# Patient Record
Sex: FEMALE | Race: BLACK OR AFRICAN AMERICAN | NOT HISPANIC OR LATINO | Employment: UNEMPLOYED | URBAN - METROPOLITAN AREA
[De-identification: names, ages, dates, MRNs, and addresses within clinical notes are randomized per-mention and may not be internally consistent; named-entity substitution may affect disease eponyms.]

---

## 2017-01-20 ENCOUNTER — RECORDS - HEALTHEAST (OUTPATIENT)
Dept: LAB | Facility: CLINIC | Age: 52
End: 2017-01-20

## 2017-01-20 LAB
CHOLEST SERPL-MCNC: 193 MG/DL
FASTING STATUS PATIENT QL REPORTED: ABNORMAL
HDLC SERPL-MCNC: 40 MG/DL
LDLC SERPL CALC-MCNC: 134 MG/DL
TRIGL SERPL-MCNC: 94 MG/DL

## 2017-01-25 ENCOUNTER — HOSPITAL ENCOUNTER (OUTPATIENT)
Dept: RADIOLOGY | Facility: HOSPITAL | Age: 52
Discharge: HOME | End: 2017-01-25

## 2017-01-25 ENCOUNTER — RECORDS - HEALTHEAST (OUTPATIENT)
Dept: ADMINISTRATIVE | Facility: OTHER | Age: 52
End: 2017-01-25

## 2017-01-25 DIAGNOSIS — R52 PAIN: ICD-10-CM

## 2017-05-08 ENCOUNTER — COMMUNICATION - HEALTHEAST (OUTPATIENT)
Dept: ENDOCRINOLOGY | Facility: CLINIC | Age: 52
End: 2017-05-08

## 2017-05-08 DIAGNOSIS — E04.2 NONTOXIC MULTINODULAR GOITER: ICD-10-CM

## 2017-05-11 ENCOUNTER — AMBULATORY - HEALTHEAST (OUTPATIENT)
Dept: LAB | Facility: CLINIC | Age: 52
End: 2017-05-11

## 2017-05-11 DIAGNOSIS — E04.2 NONTOXIC MULTINODULAR GOITER: ICD-10-CM

## 2017-05-16 ENCOUNTER — OFFICE VISIT - HEALTHEAST (OUTPATIENT)
Dept: ENDOCRINOLOGY | Facility: CLINIC | Age: 52
End: 2017-05-16

## 2017-05-16 DIAGNOSIS — E04.9 GOITER: ICD-10-CM

## 2017-05-24 ENCOUNTER — HOSPITAL ENCOUNTER (OUTPATIENT)
Dept: ULTRASOUND IMAGING | Facility: HOSPITAL | Age: 52
Discharge: HOME | End: 2017-05-24
Attending: INTERNAL MEDICINE

## 2017-05-24 DIAGNOSIS — E04.9 GOITER: ICD-10-CM

## 2017-07-19 ENCOUNTER — RECORDS - HEALTHEAST (OUTPATIENT)
Dept: ADMINISTRATIVE | Facility: OTHER | Age: 52
End: 2017-07-19

## 2017-09-20 ENCOUNTER — HOSPITAL ENCOUNTER (OUTPATIENT)
Dept: MAMMOGRAPHY | Facility: HOSPITAL | Age: 52
Discharge: HOME | End: 2017-09-20

## 2017-09-20 DIAGNOSIS — Z12.31 VISIT FOR SCREENING MAMMOGRAM: ICD-10-CM

## 2018-02-16 ENCOUNTER — RECORDS - HEALTHEAST (OUTPATIENT)
Dept: LAB | Facility: CLINIC | Age: 53
End: 2018-02-16

## 2018-02-16 LAB
ALBUMIN SERPL-MCNC: 3.5 G/DL (ref 3.5–5)
ALP SERPL-CCNC: 88 U/L (ref 45–120)
ALT SERPL W P-5'-P-CCNC: 21 U/L (ref 0–45)
ANION GAP SERPL CALCULATED.3IONS-SCNC: 9 MMOL/L (ref 5–18)
AST SERPL W P-5'-P-CCNC: 12 U/L (ref 0–40)
BILIRUB SERPL-MCNC: 0.2 MG/DL (ref 0–1)
BUN SERPL-MCNC: 9 MG/DL (ref 8–22)
CALCIUM SERPL-MCNC: 9.2 MG/DL (ref 8.5–10.5)
CHLORIDE BLD-SCNC: 110 MMOL/L (ref 98–107)
CO2 SERPL-SCNC: 21 MMOL/L (ref 22–31)
CREAT SERPL-MCNC: 0.83 MG/DL (ref 0.6–1.1)
GFR SERPL CREATININE-BSD FRML MDRD: >60 ML/MIN/1.73M2
GLUCOSE BLD-MCNC: 91 MG/DL (ref 70–125)
POTASSIUM BLD-SCNC: 3.9 MMOL/L (ref 3.5–5)
PROT SERPL-MCNC: 6.9 G/DL (ref 6–8)
SODIUM SERPL-SCNC: 140 MMOL/L (ref 136–145)

## 2018-04-20 ENCOUNTER — RECORDS - HEALTHEAST (OUTPATIENT)
Dept: LAB | Facility: CLINIC | Age: 53
End: 2018-04-20

## 2018-04-20 LAB
ANION GAP SERPL CALCULATED.3IONS-SCNC: 8 MMOL/L (ref 5–18)
BUN SERPL-MCNC: 4 MG/DL (ref 8–22)
CALCIUM SERPL-MCNC: 9 MG/DL (ref 8.5–10.5)
CHLORIDE BLD-SCNC: 109 MMOL/L (ref 98–107)
CO2 SERPL-SCNC: 23 MMOL/L (ref 22–31)
CREAT SERPL-MCNC: 0.73 MG/DL (ref 0.6–1.1)
GFR SERPL CREATININE-BSD FRML MDRD: >60 ML/MIN/1.73M2
GLUCOSE BLD-MCNC: 89 MG/DL (ref 70–125)
POTASSIUM BLD-SCNC: 3.6 MMOL/L (ref 3.5–5)
SODIUM SERPL-SCNC: 140 MMOL/L (ref 136–145)

## 2018-04-22 LAB — TOPIRAMATE SERPL-MCNC: 6.4 UG/ML (ref 5–20)

## 2018-09-07 ENCOUNTER — RECORDS - HEALTHEAST (OUTPATIENT)
Dept: LAB | Facility: CLINIC | Age: 53
End: 2018-09-07

## 2018-09-07 LAB
ALBUMIN SERPL-MCNC: 3.5 G/DL (ref 3.5–5)
ALP SERPL-CCNC: 69 U/L (ref 45–120)
ALT SERPL W P-5'-P-CCNC: 17 U/L (ref 0–45)
ANION GAP SERPL CALCULATED.3IONS-SCNC: 8 MMOL/L (ref 5–18)
AST SERPL W P-5'-P-CCNC: 15 U/L (ref 0–40)
BILIRUB SERPL-MCNC: 0.2 MG/DL (ref 0–1)
BUN SERPL-MCNC: 16 MG/DL (ref 8–22)
CALCIUM SERPL-MCNC: 9.4 MG/DL (ref 8.5–10.5)
CHLORIDE BLD-SCNC: 106 MMOL/L (ref 98–107)
CHOLEST SERPL-MCNC: 196 MG/DL
CO2 SERPL-SCNC: 24 MMOL/L (ref 22–31)
CREAT SERPL-MCNC: 0.76 MG/DL (ref 0.6–1.1)
FASTING STATUS PATIENT QL REPORTED: ABNORMAL
GFR SERPL CREATININE-BSD FRML MDRD: >60 ML/MIN/1.73M2
GLUCOSE BLD-MCNC: 84 MG/DL (ref 70–125)
HDLC SERPL-MCNC: 44 MG/DL
LDLC SERPL CALC-MCNC: 112 MG/DL
POTASSIUM BLD-SCNC: 4 MMOL/L (ref 3.5–5)
PROT SERPL-MCNC: 6.4 G/DL (ref 6–8)
SODIUM SERPL-SCNC: 138 MMOL/L (ref 136–145)
TRIGL SERPL-MCNC: 200 MG/DL

## 2018-11-07 ENCOUNTER — HOSPITAL ENCOUNTER (OUTPATIENT)
Dept: MAMMOGRAPHY | Facility: CLINIC | Age: 53
Discharge: HOME | End: 2018-11-07

## 2018-11-07 DIAGNOSIS — Z12.31 VISIT FOR SCREENING MAMMOGRAM: ICD-10-CM

## 2019-02-20 ENCOUNTER — RECORDS - HEALTHEAST (OUTPATIENT)
Dept: LAB | Facility: CLINIC | Age: 54
End: 2019-02-20

## 2019-02-20 LAB
ALBUMIN SERPL-MCNC: 3.5 G/DL (ref 3.5–5)
ALP SERPL-CCNC: 74 U/L (ref 45–120)
ALT SERPL W P-5'-P-CCNC: 14 U/L (ref 0–45)
ANION GAP SERPL CALCULATED.3IONS-SCNC: 7 MMOL/L (ref 5–18)
AST SERPL W P-5'-P-CCNC: 14 U/L (ref 0–40)
BASOPHILS # BLD AUTO: 0 THOU/UL (ref 0–0.2)
BASOPHILS NFR BLD AUTO: 1 % (ref 0–2)
BILIRUB SERPL-MCNC: 0.2 MG/DL (ref 0–1)
BUN SERPL-MCNC: 6 MG/DL (ref 8–22)
CALCIUM SERPL-MCNC: 9 MG/DL (ref 8.5–10.5)
CHLORIDE BLD-SCNC: 111 MMOL/L (ref 98–107)
CO2 SERPL-SCNC: 22 MMOL/L (ref 22–31)
CREAT SERPL-MCNC: 0.78 MG/DL (ref 0.6–1.1)
EOSINOPHIL # BLD AUTO: 0.2 THOU/UL (ref 0–0.4)
EOSINOPHIL NFR BLD AUTO: 3 % (ref 0–6)
ERYTHROCYTE [DISTWIDTH] IN BLOOD BY AUTOMATED COUNT: 12.9 % (ref 11–14.5)
GFR SERPL CREATININE-BSD FRML MDRD: >60 ML/MIN/1.73M2
GLUCOSE BLD-MCNC: 104 MG/DL (ref 70–125)
HCT VFR BLD AUTO: 36.8 % (ref 35–47)
HGB BLD-MCNC: 11.7 G/DL (ref 12–16)
LYMPHOCYTES # BLD AUTO: 2.1 THOU/UL (ref 0.8–4.4)
LYMPHOCYTES NFR BLD AUTO: 32 % (ref 20–40)
MCH RBC QN AUTO: 30.9 PG (ref 27–34)
MCHC RBC AUTO-ENTMCNC: 31.8 G/DL (ref 32–36)
MCV RBC AUTO: 97 FL (ref 80–100)
MONOCYTES # BLD AUTO: 0.6 THOU/UL (ref 0–0.9)
MONOCYTES NFR BLD AUTO: 8 % (ref 2–10)
NEUTROPHILS # BLD AUTO: 3.8 THOU/UL (ref 2–7.7)
NEUTROPHILS NFR BLD AUTO: 57 % (ref 50–70)
PLATELET # BLD AUTO: 367 THOU/UL (ref 140–440)
PMV BLD AUTO: 8.6 FL (ref 8.5–12.5)
POTASSIUM BLD-SCNC: 3.9 MMOL/L (ref 3.5–5)
PROT SERPL-MCNC: 6.5 G/DL (ref 6–8)
RBC # BLD AUTO: 3.79 MILL/UL (ref 3.8–5.4)
SODIUM SERPL-SCNC: 140 MMOL/L (ref 136–145)
WBC: 6.7 THOU/UL (ref 4–11)

## 2019-02-22 LAB — TOPIRAMATE SERPL-MCNC: 6.1 UG/ML (ref 5–20)

## 2019-05-20 ENCOUNTER — RECORDS - HEALTHEAST (OUTPATIENT)
Dept: ADMINISTRATIVE | Facility: OTHER | Age: 54
End: 2019-05-20

## 2019-05-22 ENCOUNTER — COMMUNICATION - HEALTHEAST (OUTPATIENT)
Dept: SURGERY | Facility: CLINIC | Age: 54
End: 2019-05-22

## 2019-05-31 ENCOUNTER — AMBULATORY - HEALTHEAST (OUTPATIENT)
Dept: ADMINISTRATIVE | Facility: CLINIC | Age: 54
End: 2019-05-31

## 2019-05-31 DIAGNOSIS — Z80.0 FAMILY HISTORY OF COLON CANCER: ICD-10-CM

## 2019-06-24 ENCOUNTER — RECORDS - HEALTHEAST (OUTPATIENT)
Dept: ADMINISTRATIVE | Facility: OTHER | Age: 54
End: 2019-06-24

## 2019-06-24 ENCOUNTER — ANESTHESIA - HEALTHEAST (OUTPATIENT)
Dept: SURGERY | Facility: AMBULATORY SURGERY CENTER | Age: 54
End: 2019-06-24

## 2019-07-12 ENCOUNTER — RECORDS - HEALTHEAST (OUTPATIENT)
Dept: LAB | Facility: CLINIC | Age: 54
End: 2019-07-12

## 2019-07-12 LAB
ALBUMIN SERPL-MCNC: 3.5 G/DL (ref 3.5–5)
ALP SERPL-CCNC: 72 U/L (ref 45–120)
ALT SERPL W P-5'-P-CCNC: 16 U/L (ref 0–45)
ANION GAP SERPL CALCULATED.3IONS-SCNC: 14 MMOL/L (ref 5–18)
AST SERPL W P-5'-P-CCNC: ABNORMAL U/L (ref 0–40)
BILIRUB SERPL-MCNC: 0.2 MG/DL (ref 0–1)
BUN SERPL-MCNC: 6 MG/DL (ref 8–22)
CALCIUM SERPL-MCNC: 9.3 MG/DL (ref 8.5–10.5)
CHLORIDE BLD-SCNC: 111 MMOL/L (ref 98–107)
CO2 SERPL-SCNC: 15 MMOL/L (ref 22–31)
CREAT SERPL-MCNC: 0.79 MG/DL (ref 0.6–1.1)
GFR SERPL CREATININE-BSD FRML MDRD: >60 ML/MIN/1.73M2
GLUCOSE BLD-MCNC: 92 MG/DL (ref 70–125)
HGB BLD-MCNC: 12.3 G/DL (ref 12–16)
POTASSIUM BLD-SCNC: ABNORMAL MMOL/L (ref 3.5–5)
PROT SERPL-MCNC: 7.2 G/DL (ref 6–8)
SODIUM SERPL-SCNC: 140 MMOL/L (ref 136–145)

## 2019-07-17 ENCOUNTER — RECORDS - HEALTHEAST (OUTPATIENT)
Dept: LAB | Facility: CLINIC | Age: 54
End: 2019-07-17

## 2019-07-17 LAB
ALBUMIN SERPL-MCNC: 3.9 G/DL (ref 3.5–5)
ALP SERPL-CCNC: 81 U/L (ref 45–120)
ALT SERPL W P-5'-P-CCNC: 14 U/L (ref 0–45)
ANION GAP SERPL CALCULATED.3IONS-SCNC: 9 MMOL/L (ref 5–18)
AST SERPL W P-5'-P-CCNC: 12 U/L (ref 0–40)
BILIRUB SERPL-MCNC: 0.2 MG/DL (ref 0–1)
BUN SERPL-MCNC: 5 MG/DL (ref 8–22)
CALCIUM SERPL-MCNC: 9.6 MG/DL (ref 8.5–10.5)
CHLORIDE BLD-SCNC: 106 MMOL/L (ref 98–107)
CO2 SERPL-SCNC: 25 MMOL/L (ref 22–31)
CREAT SERPL-MCNC: 0.95 MG/DL (ref 0.6–1.1)
GFR SERPL CREATININE-BSD FRML MDRD: >60 ML/MIN/1.73M2
GLUCOSE BLD-MCNC: 88 MG/DL (ref 70–125)
POTASSIUM BLD-SCNC: 3.8 MMOL/L (ref 3.5–5)
PROT SERPL-MCNC: 7.2 G/DL (ref 6–8)
SODIUM SERPL-SCNC: 140 MMOL/L (ref 136–145)

## 2019-07-26 ASSESSMENT — MIFFLIN-ST. JEOR: SCORE: 1461.79

## 2019-07-29 ENCOUNTER — ANESTHESIA - HEALTHEAST (OUTPATIENT)
Dept: SURGERY | Facility: AMBULATORY SURGERY CENTER | Age: 54
End: 2019-07-29

## 2019-07-31 ENCOUNTER — SURGERY - HEALTHEAST (OUTPATIENT)
Dept: SURGERY | Facility: AMBULATORY SURGERY CENTER | Age: 54
End: 2019-07-31

## 2019-07-31 ASSESSMENT — MIFFLIN-ST. JEOR: SCORE: 1461.79

## 2019-08-02 ENCOUNTER — COMMUNICATION - HEALTHEAST (OUTPATIENT)
Dept: SURGERY | Facility: CLINIC | Age: 54
End: 2019-08-02

## 2019-09-17 ENCOUNTER — RECORDS - HEALTHEAST (OUTPATIENT)
Dept: ADMINISTRATIVE | Facility: OTHER | Age: 54
End: 2019-09-17

## 2019-10-22 ENCOUNTER — RECORDS - HEALTHEAST (OUTPATIENT)
Dept: LAB | Facility: CLINIC | Age: 54
End: 2019-10-22

## 2019-10-22 LAB
CHOLEST SERPL-MCNC: 230 MG/DL
FASTING STATUS PATIENT QL REPORTED: ABNORMAL
HDLC SERPL-MCNC: 44 MG/DL
LDLC SERPL CALC-MCNC: 156 MG/DL
TRIGL SERPL-MCNC: 148 MG/DL
TSH SERPL DL<=0.005 MIU/L-ACNC: 1.34 UIU/ML (ref 0.3–5)

## 2019-11-11 ENCOUNTER — HOSPITAL ENCOUNTER (OUTPATIENT)
Dept: MAMMOGRAPHY | Facility: CLINIC | Age: 54
Discharge: HOME | End: 2019-11-11

## 2019-11-11 DIAGNOSIS — Z12.31 VISIT FOR SCREENING MAMMOGRAM: ICD-10-CM

## 2020-01-17 ENCOUNTER — RECORDS - HEALTHEAST (OUTPATIENT)
Dept: LAB | Facility: CLINIC | Age: 55
End: 2020-01-17

## 2020-01-17 LAB
ALBUMIN SERPL-MCNC: 3.5 G/DL (ref 3.5–5)
ALP SERPL-CCNC: 74 U/L (ref 45–120)
ALT SERPL W P-5'-P-CCNC: <9 U/L (ref 0–45)
ANION GAP SERPL CALCULATED.3IONS-SCNC: 9 MMOL/L (ref 5–18)
AST SERPL W P-5'-P-CCNC: 10 U/L (ref 0–40)
BILIRUB SERPL-MCNC: 0.2 MG/DL (ref 0–1)
BUN SERPL-MCNC: 7 MG/DL (ref 8–22)
CALCIUM SERPL-MCNC: 9.1 MG/DL (ref 8.5–10.5)
CHLORIDE BLD-SCNC: 112 MMOL/L (ref 98–107)
CO2 SERPL-SCNC: 22 MMOL/L (ref 22–31)
CREAT SERPL-MCNC: 0.81 MG/DL (ref 0.6–1.1)
GFR SERPL CREATININE-BSD FRML MDRD: >60 ML/MIN/1.73M2
GLUCOSE BLD-MCNC: 94 MG/DL (ref 70–125)
POTASSIUM BLD-SCNC: 4.1 MMOL/L (ref 3.5–5)
PROT SERPL-MCNC: 6.4 G/DL (ref 6–8)
SODIUM SERPL-SCNC: 143 MMOL/L (ref 136–145)

## 2020-01-19 LAB — TOPIRAMATE SERPL-MCNC: 6.9 UG/ML (ref 5–20)

## 2020-07-07 PROBLEM — F34.1 DYSTHYMIA: Status: ACTIVE | Noted: 2020-07-07

## 2020-07-07 PROBLEM — R27.9 LACK OF COORDINATION: Status: ACTIVE | Noted: 2020-07-07

## 2020-07-07 PROBLEM — E87.6 DRUG-INDUCED HYPOKALEMIA: Status: ACTIVE | Noted: 2020-07-07

## 2020-07-07 PROBLEM — R51.9 HEADACHE DISORDER: Status: ACTIVE | Noted: 2020-07-07

## 2020-07-07 PROBLEM — G43.109 MIGRAINE WITH AURA: Status: ACTIVE | Noted: 2020-07-07

## 2020-07-07 PROBLEM — T50.905A DRUG-INDUCED HYPOKALEMIA: Status: ACTIVE | Noted: 2020-07-07

## 2020-07-07 PROBLEM — J45.909 ASTHMA: Status: ACTIVE | Noted: 2020-07-07

## 2020-07-07 PROBLEM — G47.33 OBSTRUCTIVE SLEEP APNEA SYNDROME: Status: ACTIVE | Noted: 2020-07-07

## 2020-07-07 PROBLEM — M19.90 ARTHRITIS: Status: ACTIVE | Noted: 2020-07-07

## 2020-07-07 PROBLEM — C18.9 MALIGNANT NEOPLASM OF COLON (H): Status: ACTIVE | Noted: 2020-07-07

## 2020-07-07 PROBLEM — C50.919 MALIGNANT NEOPLASM OF BREAST (H): Status: ACTIVE | Noted: 2020-07-07

## 2020-07-07 PROBLEM — R29.6 FALLING: Status: ACTIVE | Noted: 2020-07-07

## 2020-07-07 PROBLEM — E66.9 OBESITY: Status: ACTIVE | Noted: 2020-07-07

## 2020-07-07 PROBLEM — G43.019 COMMON MIGRAINE WITH INTRACTABLE MIGRAINE: Status: ACTIVE | Noted: 2020-07-07

## 2020-07-10 ENCOUNTER — OFFICE VISIT (OUTPATIENT)
Dept: NEUROLOGY | Facility: CLINIC | Age: 55
End: 2020-07-10
Payer: COMMERCIAL

## 2020-07-10 VITALS — HEIGHT: 59 IN | WEIGHT: 196 LBS | BODY MASS INDEX: 39.51 KG/M2

## 2020-07-10 DIAGNOSIS — G43.109 MIGRAINE WITH AURA AND WITHOUT STATUS MIGRAINOSUS, NOT INTRACTABLE: Primary | ICD-10-CM

## 2020-07-10 PROCEDURE — 99214 OFFICE O/P EST MOD 30 MIN: CPT | Mod: GT | Performed by: PSYCHIATRY & NEUROLOGY

## 2020-07-10 RX ORDER — TOPIRAMATE 50 MG/1
1 TABLET, FILM COATED ORAL SEE ADMIN INSTRUCTIONS
COMMUNITY
Start: 2020-01-21 | End: 2020-07-10

## 2020-07-10 RX ORDER — BUSPIRONE HYDROCHLORIDE 5 MG/1
5 TABLET ORAL 3 TIMES DAILY
Status: ON HOLD | COMMUNITY
Start: 2019-07-03 | End: 2023-01-01

## 2020-07-10 RX ORDER — SUMATRIPTAN 50 MG/1
50 TABLET, FILM COATED ORAL PRN
Qty: 9 TABLET | Refills: 11 | Status: SHIPPED | OUTPATIENT
Start: 2020-07-10 | End: 2021-01-11

## 2020-07-10 RX ORDER — TOPIRAMATE 50 MG/1
50 TABLET, FILM COATED ORAL SEE ADMIN INSTRUCTIONS
Qty: 90 TABLET | Refills: 11 | Status: SHIPPED | OUTPATIENT
Start: 2020-07-10 | End: 2021-01-11

## 2020-07-10 RX ORDER — SUMATRIPTAN 50 MG/1
50 TABLET, FILM COATED ORAL PRN
COMMUNITY
Start: 2020-01-21 | End: 2020-07-10

## 2020-07-10 RX ORDER — HYDROXYZINE PAMOATE 25 MG/1
25 CAPSULE ORAL 3 TIMES DAILY PRN
COMMUNITY
Start: 2019-02-11

## 2020-07-10 ASSESSMENT — MIFFLIN-ST. JEOR: SCORE: 1384.68

## 2020-07-10 NOTE — PATIENT INSTRUCTIONS
Patient Education   About Migraine Headaches  What is a migraine headache?  A migraine is a very painful type of headache. It may last a few hours or days. During a migraine, you may have vision problems and feel sick to your stomach.  Migraines are three times more common in women than in men. Once they start, you may get them for the rest of your life. They may occur less often as you age.  What causes it?  We don't know the exact cause, but many things can trigger a migraine. These include:    Stress and anxiety    Lack of food or sleep    Foods and drinks that contain tyramine, such as:  ? Red víctor and some beers  ? Aged cheeses (like cheddar or blue cheese)  ? Yeast  ? Aged, dried or cured meats  ? Fermented foods like sauerkraut, soy sauce, miso and jose chi    Too much light    Chemicals (gasoline, cleaning products, perfume, glue, etc.)    Weather changes    Certain medicines    Hormone changes (in women).  What are symptoms?  Some people can tell when they're about to have a migraine. They may see flashing lights or zigzag lines in front of their eyes. Or they may lose their vision for a short time.  With a migraine, you may:    Feel pain or pulsing on one side of the head. For some people, the entire head hurts.    Be very sensitive to light and sound.    Feel nauseated (sick to your stomach) and vomit (throw up).  How is it treated?  Your care team may suggest medicine to prevent or relieve your symptoms. Once you start having migraines, you may also need medicine to keep them from getting worse.   Take your medicine at the first sign of a migraine. It may take several tries to find a medicine that works for you.   When a migraine comes:    Lie down in a quiet, dark room. Try not to bend over, as this may cause more pain.    Put a cold pack on your head. Try a bag of frozen vegetables, wrapped with a thin cloth.    Drink extra fluids. If you can't drink, suck on ice chips.  How can I prevent  migraines?  It will help to keep a migraine diary. By keeping a diary, you may find the cause of your headaches. Once you know the cause, you can take steps to prevent migraines in the future.  It also helps to live a healthy lifestyle. This means:    Get regular exercise. (If exercise triggers your headaches, tell your doctor.)    Drink plenty of water.    Eat healthy meals at regular times.    Try to go to bed and get up and regular times.    Don't smoke. Avoid second-hand smoke.    Avoid caffeine. Coffee, tea and soda may help a migraine. But if you drink them too often, they can cause migraines.    Find ways to relax, have fun and reduce stress in your life.    Try complementary therapies (yoga, acupuncture, massage, biofeedback, etc.).  When should I call my clinic?  Call your clinic at once if you have new or unusual symptoms, such as:     Pain that gets worse or lasts more than 24 hours.    Slurred speech or problems talking.    A weak arm or leg that you can't move normally.    A fever over 100 F (37.8 C), taken under the tongue.    Stiff neck.    Vomiting (throwing up) for several hours.  For more information about migraines  Contact the [employer] (ACHE) at 1-742.320.6133 or www.headaches.org.  Local providers of complementary therapies  These services may not be covered by insurance. Check your insurance plan.  [facility]  [phone]   Includes pain education, behavioral therapy,   trigger point injections and more.  [facility]   [phone]   Includes acupuncture, massage, myofascial release.  [facility]  [phone]  [email or web address]  Includes mindfulness, meditation, yoga.  Community Education     [place]: armida.mpls.k12.mn.    [place]: commedprograms.spps.org  Look for programs on yoga, mindfulness, etc.  [facility]    [phone]  www.pathways[place].org  Includes mindfulness, yoga, body-mind skills.  For informational purposes only. Not to replace the advice of your health care provider.   Copyright   2011 [employer]. All rights reserved. [employer] [id number] - 11/15.

## 2020-07-10 NOTE — NURSING NOTE
Chief Complaint   Patient presents with     Follow Up     Migraines-States she is doing ok and no new concerns      Video Visit [phone] smart phone  Edgardo Childs on 7/10/2020 at 12:39 PM

## 2020-07-10 NOTE — PROGRESS NOTES
"Video visit requested by patient  Video visit due to the COVID-19 pandemic  Text number [phone]  Patient identified    .Patient is being evaluated via a billable video visit. The patient has been notified of following:     \"This video visit will be conducted via a call between you and your physician/provider. We have found that certain health care needs can be provided without the need for an in-person physical exam. This service lets us provide the care you need with a video conversation. If a prescription is necessary we can send it directly to your pharmacy. If lab work is needed we can place an order for that and you can then stop by our lab to have the test done at a later time.    If during the course of the call the physician/provider feels a video visit is not appropriate, you will not be charged for this service.    Physician has received verbal consent for a Video Visit from the patient? YES    Patient would like the video invitation sent by: Text [phone]      Video Visit Details    Type of service: Video Visit    Video Start Time: 12:48 PM    Video End Time (time video stopped): 1:18 PM    Originating Location (pt. Location): Patient's Home    Distant Location (provider location): [facility]     Mode of Communication: Video Conference via [employer] / [employer]          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 extensive work-up as far back as 2006    Headache frequency is the same  No new symptoms or signs with her headaches  Is staying " "well-hydrated getting enough food and  Does have a PCA and her son helps her out a lot      Patient has migraine headaches  Frequency is about 9/month  Uses Imitrex 50 mill grams once per day for abortive therapy tolerates well without side effects  Uses Topamax 50 mg tablet 1 in the morning 2 at night with a level of 6.1 February 2019  Chloride of 111 CO2 of 22 knows the side effects of Topamax we discussed today at length        Patient is pretty much scooter bound when she is out and about  Uses a walker inside her apartment  Has a shower chair  Lives on the 10th floor but at one level has an elevator  Sons do the grocery shopping for her  Has a PCA who does cleaning and cooking 7 days/week 5 hours/day            Patient s current difficulties include:    1. Difficulty with gait which is chronic in nature, uses a motorized scooter wheelchair, has a four-wheeled walker also. Gait difficulties have been going on as far back as 2006, multifactorial in nature.  2. Has a past history of bowel and bladder dysfunction, mild incontinence, not progressive. No structural lesion had been found in the past in her spine during previous workups.  3. History of breast cancer, currently quiet for the last few years, is \"cancer free.\"  4. History of colon cancer with colonoscopy followed by oncologist, and tracked by Dr. Chua who removed a polyp in the past.  5. Breast cancer was originally diagnosed in 2010.  6. Has a history of hysterectomy, oophorectomy, and fibroids removed. No cancer when those were removed in 2013 workup.    Patient has a history of chronic daily headaches:    a. Does not want to use Botox for chronic headaches.  b. Has been on Topamax as a preventative with the last level of 6.4 in April 2018.  c. Does use Imitrex as an abortive therapy.  d. Have talked to her about medication overuse headaches and proper headache hygiene.    Complicating factors include:    1. Complex history of ataxia went back at least " to 2006 previous workup for this is outlined in the 2010 notes and 2013 notes for reference.  2. History of obstructive sleep apnea could not tolerate CPAP, was switched more to nasal pillows, but this can exacerbate her headache control also.    Summary of medical issues:    1. Cancer, stromal sarcoma of the breast, and malignant colonic polyp.  2. Possible CVA in 2010.  3. Asthma and COPD followed by pulmonary.  4. Obstructive sleep apnea uses nasal pillows or prongs, cannot do CPAP mask.  5. Hypertension and hypothyroidism.  6. Chronic back pain.  7. Depression.  8. Chronic headache disorder, migraines on Topamax, which helps make them better.  9. Patient is disabled from competitive gainful employment due to multiple medical issues as above confirmed on October 19, 2012 note for details.              Review of Systems   Headaches as above reasonably controlled with medications both abortive and preventative as above    No diplopia dysarthria dysphasia  No numbness  No new weakness of the arms  Chronic ataxia no change  Uses the motorized scooter    No chest pain today no shortness of breath  No nausea vomiting or diarrhea  Denies any significant constipation      HEENT is missing some teeth speech is just a little bit dysarthric that is her baseline  Moving air well  No significant edema in the feet  Follows closely with her oncologist for her breast cancer and follows up with primary for colon cancer      Neurologic exam  Alert oriented x3  Normal process speech  Normal naming  Normal comprehension  Normal repetition  Memory recall adequate  No neglect    Cranial nerves II through XII normal    No ophthalmoplegia  No nystagmus  Tongue twisters are good face is symmetrical (always has a little bit of thickness to her speech which is her baseline)    Upper extremities no drift  No tremor  Normal finger-nose    Lower extremities  Seems to move her legs relatively well while seated  Has a little bit of weakness in  the iliopsoas maybe 4+ in the past when seen face-to-face  Anterior tibial strength is just a little bit down bilaterally in the past when seen face-to-face    Seems to have more of a postural problem he is to hang onto things when she transfers uses the motorized scooter for longer distances    Assessment/Plan     Ataxia (R27.8)   Motorized scooter for longer distances  Does have a walker for inside the home and transferring  Does have a shower chair  Has a PCA 7 days/week 5 hours/day for cooking and cleaning         Migraine   Topamax 50 mg tablet 1 in the morning 2 at night  Imitrex 50 mg tablet 1 tab as needed severe headache number 9/month try not overuse  We discussed good headache hygiene with proper sleep pattern hydration nutrition and avoiding triggers    Patient had a recent follow-up sleep study to readjust her CPAP mask which might help we will see Dr. Galindo the sleep specialist for that      We reviewed the above    25 minutes total care time today greater than for percent face-to-face discussion about the above issues.

## 2020-07-10 NOTE — LETTER
"    7/10/2020         RE: Peri Irizarry  [address]  [place] [id number]        Dear Colleague,    Thank you for referring your patient, Peri Irizarry, to the [facility]. Please see a copy of my visit note below.    Video visit requested by patient  Video visit due to the COVID-19 pandemic  Text number [phone]  Patient identified    .Patient is being evaluated via a billable video visit. The patient has been notified of following:     \"This video visit will be conducted via a call between you and your physician/provider. We have found that certain health care needs can be provided without the need for an in-person physical exam. This service lets us provide the care you need with a video conversation. If a prescription is necessary we can send it directly to your pharmacy. If lab work is needed we can place an order for that and you can then stop by our lab to have the test done at a later time.    If during the course of the call the physician/provider feels a video visit is not appropriate, you will not be charged for this service.    Physician has received verbal consent for a Video Visit from the patient? YES    Patient would like the video invitation sent by: Text [phone]      Video Visit Details    Type of service: Video Visit    Video Start Time: 12:48 PM    Video End Time (time video stopped): 1:18 PM    Originating Location (pt. Location): Patient's Home    Distant Location (provider location): 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 extensive work-up as far back as 2006    Headache frequency is the same  No new symptoms or signs with her headaches  Is staying well-hydrated getting enough food and  Does have a PCA and her son helps her out a lot      Patient has migraine headaches  Frequency is about 9/month  Uses Imitrex 50 mill grams once per day for abortive therapy tolerates well without side effects  Uses Topamax 50 mg tablet 1 in the morning 2 at night with a level of 6.1 February 2019  Chloride of 111 CO2 of 22 knows the side effects of Topamax we discussed today at length        Patient is pretty much scooter bound when she is out and about  Uses a walker inside her apartment  Has a shower chair  Lives on the 10th floor but at one level has an elevator  Sons do the grocery shopping for her  Has a PCA who does cleaning and cooking 7 days/week 5 hours/day            Patient s current difficulties include:    1. Difficulty with gait which is chronic in nature, uses a motorized scooter wheelchair, has a four-wheeled walker also. Gait difficulties have been going on as far back as 2006, multifactorial in nature.  2. Has a past history of bowel and bladder dysfunction, mild incontinence, not progressive. No structural lesion had been found in the past in her spine during previous workups.  3. History of breast cancer, currently quiet for the last few years, is \"cancer free.\"  4. History of colon cancer with colonoscopy followed by oncologist, and tracked by Dr. Chua who removed a polyp in the past.  5. Breast cancer was originally diagnosed in 2010.  6. Has a history of hysterectomy, oophorectomy, and fibroids removed. No cancer when those were removed in 2013 workup.    Patient has a history of chronic daily headaches:    a. Does not want to use Botox for chronic headaches.  b. Has been on Topamax as a " preventative with the last level of 6.4 in April 2018.  c. Does use Imitrex as an abortive therapy.  d. Have talked to her about medication overuse headaches and proper headache hygiene.    Complicating factors include:    1. Complex history of ataxia went back at least to 2006 previous workup for this is outlined in the 2010 notes and 2013 notes for reference.  2. History of obstructive sleep apnea could not tolerate CPAP, was switched more to nasal pillows, but this can exacerbate her headache control also.    Summary of medical issues:    1. Cancer, stromal sarcoma of the breast, and malignant colonic polyp.  2. Possible CVA in 2010.  3. Asthma and COPD followed by pulmonary.  4. Obstructive sleep apnea uses nasal pillows or prongs, cannot do CPAP mask.  5. Hypertension and hypothyroidism.  6. Chronic back pain.  7. Depression.  8. Chronic headache disorder, migraines on Topamax, which helps make them better.  9. Patient is disabled from competitive gainful employment due to multiple medical issues as above confirmed on October 19, 2012 note for details.              Review of Systems   Headaches as above reasonably controlled with medications both abortive and preventative as above    No diplopia dysarthria dysphasia  No numbness  No new weakness of the arms  Chronic ataxia no change  Uses the motorized scooter    No chest pain today no shortness of breath  No nausea vomiting or diarrhea  Denies any significant constipation      HEENT is missing some teeth speech is just a little bit dysarthric that is her baseline  Moving air well  No significant edema in the feet  Follows closely with her oncologist for her breast cancer and follows up with primary for colon cancer      Neurologic exam  Alert oriented x3  Normal process speech  Normal naming  Normal comprehension  Normal repetition  Memory recall adequate  No neglect    Cranial nerves II through XII normal    No ophthalmoplegia  No nystagmus  Tongue twisters  are good face is symmetrical (always has a little bit of thickness to her speech which is her baseline)    Upper extremities no drift  No tremor  Normal finger-nose    Lower extremities  Seems to move her legs relatively well while seated  Has a little bit of weakness in the iliopsoas maybe 4+ in the past when seen face-to-face  Anterior tibial strength is just a little bit down bilaterally in the past when seen face-to-face    Seems to have more of a postural problem he is to hang onto things when she transfers uses the motorized scooter for longer distances    Assessment/Plan     Ataxia (R27.8)   Motorized scooter for longer distances  Does have a walker for inside the home and transferring  Does have a shower chair  Has a PCA 7 days/week 5 hours/day for cooking and cleaning         Migraine   Topamax 50 mg tablet 1 in the morning 2 at night  Imitrex 50 mg tablet 1 tab as needed severe headache number 9/month try not overuse  We discussed good headache hygiene with proper sleep pattern hydration nutrition and avoiding triggers    Patient had a recent follow-up sleep study to readjust her CPAP mask which might help we will see Dr. Galindo the sleep specialist for that      We reviewed the above    25 minutes total care time today greater than for percent face-to-face discussion about the above issues.    Again, thank you for allowing me to participate in the care of your patient.        Sincerely,        Bigg Yoon MD

## 2020-12-03 ENCOUNTER — HOSPITAL ENCOUNTER (OUTPATIENT)
Dept: MAMMOGRAPHY | Facility: CLINIC | Age: 55
Discharge: HOME | End: 2020-12-03

## 2020-12-03 DIAGNOSIS — Z12.31 VISIT FOR SCREENING MAMMOGRAM: ICD-10-CM

## 2021-01-11 ENCOUNTER — VIRTUAL VISIT (OUTPATIENT)
Dept: NEUROLOGY | Facility: CLINIC | Age: 56
End: 2021-01-11
Payer: COMMERCIAL

## 2021-01-11 VITALS — HEIGHT: 59 IN | WEIGHT: 189 LBS | BODY MASS INDEX: 38.1 KG/M2

## 2021-01-11 DIAGNOSIS — R27.9 LACK OF COORDINATION: ICD-10-CM

## 2021-01-11 DIAGNOSIS — G43.109 MIGRAINE WITH AURA AND WITHOUT STATUS MIGRAINOSUS, NOT INTRACTABLE: Primary | ICD-10-CM

## 2021-01-11 PROCEDURE — 99214 OFFICE O/P EST MOD 30 MIN: CPT | Mod: TEL | Performed by: PSYCHIATRY & NEUROLOGY

## 2021-01-11 RX ORDER — DULOXETIN HYDROCHLORIDE 30 MG/1
30 CAPSULE, DELAYED RELEASE ORAL 2 TIMES DAILY
COMMUNITY

## 2021-01-11 RX ORDER — SUMATRIPTAN 50 MG/1
50 TABLET, FILM COATED ORAL PRN
Qty: 9 TABLET | Refills: 11 | Status: SHIPPED | OUTPATIENT
Start: 2021-01-11 | End: 2022-01-03

## 2021-01-11 RX ORDER — VENLAFAXINE 37.5 MG/1
37.5 TABLET ORAL
COMMUNITY
End: 2022-12-02

## 2021-01-11 RX ORDER — AMLODIPINE BESYLATE 5 MG/1
5 TABLET ORAL
COMMUNITY
End: 2023-01-01

## 2021-01-11 RX ORDER — FERROUS SULFATE 325(65) MG
325 TABLET ORAL
COMMUNITY

## 2021-01-11 RX ORDER — ALENDRONATE SODIUM 35 MG/1
35 TABLET ORAL
COMMUNITY
Start: 2020-12-21

## 2021-01-11 RX ORDER — POLYETHYLENE GLYCOL 3350 17 G/17G
17 POWDER, FOR SOLUTION ORAL DAILY PRN
COMMUNITY
End: 2023-01-01

## 2021-01-11 RX ORDER — CHOLECALCIFEROL (VITAMIN D3) 50 MCG
50 TABLET ORAL DAILY
COMMUNITY
Start: 2020-12-28

## 2021-01-11 RX ORDER — NAPROXEN 500 MG/1
500 TABLET ORAL 2 TIMES DAILY PRN
COMMUNITY
End: 2023-01-01

## 2021-01-11 RX ORDER — CYCLOBENZAPRINE HCL 5 MG
5 TABLET ORAL 3 TIMES DAILY PRN
COMMUNITY

## 2021-01-11 RX ORDER — POTASSIUM CHLORIDE 750 MG/1
20 TABLET, EXTENDED RELEASE ORAL DAILY
Status: ON HOLD | COMMUNITY
Start: 2020-10-25 | End: 2023-01-01

## 2021-01-11 RX ORDER — MONTELUKAST SODIUM 10 MG/1
10 TABLET ORAL AT BEDTIME
COMMUNITY

## 2021-01-11 RX ORDER — TIOTROPIUM BROMIDE INHALATION SPRAY 1.56 UG/1
SPRAY, METERED RESPIRATORY (INHALATION)
COMMUNITY
Start: 2020-12-15 | End: 2023-01-01

## 2021-01-11 RX ORDER — AMOXICILLIN 250 MG
2 CAPSULE ORAL DAILY PRN
COMMUNITY

## 2021-01-11 RX ORDER — PHENOL 1.4 %
600 AEROSOL, SPRAY (ML) MUCOUS MEMBRANE 2 TIMES DAILY WITH MEALS
COMMUNITY
Start: 2020-12-15

## 2021-01-11 RX ORDER — GABAPENTIN 300 MG/1
CAPSULE ORAL
COMMUNITY
End: 2023-01-01

## 2021-01-11 RX ORDER — ALBUTEROL SULFATE 0.63 MG/3ML
1 SOLUTION RESPIRATORY (INHALATION)
COMMUNITY
End: 2022-12-02

## 2021-01-11 RX ORDER — TOPIRAMATE 50 MG/1
50 TABLET, FILM COATED ORAL SEE ADMIN INSTRUCTIONS
Qty: 90 TABLET | Refills: 11 | Status: SHIPPED | OUTPATIENT
Start: 2021-01-11 | End: 2021-12-17

## 2021-01-11 RX ORDER — FUROSEMIDE 40 MG
40 TABLET ORAL DAILY
Status: ON HOLD | COMMUNITY
End: 2023-01-01

## 2021-01-11 RX ORDER — HYDROCODONE BITARTRATE AND ACETAMINOPHEN 5; 325 MG/1; MG/1
TABLET ORAL
COMMUNITY
Start: 2020-12-02 | End: 2023-01-01

## 2021-01-11 RX ORDER — TRAZODONE HYDROCHLORIDE 50 MG/1
100 TABLET, FILM COATED ORAL AT BEDTIME
COMMUNITY

## 2021-01-11 RX ORDER — HYDROCHLOROTHIAZIDE 25 MG/1
25 TABLET ORAL DAILY
COMMUNITY

## 2021-01-11 RX ORDER — NYSTATIN AND TRIAMCINOLONE ACETONIDE 100000; 1 [USP'U]/G; MG/G
1 CREAM TOPICAL
COMMUNITY
End: 2022-01-03

## 2021-01-11 RX ORDER — ALBUTEROL SULFATE 90 UG/1
1-2 AEROSOL, METERED RESPIRATORY (INHALATION) EVERY 4 HOURS PRN
COMMUNITY
Start: 2019-11-04

## 2021-01-11 RX ORDER — CHOLECALCIFEROL (VITAMIN D3) 125 MCG
CAPSULE ORAL
COMMUNITY
Start: 2020-09-14 | End: 2022-12-02

## 2021-01-11 ASSESSMENT — MIFFLIN-ST. JEOR: SCORE: 1352.93

## 2021-01-11 NOTE — NURSING NOTE
Chief Complaint   Patient presents with     Follow Up     Migraines. Pt states she has been doing well.      Video Visit     [employer] g[facility]riele, send text link to [phone]     Tanvi Becker LPN on 1/11/2021 at 11:07 AM

## 2021-01-11 NOTE — PROGRESS NOTES
"Video visit requested by patient  Video visit due to the COVID-19 pandemic  Text number [phone]  Patient identified    .Patient is being evaluated via a billable video visit. The patient has been notified of following:     \"This video visit will be conducted via a call between you and your physician/provider. We have found that certain health care needs can be provided without the need for an in-person physical exam. This service lets us provide the care you need with a video conversation. If a prescription is necessary we can send it directly to your pharmacy. If lab work is needed we can place an order for that and you can then stop by our lab to have the test done at a later time.    If during the course of the call the physician/provider feels a video visit is not appropriate, you will not be charged for this service.    Physician has received verbal consent for a Video Visit from the patient? YES    Patient would like the video invitation sent by: Text [phone]      Video Visit Details    Type of service: Video Visit    Video Start Time: 11:08 AM    Video End Time (time video stopped):     Originating Location (pt. Location): Patient's Home    Distant Location (provider location): [facility] [place]     Mode of Communication: Video Conference via [employer]    [facility] to connect switch to a telephone visit  .  The patient has been notified of following:     \"This telephone visit will be conducted via a call between you and your physician/provider. We have found that certain health care needs can be provided without the need for a physical exam. This service lets us provide the care you need with a short phone conversation. If a prescription is necessary we can send it directly to your pharmacy. If lab work is needed we can place an order for that and you can then stop by our lab to have the test done at a later time.    If during the course of the call the physician/provider feels a " "telephone visit is not appropriate, you will not be charged for this service.\"     Patient has given verbal consent for Telephone visit? Yes  Consent has been obtained for this service by 1 care team member: yes.              History of Present Illness   55-year-old here for neurologic follow-up  Last seen video follow-up visit 7/10/2020  Today's visit January 11, 2021, switch to telephone visit due to trouble with connection with [employer]    Neurologic difficulties  Chronic ataxia uses motorized wheelchair  Migraine headaches  Breast cancer followed by oncologist  Colon cancer followed by GI/rectal surgery    Has difficulty with migraine headaches  Has difficulty with ataxia     Says that her breast cancer is stable primary is following with that along with her oncologist    Headache are controlled by the Imitrex we do not want her to overuse this    Tolerating the Topamax does stay well-hydrated do not want to push this further at this time    Patient has chronic gait ataxia multifactorial in nature extensive work-up as far back as 2006    Headache frequency is the same, about 2/month, which uses too much Imitrex actually get out of hand to become more frequent  No new symptoms or signs with her headaches  Is staying well-hydrated getting enough food and  Does have a PCA and her son helps her out a lot      Fell in the bathroom sometime around the holidays but strained to go to the bathroom getting off the toilet back on her scooter fell bumped her head son helped her back to bed  She tells me that she does not have a lifeline anymore she is getting have her PCA look back into that        Patient has migraine headaches  Frequency is about 9/month  Uses Imitrex 50 mill grams once per day for abortive therapy tolerates well without side effects  Uses Topamax 50 mg tablet 1 in the morning 2 at night with a level of 6.1 February 2019  Chloride of 111 CO2 of 22 knows the side effects of Topamax we discussed today at " "length        Patient is pretty much scooter bound when she is out and about  Uses a walker inside her apartment  Has a shower chair  Lives on the 10th floor but at one level has an elevator  Sons do the grocery shopping for her  Has a PCA who does cleaning and cooking 7 days/week 5 hours/day            Patient s current difficulties include:    1. Difficulty with gait which is chronic in nature, uses a motorized scooter wheelchair, has a four-wheeled walker also. Gait difficulties have been going on as far back as 2006, multifactorial in nature.  2. Has a past history of bowel and bladder dysfunction, mild incontinence, not progressive. No structural lesion had been found in the past in her spine during previous workups.  3. History of breast cancer, currently quiet for the last few years, is \"cancer free.\"  4. History of colon cancer with colonoscopy followed by oncologist, and tracked by Dr. Chua who removed a polyp in the past.  5. Breast cancer was originally diagnosed in 2010.  6. Has a history of hysterectomy, oophorectomy, and fibroids removed. No cancer when those were removed in 2013 workup.    Patient has a history of chronic daily headaches:    a. Does not want to use Botox for chronic headaches.  b. Has been on Topamax as a preventative with the last level of 6.4 in April 2018.  c. Does use Imitrex as an abortive therapy.  d. Have talked to her about medication overuse headaches and proper headache hygiene.    Complicating factors include:    1. Complex history of ataxia went back at least to 2006 previous workup for this is outlined in the 2010 notes and 2013 notes for reference.  2. History of obstructive sleep apnea could not tolerate CPAP, was switched more to nasal pillows, but this can exacerbate her headache control also.    Summary of medical issues:    1. Cancer, stromal sarcoma of the breast, and malignant colonic polyp.  2. Possible CVA in 2010.  3. Asthma and COPD followed by pulmonary.  4. " Obstructive sleep apnea uses nasal pillows or prongs, cannot do CPAP mask.  5. Hypertension and hypothyroidism.  6. Chronic back pain.  7. Depression.  8. Chronic headache disorder, migraines on Topamax, which helps make them better.  9. Patient is disabled from competitive gainful employment due to multiple medical issues as above confirmed on October 19, 2012 note for details.              Review of Systems  Headaches are about 1 to 2/month  When she takes the Imitrex too often that he come every day  We discussed medication overuse headaches      No diplopia dysarthria dysphasia  No numbness  No new weakness of the arms  Chronic ataxia no change  Uses the motorized scooter    No chest pain today no shortness of breath  No nausea vomiting or diarrhea  Denies any significant constipation      Past exam for comparison in the future  HEENT is missing some teeth speech is just a little bit dysarthric that is her baseline  Moving air well  No significant edema in the feet  Follows closely with her oncologist for her breast cancer and follows up with primary for colon cancer      Neurologic exam  Alert oriented x3  Normal process speech  Normal naming  Normal comprehension  Normal repetition  Memory recall adequate  No neglect    Cranial nerves II through XII normal    No ophthalmoplegia  No nystagmus  Tongue twisters are good face is symmetrical (always has a little bit of thickness to her speech which is her baseline)    Upper extremities no drift  No tremor  Normal finger-nose    Lower extremities  Seems to move her legs relatively well while seated  Has a little bit of weakness in the iliopsoas maybe 4+ in the past when seen face-to-face  Anterior tibial strength is just a little bit down bilaterally in the past when seen face-to-face    Seems to have more of a postural problem he is to hang onto things when she transfers uses the motorized scooter for longer distances    Assessment/Plan     Ataxia (R27.8)    Motorized scooter for longer distances  Does have a walker for inside the home and transferring  Does have a shower chair  Has a PCA 7 days/week 5 hours/day for cooking and cleaning         Migraine   Topamax 50 mg tablet 1 in the morning 2 at night  Imitrex 50 mg tablet 1 tab as needed severe headache number 9/month try not overuse  We discussed good headache hygiene with proper sleep pattern hydration nutrition and avoiding triggers    Patient had a recent follow-up sleep study to readjust her CPAP mask which might help we will see Dr. Galindo the sleep specialist for that    I reviewed the labs from January 2020 a year ago  Topamax 6.9  Sodium 143 potassium 4.1 BUN 7 creatinine 0.81  Chloride 112  CO2 22  AST 10    We reviewed the above    Patient will follow up with a face-to-face visit in July 2021      As part of this visit today I reviewed her past records  I reviewed her past labs  I reviewed chart as above  Meds refilled    21 minutes of discussion time today about her ataxia and migraine headaches safety issues care needs.

## 2021-01-11 NOTE — LETTER
"    1/11/2021         RE: Peri Irizarry  [address]  [place] [address]        Dear Colleague,    Thank you for referring your patient, Peri Irizarry, to the [facility] [place]. Please see a copy of my visit note below.    Video visit requested by patient  Video visit due to the COVID-19 pandemic  Text number [phone]  Patient identified    .Patient is being evaluated via a billable video visit. The patient has been notified of following:     \"This video visit will be conducted via a call between you and your physician/provider. We have found that certain health care needs can be provided without the need for an in-person physical exam. This service lets us provide the care you need with a video conversation. If a prescription is necessary we can send it directly to your pharmacy. If lab work is needed we can place an order for that and you can then stop by our lab to have the test done at a later time.    If during the course of the call the physician/provider feels a video visit is not appropriate, you will not be charged for this service.    Physician has received verbal consent for a Video Visit from the patient? YES    Patient would like the video invitation sent by: Text [phone]      Video Visit Details    Type of service: Video Visit    Video Start Time: 11:08 AM    Video End Time (time video stopped):     Originating Location (pt. Location): Patient's Home    Distant Location (provider location): [facility]     Mode of Communication: Video Conference via [employer] to connect switch to a telephone visit  .  The patient has been notified of following:     \"This telephone visit will be conducted via a call between you and your physician/provider. We have found that certain health care needs can be provided without the need for a physical exam. This service lets us provide the care you need with a short phone " "conversation. If a prescription is necessary we can send it directly to your pharmacy. If lab work is needed we can place an order for that and you can then stop by our lab to have the test done at a later time.    If during the course of the call the physician/provider feels a telephone visit is not appropriate, you will not be charged for this service.\"     Patient has given verbal consent for Telephone visit? Yes  Consent has been obtained for this service by 1 care team member: yes.              History of Present Illness   55-year-old here for neurologic follow-up  Last seen video follow-up visit 7/10/2020  Today's visit January 11, 2021, switch to telephone visit due to trouble with connection with [employer]    Neurologic difficulties  Chronic ataxia uses motorized wheelchair  Migraine headaches  Breast cancer followed by oncologist  Colon cancer followed by GI/rectal surgery    Has difficulty with migraine headaches  Has difficulty with ataxia     Says that her breast cancer is stable primary is following with that along with her oncologist    Headache are controlled by the Imitrex we do not want her to overuse this    Tolerating the Topamax does stay well-hydrated do not want to push this further at this time    Patient has chronic gait ataxia multifactorial in nature extensive work-up as far back as 2006    Headache frequency is the same, about 2/month, which uses too much Imitrex actually get out of hand to become more frequent  No new symptoms or signs with her headaches  Is staying well-hydrated getting enough food and  Does have a PCA and her son helps her out a lot      Fell in the bathroom sometime around the holidays but strained to go to the bathroom getting off the toilet back on her scooter fell bumped her head son helped her back to bed  She tells me that she does not have a lifeline anymore she is getting have her PCA look back into that        Patient has migraine headaches  Frequency is about " "9/month  Uses Imitrex 50 mill grams once per day for abortive therapy tolerates well without side effects  Uses Topamax 50 mg tablet 1 in the morning 2 at night with a level of 6.1 February 2019  Chloride of 111 CO2 of 22 knows the side effects of Topamax we discussed today at length        Patient is pretty much scooter bound when she is out and about  Uses a walker inside her apartment  Has a shower chair  Lives on the 10th floor but at one level has an elevator  Sons do the grocery shopping for her  Has a PCA who does cleaning and cooking 7 days/week 5 hours/day            Patient s current difficulties include:    1. Difficulty with gait which is chronic in nature, uses a motorized scooter wheelchair, has a four-wheeled walker also. Gait difficulties have been going on as far back as 2006, multifactorial in nature.  2. Has a past history of bowel and bladder dysfunction, mild incontinence, not progressive. No structural lesion had been found in the past in her spine during previous workups.  3. History of breast cancer, currently quiet for the last few years, is \"cancer free.\"  4. History of colon cancer with colonoscopy followed by oncologist, and tracked by Dr. Chua who removed a polyp in the past.  5. Breast cancer was originally diagnosed in 2010.  6. Has a history of hysterectomy, oophorectomy, and fibroids removed. No cancer when those were removed in 2013 workup.    Patient has a history of chronic daily headaches:    a. Does not want to use Botox for chronic headaches.  b. Has been on Topamax as a preventative with the last level of 6.4 in April 2018.  c. Does use Imitrex as an abortive therapy.  d. Have talked to her about medication overuse headaches and proper headache hygiene.    Complicating factors include:    1. Complex history of ataxia went back at least to 2006 previous workup for this is outlined in the 2010 notes and 2013 notes for reference.  2. History of obstructive sleep apnea could not " tolerate CPAP, was switched more to nasal pillows, but this can exacerbate her headache control also.    Summary of medical issues:    1. Cancer, stromal sarcoma of the breast, and malignant colonic polyp.  2. Possible CVA in 2010.  3. Asthma and COPD followed by pulmonary.  4. Obstructive sleep apnea uses nasal pillows or prongs, cannot do CPAP mask.  5. Hypertension and hypothyroidism.  6. Chronic back pain.  7. Depression.  8. Chronic headache disorder, migraines on Topamax, which helps make them better.  9. Patient is disabled from competitive gainful employment due to multiple medical issues as above confirmed on October 19, 2012 note for details.              Review of Systems  Headaches are about 1 to 2/month  When she takes the Imitrex too often that he come every day  We discussed medication overuse headaches      No diplopia dysarthria dysphasia  No numbness  No new weakness of the arms  Chronic ataxia no change  Uses the motorized scooter    No chest pain today no shortness of breath  No nausea vomiting or diarrhea  Denies any significant constipation      Past exam for comparison in the future  HEENT is missing some teeth speech is just a little bit dysarthric that is her baseline  Moving air well  No significant edema in the feet  Follows closely with her oncologist for her breast cancer and follows up with primary for colon cancer      Neurologic exam  Alert oriented x3  Normal process speech  Normal naming  Normal comprehension  Normal repetition  Memory recall adequate  No neglect    Cranial nerves II through XII normal    No ophthalmoplegia  No nystagmus  Tongue twisters are good face is symmetrical (always has a little bit of thickness to her speech which is her baseline)    Upper extremities no drift  No tremor  Normal finger-nose    Lower extremities  Seems to move her legs relatively well while seated  Has a little bit of weakness in the iliopsoas maybe 4+ in the past when seen  face-to-face  Anterior tibial strength is just a little bit down bilaterally in the past when seen face-to-face    Seems to have more of a postural problem he is to hang onto things when she transfers uses the motorized scooter for longer distances    Assessment/Plan     Ataxia (R27.8)   Motorized scooter for longer distances  Does have a walker for inside the home and transferring  Does have a shower chair  Has a PCA 7 days/week 5 hours/day for cooking and cleaning         Migraine   Topamax 50 mg tablet 1 in the morning 2 at night  Imitrex 50 mg tablet 1 tab as needed severe headache number 9/month try not overuse  We discussed good headache hygiene with proper sleep pattern hydration nutrition and avoiding triggers    Patient had a recent follow-up sleep study to readjust her CPAP mask which might help we will see Dr. Galindo the sleep specialist for that    I reviewed the labs from January 2020 a year ago  Topamax 6.9  Sodium 143 potassium 4.1 BUN 7 creatinine 0.81  Chloride 112  CO2 22  AST 10    We reviewed the above    Patient will follow up with a face-to-face visit in July 2021      As part of this visit today I reviewed her past records  I reviewed her past labs  I reviewed chart as above  Meds refilled    21 minutes of discussion time today about her ataxia and migraine headaches safety issues care needs.      Again, thank you for allowing me to participate in the care of your patient.        Sincerely,        Bigg Yoon MD

## 2021-02-19 ENCOUNTER — RECORDS - HEALTHEAST (OUTPATIENT)
Dept: LAB | Facility: CLINIC | Age: 56
End: 2021-02-19

## 2021-02-19 LAB
ALBUMIN SERPL-MCNC: 3.5 G/DL (ref 3.5–5)
ALP SERPL-CCNC: 87 U/L (ref 45–120)
ALT SERPL W P-5'-P-CCNC: 14 U/L (ref 0–45)
ANION GAP SERPL CALCULATED.3IONS-SCNC: 6 MMOL/L (ref 5–18)
AST SERPL W P-5'-P-CCNC: 14 U/L (ref 0–40)
BASOPHILS # BLD AUTO: 0 THOU/UL (ref 0–0.2)
BASOPHILS NFR BLD AUTO: 1 % (ref 0–2)
BILIRUB SERPL-MCNC: 0.2 MG/DL (ref 0–1)
BUN SERPL-MCNC: 7 MG/DL (ref 8–22)
CALCIUM SERPL-MCNC: 8.6 MG/DL (ref 8.5–10.5)
CHLORIDE BLD-SCNC: 111 MMOL/L (ref 98–107)
CHOLEST SERPL-MCNC: 217 MG/DL
CO2 SERPL-SCNC: 24 MMOL/L (ref 22–31)
CREAT SERPL-MCNC: 0.8 MG/DL (ref 0.6–1.1)
EOSINOPHIL # BLD AUTO: 0.2 THOU/UL (ref 0–0.4)
EOSINOPHIL NFR BLD AUTO: 4 % (ref 0–6)
ERYTHROCYTE [DISTWIDTH] IN BLOOD BY AUTOMATED COUNT: 14.3 % (ref 11–14.5)
FASTING STATUS PATIENT QL REPORTED: ABNORMAL
GFR SERPL CREATININE-BSD FRML MDRD: >60 ML/MIN/1.73M2
GLUCOSE BLD-MCNC: 93 MG/DL (ref 70–125)
HCT VFR BLD AUTO: 36.1 % (ref 35–47)
HDLC SERPL-MCNC: 51 MG/DL
HGB BLD-MCNC: 11.5 G/DL (ref 12–16)
IMM GRANULOCYTES # BLD: 0 THOU/UL
IMM GRANULOCYTES NFR BLD: 0 %
LDLC SERPL CALC-MCNC: 150 MG/DL
LYMPHOCYTES # BLD AUTO: 1.6 THOU/UL (ref 0.8–4.4)
LYMPHOCYTES NFR BLD AUTO: 28 % (ref 20–40)
MCH RBC QN AUTO: 30.3 PG (ref 27–34)
MCHC RBC AUTO-ENTMCNC: 31.9 G/DL (ref 32–36)
MCV RBC AUTO: 95 FL (ref 80–100)
MONOCYTES # BLD AUTO: 0.4 THOU/UL (ref 0–0.9)
MONOCYTES NFR BLD AUTO: 7 % (ref 2–10)
NEUTROPHILS # BLD AUTO: 3.4 THOU/UL (ref 2–7.7)
NEUTROPHILS NFR BLD AUTO: 60 % (ref 50–70)
PLATELET # BLD AUTO: 454 THOU/UL (ref 140–440)
PMV BLD AUTO: 8.4 FL (ref 8.5–12.5)
POTASSIUM BLD-SCNC: 4.2 MMOL/L (ref 3.5–5)
PROT SERPL-MCNC: 6.7 G/DL (ref 6–8)
RBC # BLD AUTO: 3.79 MILL/UL (ref 3.8–5.4)
SODIUM SERPL-SCNC: 141 MMOL/L (ref 136–145)
TRIGL SERPL-MCNC: 80 MG/DL
TSH SERPL DL<=0.005 MIU/L-ACNC: 0.99 UIU/ML (ref 0.3–5)
WBC: 5.7 THOU/UL (ref 4–11)

## 2021-02-21 LAB — TOPIRAMATE SERPL-MCNC: 6.6 UG/ML (ref 5–20)

## 2021-05-31 NOTE — ANESTHESIA PREPROCEDURE EVALUATION
Anesthesia Evaluation      Patient summary reviewed   No history of anesthetic complications     Airway   Mallampati: II  Neck ROM: full   Pulmonary - normal exam   (+) COPD (on inhalers, no O2 therapy) moderate, asthma  shortness of breath (at baseline), sleep apnea on CPAP, ,                          Cardiovascular - normal exam  (+) hypertension, ,     ECG reviewed        Neuro/Psych    (+) CVA (no residual syptoms) , depression,     Comments: BLE weakness, uses wheelchair to get around    Endo/Other    (+) arthritis, obesity (BMI 43),      GI/Hepatic/Renal - negative ROS           Dental    (+) edentulous                       Anesthesia Plan  Planned anesthetic: MAC    ASA 3   Induction: intravenous   Anesthetic plan and risks discussed with: patient    Post-op plan: routine recovery

## 2021-05-31 NOTE — ANESTHESIA POSTPROCEDURE EVALUATION
Patient: Peri Irizarry  COLONOSCOPY  Anesthesia type: MAC    Patient location: Phase II Recovery  Last vitals:   Vitals Value Taken Time   /60 7/31/2019  4:00 PM   Temp 36.7  C (98  F) 7/31/2019  3:50 PM   Pulse 64 7/31/2019  4:00 PM   Resp 16 7/31/2019  3:50 PM   SpO2 100 % 7/31/2019  4:00 PM   Vitals shown include unvalidated device data.  Post vital signs: stable  Level of consciousness: awake and responds to simple questions  Post-anesthesia pain: pain controlled  Post-anesthesia nausea and vomiting: no  Pulmonary: unassisted  Cardiovascular: stable  Hydration: adequate  Anesthetic events: no    QCDR Measures:  ASA# 11 - Alondra-op Cardiac Arrest: ASA11B - Patient did NOT experience unanticipated cardiac arrest  ASA# 12 - Alondra-op Mortality Rate: ASA12B - Patient did NOT die  ASA# 13 - PACU Re-Intubation Rate: NA - No ETT / LMA used for case  ASA# 10 - Composite Anes Safety: ASA10A - No serious adverse event    Additional Notes:

## 2021-05-31 NOTE — ANESTHESIA CARE TRANSFER NOTE
Last vitals:   Vitals:    07/31/19 1550   BP: 107/58   Pulse: 80   Resp: 16   Temp: 36.7  C (98  F)   SpO2: 100%     Patient's level of consciousness is drowsy  Spontaneous respirations: yes  Maintains airway independently: yes  Dentition unchanged: yes  Oropharynx: oropharynx clear of all foreign objects    QCDR Measures:  ASA# 20 - Surgical Safety Checklist: WHO surgical safety checklist completed prior to induction    PQRS# 430 - Adult PONV Prevention: 4558F-8P - Pt did NOT receive => 2 anti-emetic agents  ASA# 8 - Peds PONV Prevention: NA - Not pediatric patient, not GA or 2 or more risk factors NOT present  PQRS# 424 - Alondra-op Temp Management: 4559F - At least one body temp DOCUMENTED => 35.5C or 95.9F within required timeframe  PQRS# 426 - PACU Transfer Protocol: - Transfer of care checklist used  ASA# 14 - Acute Post-op Pain: ASA14B - Patient did NOT experience pain >= 7 out of 10

## 2021-06-02 ENCOUNTER — RECORDS - HEALTHEAST (OUTPATIENT)
Dept: ADMINISTRATIVE | Facility: CLINIC | Age: 56
End: 2021-06-02

## 2021-06-03 ENCOUNTER — RECORDS - HEALTHEAST (OUTPATIENT)
Dept: ADMINISTRATIVE | Facility: CLINIC | Age: 56
End: 2021-06-03

## 2021-06-03 VITALS — WEIGHT: 213 LBS | HEIGHT: 59 IN | BODY MASS INDEX: 42.94 KG/M2

## 2021-06-03 VITALS — HEIGHT: 59 IN | WEIGHT: 220 LBS | BODY MASS INDEX: 44.43 KG/M2 | BODY MASS INDEX: 44.53 KG/M2

## 2021-06-10 NOTE — PROGRESS NOTES
Progress Note    Reason for Visit:  Chief Complaint     Thyroid Problem          Progress Note:    HPI: This pleasant 51-year-old female patient is a new patient to me.  She is known to have thyroid nodules that has been biopsied before and it was benign 1 biopsy was nondiagnostic.    The patient had repeat thyroid ultrasound.      Component      Latest Ref Rng & Units 3/14/2017 5/11/2017   Creatinine      0.60 - 1.10 mg/dL 0.75    GFR MDRD Af Amer      >60 mL/min/1.73m2 >60    GFR MDRD Non Af Amer      >60 mL/min/1.73m2 >60    Bilirubin, Total      0.0 - 1.0 mg/dL 0.3    Calcium      8.5 - 10.5 mg/dL 9.4 9.5   Protein, Total      6.0 - 8.0 g/dL 7.6    ALBUMIN      3.5 - 5.0 g/dL 4.0    Alkaline Phosphatase      45 - 120 U/L 95    AST      0 - 40 U/L 13    ALT      0 - 45 U/L 12    Cholesterol      <=199 mg/dL     Triglycerides      <=149 mg/dL     HDL Cholesterol      >=50 mg/dL     LDL Calculated      <=129 mg/dL     Patient Fasting > 8hrs?           TSH      0.30 - 5.00 uIU/mL  1.93   Free T4      0.7 - 1.8 ng/dL  0.9   T3, Total      45 - 175 ng/dL  109   Vitamin D, Total (25-Hydroxy)      30.0 - 80.0 ng/mL  50.2     [facility]  US THYROID  8/20/2015 1:12 PM     INDICATION: Nontoxic multinodular goiter. Prior thyroid biopsies.  TECHNIQUE: Routine.  COMPARISON: 05/20/2014.      FINDINGS: The right lobe measures 5.8 x 2.4 x 2.3 cm and the left 5.6 x 2 x 2 cm. Both lobes are quite heterogeneous. Previously biopsied colloid appearing nodule is not readily identified today inferiorly in the right lobe. There is a tiny 3 mm cystic   nodule in the right lobe. There has been no change in the 1 cm hypoechoic nodule inferiorly in the left lobe. Isthmus is again thickened measuring 7 mm.     IMPRESSION:   IMPRESSION:  1. Multinodular goiter without any suspicious lesions.         HPI:  Ms. Irizarry is a 50-year-old woman who returns for followup evaluation of her   nontoxic multinodular goiter. The patient was  originally seen by me in   12/2012, at which time her history was obtained. She was noted to have a   thyroid ultrasound in 2012, which was compared to 2010. This showed a normal   sized thyroid; however, there was subcentimeter nodule on the right lobe and   a 1.1 cm nodule on the left lobe. The nodule was about 1 cm in 2010. She   never had fine-needle aspiration and therefore underwent fine-needle   aspiration of the dominant nodule in 12/2012 the result of which showed a   benign lesion. Repeat ultrasound in May 2014 showed a new right-sided nodule measuring about 1.5 cm. This was subjected to fine-needle aspiration results of which are as follows:     Medical Cytology Case: [id number]   --------------------------------------------------------------------------------------------------   Authorizing Provider: Bigg Carmona MD Ordering Provider: Bigg Carmona MD   Ordering Location: [facility] Collected: 6/4/2014 1250   Ultrasound   Pathologist: Juan Montgomery MD PHD Received: 6/4/2014 1336   Signed Out: 6/5/2014 1615 (Final)     Specimen: Thyroid, Right     [facility] General Path Interpretation Negative for malignant cells, Non-Diagnostic   Negative for malignant cells     [facility] Micro/Diagnosis   RIGHT THYROID, NODULE, FINE NEEDLE ASPIRATION:   1) SCANT CELLULAR SPECIMEN WITH COLLOID MATERIAL, RARE BENIGN THYROID EPITHELIUM, AND FOCAL INCREASED LYMPHOID CELLS POPULATION, SUGGESTIVE OF FOCAL THYROIDITIS (SEE COMMENT)   2) NO OBVIOUS NUCLEAR OR CYTOLOGIC FEATURES OF PAPILLARY CARCINOMA OR OTHER HIGH GRADE MALIGNANCY   [facility] Comment   Focal presence of lymphocytes, suggestive of focal thyroiditis is noted. Overall, the thyroid epithelial cells in the aspirate cytology materials are scarce; the findings may not be representative. Appropriate clinical and radiologic correlation and follow-up is recommended.   [facility] Clinical Information   Right thyroid nodule             Review of  Systems:    Nervous System: No headache, dizziness, fainting or memory loss. No tingling sensation of hand or feet.  Ears: No hearing loss or ringing in the ears  Eyes: No blurring of vision, redness, itching or dryness.  Nose: No nosebleed or loss of smell  Mouth: No mouth sores or loss of taste  Throat: No hoarseness or difficulty swallowing  Neck: No enlarged thyroid or lymph nodes.  Heart: No chest pain, palpitation or irregular heartbeat. No swelling of hands or feet  Lungs: No shortness of breath, cough, night sweats, wheezing or hemoptysis.  Gastrointestinal: No nausea or vomiting, constipation or diarrhea.  No acid reflux, abdominal pain or blood in stools.  Kidney/Bladdr: No polyuria, polydipsia, nocturia or hematuria.  Genital/Sexual: No loss of libido  Skin: No rash, hair loss or hirsutism.  No abnormal striae  Muscles/Joints/Bones: No morning stiffness, muscle aches and pain or loss of height.    Current Medications:  Current Outpatient Prescriptions   Medication Sig     albuterol (ACCUNEB) 0.63 mg/3 mL nebulizer solution Take 1 ampule by nebulization every 6 (six) hours as needed for wheezing.     albuterol (PROVENTIL HFA;VENTOLIN HFA) 90 mcg/actuation inhaler Inhale 2 puffs every 6 (six) hours as needed for wheezing.     alendronate (FOSAMAX) 35 MG tablet      amLODIPine (NORVASC) 5 MG tablet Take 5 mg by mouth daily.     conjugated estrogens (PREMARIN) vaginal cream Insert 1 g into the vagina 2 (two) times a week.     cyclobenzaprine (FLEXERIL) 5 MG tablet Take 5 mg by mouth 3 (three) times a day as needed for muscle spasms.     diclofenac sodium (VOLTAREN) 1 % Gel Apply 2 g topically 4 (four) times a day.     DULoxetine (CYMBALTA) 30 MG capsule Take 30 mg by mouth 2 (two) times a day.      ferrous sulfate 325 (65 FE) MG tablet Take 325 mg by mouth every other day.      fluticasone-salmeterol (ADVAIR) 500-50 mcg/dose DISKUS Inhale 1 puff 2 (two) times a day. Information from [facility]      gabapentin (NEURONTIN) 300 MG capsule Take 300 mg by mouth 2 (two) times a day. In addition to 600 mg QHS     gabapentin (NEURONTIN) 600 MG tablet Take 600 mg by mouth bedtime. In addition to 300 mg BID     hydrochlorothiazide (HYDRODIURIL) 25 MG tablet Take 25 mg by mouth daily.     ibuprofen (ADVIL,MOTRIN) 800 MG tablet Take 800 mg by mouth every 6 (six) hours as needed for pain.     naproxen (NAPROSYN) 500 MG tablet Take 500 mg by mouth 2 (two) times a day with meals. Takes naproxen regularly for arthritis, takes ibuprofen for occasional pain     nystatin-triamcinolone (MYCOLOG II) cream Apply 1 application topically 4 (four) times a day.      ondansetron (ZOFRAN) 8 MG tablet Take 1 tablet (8 mg total) by mouth every 8 (eight) hours as needed for nausea.     polyethylene glycol (MIRALAX) 17 gram packet Take 17 g by mouth daily.     potassium chloride SA (K-DUR,KLOR-CON) 10 MEQ tablet Take 10 mEq by mouth daily.     psyllium (FIBER) powder Take 2 g by mouth daily.      senna-docusate (SENNOSIDES-DOCUSATE SODIUM) 8.6-50 mg tablet Take 2 tablets by mouth daily.     SUMAtriptan (IMITREX) 25 MG tablet Take 25 mg by mouth every 2 (two) hours as needed for migraine (max of 200mg\24 hours).      tiotropium (SPIRIVA) 18 mcg inhalation capsule Place 18 mcg into inhaler and inhale daily.     topiramate (TOPAMAX) 100 MG tablet Take 100 mg by mouth every evening. In addition to 50 mg QAM     topiramate (TOPAMAX) 50 MG tablet Take 50 mg by mouth every morning. For headaches - in addition to 100 mg QHS     traZODone (DESYREL) 50 MG tablet Take 200 mg by mouth bedtime.      venlafaxine (EFFEXOR) 37.5 MG tablet Take 37.5 mg by mouth 2 (two) times a day.       Patients Active Problems:  Patient Active Problem List   Diagnosis     Nontoxic multinodular goiter     History of colon polyps     Hypokalemia     Gastroenteritis     Dehydration     Shoulder pain, acute, left       History:   reports that she quit smoking about 7 years  "ago. She has a 72.00 pack-year smoking history. She has never used smokeless tobacco. She reports that she does not drink alcohol or use illicit drugs.   reports that she quit smoking about 7 years ago. She has a 72.00 pack-year smoking history. She has never used smokeless tobacco. She reports that she does not drink alcohol or use illicit drugs.  History   Smoking Status     Former Smoker     Packs/day: 3.00     Years: 24.00     Quit date: 1/1/2010   Smokeless Tobacco     Never Used     Comment: cigarette every once in a while      reports that she quit smoking about 7 years ago. She has a 72.00 pack-year smoking history. She has never used smokeless tobacco. She reports that she does not drink alcohol or use illicit drugs.  History   Sexual Activity     Sexual activity: Not on file     Past Medical History:   Diagnosis Date     Arthritis      Asthma      Breast cancer 2010     Colon cancer 2011     COPD (chronic obstructive pulmonary disease)      History of migraine headaches      Hx of radiation therapy 2010     Hypertension      Sleep apnea     CPAP     Stroke 2010    \"I HAD A SLIGHT STROKE BACK IN 2010\"     Family History   Problem Relation Age of Onset     Breast cancer Sister 53     Breast cancer Maternal Aunt 45     Colon cancer Paternal Aunt 45     Past Medical History:   Diagnosis Date     Arthritis      Asthma      Breast cancer 2010     Colon cancer 2011     COPD (chronic obstructive pulmonary disease)      History of migraine headaches      Hx of radiation therapy 2010     Hypertension      Sleep apnea     CPAP     Stroke 2010    \"I HAD A SLIGHT STROKE BACK IN 2010\"     Past Surgical History:   Procedure Laterality Date     BREAST BIOPSY Left 2010     BREAST LUMPECTOMY Left 2010     COLON SURGERY  2011    20% OF COLON REMOVED-COLON CANCER     COLONOSCOPY N/A 6/24/2016    Procedure: COLONOSCOPY WITH MAC SEDATION;  Surgeon: Kumar Horowitz MD;  Location: [facility];  Service:      " HYSTERECTOMY  2013     OOPHORECTOMY Bilateral 2013       Vitals   blood pressure is 116/74 and her pulse is 84.         Exam  General appearance: The patient looked well, not in acute distress.  Eyes: no evidence of thyroid eye disease.   Retinal exam: No evidence of diabetic retinopathy.  Mouth and Throat: Normal  Neck: No evidence of thyromegaly, enlarged lymph node or tenderness  Chest: Trachea is central. Chest is clear to auscultation and percussion. Breat sounds are normal.  Cardiovascular exam: JVP is not raised. Heart sounds are normal, no murmurs or rub  Peripheral pulses are palpable.   Abdomen: No masses or tenderness.    Back: No vertebral tenderness or kyphosis.  Extremities: No evidence of leg edema.   Skin: Normal to touch.  No abnormal striae  Neurologic exam:  Visual fields are intact by confrontation, grossly intact. No evidence of peripheral neuropathy.  Detailed foot exam normal.        Diagnosis:  No diagnosis found.    Orders:   No orders of the defined types were placed in this encounter.        Assessment and Plan:  Thyroid nodules biopsied twice one the last biopsy was nondiagnostic repeat ultrasound showed that the nodules has become smaller indicating the benign nature.    Thyroid exam could not feel any enlargement.    The patient has previous history of breast cancer diagnosed with surgery and radiation.    She also has diagnosis of colon cancer that was over 10 years ago.    She is taking Norvasc 5 mg for hypertension blood pressures were controlled 116/74 pulse is 84.    The patient is on a wheelchair because of arthritis of her ankles.    She is taking Topamax 150 mg daily for headache Fosamax 35 mg once a week has been on it for 6 months I did advise her if she had any acid reflux or heartburn stopped that she is taking potassium supplements Neurontin 600 mg 3 times a day she had a hysterectomy.    She takes hydrochlorothiazide 25 mg plus KCl 10 mg.    Her TSH is normal at 1.93 free  T4 0.9 vitamin D is 50.2.    The patient restarted smoking again and I counseled her regarding that.    We will do thyroid ultrasound to make sure that there is no thyroid nodules or new nodules.    She said that her sister had thyroid checked surgery but she is not sure for what reason.    Patient return to clinic in 1 year I did spend 40 minutes with the patient more than 50% was spent on counseling and managing her care.

## 2021-06-19 NOTE — LETTER
Letter by Peggy Cao RN at      Author: Peggy Cao RN Service: -- Author Type: --    Filed:  Encounter Date: 8/2/2019 Status: (Other)       8/2/2019    Peri Irizarry  [address]  [place] [address]    Re: Recent Colonoscopy    Dear Peri Irizarry.    We are writing with results from your recent colonoscopy which showed:     A polyp identified as an adenoma with high-grade dysplasia. No cancer was seen in this lesion but because there is an elevated risk of developing colon cancer, we recommend your next colonoscopy to be in 3 years.    Polyps are growths from the lining of the colon. Some polyps have potential to progress on to become colon cancers. When polyps are identified during a colonoscopy we do our best to remove the entire polyp and send it to pathology for an evaluation under the microscope. The risk for developing colon cancer from a polyp is drastically reduced when that polyp is removed.       If any new concerns arise in the meantime, please contact your primary care provider for evaluation so your provider can help you decide if you need a colonscopy sooner. Some things to watch for include blood in your stool, stomach pain or cramping that does not resolve, or unexplained weight loss.    We sincerely appreciate the opportunity to provide your care. If you have any questions please feel free to contact us at [phone].    Sincerely,     Chintan Peter MD

## 2021-06-19 NOTE — LETTER
Letter by Cindy Gregg CMA at      Author: Cindy Gregg CMA Service: -- Author Type: --    Filed:  Encounter Date: 5/22/2019 Status: (Other)          Peri Irizarry   [address] [facility] [address]   [place] [address]

## 2021-07-13 ENCOUNTER — RECORDS - HEALTHEAST (OUTPATIENT)
Dept: ADMINISTRATIVE | Facility: CLINIC | Age: 56
End: 2021-07-13

## 2021-07-22 ENCOUNTER — RECORDS - HEALTHEAST (OUTPATIENT)
Dept: SCHEDULING | Facility: CLINIC | Age: 56
End: 2021-07-22

## 2021-07-22 DIAGNOSIS — Z12.31 OTHER SCREENING MAMMOGRAM: ICD-10-CM

## 2021-12-17 DIAGNOSIS — G43.109 MIGRAINE WITH AURA AND WITHOUT STATUS MIGRAINOSUS, NOT INTRACTABLE: ICD-10-CM

## 2021-12-17 RX ORDER — TOPIRAMATE 50 MG/1
TABLET, FILM COATED ORAL
Qty: 90 TABLET | Refills: 0 | Status: SHIPPED | OUTPATIENT
Start: 2021-12-17 | End: 2022-01-03

## 2021-12-17 NOTE — TELEPHONE ENCOUNTER
Pt requesting Topamax refill. Pt has an appt on 1/3/22. Will send in enough refills to get through until her next appointment.     Abena Gordon RN on 12/17/2021 at 1:51 PM

## 2021-12-23 ENCOUNTER — APPOINTMENT (OUTPATIENT)
Dept: CT IMAGING | Facility: HOSPITAL | Age: 56
End: 2021-12-23
Attending: EMERGENCY MEDICINE
Payer: COMMERCIAL

## 2021-12-23 ENCOUNTER — HOSPITAL ENCOUNTER (EMERGENCY)
Facility: HOSPITAL | Age: 56
Discharge: HOME | End: 2021-12-23
Attending: EMERGENCY MEDICINE | Admitting: EMERGENCY MEDICINE
Payer: COMMERCIAL

## 2021-12-23 VITALS
TEMPERATURE: 99.3 F | SYSTOLIC BLOOD PRESSURE: 136 MMHG | BODY MASS INDEX: 40.12 KG/M2 | RESPIRATION RATE: 24 BRPM | HEIGHT: 59 IN | OXYGEN SATURATION: 97 % | DIASTOLIC BLOOD PRESSURE: 70 MMHG | WEIGHT: 199 LBS | HEART RATE: 78 BPM

## 2021-12-23 DIAGNOSIS — R42 VERTIGO: ICD-10-CM

## 2021-12-23 LAB
ALBUMIN SERPL-MCNC: 3.9 G/DL (ref 3.5–5)
ALP SERPL-CCNC: 68 U/L (ref 45–120)
ALT SERPL W P-5'-P-CCNC: 13 U/L (ref 0–45)
ANION GAP SERPL CALCULATED.3IONS-SCNC: 12 MMOL/L (ref 5–18)
AST SERPL W P-5'-P-CCNC: 19 U/L (ref 0–40)
BASOPHILS # BLD AUTO: 0 10E3/UL (ref 0–0.2)
BASOPHILS NFR BLD AUTO: 0 %
BILIRUB SERPL-MCNC: 0.3 MG/DL (ref 0–1)
BUN SERPL-MCNC: 4 MG/DL (ref 8–22)
CALCIUM SERPL-MCNC: 9.5 MG/DL (ref 8.5–10.5)
CHLORIDE BLD-SCNC: 109 MMOL/L (ref 98–107)
CO2 SERPL-SCNC: 20 MMOL/L (ref 22–31)
CREAT SERPL-MCNC: 0.8 MG/DL (ref 0.6–1.1)
EOSINOPHIL # BLD AUTO: 0.1 10E3/UL (ref 0–0.7)
EOSINOPHIL NFR BLD AUTO: 1 %
ERYTHROCYTE [DISTWIDTH] IN BLOOD BY AUTOMATED COUNT: 14.6 % (ref 10–15)
GFR SERPL CREATININE-BSD FRML MDRD: 85 ML/MIN/1.73M2
GLUCOSE BLD-MCNC: 89 MG/DL (ref 70–125)
HCT VFR BLD AUTO: 37.3 % (ref 35–47)
HGB BLD-MCNC: 12.2 G/DL (ref 11.7–15.7)
IMM GRANULOCYTES # BLD: 0 10E3/UL
IMM GRANULOCYTES NFR BLD: 0 %
INR PPP: 0.98 (ref 0.9–1.15)
LYMPHOCYTES # BLD AUTO: 2.9 10E3/UL (ref 0.8–5.3)
LYMPHOCYTES NFR BLD AUTO: 28 %
MCH RBC QN AUTO: 29.8 PG (ref 26.5–33)
MCHC RBC AUTO-ENTMCNC: 32.7 G/DL (ref 31.5–36.5)
MCV RBC AUTO: 91 FL (ref 78–100)
MONOCYTES # BLD AUTO: 0.6 10E3/UL (ref 0–1.3)
MONOCYTES NFR BLD AUTO: 6 %
NEUTROPHILS # BLD AUTO: 6.6 10E3/UL (ref 1.6–8.3)
NEUTROPHILS NFR BLD AUTO: 65 %
NRBC # BLD AUTO: 0 10E3/UL
NRBC BLD AUTO-RTO: 0 /100
PLATELET # BLD AUTO: 496 10E3/UL (ref 150–450)
POTASSIUM BLD-SCNC: 4.3 MMOL/L (ref 3.5–5)
PROT SERPL-MCNC: 8.1 G/DL (ref 6–8)
RBC # BLD AUTO: 4.09 10E6/UL (ref 3.8–5.2)
SODIUM SERPL-SCNC: 141 MMOL/L (ref 136–145)
WBC # BLD AUTO: 10.2 10E3/UL (ref 4–11)

## 2021-12-23 PROCEDURE — 250N000011 HC RX IP 250 OP 636: Performed by: EMERGENCY MEDICINE

## 2021-12-23 PROCEDURE — 258N000003 HC RX IP 258 OP 636: Performed by: EMERGENCY MEDICINE

## 2021-12-23 PROCEDURE — 96361 HYDRATE IV INFUSION ADD-ON: CPT

## 2021-12-23 PROCEDURE — 70498 CT ANGIOGRAPHY NECK: CPT

## 2021-12-23 PROCEDURE — 93005 ELECTROCARDIOGRAM TRACING: CPT | Performed by: EMERGENCY MEDICINE

## 2021-12-23 PROCEDURE — 250N000013 HC RX MED GY IP 250 OP 250 PS 637: Performed by: EMERGENCY MEDICINE

## 2021-12-23 PROCEDURE — 96374 THER/PROPH/DIAG INJ IV PUSH: CPT | Mod: 59

## 2021-12-23 PROCEDURE — 82040 ASSAY OF SERUM ALBUMIN: CPT | Performed by: EMERGENCY MEDICINE

## 2021-12-23 PROCEDURE — 99285 EMERGENCY DEPT VISIT HI MDM: CPT | Mod: 25

## 2021-12-23 PROCEDURE — 85025 COMPLETE CBC W/AUTO DIFF WBC: CPT | Performed by: EMERGENCY MEDICINE

## 2021-12-23 PROCEDURE — 36415 COLL VENOUS BLD VENIPUNCTURE: CPT | Performed by: EMERGENCY MEDICINE

## 2021-12-23 PROCEDURE — 85610 PROTHROMBIN TIME: CPT | Performed by: EMERGENCY MEDICINE

## 2021-12-23 RX ORDER — MECLIZINE HCL 12.5 MG 12.5 MG/1
25 TABLET ORAL ONCE
Status: COMPLETED | OUTPATIENT
Start: 2021-12-23 | End: 2021-12-23

## 2021-12-23 RX ORDER — SODIUM CHLORIDE 9 MG/ML
INJECTION, SOLUTION INTRAVENOUS CONTINUOUS
Status: DISCONTINUED | OUTPATIENT
Start: 2021-12-23 | End: 2021-12-24 | Stop reason: HOSPADM

## 2021-12-23 RX ORDER — ONDANSETRON 2 MG/ML
4 INJECTION INTRAMUSCULAR; INTRAVENOUS EVERY 30 MIN PRN
Status: DISCONTINUED | OUTPATIENT
Start: 2021-12-23 | End: 2021-12-24 | Stop reason: HOSPADM

## 2021-12-23 RX ORDER — IOPAMIDOL 755 MG/ML
75 INJECTION, SOLUTION INTRAVASCULAR ONCE
Status: COMPLETED | OUTPATIENT
Start: 2021-12-23 | End: 2021-12-23

## 2021-12-23 RX ORDER — LORAZEPAM 2 MG/ML
0.5 INJECTION INTRAMUSCULAR ONCE
Status: COMPLETED | OUTPATIENT
Start: 2021-12-23 | End: 2021-12-23

## 2021-12-23 RX ORDER — MECLIZINE HYDROCHLORIDE 25 MG/1
25 TABLET ORAL 3 TIMES DAILY PRN
Qty: 20 TABLET | Refills: 0 | Status: SHIPPED | OUTPATIENT
Start: 2021-12-23

## 2021-12-23 RX ADMIN — MECLIZINE HCL 12.5 MG 25 MG: 12.5 TABLET ORAL at 22:58

## 2021-12-23 RX ADMIN — SODIUM CHLORIDE 1000 ML: 9 INJECTION, SOLUTION INTRAVENOUS at 19:49

## 2021-12-23 RX ADMIN — IOPAMIDOL 75 ML: 755 INJECTION, SOLUTION INTRAVENOUS at 20:36

## 2021-12-23 RX ADMIN — LORAZEPAM 0.5 MG: 2 INJECTION INTRAMUSCULAR; INTRAVENOUS at 22:57

## 2021-12-23 RX ADMIN — ONDANSETRON 4 MG: 2 INJECTION INTRAMUSCULAR; INTRAVENOUS at 19:49

## 2021-12-23 ASSESSMENT — ENCOUNTER SYMPTOMS
DIZZINESS: 1
NAUSEA: 0
NECK PAIN: 1
VOMITING: 0
APPETITE CHANGE: 0

## 2021-12-23 ASSESSMENT — MIFFLIN-ST. JEOR: SCORE: 1393.29

## 2021-12-24 LAB
ATRIAL RATE - MUSE: 92 BPM
DIASTOLIC BLOOD PRESSURE - MUSE: NORMAL MMHG
INTERPRETATION ECG - MUSE: NORMAL
P AXIS - MUSE: 51 DEGREES
PR INTERVAL - MUSE: 168 MS
QRS DURATION - MUSE: 78 MS
QT - MUSE: 390 MS
QTC - MUSE: 482 MS
R AXIS - MUSE: 12 DEGREES
SYSTOLIC BLOOD PRESSURE - MUSE: NORMAL MMHG
T AXIS - MUSE: 8 DEGREES
VENTRICULAR RATE- MUSE: 92 BPM

## 2021-12-24 NOTE — ED PROVIDER NOTES
Emergency Department Encounter      NAME: Peri Irizarry  AGE: 56 year old female  YOB: 1965  MRN: [id number]  EVALUATION DATE & TIME: No admission date for patient encounter.    PCP: Dinora Whitfield    ED PROVIDER: Heri Kaufman M.D.      Chief Complaint   Patient presents with     Dizziness     Fall         FINAL IMPRESSION:  1. Vertigo        MEDICAL DECISION MAKIN:39 PM I met with the patient in triage, obtained history, performed an initial exam, and discussed options and plan for diagnostics and treatment here in the ED. PPE: Provider wore surgical mask.   8:11 PM I rechecked the patient.   10:45 PM I rechecked and updated the patient.     Patient is a 56-year-old female with a history of hypertension who fell last Friday and hit her forehead on the ground causing a brief loss of consciousness.  She thinks that her legs gave out on her at that time which apparently happens periodically.  She was fine for the weekend but then 3 days ago on Monday developed some vertigo and unsteadiness.  She says that she feels she is leaning to the right.  She says the vertigo is worsened by moving her head or hold it in different positions.  She did have some mild tenderness along the right side of her neck.  She complained of tinnitus but no hearing loss.  The patient was initiated as a stroke code in triage and a head and neck CT angiogram was ordered.  This did not show any sign of acute stroke but there was a partially empty sella on the head CT, small eccentric soft tissue filling defects along the posterior walls of both internal carotid arteries which are nonspecific but could be concerning for bilateral carotid webs or noncalcified atherosclerosis.  The patient was feeling better and will be discharged home with some meclizine to use for her dizziness.  She is going to follow-up with her doctor to discuss the findings on the imaging and labs.  She was told not to drive until she is  completely better.    Pertinent Labs & Imaging studies reviewed. (See chart for details)           The importance of close follow up was discussed. We reviewed warning signs and symptoms, and I instructed Ms. Irizarry to return to the emergency department immediately if she develops any new or worsening symptoms. I provided additional verbal discharge instructions. Ms. Irizarry expressed understanding and agreement with this plan of care, her questions were answered, and she was discharged in stable condition.       MEDICATIONS GIVEN IN THE EMERGENCY:  Medications   0.9% sodium chloride BOLUS (0 mLs Intravenous Stopped 12/23/21 2140)     Followed by   sodium chloride 0.9% infusion (0 mLs Intravenous Hold 12/23/21 2141)   ondansetron (ZOFRAN) injection 4 mg (4 mg Intravenous Given 12/23/21 1949)   iopamidol (ISOVUE-370) solution 75 mL (75 mLs Intravenous Given 12/23/21 2036)   meclizine (ANTIVERT) tablet 25 mg (25 mg Oral Given 12/23/21 2258)   LORazepam (ATIVAN) injection 0.5 mg (0.5 mg Intravenous Given 12/23/21 2257)       NEW PRESCRIPTIONS STARTED AT TODAY'S ER VISIT:  New Prescriptions    MECLIZINE (ANTIVERT) 25 MG TABLET    Take 1 tablet (25 mg) by mouth 3 times daily as needed for dizziness          =================================================================    HPI    Patient information was obtained from: Patient     Use of : N/A       Peri PELLETIER Juan C is a 56 year old female with a past medical history of hypertension, anemia, migraines, breast cancer, and colon cancer who presents for evaluation of dizziness and unsteadiness.     Patient reports that last Friday (6 days ago) she fell, hit her forehead, and briefly lost consciousness; she states that she thinks her legs gave out at the time of the fall. Denies blood thinner use. She felt fine over the weekend. On Monday (3 days ago) the patient then developed spinning dizziness and unsteadiness on her feet. She states that she has been  "tripping over her own feet and leaning to the right side. Her dizziness is worsened when she is standing upright. Patient also endorses some mild soreness localized to the right side of her neck as well as ringing in her right ear. She adds that she feels dehydrated. Patient states that she had been eating and drinking as usual at home. She denies nausea, vomiting, or additional symptoms or complaints at this time.     Social history: Former smoker      REVIEW OF SYSTEMS   Review of Systems   Constitutional: Negative for appetite change.        Positive for feeling dehydrated   HENT: Positive for tinnitus (right ear).    Gastrointestinal: Negative for nausea and vomiting.   Musculoskeletal: Positive for gait problem (unsteadiness on feet) and neck pain.   Neurological: Positive for dizziness.        Positive for loss of consciousness   All other systems reviewed and are negative.       PAST MEDICAL HISTORY:  Past Medical History:   Diagnosis Date     Anemia      Anxiety      Arthritis      Asthma      Asthma      Breast cancer (H) 2010    left      Cancer (H)      Colon cancer (H) 2011     COPD (chronic obstructive pulmonary disease) (H)      Depression      History of migraine headaches      Hx of radiation therapy 2010     Hypertension      Migraines      Shortness of breath      Sleep apnea     CPAP     Stroke (H) 2010    \"I HAD A SLIGHT STROKE BACK IN 2010\"       PAST SURGICAL HISTORY:  Past Surgical History:   Procedure Laterality Date     BIOPSY BREAST Left 2010     BREAST SURGERY       COLON SURGERY  2011    20% OF COLON REMOVED-COLON CANCER     COLONOSCOPY N/A 6/24/2016    Procedure: COLONOSCOPY WITH MAC SEDATION;  Surgeon: Kumar Horowitz MD;  Location: [facility] [place];  Service:      COLONOSCOPY N/A 7/31/2019    Procedure: COLONOSCOPY;  Surgeon: Chintan Peter MD;  Location: [facility] [place];  Service: General     HYSTERECTOMY  2013     LUMPECTOMY BREAST Left 2010     OOPHORECTOMY Bilateral 2013 "       CURRENT MEDICATIONS:      Current Facility-Administered Medications:      ondansetron (ZOFRAN) injection 4 mg, 4 mg, Intravenous, Q30 Min PRN, Heri Kaufman MD, 4 mg at 12/23/21 1949     [COMPLETED] 0.9% sodium chloride BOLUS, 1,000 mL, Intravenous, Once, Stopped at 12/23/21 2140 **FOLLOWED BY** sodium chloride 0.9% infusion, , Intravenous, Continuous, Heri Kaufman MD, Held at 12/23/21 2141    Current Outpatient Medications:      meclizine (ANTIVERT) 25 MG tablet, Take 1 tablet (25 mg) by mouth 3 times daily as needed for dizziness, Disp: 20 tablet, Rfl: 0     ADVAIR DISKUS 500-50 MCG/DOSE inhaler, , Disp: , Rfl:      albuterol (ACCUNEB) 0.63 MG/3ML neb solution, Inhale 1 ampule into the lungs, Disp: , Rfl:      albuterol (PROAIR HFA/PROVENTIL HFA/VENTOLIN HFA) 108 (90 Base) MCG/ACT inhaler, Inhale 1-2 puffs into the lungs, Disp: , Rfl:      alendronate (FOSAMAX) 35 MG tablet, , Disp: , Rfl:      amLODIPine (NORVASC) 5 MG tablet, Take 5 mg by mouth, Disp: , Rfl:      busPIRone (BUSPAR) 5 MG tablet, Take 5 mg by mouth 2 times daily, Disp: , Rfl:      calcium carbonate (OS-CHERY) 600 MG tablet, , Disp: , Rfl:      conjugated estrogens (PREMARIN) 0.625 MG/GM vaginal cream, Place 1 g vaginally, Disp: , Rfl:      cyclobenzaprine (FLEXERIL) 5 MG tablet, Take 5 mg by mouth, Disp: , Rfl:      diclofenac (VOLTAREN) 1 % topical gel, Apply 2 g topically, Disp: , Rfl:      DULoxetine (CYMBALTA) 30 MG capsule, Take 30 mg by mouth, Disp: , Rfl:      ferrous sulfate (FEROSUL) 325 (65 Fe) MG tablet, Take 325 mg by mouth every 48 hours, Disp: , Rfl:      FIBER THERAPY 43 % POWD, , Disp: , Rfl:      furosemide (LASIX) 40 MG tablet, Take 40 mg by mouth, Disp: , Rfl:      gabapentin (NEURONTIN) 300 MG capsule, , Disp: , Rfl:      hydrochlorothiazide (HYDRODIURIL) 25 MG tablet, Take 25 mg by mouth, Disp: , Rfl:      HYDROcodone-acetaminophen (NORCO) 5-325 MG tablet, , Disp: , Rfl:      hydrOXYzine (VISTARIL) 25 MG  capsule, Take 25 mg by mouth 3 times daily as needed, Disp: , Rfl:      montelukast (SINGULAIR) 10 MG tablet, Take 10 mg by mouth, Disp: , Rfl:      naproxen (NAPROSYN) 500 MG tablet, Take 500 mg by mouth, Disp: , Rfl:      nystatin-triamcinolone (MYCOLOG II) 910032-4.1 UNIT/GM-% external cream, Apply 1 Application topically, Disp: , Rfl:      polyethylene glycol (MIRALAX) 17 GM/Dose powder, Take 17 g by mouth, Disp: , Rfl:      potassium chloride ER (KLOR-CON M) 10 MEQ CR tablet, , Disp: , Rfl:      senna-docusate (SENOKOT-S/PERICOLACE) 8.6-50 MG tablet, Take 2 tablets by mouth, Disp: , Rfl:      SPIRIVA RESPIMAT 1.25 MCG/ACT inhaler, , Disp: , Rfl:      SUMAtriptan (IMITREX) 50 MG tablet, Take 1 tablet (50 mg) by mouth as needed for migraine, Disp: 9 tablet, Rfl: 11     topiramate (TOPAMAX) 50 MG tablet, SEE ADMIN INSTRUCTIONS. TAKE 1 TABLET BY MOUTH IN THE MORNING AND 2 BY MOUTH IN THE EVENING- *NDC [address]-15** ONLY, Disp: 90 tablet, Rfl: 0     traZODone (DESYREL) 50 MG tablet, Take 200 mg by mouth, Disp: , Rfl:      venlafaxine (EFFEXOR) 37.5 MG tablet, Take 37.5 mg by mouth, Disp: , Rfl:      vitamin D3 (CHOLECALCIFEROL) 50 mcg (2000 units) tablet, , Disp: , Rfl:     ALLERGIES:  Allergies   Allergen Reactions     Hydrocodone-Acetaminophen Unknown     Lisinopril      Penicillin G        FAMILY HISTORY:  Family History   Problem Relation Age of Onset     Heart Disease Mother      No Known Problems Father      Breast Cancer Sister 53.00     Breast Cancer Maternal Aunt 45.00     Colon Cancer Paternal Aunt 45.00       SOCIAL HISTORY:   Social History     Socioeconomic History     Marital status: Single     Spouse name: Not on file     Number of children: Not on file     Years of education: Not on file     Highest education level: Not on file   Occupational History     Not on file   Tobacco Use     Smoking status: Former Smoker     Packs/day: 2.00     Years: 20.00     Pack years: 40.00     Types: Cigarettes      "Smokeless tobacco: Never Used     Tobacco comment: quit in 2010   Substance and Sexual Activity     Alcohol use: Not Currently     Drug use: Never     Sexual activity: Yes   Other Topics Concern     Parent/sibling w/ CABG, MI or angioplasty before 65F 55M? No   Social History Narrative     Not on file     Social Determinants of Health     Financial Resource Strain: Not on file   Food Insecurity: Not on file   Transportation Needs: Not on file   Physical Activity: Not on file   Stress: Not on file   Social Connections: Not on file   Intimate Partner Violence: Not on file   Housing Stability: Not on file       PHYSICAL EXAM:    Vitals: BP (!) 140/61   Pulse 66   Temp 99.3  F (37.4  C) (Oral)   Resp 21   Ht 1.499 m (4' 11\")   Wt 90.3 kg (199 lb)   LMP  (LMP Unknown)   SpO2 98%   BMI 40.19 kg/m     Constitutional: Well developed, well nourished. Comfortable appearing.  HENT: Normocephalic, atraumatic, mucous membranes moist, nose normal.   Neck- Supple, gross ROM intact.   Eyes: Pupils mid-range, sclera white, no discharge  Respiratory: Clear to auscultation bilaterally, no respiratory distress, no wheezing, speaks full sentences easily.  Cardiovascular: Normal heart rate, regular rhythm, no murmurs. No lower extremity edema, 2+ DP pulses.   GI: Soft, no tenderness to deep palpation in all quadrants, no masses.  Musculoskeletal: Moving all 4 extremities intentionally and without pain. No obvious deformity.  Skin: Warm, dry, no rash.  Neurologic: Alert & oriented x 3, speech clear, moving all extremities spontaneously   Psychiatric: Affect normal, cooperative.     LAB:  All pertinent labs reviewed and interpreted.  Labs Ordered and Resulted from Time of ED Arrival to Time of ED Departure   COMPREHENSIVE METABOLIC PANEL - Abnormal       Result Value    Sodium 141      Potassium 4.3      Chloride 109 (*)     Carbon Dioxide (CO2) 20 (*)     Anion Gap 12      Urea Nitrogen 4 (*)     Creatinine 0.80      Calcium 9.5   "    Glucose 89      Alkaline Phosphatase 68      AST 19      ALT 13      Protein Total 8.1 (*)     Albumin 3.9      Bilirubin Total 0.3      GFR Estimate 85     CBC WITH PLATELETS AND DIFFERENTIAL - Abnormal    WBC Count 10.2      RBC Count 4.09      Hemoglobin 12.2      Hematocrit 37.3      MCV 91      MCH 29.8      MCHC 32.7      RDW 14.6      Platelet Count 496 (*)     % Neutrophils 65      % Lymphocytes 28      % Monocytes 6      % Eosinophils 1      % Basophils 0      % Immature Granulocytes 0      NRBCs per 100 WBC 0      Absolute Neutrophils 6.6      Absolute Lymphocytes 2.9      Absolute Monocytes 0.6      Absolute Eosinophils 0.1      Absolute Basophils 0.0      Absolute Immature Granulocytes 0.0      Absolute NRBCs 0.0     INR - Normal    INR 0.98         RADIOLOGY:  CTA Head Neck with Contrast   Final Result   IMPRESSION:    HEAD CT:   1.  No CT findings of acute intracranial process.   2.  Mild generalized brain parenchymal volume loss.   3.  Partially empty sella. This is a nonspecific finding and can be incidental, but may also be seen in some patients with pituitary endocrine abnormalities/dysfunction or idiopathic intracranial hypertension in the appropriate clinical context.      HEAD CTA:    1.  No significant stenosis, aneurysm, or high flow vascular malformation identified.   2.  Variant [place] of [place] anatomy as above.      NECK CTA:   1.  Small eccentric soft tissue filling defects along the posterior walls of the bilateral internal carotid arteries, right greater than left. These are nonspecific, but are concerning for bilateral carotid webs versus less likely noncalcified    atherosclerosis. No flow-limiting stenosis of the cervical carotid arteries.   2.  No significant stenosis of the vertebral arteries.   3.  Aberrant right subclavian artery.          EKG:   December 23, 2021 at 7:13 PM.  Normal sinus rhythm, 92 bpm.  There is a nonspecific T wave abnormality.  There is a prolonged QT  with a QTC duration 482 ms.  QRS duration 78 ms.  No previous EKG available for comparison.  I have independently reviewed and interpreted the EKG(s) documented above.     PROCEDURES:   Procedures       I, Caren Brothers, am serving as a scribe to document services personally performed by Dr. Heri Kaufman based on my observation and the provider's statements to me. I, Heri Kaufman M.D. attest that Caren Brothers is acting in a scribe capacity, has observed my performance of the services and has documented them in accordance with my direction.      Heri Kaufman M.D.  Emergency Medicine  [facility] EMERGENCY DEPARTMENT  [facility] [facility] [id number] [phone]  Dept: [phone]     Hrei Kaufman MD  12/28/21 0711

## 2021-12-24 NOTE — ED TRIAGE NOTES
Pt fell last Friday and hit her head. Monday, pt started feeling dizzy. Pt has been tripping over her feet as well. Pt feels weak on right side of body. Pt is alert and oriented. Pt denies blood thinners. Neuro assessment called.

## 2022-01-03 ENCOUNTER — OFFICE VISIT (OUTPATIENT)
Dept: NEUROLOGY | Facility: CLINIC | Age: 57
End: 2022-01-03
Payer: COMMERCIAL

## 2022-01-03 VITALS
HEIGHT: 59 IN | HEART RATE: 95 BPM | WEIGHT: 199 LBS | DIASTOLIC BLOOD PRESSURE: 80 MMHG | BODY MASS INDEX: 40.12 KG/M2 | SYSTOLIC BLOOD PRESSURE: 125 MMHG

## 2022-01-03 DIAGNOSIS — E66.01 MORBID OBESITY (H): Primary | ICD-10-CM

## 2022-01-03 DIAGNOSIS — G43.109 MIGRAINE WITH AURA AND WITHOUT STATUS MIGRAINOSUS, NOT INTRACTABLE: ICD-10-CM

## 2022-01-03 DIAGNOSIS — R27.9 LACK OF COORDINATION: ICD-10-CM

## 2022-01-03 PROCEDURE — 99215 OFFICE O/P EST HI 40 MIN: CPT | Performed by: PSYCHIATRY & NEUROLOGY

## 2022-01-03 RX ORDER — SUMATRIPTAN 50 MG/1
50 TABLET, FILM COATED ORAL
Qty: 9 TABLET | Refills: 11 | Status: SHIPPED | OUTPATIENT
Start: 2022-01-03 | End: 2023-01-05

## 2022-01-03 RX ORDER — TOPIRAMATE 50 MG/1
TABLET, FILM COATED ORAL
Qty: 90 TABLET | Refills: 11 | Status: SHIPPED | OUTPATIENT
Start: 2022-01-03 | End: 2023-02-21

## 2022-01-03 ASSESSMENT — MIFFLIN-ST. JEOR: SCORE: 1393.29

## 2022-01-03 NOTE — PROGRESS NOTES
In person evaluation        History of Present Illness :  1/21/2020, in person visit  7/10/2020, video visit  1/11/2021, telephone visit  7/13/2021 no-show  11/23/2021, no-show canceled day visit  1/3/2022, in person visit      56-year-old seen for  Neurologic difficulties  Chronic ataxia uses motorized wheelchair  Migraine headaches  Breast cancer followed by oncologist  Colon cancer followed by GI/rectal surgery      A.  Migraine headaches       Frequency between 4 and 9/month       Duration is about 2 hours or less if she takes the Imitrex       May go through 9 Imitrex in a month with vague that         Currently       No vomiting need a little bit of nausea       Some photophobia       No visual changes                Imitrex 50 mg tablet 1 p.o. daily as needed severe headache #9 with 11 refills           Topamax 50 mg tablet 1 in the morning 2 at night as a preventative    Stay well-hydrated to prevent kidney stones  Knows risk factors of kidney stones glaucoma paresthesias cognitive difficulties    B.   Chronic ataxia multifactorial         uses motorized scooter         Patient has chronic gait ataxia multifactorial in nature extensive work-up as far back as 2006           Fell possibly hit her head 12/1/2021        This was at nighttime coming back from the bathroom          Went to the ER on 12/23/2021 reviewed MRI scan personally with her             No intracranial changes         We discussed gait safety    C.   History of cancer x2         Breast cancer followed by primary oncologist         Colon cancer followed by her physicians        D.   Lives on her own         Has a PCA, 5hours/day         2 sons that help her out         Gets around with a motorized scooter when she is outside the home does have a walker and a cane to use inside the home    E.  History of sleep apnea          Patient has migraine headaches  Frequency is about 9/month  Uses Imitrex 50 mill grams once per day for abortive therapy  "tolerates well without side effects  Uses Topamax 50 mg tablet 1 in the morning 2 at night with a level of 6.1 February 2019  Chloride of 111 CO2 of 22 knows the side effects of Topamax we discussed today at length        Patient is pretty much scooter bound when she is out and about  Uses a walker inside her apartment  Has a shower chair  Lives on the 10th floor but at one level has an elevator  Sons do the grocery shopping for her  Has a PCA who does cleaning and cooking 7 days/week 5 hours/day            Patient s current difficulties include:    1. Difficulty with gait which is chronic in nature, uses a motorized scooter wheelchair, has a four-wheeled walker also. Gait difficulties have been going on as far back as 2006, multifactorial in nature.  2. Has a past history of bowel and bladder dysfunction, mild incontinence, not progressive. No structural lesion had been found in the past in her spine during previous workups.  3. History of breast cancer, currently quiet for the last few years, is \"cancer free.\"  4. History of colon cancer with colonoscopy followed by oncologist, and tracked by Dr. Chua who removed a polyp in the past.  5. Breast cancer was originally diagnosed in 2010.  6. Has a history of hysterectomy, oophorectomy, and fibroids removed. No cancer when those were removed in 2013 workup.    Patient has a history of chronic daily headaches:    a. Does not want to use Botox for chronic headaches.  b. Has been on Topamax as a preventative with the last level of 6.4 in April 2018.  c. Does use Imitrex as an abortive therapy.  d. Have talked to her about medication overuse headaches and proper headache hygiene.    Complicating factors include:    1. Complex history of ataxia went back at least to 2006 previous workup for this is outlined in the 2010 notes and 2013 notes for reference.  2. History of obstructive sleep apnea could not tolerate CPAP, was switched more to nasal pillows, but this can " exacerbate her headache control also.    Summary of medical issues:    1. Cancer, stromal sarcoma of the breast, and malignant colonic polyp.  2. Possible CVA in 2010.  3. Asthma and COPD followed by pulmonary.  4. Obstructive sleep apnea uses nasal pillows or prongs, cannot do CPAP mask.  5. Hypertension and hypothyroidism.  6. Chronic back pain.  7. Depression.  8. Chronic headache disorder, migraines on Topamax, which helps make them better.  9. Patient is disabled from competitive gainful employment due to multiple medical issues as above confirmed on October 19, 2012 note for details.    Work-up review  HDL 51/ (February 2021)  TSH 0.99 (February 21)  CT Head 12 /23 /2021  1.  No CT findings of acute intracranial process.  2.  Mild generalized brain parenchymal volume loss.  3.  Partially empty sella. This is a nonspecific finding and can be incidental, but may also be seen in some patients with pituitary endocrine abnormalities/dysfunction or idiopathic intracranial hypertension in the appropriate clinical context.  HEAD CTA: 12/23/2021  1.  No significant stenosis, aneurysm, or high flow vascular malformation identified.  2.  Variant [place] of [place] anatomy as above.  NECK CTA: 12/23/2021  1.  Small eccentric soft tissue filling defects along the posterior walls of the bilateral internal carotid arteries, right greater than left. These are nonspecific, but are concerning for bilateral carotid webs versus less likely noncalcified   atherosclerosis. No flow-limiting stenosis of the cervical carotid arteries.  2.  No significant stenosis of the vertebral arteries.  3.  Aberrant right subclavian artery.    Laboratory review                                      1/2020 2/19/2021 12/23/2021  Topamax                       6.9                6.6    NA/K                             143/4.1         141/4.2             141/4.3    Cl/CO2                         112/24          111/24                109/20    BUN/Cr                         7/0.81          7/0.8                   4/0.8    AST                              10                14                         19    GLU                                                                              89    WBC/Hgb                                     5.7/11.5              10.2/12.2    PLTs                                            454,000               496,000          Review of Systems  Headaches about 9/month although vague about its significance and severity    Episode of falling reviewed ER notes 12/23/2021    When she takes the Imitrex too often that he come every day  We discussed medication overuse headaches      No diplopia dysarthria dysphasia  No numbness  No new weakness of the arms  Chronic ataxia no change  Uses the motorized scooter    No chest pain today no shortness of breath  No nausea vomiting or diarrhea  Denies any significant constipation      General exam  Blood pressure 125/80, pulse 95, temperature nine 9.3  HEENT is missing some teeth speech is just a little bit dysarthric that is her baseline  Lungs clear  Heart rate regular  Abdomen soft positive bowel sounds  No edema the feet    Follows closely with her oncologist for her breast cancer and follows up with primary for colon cancer      Neurologic exam  Alert oriented x3  Normal process speech  Normal naming  Normal comprehension  Normal repetition  Memory recall adequate  No neglect    Cranial nerves II through XII normal  No ophthalmoplegia  No nystagmus  Tongue twisters are good face is symmetrical (always has a little bit of thickness to her speech which is her baseline)   no visual field cut    Upper extremities no drift  No tremor  Normal finger-nose    Lower extremities   can move her legs in the scooter  With some help was able to turn stand up march in place with a little bit help for balance  Has some problems with ataxia better with assistive device or scooter    Seems to  have more of a postural problem he is to hang onto things when she transfers uses the motorized scooter for longer distances    Assessment/Plan    1.  Ataxia (R27.8)   Motorized scooter for longer distances  Does have a walker for inside the home and transferring  Does have a shower chair  Has a PCA 7 days/week 5 hours/day for cooking and cleaning      2.  Migraine headaches       Topamax 50 mg tablet 1 in the morning 2 at night       Imitrex 5000, 1 tab p.o. daily as needed severe headache #9 try not to overuse      As part of the visit today  Reviewed CT scan head December 2021  Reviewed recent labs December 2021  Reviewed ER notes 12/23/2021  Fill medication    Total care time today 42 minutes discussing and evaluating the above  Follow-up in 6 months

## 2022-01-03 NOTE — LETTER
1/3/2022         RE: Peri Irizarry  [address]  [place] [address]        Dear Colleague,    Thank you for referring your patient, Peri Irizarry, to the [facility] [place]. Please see a copy of my visit note below.    In person evaluation        History of Present Illness :  1/21/2020, in person visit  7/10/2020, video visit  1/11/2021, telephone visit  7/13/2021 no-show  11/23/2021, no-show canceled day visit  1/3/2022, in person visit      56-year-old seen for  Neurologic difficulties  Chronic ataxia uses motorized wheelchair  Migraine headaches  Breast cancer followed by oncologist  Colon cancer followed by GI/rectal surgery      A.  Migraine headaches       Frequency between 4 and 9/month       Duration is about 2 hours or less if she takes the Imitrex       May go through 9 Imitrex in a month with vague that         Currently       No vomiting need a little bit of nausea       Some photophobia       No visual changes                Imitrex 50 mg tablet 1 p.o. daily as needed severe headache #9 with 11 refills           Topamax 50 mg tablet 1 in the morning 2 at night as a preventative    Stay well-hydrated to prevent kidney stones  Knows risk factors of kidney stones glaucoma paresthesias cognitive difficulties    B.   Chronic ataxia multifactorial         uses motorized scooter         Patient has chronic gait ataxia multifactorial in nature extensive work-up as far back as 2006           Fell possibly hit her head 12/1/2021        This was at nighttime coming back from the bathroom          Went to the ER on 12/23/2021 reviewed MRI scan personally with her             No intracranial changes         We discussed gait safety    C.   History of cancer x2         Breast cancer followed by primary oncologist         Colon cancer followed by her physicians        D.   Lives on her own         Has a PCA, 5hours/day         2 sons that help her out         Gets around  "with a motorized scooter when she is outside the home does have a walker and a cane to use inside the home    E.  History of sleep apnea          Patient has migraine headaches  Frequency is about 9/month  Uses Imitrex 50 mill grams once per day for abortive therapy tolerates well without side effects  Uses Topamax 50 mg tablet 1 in the morning 2 at night with a level of 6.1 February 2019  Chloride of 111 CO2 of 22 knows the side effects of Topamax we discussed today at length        Patient is pretty much scooter bound when she is out and about  Uses a walker inside her apartment  Has a shower chair  Lives on the 10th floor but at one level has an elevator  Sons do the grocery shopping for her  Has a PCA who does cleaning and cooking 7 days/week 5 hours/day            Patient s current difficulties include:    1. Difficulty with gait which is chronic in nature, uses a motorized scooter wheelchair, has a four-wheeled walker also. Gait difficulties have been going on as far back as 2006, multifactorial in nature.  2. Has a past history of bowel and bladder dysfunction, mild incontinence, not progressive. No structural lesion had been found in the past in her spine during previous workups.  3. History of breast cancer, currently quiet for the last few years, is \"cancer free.\"  4. History of colon cancer with colonoscopy followed by oncologist, and tracked by Dr. Chua who removed a polyp in the past.  5. Breast cancer was originally diagnosed in 2010.  6. Has a history of hysterectomy, oophorectomy, and fibroids removed. No cancer when those were removed in 2013 workup.    Patient has a history of chronic daily headaches:    a. Does not want to use Botox for chronic headaches.  b. Has been on Topamax as a preventative with the last level of 6.4 in April 2018.  c. Does use Imitrex as an abortive therapy.  d. Have talked to her about medication overuse headaches and proper headache hygiene.    Complicating factors " include:    1. Complex history of ataxia went back at least to 2006 previous workup for this is outlined in the 2010 notes and 2013 notes for reference.  2. History of obstructive sleep apnea could not tolerate CPAP, was switched more to nasal pillows, but this can exacerbate her headache control also.    Summary of medical issues:    1. Cancer, stromal sarcoma of the breast, and malignant colonic polyp.  2. Possible CVA in 2010.  3. Asthma and COPD followed by pulmonary.  4. Obstructive sleep apnea uses nasal pillows or prongs, cannot do CPAP mask.  5. Hypertension and hypothyroidism.  6. Chronic back pain.  7. Depression.  8. Chronic headache disorder, migraines on Topamax, which helps make them better.  9. Patient is disabled from competitive gainful employment due to multiple medical issues as above confirmed on October 19, 2012 note for details.    Work-up review  HDL 51/ (February 2021)  TSH 0.99 (February 21)  CT Head 12 /23 /2021  1.  No CT findings of acute intracranial process.  2.  Mild generalized brain parenchymal volume loss.  3.  Partially empty sella. This is a nonspecific finding and can be incidental, but may also be seen in some patients with pituitary endocrine abnormalities/dysfunction or idiopathic intracranial hypertension in the appropriate clinical context.  HEAD CTA: 12/23/2021  1.  No significant stenosis, aneurysm, or high flow vascular malformation identified.  2.  Variant [place] of [place] anatomy as above.  NECK CTA: 12/23/2021  1.  Small eccentric soft tissue filling defects along the posterior walls of the bilateral internal carotid arteries, right greater than left. These are nonspecific, but are concerning for bilateral carotid webs versus less likely noncalcified   atherosclerosis. No flow-limiting stenosis of the cervical carotid arteries.  2.  No significant stenosis of the vertebral arteries.  3.  Aberrant right subclavian artery.    Laboratory review                                       1/2020 2/19/2021 12/23/2021  Topamax                       6.9                6.6    NA/K                             143/4.1         141/4.2             141/4.3    Cl/CO2                         112/24          111/24               109/20    BUN/Cr                         7/0.81          7/0.8                   4/0.8    AST                              10                14                         19    GLU                                                                              89    WBC/Hgb                                     5.7/11.5              10.2/12.2    PLTs                                            454,000               496,000          Review of Systems  Headaches about 9/month although vague about its significance and severity    Episode of falling reviewed ER notes 12/23/2021    When she takes the Imitrex too often that he come every day  We discussed medication overuse headaches      No diplopia dysarthria dysphasia  No numbness  No new weakness of the arms  Chronic ataxia no change  Uses the motorized scooter    No chest pain today no shortness of breath  No nausea vomiting or diarrhea  Denies any significant constipation      General exam  Blood pressure 125/80, pulse 95, temperature nine 9.3  HEENT is missing some teeth speech is just a little bit dysarthric that is her baseline  Lungs clear  Heart rate regular  Abdomen soft positive bowel sounds  No edema the feet    Follows closely with her oncologist for her breast cancer and follows up with primary for colon cancer      Neurologic exam  Alert oriented x3  Normal process speech  Normal naming  Normal comprehension  Normal repetition  Memory recall adequate  No neglect    Cranial nerves II through XII normal  No ophthalmoplegia  No nystagmus  Tongue twisters are good face is symmetrical (always has a little bit of thickness to her speech which is her baseline)   no visual field cut    Upper extremities no  drift  No tremor  Normal finger-nose    Lower extremities   can move her legs in the scooter  With some help was able to turn stand up march in place with a little bit help for balance  Has some problems with ataxia better with assistive device or scooter    Seems to have more of a postural problem he is to hang onto things when she transfers uses the motorized scooter for longer distances    Assessment/Plan    1.  Ataxia (R27.8)   Motorized scooter for longer distances  Does have a walker for inside the home and transferring  Does have a shower chair  Has a PCA 7 days/week 5 hours/day for cooking and cleaning      2.  Migraine headaches       Topamax 50 mg tablet 1 in the morning 2 at night       Imitrex 5000, 1 tab p.o. daily as needed severe headache #9 try not to overuse      As part of the visit today  Reviewed CT scan head December 2021  Reviewed recent labs December 2021  Reviewed ER notes 12/23/2021  Fill medication    Total care time today 42 minutes discussing and evaluating the above  Follow-up in 6 months      Again, thank you for allowing me to participate in the care of your patient.        Sincerely,        Bigg Yoon MD

## 2022-01-03 NOTE — NURSING NOTE
Chief Complaint   Patient presents with     Migraine     Annual follow up for migraine. pt states she had a fall about 2 weeks ago, starting feeling dizzy the next day. Was seen in ED on 12/23/21 for this.     Tanvi Becker LPN on 1/3/2022 at 2:02 PM.

## 2022-01-06 ENCOUNTER — ANCILLARY PROCEDURE (OUTPATIENT)
Dept: MAMMOGRAPHY | Facility: CLINIC | Age: 57
End: 2022-01-06
Attending: FAMILY MEDICINE
Payer: COMMERCIAL

## 2022-01-06 DIAGNOSIS — Z12.31 VISIT FOR SCREENING MAMMOGRAM: ICD-10-CM

## 2022-01-06 PROCEDURE — 77067 SCR MAMMO BI INCL CAD: CPT

## 2022-04-13 ENCOUNTER — LAB REQUISITION (OUTPATIENT)
Dept: LAB | Facility: CLINIC | Age: 57
End: 2022-04-13
Payer: COMMERCIAL

## 2022-04-13 DIAGNOSIS — Z51.81 ENCOUNTER FOR THERAPEUTIC DRUG LEVEL MONITORING: ICD-10-CM

## 2022-04-13 DIAGNOSIS — E78.2 MIXED HYPERLIPIDEMIA: ICD-10-CM

## 2022-04-13 DIAGNOSIS — I10 ESSENTIAL (PRIMARY) HYPERTENSION: ICD-10-CM

## 2022-04-13 DIAGNOSIS — E04.2 NONTOXIC MULTINODULAR GOITER: ICD-10-CM

## 2022-04-13 DIAGNOSIS — E23.6 OTHER DISORDERS OF PITUITARY GLAND (H): ICD-10-CM

## 2022-04-13 LAB
ALBUMIN SERPL-MCNC: 3.9 G/DL (ref 3.5–5)
ALP SERPL-CCNC: 83 U/L (ref 45–120)
ALT SERPL W P-5'-P-CCNC: 13 U/L (ref 0–45)
ANION GAP SERPL CALCULATED.3IONS-SCNC: 12 MMOL/L (ref 5–18)
AST SERPL W P-5'-P-CCNC: 12 U/L (ref 0–40)
BASOPHILS # BLD AUTO: 0 10E3/UL (ref 0–0.2)
BASOPHILS NFR BLD AUTO: 0 %
BILIRUB SERPL-MCNC: 0.3 MG/DL (ref 0–1)
BUN SERPL-MCNC: 10 MG/DL (ref 8–22)
CALCIUM SERPL-MCNC: 9.6 MG/DL (ref 8.5–10.5)
CHLORIDE BLD-SCNC: 101 MMOL/L (ref 98–107)
CHOLEST SERPL-MCNC: 230 MG/DL
CO2 SERPL-SCNC: 26 MMOL/L (ref 22–31)
CORTIS SERPL-MCNC: 4.2 UG/DL
CREAT SERPL-MCNC: 0.95 MG/DL (ref 0.6–1.1)
EOSINOPHIL # BLD AUTO: 0.2 10E3/UL (ref 0–0.7)
EOSINOPHIL NFR BLD AUTO: 1 %
ERYTHROCYTE [DISTWIDTH] IN BLOOD BY AUTOMATED COUNT: 16.5 % (ref 10–15)
FASTING STATUS PATIENT QL REPORTED: ABNORMAL
GFR SERPL CREATININE-BSD FRML MDRD: 70 ML/MIN/1.73M2
GLUCOSE BLD-MCNC: 90 MG/DL (ref 70–125)
HCT VFR BLD AUTO: 35.6 % (ref 35–47)
HDLC SERPL-MCNC: 58 MG/DL
HGB BLD-MCNC: 11.5 G/DL (ref 11.7–15.7)
IMM GRANULOCYTES # BLD: 0.1 10E3/UL
IMM GRANULOCYTES NFR BLD: 1 %
LDLC SERPL CALC-MCNC: 155 MG/DL
LYMPHOCYTES # BLD AUTO: 2.7 10E3/UL (ref 0.8–5.3)
LYMPHOCYTES NFR BLD AUTO: 25 %
MCH RBC QN AUTO: 28.2 PG (ref 26.5–33)
MCHC RBC AUTO-ENTMCNC: 32.3 G/DL (ref 31.5–36.5)
MCV RBC AUTO: 87 FL (ref 78–100)
MONOCYTES # BLD AUTO: 0.5 10E3/UL (ref 0–1.3)
MONOCYTES NFR BLD AUTO: 5 %
NEUTROPHILS # BLD AUTO: 7.2 10E3/UL (ref 1.6–8.3)
NEUTROPHILS NFR BLD AUTO: 68 %
NRBC # BLD AUTO: 0 10E3/UL
NRBC BLD AUTO-RTO: 0 /100
PLATELET # BLD AUTO: 554 10E3/UL (ref 150–450)
POTASSIUM BLD-SCNC: 3.5 MMOL/L (ref 3.5–5)
PROLACTIN SERPL-MCNC: 6 NG/ML (ref 0–20)
PROT SERPL-MCNC: 7.4 G/DL (ref 6–8)
RBC # BLD AUTO: 4.08 10E6/UL (ref 3.8–5.2)
SODIUM SERPL-SCNC: 139 MMOL/L (ref 136–145)
TRIGL SERPL-MCNC: 84 MG/DL
TSH SERPL DL<=0.005 MIU/L-ACNC: 1.5 UIU/ML (ref 0.3–5)
WBC # BLD AUTO: 10.7 10E3/UL (ref 4–11)

## 2022-04-13 PROCEDURE — 85025 COMPLETE CBC W/AUTO DIFF WBC: CPT | Mod: ORL | Performed by: FAMILY MEDICINE

## 2022-04-13 PROCEDURE — 82533 TOTAL CORTISOL: CPT | Mod: ORL | Performed by: FAMILY MEDICINE

## 2022-04-13 PROCEDURE — 84443 ASSAY THYROID STIM HORMONE: CPT | Mod: ORL | Performed by: FAMILY MEDICINE

## 2022-04-13 PROCEDURE — 80201 ASSAY OF TOPIRAMATE: CPT | Mod: ORL | Performed by: FAMILY MEDICINE

## 2022-04-13 PROCEDURE — 84146 ASSAY OF PROLACTIN: CPT | Mod: ORL | Performed by: FAMILY MEDICINE

## 2022-04-13 PROCEDURE — 80053 COMPREHEN METABOLIC PANEL: CPT | Mod: ORL | Performed by: FAMILY MEDICINE

## 2022-04-13 PROCEDURE — 80061 LIPID PANEL: CPT | Mod: ORL | Performed by: FAMILY MEDICINE

## 2022-04-15 LAB — TOPIRAMATE SERPL-MCNC: 5.6 UG/ML

## 2022-06-21 ENCOUNTER — TRANSFERRED RECORDS (OUTPATIENT)
Dept: NEUROLOGY | Facility: CLINIC | Age: 57
End: 2022-06-21

## 2022-09-16 ENCOUNTER — MEDICAL CORRESPONDENCE (OUTPATIENT)
Dept: HEALTH INFORMATION MANAGEMENT | Facility: CLINIC | Age: 57
End: 2022-09-16

## 2022-09-19 ENCOUNTER — TELEPHONE (OUTPATIENT)
Dept: SURGERY | Facility: CLINIC | Age: 57
End: 2022-09-19

## 2022-09-19 ENCOUNTER — PREP FOR PROCEDURE (OUTPATIENT)
Dept: SURGERY | Facility: CLINIC | Age: 57
End: 2022-09-19

## 2022-09-19 ENCOUNTER — TRANSCRIBE ORDERS (OUTPATIENT)
Dept: OTHER | Age: 57
End: 2022-09-19

## 2022-09-19 DIAGNOSIS — Z12.11 ENCOUNTER FOR SCREENING COLONOSCOPY: Primary | ICD-10-CM

## 2022-09-19 DIAGNOSIS — Z86.0100 HISTORY OF COLONIC POLYPS: Primary | ICD-10-CM

## 2022-09-19 DIAGNOSIS — K63.5 POLYP OF COLON: Primary | ICD-10-CM

## 2022-09-19 RX ORDER — POLYETHYLENE GLYCOL 3350 17 G/17G
POWDER, FOR SOLUTION ORAL
Qty: 527 G | Refills: 0 | Status: SHIPPED | OUTPATIENT
Start: 2022-09-19 | End: 2023-01-01

## 2022-09-19 RX ORDER — BISACODYL 5 MG/1
TABLET, DELAYED RELEASE ORAL
Qty: 2 TABLET | Refills: 0 | Status: SHIPPED | OUTPATIENT
Start: 2022-09-19 | End: 2023-01-01

## 2022-09-19 NOTE — LETTER
COLONOSCOPY INSTRUCTIONS:        Before your colonoscopy, you will need to prep your colon.  It is important you follow the instructions below carefully.   the following over-the-counter items at your local pharmacy.     2 Bisacodyl 5 mg tablets (Dulcolax laxative, NOT stool softener)     Miralax powder, 8.3 ounce bottle     Gatorade or PowerAde 64 ounce bottle (NOT red and NOT powered)          14 Days Prior to Colonoscopy   Stop taking any fiber supplements and any medications, including vitamins, which contain iron.     Stop taking any Vitamin E (other than that included in a multivitamin), fish oil, diet and herbal supplements.          7 Days Prior to Colonoscopy   Stop taking any blood thinners, including aspirin and ibuprofen. Contact your prescribing physician for instructions before discontinuing anticoagulants such as Coumadin, Ticlid and Plavix.          3 Days Prior to Colonoscopy   Start a low-residue diet. Avoid high-fiber foods.          2 Days Prior to Colonoscopy   Continue with low-residue diet. Drink at least 8 glasses of water     May chill the Gatorade/PowerAde if desired. No solid food after 11:00pm          1 Day Prior to Colonoscopy   Start a clear liquid diet. Drink at least 8 glasses of water     At 12:00pm, take the 2 Bisacodyl tablets     At 4:00pm mix the entire bottle of Miralax with the 64 ounces of Gatorade     Drink one 8 ounce glass of the solution every 15 minutes until the solution is gone.      This will give you diarrhea; stay near a toilet. You will be uncomfortable and may vomit. If this happens, rinse your mouth with water and take a 15-minute break. It is important to drink ALL of the solution. When finished, continue with clear liquids for remainder of the day.          Day of Colonoscopy Procedure   CLEAR LIQUIDS ONLY!   Take your morning medication(s) as usual.  If you are a diabetic, ask your physician if you should modify your medication or dose of medication.      2 hours prior: nothing at all by mouth. Including no water or gum.           Bring up to date insurance information, including your insurance card, and photo ID.  Wear comfortable, loose fitting clothing.  Do not wear jewelry and do not bring any valuables.          *If you have any questions regarding your colonoscopy or the prep, or if you need to reschedule your procedure, please call our office at [phone].     **You must arrange for a responsible adult to drive you and stay with you for 24hours after the procedure or your procedure will be cancelled**          You will receive a phone call from the facility approximately 1-3 days before the procedure for exact arrival time.     The following foods are allowed in a clear liquid diet:     Water (plain, carbonated or flavored)   Fruit juices without pulp, such as apple or white grape   Fruit-flavored beverages, such as fruit punch or lemonade   Carbonated drinks, including dark sodas (cola and root beer)   Gelatin   Tea or coffee without milk or cream   Strained tomato or vegetable juice   Sports drinks   Clear, fat-free broth (bouillon or consomme)   Honey or sugar   Hard candy, such as lemon drops or peppermint rounds   Ice pops without milk, bits of fruit, seeds or nuts          Foods that are generally allowed on a low-fiber diet include:     White bread without nuts and seeds   White rice, plain white pasta, and crackers   Refined hot cereals, such as Cream of Wheat, or cold cereals with less than 1 gram of fiber per serving   Pancakes or waffles made from white refined flour   Most canned or well-cooked vegetables and fruits without skins or seeds   Fruit and vegetable juice with little or no pulp, fruit-flavored drinks, and flavored mauro   Tender meat, poultry, fish, eggs and tofu   Milk and foods made from milk -- such as yogurt, pudding, ice cream, cheeses and sour cream -- if tolerated   Butter, margarine, oils and salad dressings without seeds

## 2022-09-19 NOTE — TELEPHONE ENCOUNTER
Spoke with patient today regarding surgery scheduling     Went over details/instructions.    Surgery Letter sent via mail

## 2022-09-19 NOTE — TELEPHONE ENCOUNTER
I sent a Rx for Miralax and Dulcolax for her upcoming colonoscopy. I did try to send a Rx for Magnesium Citrate but this was unable to send though electronically de to recall.

## 2022-09-19 NOTE — LETTER
We've received instruction to get you scheduled for a colonoscopy with Dr Wright. We have that arranged as follows:     Pre-op Physical:  Dr. Wright will do your pre op physical the day of surgery    Surgery Date: 12/7/2022     [place]: [place] [address]    Approximate Arrival Time: 6:00 am  (Unless instructed differently by the pre-op call nurse)     Prep Tasks and Info:     1. Review your medications with your primary care or prescribing physician; they will advise you which meds to stop and when, and when you can resume taking.  Certain medications like blood thinners, need to be stopped in advance of surgery to proceed safely.    2. You must get tested for COVID-19, even if you are vaccinated.    Outpatient Surgery:  If you are going home the same day of surgery, a home rapid antigen Covid-19 test is required 1-2 days before surgery- regardless of your vaccination status.  Take a photo of the negative result and show to the nurse on the day of surgery. If you test positive, contact our office right away to reschedule surgery. You can buy a home Covid-19 Rapid Antigen test at many local pharmacies, or you can order for free at covid.gov/tests.      3. Please shower the evening before and morning of surgery with Hibiclens or Exidine soap.  This can be found at your local pharmacy.    4. Fasting instructions will be provided by the pre-op nurse who will call you 1-3 days before surgery.  Typically we advise normal food up to 8 hours before surgery then clear liquids only up to 2 hours before surgery then nothing at all by mouth for 2 hours including no gum or candy.  The nurse will review your specific instructions with you at the call.      5. Smoking impacts your body's ability to heal properly.  If you are a smoker, we strongly urge you to stop smoking 4-6 weeks before surgery.    6. You will need an adult to drive you home and stay with you 24 hours  after surgery. Public transportation or Medical Van Services are not permitted.    7. You may have one family member wait in the lobby at the surgery center during your surgery. Visitor restrictions are subject to change, please verify with the pre-op nurse when they call.    8. If the community sees a new COVID19 surge, your procedure may need to be postponed. We will contact you if this happens. You will be screened for high-risk exposure to Covid-19 during the pre-op call.  We encourage you to quarantine yourself away from any Covid-19 people for 10 days before surgery to avoid possible last minute cancellations.   When you arrive to the surgery center, you will again be screened for COVID19 symptoms. If you screen positive, your surgery will need to be postponed.    9. We always encourage you to notify your insurance any time you have medical tests or procedures scheduled including surgery. The number is usually right on the back of your insurance card. Please call [facility] Cost of Care at [phone] if you'd like a surgery quote.       Call our office if you have any questions! Thank you!     Keren Steele  Surgery Coordinator of Surgical Specialties   [phone]

## 2022-09-21 PROBLEM — Z86.0100 HISTORY OF COLONIC POLYPS: Status: ACTIVE | Noted: 2022-09-21

## 2022-12-02 RX ORDER — ALBUTEROL SULFATE 0.83 MG/ML
SOLUTION RESPIRATORY (INHALATION)
COMMUNITY
Start: 2022-09-13

## 2022-12-02 RX ORDER — VENLAFAXINE HYDROCHLORIDE 37.5 MG/1
37.5 CAPSULE, EXTENDED RELEASE ORAL 2 TIMES DAILY
Status: ON HOLD | COMMUNITY
Start: 2022-09-02 | End: 2023-01-01

## 2022-12-02 RX ORDER — APIXABAN 2.5 MG/1
TABLET, FILM COATED ORAL
COMMUNITY
Start: 2022-07-25 | End: 2023-02-21

## 2022-12-02 RX ORDER — ASPIRIN 325 MG
TABLET ORAL
COMMUNITY
Start: 2022-08-20 | End: 2022-12-07

## 2022-12-06 ENCOUNTER — ANESTHESIA EVENT (OUTPATIENT)
Dept: SURGERY | Facility: AMBULATORY SURGERY CENTER | Age: 57
End: 2022-12-06
Payer: COMMERCIAL

## 2022-12-07 ENCOUNTER — ANESTHESIA (OUTPATIENT)
Dept: SURGERY | Facility: AMBULATORY SURGERY CENTER | Age: 57
End: 2022-12-07
Payer: COMMERCIAL

## 2022-12-07 ENCOUNTER — SURGERY (OUTPATIENT)
Age: 57
End: 2022-12-07
Payer: COMMERCIAL

## 2022-12-07 ENCOUNTER — HOSPITAL ENCOUNTER (OUTPATIENT)
Facility: AMBULATORY SURGERY CENTER | Age: 57
Discharge: HOME | End: 2022-12-07
Attending: SURGERY
Payer: COMMERCIAL

## 2022-12-07 VITALS
BODY MASS INDEX: 34.27 KG/M2 | DIASTOLIC BLOOD PRESSURE: 86 MMHG | HEART RATE: 98 BPM | RESPIRATION RATE: 20 BRPM | SYSTOLIC BLOOD PRESSURE: 142 MMHG | HEIGHT: 59 IN | WEIGHT: 170 LBS | OXYGEN SATURATION: 99 % | TEMPERATURE: 97.4 F

## 2022-12-07 DIAGNOSIS — Z86.0100 HISTORY OF COLONIC POLYPS: ICD-10-CM

## 2022-12-07 LAB — COLONOSCOPY: NORMAL

## 2022-12-07 PROCEDURE — 45380 COLONOSCOPY AND BIOPSY: CPT | Mod: PT | Performed by: SURGERY

## 2022-12-07 RX ORDER — PROPOFOL 10 MG/ML
INJECTION, EMULSION INTRAVENOUS CONTINUOUS PRN
Status: DISCONTINUED | OUTPATIENT
Start: 2022-12-07 | End: 2022-12-07

## 2022-12-07 RX ORDER — HYDROMORPHONE HCL IN WATER/PF 6 MG/30 ML
0.4 PATIENT CONTROLLED ANALGESIA SYRINGE INTRAVENOUS EVERY 5 MIN PRN
Status: DISCONTINUED | OUTPATIENT
Start: 2022-12-07 | End: 2022-12-09 | Stop reason: HOSPADM

## 2022-12-07 RX ORDER — OXYCODONE HYDROCHLORIDE 10 MG/1
10 TABLET ORAL EVERY 4 HOURS PRN
Status: DISCONTINUED | OUTPATIENT
Start: 2022-12-07 | End: 2022-12-09 | Stop reason: HOSPADM

## 2022-12-07 RX ORDER — ONDANSETRON 2 MG/ML
4 INJECTION INTRAMUSCULAR; INTRAVENOUS EVERY 30 MIN PRN
Status: DISCONTINUED | OUTPATIENT
Start: 2022-12-07 | End: 2022-12-09 | Stop reason: HOSPADM

## 2022-12-07 RX ORDER — LIDOCAINE 40 MG/G
CREAM TOPICAL
Status: DISCONTINUED | OUTPATIENT
Start: 2022-12-07 | End: 2022-12-09 | Stop reason: HOSPADM

## 2022-12-07 RX ORDER — SODIUM CHLORIDE, SODIUM LACTATE, POTASSIUM CHLORIDE, CALCIUM CHLORIDE 600; 310; 30; 20 MG/100ML; MG/100ML; MG/100ML; MG/100ML
INJECTION, SOLUTION INTRAVENOUS CONTINUOUS
Status: DISCONTINUED | OUTPATIENT
Start: 2022-12-07 | End: 2022-12-09 | Stop reason: HOSPADM

## 2022-12-07 RX ORDER — FENTANYL CITRATE 0.05 MG/ML
50 INJECTION, SOLUTION INTRAMUSCULAR; INTRAVENOUS EVERY 5 MIN PRN
Status: DISCONTINUED | OUTPATIENT
Start: 2022-12-07 | End: 2022-12-09 | Stop reason: HOSPADM

## 2022-12-07 RX ORDER — FENTANYL CITRATE 0.05 MG/ML
25 INJECTION, SOLUTION INTRAMUSCULAR; INTRAVENOUS
Status: DISCONTINUED | OUTPATIENT
Start: 2022-12-07 | End: 2022-12-09 | Stop reason: HOSPADM

## 2022-12-07 RX ORDER — LIDOCAINE HYDROCHLORIDE 10 MG/ML
INJECTION, SOLUTION INFILTRATION; PERINEURAL PRN
Status: DISCONTINUED | OUTPATIENT
Start: 2022-12-07 | End: 2022-12-07

## 2022-12-07 RX ORDER — FENTANYL CITRATE 0.05 MG/ML
25 INJECTION, SOLUTION INTRAMUSCULAR; INTRAVENOUS EVERY 5 MIN PRN
Status: DISCONTINUED | OUTPATIENT
Start: 2022-12-07 | End: 2022-12-09 | Stop reason: HOSPADM

## 2022-12-07 RX ORDER — MEPERIDINE HYDROCHLORIDE 25 MG/ML
12.5 INJECTION INTRAMUSCULAR; INTRAVENOUS; SUBCUTANEOUS
Status: DISCONTINUED | OUTPATIENT
Start: 2022-12-07 | End: 2022-12-09 | Stop reason: HOSPADM

## 2022-12-07 RX ORDER — PROPOFOL 10 MG/ML
INJECTION, EMULSION INTRAVENOUS PRN
Status: DISCONTINUED | OUTPATIENT
Start: 2022-12-07 | End: 2022-12-07

## 2022-12-07 RX ORDER — ONDANSETRON 4 MG/1
4 TABLET, ORALLY DISINTEGRATING ORAL EVERY 30 MIN PRN
Status: DISCONTINUED | OUTPATIENT
Start: 2022-12-07 | End: 2022-12-09 | Stop reason: HOSPADM

## 2022-12-07 RX ORDER — HYDROMORPHONE HCL IN WATER/PF 6 MG/30 ML
0.2 PATIENT CONTROLLED ANALGESIA SYRINGE INTRAVENOUS EVERY 5 MIN PRN
Status: DISCONTINUED | OUTPATIENT
Start: 2022-12-07 | End: 2022-12-09 | Stop reason: HOSPADM

## 2022-12-07 RX ORDER — ACETAMINOPHEN 325 MG/1
975 TABLET ORAL ONCE
Status: DISCONTINUED | OUTPATIENT
Start: 2022-12-07 | End: 2022-12-09 | Stop reason: HOSPADM

## 2022-12-07 RX ORDER — GLYCOPYRROLATE 0.2 MG/ML
INJECTION, SOLUTION INTRAMUSCULAR; INTRAVENOUS PRN
Status: DISCONTINUED | OUTPATIENT
Start: 2022-12-07 | End: 2022-12-07

## 2022-12-07 RX ORDER — OXYCODONE HYDROCHLORIDE 5 MG/1
5 TABLET ORAL EVERY 4 HOURS PRN
Status: DISCONTINUED | OUTPATIENT
Start: 2022-12-07 | End: 2022-12-09 | Stop reason: HOSPADM

## 2022-12-07 RX ORDER — ONDANSETRON 2 MG/ML
INJECTION INTRAMUSCULAR; INTRAVENOUS PRN
Status: DISCONTINUED | OUTPATIENT
Start: 2022-12-07 | End: 2022-12-07

## 2022-12-07 RX ADMIN — ONDANSETRON 4 MG: 2 INJECTION INTRAMUSCULAR; INTRAVENOUS at 07:06

## 2022-12-07 RX ADMIN — LIDOCAINE HYDROCHLORIDE 3 ML: 10 INJECTION, SOLUTION INFILTRATION; PERINEURAL at 07:16

## 2022-12-07 RX ADMIN — SODIUM CHLORIDE, SODIUM LACTATE, POTASSIUM CHLORIDE, CALCIUM CHLORIDE: 600; 310; 30; 20 INJECTION, SOLUTION INTRAVENOUS at 06:55

## 2022-12-07 RX ADMIN — PROPOFOL 160 MCG/KG/MIN: 10 INJECTION, EMULSION INTRAVENOUS at 07:05

## 2022-12-07 RX ADMIN — PROPOFOL 30 MG: 10 INJECTION, EMULSION INTRAVENOUS at 07:08

## 2022-12-07 RX ADMIN — PROPOFOL 40 MG: 10 INJECTION, EMULSION INTRAVENOUS at 07:05

## 2022-12-07 RX ADMIN — GLYCOPYRROLATE 0.2 MG: 0.2 INJECTION, SOLUTION INTRAMUSCULAR; INTRAVENOUS at 07:02

## 2022-12-07 RX ADMIN — LIDOCAINE HYDROCHLORIDE 3 ML: 10 INJECTION, SOLUTION INFILTRATION; PERINEURAL at 07:04

## 2022-12-07 ASSESSMENT — LIFESTYLE VARIABLES: TOBACCO_USE: 1

## 2022-12-07 ASSESSMENT — COPD QUESTIONNAIRES: COPD: 1

## 2022-12-07 NOTE — ANESTHESIA POSTPROCEDURE EVALUATION
Patient: Peri Irizarry    Procedure: Procedure(s):  COLONOSCOPY WITH POLYPECTOMY       Anesthesia Type:  MAC    Note:  Disposition: Outpatient   Postop Pain Control: Uneventful            Sign Out: Well controlled pain   PONV: No   Neuro/Psych: Uneventful            Sign Out: Acceptable/Baseline neuro status   Airway/Respiratory: Uneventful            Sign Out: Acceptable/Baseline resp. status   CV/Hemodynamics: Uneventful            Sign Out: Acceptable CV status; No obvious hypovolemia; No obvious fluid overload   Other NRE: NONE   DID A NON-ROUTINE EVENT OCCUR? No           Last vitals:  Vitals Value Taken Time   /71 12/07/22 0807   Temp 97.4  F (36.3  C) 12/07/22 0753   Pulse 93 12/07/22 0816   Resp 20 12/07/22 0753   SpO2 97 % 12/07/22 0816   Vitals shown include unvalidated device data.    Electronically Signed By: Paulo Pollack MD  December 7, 2022  8:44 AM

## 2022-12-07 NOTE — DISCHARGE INSTRUCTIONS
Colonoscopy  Colonoscopy is a test to view the inside of your lower digestive tract (colon and rectum). Sometimes it can show the last part of the small intestine (ileum). During the test, small pieces of tissue may be removed for testing. This is called a biopsy. Small growths, such as polyps, may also be removed.       A camera attached to a flexible tube with a viewing lens is used to take video pictures.   Why is colonoscopy done?  The test is done to help look for colon cancer. And it can help find the source of abdominal pain, bleeding, and changes in bowel habits. It may be needed once a year to every 10 years, depending on factors such as your:   Age  Health history  Family health history  Symptoms  Results from any prior colonoscopy  Risks and possible complications  These include:  Bleeding               A puncture or tear in the colon   Risks of anesthesia  A cancer lesion not being seen or fully removed  Getting ready   To prepare for the test:  Talk with your healthcare provider about the risks of the test (see below). Also ask your healthcare provider about alternatives to the test.  Tell your healthcare provider about any medicines and supplements you take. Also tell him or her about any health conditions you may have.  Make sure your rectum and colon are empty for the test. Follow the diet and bowel prep instructions exactly. If you don t, the test may need to be rescheduled.  Plan for a friend or family member to drive you home after the test.    You may discuss the results with your doctor right away or at a future visit.   During the test   The test is usually done in the hospital on an outpatient basis or at an outpatient clinic. This means you go home the same day. The procedure takes about 30 minutes. During that time:   You are given relaxing (sedating) medicine through an IV line. You may be drowsy, or fully asleep.  The healthcare provider will first give you a physical exam to check for  anal and rectal problems.  Then the anus is lubricated and the scope inserted.  If you are awake, you may have a feeling similar to needing to have a bowel movement. You may also feel pressure as air is pumped into the colon. It s OK to pass gas during the procedure.  Biopsy, polyp removal, or other treatments may be done during the test.     Colonoscopy provides an inside view of the entire colon.   After the test   You may have gas right after the test. It can help to try to pass it to help prevent later bloating. Your healthcare provider may discuss the results with you right away. Or you may need to schedule a follow-up visit to talk about the results. After the test, you can go back to your normal eating and other activities. You may be tired from the sedation and need to rest for a few hours. Discuss your medicines with your provider to understand if they can be restarted right away.   When to call your healthcare provider  Call your healthcare provider if you have any of the following after the procedure:   Pain in your belly  Fever of 100.4 F (38 C) or higher, or as directed by your provider  Rectal bleeding  Nausea or vomiting  [employer] last reviewed this educational content on 6/1/2019 2000-2021 [employer]. All rights reserved. This information is not intended as a substitute for professional medical care. Always follow your healthcare professional's instructions.      If you have any questions or concerns regarding your procedure, please contact Dr. Wright, his office number is [phone].     Discharge Instructions: After Your Surgery    You ve just had surgery. During surgery, you were given medicine called anesthesia to keep you relaxed and free of pain. After surgery, you may have some pain or nausea. This is common. Here are some tips for feeling better and getting well after surgery.    Going home    Your healthcare provider will show you how to take care of yourself when you go  home. He or she will also answer your questions. Have an adult family member or friend drive you home. For the first 24 hours after your surgery:  Don't drive or use heavy equipment.  Don't make important decisions or sign legal papers.  Don't drink alcohol.  Have an adult stay with you for 24 hours. He or she can watch for problems and help keep you safe.  Be sure to go to all follow-up visits with your healthcare provider. And rest after your surgery for as long as your healthcare provider tells you to.    Coping with pain    If you have pain after surgery, pain medicine will help you feel better. Take it as told, before pain becomes severe. Also, ask your healthcare provider or pharmacist about other ways to control pain. This might be with heat, ice, or relaxation. And follow any other instructions your surgeon or nurse gives you.    Tips for taking pain medicine    To get the best relief possible, remember these points:  Pain medicines can upset your stomach. Taking them with a little food may help.  Most pain relievers taken by mouth need at least 20 to 30 minutes to start to work.  Don't wait till your pain becomes severe before you take your medicine. Try to time your medicine so that you can take it before starting an activity. This might be before you get dressed, go for a walk, or sit down for dinner.  Constipation is a common side effect of pain medicines. It's ok to take medicines such as laxatives or stool softeners to help ease constipation. Also ask if you should skip any foods. Drinking lots of fluids and eating foods such as fruits and vegetables that are high in fiber can also help.  Drinking alcohol and taking pain medicine can cause dizziness and slow your breathing. It can even be deadly. Don't drink alcohol while taking pain medicine.  Pain medicine can make you react more slowly to things. Don't drive or run machinery while taking pain medicine.  Your healthcare provider may tell you to take  acetaminophen to help ease your pain. Ask him or her how much you are supposed to take each day. Acetaminophen or other pain relievers may interact with your prescription medicines or other over-the-counter (OTC) medicines. Some prescription medicines have acetaminophen and other ingredients. Using both prescription and OTC acetaminophen for pain can cause you to overdose. Read the labels on your OTC medicines with care. This will help you to clearly know the list of ingredients, how much to take, and any warnings. It may also help you not take too much acetaminophen. If you have questions or don't understand the information, ask your pharmacist or healthcare provider to explain it to you before you take the OTC medicine.    Managing nausea    Some people have an upset stomach after surgery. This is often because of anesthesia, pain, or pain medicine, or the stress of surgery. These tips will help you handle nausea and eat healthy foods as you get better. If you were on a special food plan before surgery, ask your healthcare provider if you should follow it while you get better. These tips may help:    Don't push yourself to eat. Your body will tell you when to eat and how much.  Start off with clear liquids and soup. They are easier to digest.  Next try semi-solid foods, such as mashed potatoes, applesauce, and gelatin, as you feel ready.  Slowly move to solid foods. Don t eat fatty, rich, or spicy foods at first.  Don't force yourself to have 3 large meals a day. Instead eat smaller amounts more often.  Take pain medicines with a small amount of solid food, such as crackers or toast, to prevent nausea.    When to call your healthcare provider    Call your healthcare provider if:  You still have intolerable pain an hour after taking medicine. The medicine may not be strong enough.  You feel too sleepy, dizzy, or groggy. The medicine may be too strong.  You have side effects such as nausea or vomiting, or skin  changes such as rash, itching, or hives. Your healthcare provider may suggest other medicines to control side effects.  Rash, itching, or hives may mean you have an allergic reaction. Report this right away. If you have trouble breathing or facial swelling, call 911 right away.    If you have obstructive sleep apnea    You were given anesthesia medicine during surgery to keep you comfortable and free of pain. After surgery, you may have more apnea spells because of this medicine and other medicines you were given. The spells may last longer than usual.   At home:  Keep using the continuous positive airway pressure (CPAP) device when you sleep. Unless your healthcare provider tells you not to, use it when you sleep, day or night. CPAP is a common device used to treat obstructive sleep apnea.  Talk with your provider before taking any pain medicine, muscle relaxants, or sedatives. Your provider will tell you about the possible dangers of taking these medicines.

## 2022-12-07 NOTE — ANESTHESIA CARE TRANSFER NOTE
Patient: Peri Irizarry    Procedure: Procedure(s):  COLONOSCOPY WITH POLYPECTOMY       Diagnosis: History of colonic polyps [Z86.010]  Diagnosis Additional Information: No value filed.    Anesthesia Type:   MAC     Note:    Oropharynx: oropharynx clear of all foreign objects and spontaneously breathing  Level of Consciousness: drowsy  Oxygen Supplementation: face mask  Level of Supplemental Oxygen (L/min / FiO2): 6  Independent Airway: airway patency not satisfactory and stable  Dentition: dentition unchanged  Vital Signs Stable: post-procedure vital signs reviewed and stable  Report to RN Given: handoff report given  Patient transferred to: Phase II    Handoff Report: Identifed the Patient, Identified the Reponsible Provider, Reviewed the pertinent medical history, Discussed the surgical course, Reviewed Intra-OP anesthesia mangement and issues during anesthesia, Set expectations for post-procedure period and Allowed opportunity for questions and acknowledgement of understanding      Vitals:  Vitals Value Taken Time   /88 12/07/22 0754   Temp 97.4    Pulse 83 12/07/22 0754   Resp 20    SpO2 100 % 12/07/22 0754   Vitals shown include unvalidated device data.    Electronically Signed By: EUFEMIA Mckeon CRNA  December 7, 2022  7:57 AM

## 2022-12-07 NOTE — ANESTHESIA PREPROCEDURE EVALUATION
"Anesthesia Pre-Procedure Evaluation    Patient: Peri Irizarry   MRN: [id number] : 1965        Procedure : Procedure(s):  COLONOSCOPY          Past Medical History:   Diagnosis Date     Anemia      Anxiety      Arthritis      Asthma      Asthma      Breast cancer (H) 2010    left      Cancer (H)      Colon cancer (H) 2011     COPD (chronic obstructive pulmonary disease) (H)      Depression      Difficulty walking      Dyspnea on exertion      History of migraine headaches      Hx of radiation therapy 2010     Hypertension      Migraines      Motion sickness      Orthopnea      Other chronic pain      Shortness of breath      Sleep apnea     CPAP     Stroke (H) 2010    \"I HAD A SLIGHT STROKE BACK IN \"     Walking troubles       Past Surgical History:   Procedure Laterality Date     BIOPSY BREAST Left 2010     BREAST SURGERY       COLON SURGERY      20% OF COLON REMOVED-COLON CANCER     COLONOSCOPY N/A 2016    Procedure: COLONOSCOPY WITH MAC SEDATION;  Surgeon: Kumar Horowitz MD;  Location: [facility];  Service:      COLONOSCOPY N/A 2019    Procedure: COLONOSCOPY;  Surgeon: Chintan Peter MD;  Location: [facility];  Service: General     HYSTERECTOMY  2013     LUMPECTOMY BREAST Left 2010     OOPHORECTOMY Bilateral 2013      Allergies   Allergen Reactions     Hydrocodone-Acetaminophen Unknown     Pt takes this med 3 times daily, Brand Name Hydrocodone without the sparkles pt can use     Lisinopril      Penicillin G       Social History     Tobacco Use     Smoking status: Former     Packs/day: 2.00     Years: 20.00     Pack years: 40.00     Types: Cigarettes     Smokeless tobacco: Never     Tobacco comments:     quit in    Substance Use Topics     Alcohol use: Not Currently      Wt Readings from Last 1 Encounters:   22 77.1 kg (170 lb)        Anesthesia Evaluation   Pt has had prior anesthetic.     No history of anesthetic complications "       ROS/MED HX  ENT/Pulmonary:     (+) sleep apnea, uses CPAP, tobacco use, Current use, asthma Treatment: Inhaler prn and Inhaler daily,  COPD,     Neurologic:     (+) migraines, CVA, date: 2010, with deficits,     Cardiovascular:     (+) Dyslipidemia hypertension----- (-) wheezes   METS/Exercise Tolerance:     Hematologic: Comments: Was on eliquis after ankle fx, now off for over 1 month    (+) anemia,     Musculoskeletal:   (+) arthritis,     GI/Hepatic:       Renal/Genitourinary:       Endo:     (+) Obesity (bmi 34),     Psychiatric/Substance Use:     (+) depression and anxiety     Infectious Disease:       Malignancy:   (+) Malignancy, History of Breast.Breast CA status post Radiation.        Other:      (+) , H/O Chronic Pain,        Physical Exam    Airway        Mallampati: II   TM distance: > 3 FB   Neck ROM: full   Mouth opening: > 3 cm    Respiratory Devices and Support         Dental       (+) upper dentures and lower dentures      Cardiovascular          Rhythm and rate: regular and normal     Pulmonary           breath sounds clear to auscultation   (-) no rhonchi and no wheezes        OUTSIDE LABS:  CBC:   Lab Results   Component Value Date    WBC 10.7 04/13/2022    WBC 10.2 12/23/2021    HGB 11.5 (L) 04/13/2022    HGB 12.2 12/23/2021    HCT 35.6 04/13/2022    HCT 37.3 12/23/2021     (H) 04/13/2022     (H) 12/23/2021     BMP:   Lab Results   Component Value Date     04/13/2022     12/23/2021    POTASSIUM 3.5 04/13/2022    POTASSIUM 4.3 12/23/2021    CHLORIDE 101 04/13/2022    CHLORIDE 109 (H) 12/23/2021    CO2 26 04/13/2022    CO2 20 (L) 12/23/2021    BUN 10 04/13/2022    BUN 4 (L) 12/23/2021    CR 0.95 04/13/2022    CR 0.80 12/23/2021    GLC 90 04/13/2022    GLC 89 12/23/2021     COAGS:   Lab Results   Component Value Date    INR 0.98 12/23/2021     POC: No results found for: BGM, HCG, HCGS  HEPATIC:   Lab Results   Component Value Date    ALBUMIN 3.9 04/13/2022     PROTTOTAL 7.4 04/13/2022    ALT 13 04/13/2022    AST 12 04/13/2022    ALKPHOS 83 04/13/2022    BILITOTAL 0.3 04/13/2022     OTHER:   Lab Results   Component Value Date    CHERY 9.6 04/13/2022    TSH 1.50 04/13/2022       Anesthesia Plan    ASA Status:  3   NPO Status:  NPO Appropriate    Anesthesia Type: MAC.     - Reason for MAC: immobility needed, straight local not clinically adequate   Induction: Propofol.   Maintenance: TIVA.        Consents    Anesthesia Plan(s) and associated risks, benefits, and realistic alternatives discussed. Questions answered and patient/representative(s) expressed understanding.    - Discussed:     - Discussed with:  Patient         Postoperative Care    Pain management: Multi-modal analgesia.   PONV prophylaxis: Ondansetron (or other 5HT-3), Dexamethasone or Solumedrol     Comments:    Other Comments: Propofol infusion    Reviewed anesthetic options and risks. Patient agrees to proceed.             Paulo Pollack MD

## 2022-12-07 NOTE — BRIEF OP NOTE
[facility]    Brief Operative Note    Pre-operative diagnosis: History of colonic polyps [Z86.010]  Post-operative diagnosis Same as pre-operative diagnosis    Procedure: Procedure(s):  COLONOSCOPY WITH POLYPECTOMY  Surgeon: Surgeon(s) and Role:     * Evin Wright DO - Primary  Anesthesia: MAC   Estimated Blood Loss: 2 mL from 12/7/2022  7:02 AM to 12/7/2022  7:53 AM      Drains: None  Specimens:   ID Type Source Tests Collected by Time Destination   1 :  Polyp Large Intestine, Colon, Descending SURGICAL PATHOLOGY EXAM Evin Wright DO 12/7/2022  7:36 AM    2 :  Polyp Rectum SURGICAL PATHOLOGY EXAM Evin Wright DO 12/7/2022  7:39 AM      Please see provation apex for op note.

## 2022-12-07 NOTE — H&P
"General Surgery H&P  Peri Irizarry MRN# [id number]   Age/Sex: 57 year old female YOB: 1965     Reason for visit: Colonoscopy       Referring physician: Brain Camacho MD                   Assessment and Plan:   Assessment:  1. Colon cancer -partial colectomy done in 2010 by colorectal surgery.      Plan:  -To the OR for colonoscopy  -Risk and benefits of procedure explained detail to the patient.  Risks include infection, bleeding, damage to the surrounding structures and possible perforation of the hollow organs.  Patient verbalized understanding provided consent to undergo the procedure above.          Chief Complaint:   Presenting for colonoscopy     History is obtained from the patient    HPI:   Peri Irizarry is a 57 year old female who presents for colonoscopy.  Patient tolerated the prep well.  The patient's last colonoscopy was 2019.  During that colonoscopy, the patient was found to have a cecal lipoma as well as polyps.  No new complaints.           Past Medical History:     Past Medical History:   Diagnosis Date     Anemia      Anxiety      Arthritis      Asthma      Asthma      Breast cancer (H) 01/01/2010    left      Cancer (H)      Colon cancer (H) 01/01/2011     COPD (chronic obstructive pulmonary disease) (H)      Depression      Difficulty walking      Dyspnea on exertion      History of migraine headaches      Hx of radiation therapy 01/01/2010     Hypertension      Migraines      Motion sickness      Orthopnea      Other chronic pain      Shortness of breath      Sleep apnea     CPAP     Stroke (H) 01/01/2010    \"I HAD A SLIGHT STROKE BACK IN 2010\"     Walking troubles               Past Surgical History:     Past Surgical History:   Procedure Laterality Date     BIOPSY BREAST Left 2010     BREAST SURGERY       COLON SURGERY  2011    20% OF COLON REMOVED-COLON CANCER     COLONOSCOPY N/A 6/24/2016    Procedure: COLONOSCOPY WITH MAC SEDATION;  Surgeon: Kumar Horowitz MD;  " Location: [facility] OR;  Service:      COLONOSCOPY N/A 7/31/2019    Procedure: COLONOSCOPY;  Surgeon: Chintan Peter MD;  Location: [facility] OR;  Service: General     HYSTERECTOMY  2013     LUMPECTOMY BREAST Left 2010     OOPHORECTOMY Bilateral 2013             Social History:    reports that she has been smoking cigarettes. She has a 40.00 pack-year smoking history. She has never used smokeless tobacco. She reports that she does not currently use alcohol. She reports that she does not use drugs.           Family History:     Family History   Problem Relation Age of Onset     Heart Disease Mother      No Known Problems Father      Breast Cancer Sister 53.00     Breast Cancer Maternal Aunt 45.00     Colon Cancer Paternal Aunt 45.00              Allergies:     Allergies   Allergen Reactions     Hydrocodone-Acetaminophen Unknown     Pt takes this med 3 times daily, Brand Name Hydrocodone without the sparkles pt can use     Lisinopril      Penicillin G               Medications:     Prior to Admission medications    Medication Sig Start Date End Date Taking? Authorizing Provider   ADVAIR DISKUS 500-50 MCG/DOSE inhaler  12/21/20  Yes Reported, Patient   albuterol (PROAIR HFA/PROVENTIL HFA/VENTOLIN HFA) 108 (90 Base) MCG/ACT inhaler Inhale 1-2 puffs into the lungs 11/4/19  Yes Reported, Patient   albuterol (PROVENTIL) (2.5 MG/3ML) 0.083% neb solution NEBULIZE 1 VIAL AS DIRECTED EVERY 4-6 HOURS AS NEEDED VIA NEBULIZER 9/13/22  Yes Reported, Patient   alendronate (FOSAMAX) 35 MG tablet  12/21/20  Yes Reported, Patient   amLODIPine (NORVASC) 5 MG tablet Take 5 mg by mouth   Yes Reported, Patient   bisacodyl (DULCOLAX) 5 MG EC tablet Use as instructed for colonoscopy prep 9/19/22  Yes Evin Wright,    busPIRone (BUSPAR) 5 MG tablet Take 5 mg by mouth 2 times daily 7/3/19  Yes Reported, Patient   calcium carbonate (OS-CHERY) 600 MG tablet  12/15/20  Yes Reported, Patient   conjugated estrogens (PREMARIN) 0.625  MG/GM vaginal cream Place 1 g vaginally   Yes Reported, Patient   cyclobenzaprine (FLEXERIL) 5 MG tablet Take 5 mg by mouth prn   Yes Reported, Patient   DULoxetine (CYMBALTA) 30 MG capsule Take 30 mg by mouth   Yes Reported, Patient   ELIQUIS ANTICOAGULANT 2.5 MG tablet TAKE 1 TABLET BY MOUTH TWO TIMES A DAY FOR 14 DAYS 7/25/22  Yes Reported, Patient   ferrous sulfate (FEROSUL) 325 (65 Fe) MG tablet Take 325 mg by mouth every 48 hours   Yes Reported, Patient   furosemide (LASIX) 40 MG tablet Take 40 mg by mouth   Yes Reported, Patient   gabapentin (NEURONTIN) 300 MG capsule One in the morning, one in the evening and 2 at bedtime   Yes Reported, Patient   GNP NATURAL FIBER 48.57 % POWD TAKE 2G ONCE DAILY AS DIRECTED **184 DAYS SUPPLY** 30 6/15/22  Yes Reported, Patient   hydrochlorothiazide (HYDRODIURIL) 25 MG tablet Take 25 mg by mouth   Yes Reported, Patient   HYDROcodone-acetaminophen (NORCO) 5-325 MG tablet  12/2/20  Yes Reported, Patient   hydrOXYzine (VISTARIL) 25 MG capsule Take 25 mg by mouth 3 times daily as needed 2/11/19  Yes Reported, Patient   meclizine (ANTIVERT) 25 MG tablet Take 1 tablet (25 mg) by mouth 3 times daily as needed for dizziness 12/23/21  Yes Heri Kaufman MD   montelukast (SINGULAIR) 10 MG tablet Take 10 mg by mouth   Yes Reported, Patient   naproxen (NAPROSYN) 500 MG tablet Take 500 mg by mouth   Yes Reported, Patient   polyethylene glycol (MIRALAX) 17 GM/Dose powder Use as instructed for colonoscopy prep. 9/19/22  Yes Evin Wright,    polyethylene glycol (MIRALAX) 17 GM/Dose powder Take 17 g by mouth   Yes Reported, Patient   potassium chloride ER (KLOR-CON M) 10 MEQ CR tablet  10/25/20  Yes Reported, Patient   senna-docusate (SENOKOT-S/PERICOLACE) 8.6-50 MG tablet Take 2 tablets by mouth   Yes Reported, Patient   SPIRIVA RESPIMAT 1.25 MCG/ACT inhaler  12/15/20  Yes Reported, Patient   SUMAtriptan (IMITREX) 50 MG tablet Take 1 tablet (50 mg) by mouth at onset of headache for  "migraine 1/3/22  Yes Bigg Yoon MD   topiramate (TOPAMAX) 50 MG tablet One tab po qam and two tabs po qhs 1/3/22  Yes Bigg Yoon MD   traZODone (DESYREL) 50 MG tablet Take 200 mg by mouth   Yes Reported, Patient   venlafaxine (EFFEXOR XR) 37.5 MG 24 hr capsule TAKE TWO CAPSULES BY MOUTH ONCE DAILY FOR HOT FLASHES 9/2/22  Yes Reported, Patient   vitamin D3 (CHOLECALCIFEROL) 50 mcg (2000 units) tablet  12/28/20  Yes Reported, Patient              Review of Systems:   A 12 point Review of Systems is negative other than noted in the HPI            Physical Exam:     Patient Vitals for the past 24 hrs:   BP Temp Temp src Pulse Resp SpO2 Height Weight   12/07/22 0626 (!) 149/84 98  F (36.7  C) Temporal 93 18 98 % 1.499 m (4' 11\") 77.1 kg (170 lb)        No intake or output data in the 24 hours ending 12/07/22 0657   Constitutional:   awake, alert, cooperative, no apparent distress, and appears stated age       Eyes:   PERRL, conjunctiva/corneas clear, EOM's intact; no scleral edema or icterus noted        ENT:   Normocephalic, without obvious abnormality, atraumatic, Lips, mucosa, and tongue normal        Hematologic / Lymphatic:   No lymphadenopathy       Lungs:   Normal respiratory effort, no accessory muscle use, breath sounds bilaterally on auscultation       Cardiovascular:   Regular rate and rhythm       Abdomen:   Soft, nondistended, nontender to palpation       Musculoskeletal:   No obvious swelling, bruising or deformity       Skin:   Skin color and texture normal for patient, no rashes or lesions              Data:          DO Evin Mazariegos DO  General Surgeon  [facility]  Surgery [address]  [address] [place] [address]?  Office: [phone]  Employed by - [facility]  Pager: [phone]         "

## 2022-12-09 NOTE — RESULT ENCOUNTER NOTE
I called patient with results.  The patient had findings of hyperplastic polyps.  Given the patient's history of colon cancer and with the findings of multiple polyps, my recommendation would be for the patient to repeat colonoscopy in 3 years.  All of patient's question concerns answered.     Evin Wright DO  General Surgeon  [facility]  Surgery [address] [address]?  Office: [phone]  Employed by - [facility] [employer]  Pager: [phone]

## 2023-01-01 ENCOUNTER — HOSPITAL ENCOUNTER (INPATIENT)
Facility: HOSPITAL | Age: 58
LOS: 15 days | Discharge: HOME | DRG: 326 | End: 2023-10-24
Attending: EMERGENCY MEDICINE | Admitting: HOSPITALIST
Payer: COMMERCIAL

## 2023-01-01 ENCOUNTER — ANESTHESIA (OUTPATIENT)
Dept: SURGERY | Facility: HOSPITAL | Age: 58
DRG: 326 | End: 2023-01-01
Payer: COMMERCIAL

## 2023-01-01 ENCOUNTER — APPOINTMENT (OUTPATIENT)
Dept: CT IMAGING | Facility: HOSPITAL | Age: 58
DRG: 326 | End: 2023-01-01
Attending: EMERGENCY MEDICINE
Payer: COMMERCIAL

## 2023-01-01 ENCOUNTER — APPOINTMENT (OUTPATIENT)
Dept: RADIOLOGY | Facility: HOSPITAL | Age: 58
DRG: 326 | End: 2023-01-01
Attending: INTERNAL MEDICINE
Payer: COMMERCIAL

## 2023-01-01 ENCOUNTER — LAB REQUISITION (OUTPATIENT)
Dept: LAB | Facility: CLINIC | Age: 58
End: 2023-01-01
Payer: COMMERCIAL

## 2023-01-01 ENCOUNTER — TELEPHONE (OUTPATIENT)
Dept: NEUROLOGY | Facility: CLINIC | Age: 58
End: 2023-01-01
Payer: COMMERCIAL

## 2023-01-01 ENCOUNTER — APPOINTMENT (OUTPATIENT)
Dept: CT IMAGING | Facility: HOSPITAL | Age: 58
DRG: 326 | End: 2023-01-01
Attending: STUDENT IN AN ORGANIZED HEALTH CARE EDUCATION/TRAINING PROGRAM
Payer: COMMERCIAL

## 2023-01-01 ENCOUNTER — TELEPHONE (OUTPATIENT)
Dept: INTERVENTIONAL RADIOLOGY/VASCULAR | Facility: CLINIC | Age: 58
End: 2023-01-01

## 2023-01-01 ENCOUNTER — APPOINTMENT (OUTPATIENT)
Dept: OCCUPATIONAL THERAPY | Facility: HOSPITAL | Age: 58
DRG: 326 | End: 2023-01-01
Attending: INTERNAL MEDICINE
Payer: COMMERCIAL

## 2023-01-01 ENCOUNTER — MEDICAL CORRESPONDENCE (OUTPATIENT)
Dept: HEALTH INFORMATION MANAGEMENT | Facility: CLINIC | Age: 58
End: 2023-01-01

## 2023-01-01 ENCOUNTER — APPOINTMENT (OUTPATIENT)
Dept: PHYSICAL THERAPY | Facility: HOSPITAL | Age: 58
DRG: 326 | End: 2023-01-01
Payer: COMMERCIAL

## 2023-01-01 ENCOUNTER — ANESTHESIA EVENT (OUTPATIENT)
Dept: SURGERY | Facility: HOSPITAL | Age: 58
DRG: 326 | End: 2023-01-01
Payer: COMMERCIAL

## 2023-01-01 ENCOUNTER — HOSPITAL ENCOUNTER (OUTPATIENT)
Dept: INTERVENTIONAL RADIOLOGY/VASCULAR | Facility: HOSPITAL | Age: 58
Discharge: HOME | End: 2023-12-13
Attending: INTERNAL MEDICINE | Admitting: RADIOLOGY
Payer: COMMERCIAL

## 2023-01-01 ENCOUNTER — APPOINTMENT (OUTPATIENT)
Dept: RADIOLOGY | Facility: HOSPITAL | Age: 58
DRG: 326 | End: 2023-01-01
Attending: HOSPITALIST
Payer: COMMERCIAL

## 2023-01-01 ENCOUNTER — APPOINTMENT (OUTPATIENT)
Dept: PHYSICAL THERAPY | Facility: HOSPITAL | Age: 58
DRG: 326 | End: 2023-01-01
Attending: INTERNAL MEDICINE
Payer: COMMERCIAL

## 2023-01-01 ENCOUNTER — APPOINTMENT (OUTPATIENT)
Dept: RADIOLOGY | Facility: HOSPITAL | Age: 58
DRG: 326 | End: 2023-01-01
Attending: STUDENT IN AN ORGANIZED HEALTH CARE EDUCATION/TRAINING PROGRAM
Payer: COMMERCIAL

## 2023-01-01 ENCOUNTER — APPOINTMENT (OUTPATIENT)
Dept: CT IMAGING | Facility: HOSPITAL | Age: 58
DRG: 326 | End: 2023-01-01
Attending: PHYSICIAN ASSISTANT
Payer: COMMERCIAL

## 2023-01-01 ENCOUNTER — APPOINTMENT (OUTPATIENT)
Dept: ULTRASOUND IMAGING | Facility: HOSPITAL | Age: 58
DRG: 326 | End: 2023-01-01
Attending: NURSE PRACTITIONER
Payer: COMMERCIAL

## 2023-01-01 VITALS
DIASTOLIC BLOOD PRESSURE: 58 MMHG | SYSTOLIC BLOOD PRESSURE: 120 MMHG | TEMPERATURE: 98.2 F | HEART RATE: 95 BPM | RESPIRATION RATE: 18 BRPM | OXYGEN SATURATION: 100 %

## 2023-01-01 VITALS
BODY MASS INDEX: 29.76 KG/M2 | RESPIRATION RATE: 20 BRPM | HEIGHT: 59 IN | WEIGHT: 147.6 LBS | SYSTOLIC BLOOD PRESSURE: 135 MMHG | DIASTOLIC BLOOD PRESSURE: 72 MMHG | TEMPERATURE: 99.1 F | OXYGEN SATURATION: 97 % | HEART RATE: 86 BPM

## 2023-01-01 DIAGNOSIS — E78.2 MIXED HYPERLIPIDEMIA: ICD-10-CM

## 2023-01-01 DIAGNOSIS — E86.0 MILD DEHYDRATION: ICD-10-CM

## 2023-01-01 DIAGNOSIS — Z09 HOSPITAL DISCHARGE FOLLOW-UP: ICD-10-CM

## 2023-01-01 DIAGNOSIS — R19.07 GENERALIZED ABDOMINAL MASS: ICD-10-CM

## 2023-01-01 DIAGNOSIS — C50.919 MALIGNANT NEOPLASM OF FEMALE BREAST (H): ICD-10-CM

## 2023-01-01 DIAGNOSIS — D72.829 ELEVATED WHITE BLOOD CELL COUNT, UNSPECIFIED: ICD-10-CM

## 2023-01-01 DIAGNOSIS — I10 ESSENTIAL (PRIMARY) HYPERTENSION: ICD-10-CM

## 2023-01-01 DIAGNOSIS — R10.10 UPPER ABDOMINAL PAIN, UNSPECIFIED: ICD-10-CM

## 2023-01-01 DIAGNOSIS — N39.0 ACUTE UTI: ICD-10-CM

## 2023-01-01 DIAGNOSIS — E87.6 HYPOKALEMIA: ICD-10-CM

## 2023-01-01 DIAGNOSIS — C18.6: ICD-10-CM

## 2023-01-01 DIAGNOSIS — I10 PRIMARY HYPERTENSION: ICD-10-CM

## 2023-01-01 DIAGNOSIS — M85.80 OTHER SPECIFIED DISORDERS OF BONE DENSITY AND STRUCTURE, UNSPECIFIED SITE: ICD-10-CM

## 2023-01-01 DIAGNOSIS — E04.2 NONTOXIC MULTINODULAR GOITER: ICD-10-CM

## 2023-01-01 DIAGNOSIS — D50.9 IRON DEFICIENCY ANEMIA, UNSPECIFIED: ICD-10-CM

## 2023-01-01 DIAGNOSIS — K56.609 SBO (SMALL BOWEL OBSTRUCTION) (H): ICD-10-CM

## 2023-01-01 DIAGNOSIS — C16.9 MALIGNANT NEOPLASM OF STOMACH, UNSPECIFIED LOCATION (H): Primary | ICD-10-CM

## 2023-01-01 DIAGNOSIS — R10.10 UPPER ABDOMINAL PAIN: ICD-10-CM

## 2023-01-01 LAB
ALBUMIN SERPL BCG-MCNC: 2.7 G/DL (ref 3.5–5.2)
ALBUMIN SERPL BCG-MCNC: 3 G/DL (ref 3.5–5.2)
ALBUMIN SERPL BCG-MCNC: 4.1 G/DL (ref 3.5–5.2)
ALBUMIN SERPL BCG-MCNC: 4.1 G/DL (ref 3.5–5.2)
ALBUMIN SERPL BCG-MCNC: 4.5 G/DL (ref 3.5–5.2)
ALBUMIN UR-MCNC: 10 MG/DL
ALBUMIN UR-MCNC: 50 MG/DL
ALBUMIN UR-MCNC: NEGATIVE MG/DL
ALP SERPL-CCNC: 100 U/L (ref 40–150)
ALP SERPL-CCNC: 118 U/L (ref 35–104)
ALP SERPL-CCNC: 232 U/L (ref 35–104)
ALP SERPL-CCNC: 76 U/L (ref 35–104)
ALP SERPL-CCNC: 76 U/L (ref 35–104)
ALT SERPL W P-5'-P-CCNC: 10 U/L (ref 0–50)
ALT SERPL W P-5'-P-CCNC: 10 U/L (ref 10–35)
ALT SERPL W P-5'-P-CCNC: 11 U/L (ref 0–50)
ALT SERPL W P-5'-P-CCNC: 20 U/L (ref 0–50)
ALT SERPL W P-5'-P-CCNC: 21 U/L (ref 0–50)
AMMONIA PLAS-SCNC: 27 UMOL/L (ref 11–51)
ANION GAP SERPL CALCULATED.3IONS-SCNC: 10 MMOL/L (ref 7–15)
ANION GAP SERPL CALCULATED.3IONS-SCNC: 11 MMOL/L (ref 7–15)
ANION GAP SERPL CALCULATED.3IONS-SCNC: 12 MMOL/L (ref 7–15)
ANION GAP SERPL CALCULATED.3IONS-SCNC: 12 MMOL/L (ref 7–15)
ANION GAP SERPL CALCULATED.3IONS-SCNC: 13 MMOL/L (ref 7–15)
ANION GAP SERPL CALCULATED.3IONS-SCNC: 13 MMOL/L (ref 7–15)
ANION GAP SERPL CALCULATED.3IONS-SCNC: 14 MMOL/L (ref 7–15)
ANION GAP SERPL CALCULATED.3IONS-SCNC: 15 MMOL/L (ref 7–15)
ANION GAP SERPL CALCULATED.3IONS-SCNC: 15 MMOL/L (ref 7–15)
ANION GAP SERPL CALCULATED.3IONS-SCNC: 16 MMOL/L (ref 7–15)
ANION GAP SERPL CALCULATED.3IONS-SCNC: 16 MMOL/L (ref 7–15)
ANION GAP SERPL CALCULATED.3IONS-SCNC: 17 MMOL/L (ref 7–15)
ANION GAP SERPL CALCULATED.3IONS-SCNC: 18 MMOL/L (ref 7–15)
ANION GAP SERPL CALCULATED.3IONS-SCNC: 22 MMOL/L (ref 7–15)
ANION GAP SERPL CALCULATED.3IONS-SCNC: 8 MMOL/L (ref 7–15)
APPEARANCE UR: ABNORMAL
APPEARANCE UR: CLEAR
APPEARANCE UR: CLEAR
AST SERPL W P-5'-P-CCNC: 14 U/L (ref 0–45)
AST SERPL W P-5'-P-CCNC: 16 U/L (ref 10–35)
AST SERPL W P-5'-P-CCNC: 22 U/L (ref 0–45)
AST SERPL W P-5'-P-CCNC: 23 U/L (ref 0–45)
AST SERPL W P-5'-P-CCNC: 25 U/L (ref 0–45)
ATRIAL RATE - MUSE: 101 BPM
ATRIAL RATE - MUSE: 112 BPM
ATRIAL RATE - MUSE: 119 BPM
ATRIAL RATE - MUSE: 94 BPM
BACTERIA #/AREA URNS HPF: ABNORMAL /HPF
BACTERIA #/AREA URNS HPF: ABNORMAL /HPF
BACTERIA BLD CULT: NO GROWTH
BACTERIA BLD CULT: NO GROWTH
BACTERIA UR CULT: ABNORMAL
BACTERIA UR CULT: NO GROWTH
BASE EXCESS BLDA CALC-SCNC: -9.6 MMOL/L
BASO+EOS+MONOS # BLD AUTO: ABNORMAL 10*3/UL
BASO+EOS+MONOS NFR BLD AUTO: ABNORMAL %
BASOPHILS # BLD AUTO: 0 10E3/UL (ref 0–0.2)
BASOPHILS # BLD AUTO: 0.1 10E3/UL (ref 0–0.2)
BASOPHILS NFR BLD AUTO: 0 %
BASOPHILS NFR BLD AUTO: 1 %
BILIRUB SERPL-MCNC: 0.2 MG/DL
BILIRUB SERPL-MCNC: 0.2 MG/DL
BILIRUB SERPL-MCNC: 0.4 MG/DL
BILIRUB SERPL-MCNC: 0.6 MG/DL
BILIRUB SERPL-MCNC: <0.2 MG/DL
BILIRUB UR QL STRIP: NEGATIVE
BUN SERPL-MCNC: 1.9 MG/DL (ref 8–23)
BUN SERPL-MCNC: 10.3 MG/DL (ref 8–23)
BUN SERPL-MCNC: 11.1 MG/DL (ref 8–23)
BUN SERPL-MCNC: 12.1 MG/DL (ref 8–23)
BUN SERPL-MCNC: 12.6 MG/DL (ref 8–23)
BUN SERPL-MCNC: 13.2 MG/DL (ref 6–20)
BUN SERPL-MCNC: 13.2 MG/DL (ref 8–23)
BUN SERPL-MCNC: 13.8 MG/DL (ref 8–23)
BUN SERPL-MCNC: 15.1 MG/DL (ref 8–23)
BUN SERPL-MCNC: 6.3 MG/DL (ref 8–23)
BUN SERPL-MCNC: 7 MG/DL (ref 8–23)
BUN SERPL-MCNC: 7.1 MG/DL (ref 8–23)
BUN SERPL-MCNC: 7.1 MG/DL (ref 8–23)
BUN SERPL-MCNC: 7.9 MG/DL (ref 8–23)
BUN SERPL-MCNC: 7.9 MG/DL (ref 8–23)
BUN SERPL-MCNC: 8.3 MG/DL (ref 8–23)
BUN SERPL-MCNC: 9.7 MG/DL (ref 8–23)
CALCIUM SERPL-MCNC: 10.1 MG/DL (ref 8.6–10)
CALCIUM SERPL-MCNC: 8 MG/DL (ref 8.6–10)
CALCIUM SERPL-MCNC: 8.2 MG/DL (ref 8.6–10)
CALCIUM SERPL-MCNC: 8.2 MG/DL (ref 8.6–10)
CALCIUM SERPL-MCNC: 8.3 MG/DL (ref 8.6–10)
CALCIUM SERPL-MCNC: 8.3 MG/DL (ref 8.6–10)
CALCIUM SERPL-MCNC: 8.4 MG/DL (ref 8.6–10)
CALCIUM SERPL-MCNC: 8.4 MG/DL (ref 8.6–10)
CALCIUM SERPL-MCNC: 8.5 MG/DL (ref 8.6–10)
CALCIUM SERPL-MCNC: 8.6 MG/DL (ref 8.6–10)
CALCIUM SERPL-MCNC: 8.6 MG/DL (ref 8.6–10)
CALCIUM SERPL-MCNC: 8.9 MG/DL (ref 8.6–10)
CALCIUM SERPL-MCNC: 9.1 MG/DL (ref 8.6–10)
CALCIUM SERPL-MCNC: 9.2 MG/DL (ref 8.6–10)
CALCIUM SERPL-MCNC: 9.7 MG/DL (ref 8.6–10)
CALCIUM, IONIZED MEASURED: 1.11 MMOL/L (ref 1.11–1.3)
CEA SERPL-MCNC: 3.9 NG/ML
CHLORIDE SERPL-SCNC: 101 MMOL/L (ref 98–107)
CHLORIDE SERPL-SCNC: 102 MMOL/L (ref 98–107)
CHLORIDE SERPL-SCNC: 106 MMOL/L (ref 98–107)
CHLORIDE SERPL-SCNC: 107 MMOL/L (ref 98–107)
CHLORIDE SERPL-SCNC: 107 MMOL/L (ref 98–107)
CHLORIDE SERPL-SCNC: 109 MMOL/L (ref 98–107)
CHLORIDE SERPL-SCNC: 109 MMOL/L (ref 98–107)
CHLORIDE SERPL-SCNC: 111 MMOL/L (ref 98–107)
CHLORIDE SERPL-SCNC: 112 MMOL/L (ref 98–107)
CHLORIDE SERPL-SCNC: 114 MMOL/L (ref 98–107)
CHLORIDE SERPL-SCNC: 114 MMOL/L (ref 98–107)
CHLORIDE SERPL-SCNC: 92 MMOL/L (ref 98–107)
CHLORIDE SERPL-SCNC: 97 MMOL/L (ref 98–107)
CHLORIDE SERPL-SCNC: 99 MMOL/L (ref 98–107)
CHOLEST SERPL-MCNC: 193 MG/DL
COHGB MFR BLD: 94.6 % (ref 96–97)
COLOR UR AUTO: ABNORMAL
COLOR UR AUTO: ABNORMAL
COLOR UR AUTO: YELLOW
CREAT SERPL-MCNC: 0.62 MG/DL (ref 0.51–0.95)
CREAT SERPL-MCNC: 0.63 MG/DL (ref 0.51–0.95)
CREAT SERPL-MCNC: 0.63 MG/DL (ref 0.51–0.95)
CREAT SERPL-MCNC: 0.66 MG/DL (ref 0.51–0.95)
CREAT SERPL-MCNC: 0.67 MG/DL (ref 0.51–0.95)
CREAT SERPL-MCNC: 0.67 MG/DL (ref 0.51–0.95)
CREAT SERPL-MCNC: 0.76 MG/DL (ref 0.51–0.95)
CREAT SERPL-MCNC: 0.77 MG/DL (ref 0.51–0.95)
CREAT SERPL-MCNC: 0.77 MG/DL (ref 0.51–0.95)
CREAT SERPL-MCNC: 0.78 MG/DL (ref 0.51–0.95)
CREAT SERPL-MCNC: 0.79 MG/DL (ref 0.51–0.95)
CREAT SERPL-MCNC: 0.79 MG/DL (ref 0.51–0.95)
CREAT SERPL-MCNC: 0.83 MG/DL (ref 0.51–0.95)
CREAT SERPL-MCNC: 0.85 MG/DL (ref 0.51–0.95)
CREAT SERPL-MCNC: 0.87 MG/DL (ref 0.51–0.95)
CREAT SERPL-MCNC: 0.89 MG/DL (ref 0.51–0.95)
CREAT SERPL-MCNC: 0.9 MG/DL (ref 0.51–0.95)
CREAT SERPL-MCNC: 1.01 MG/DL (ref 0.51–0.95)
CREAT SERPL-MCNC: 1.02 MG/DL (ref 0.51–0.95)
CREAT SERPL-MCNC: 1.13 MG/DL (ref 0.51–0.95)
CRP SERPL-MCNC: 172.2 MG/L
DAT POLY: NEGATIVE
DEPRECATED CALCIDIOL+CALCIFEROL SERPL-MC: 59 UG/L (ref 20–75)
DEPRECATED HCO3 PLAS-SCNC: 11 MMOL/L (ref 22–29)
DEPRECATED HCO3 PLAS-SCNC: 15 MMOL/L (ref 22–29)
DEPRECATED HCO3 PLAS-SCNC: 15 MMOL/L (ref 22–29)
DEPRECATED HCO3 PLAS-SCNC: 16 MMOL/L (ref 22–29)
DEPRECATED HCO3 PLAS-SCNC: 19 MMOL/L (ref 22–29)
DEPRECATED HCO3 PLAS-SCNC: 19 MMOL/L (ref 22–29)
DEPRECATED HCO3 PLAS-SCNC: 22 MMOL/L (ref 22–29)
DEPRECATED HCO3 PLAS-SCNC: 22 MMOL/L (ref 22–29)
DEPRECATED HCO3 PLAS-SCNC: 23 MMOL/L (ref 22–29)
DEPRECATED HCO3 PLAS-SCNC: 24 MMOL/L (ref 22–29)
DEPRECATED HCO3 PLAS-SCNC: 25 MMOL/L (ref 22–29)
DEPRECATED HCO3 PLAS-SCNC: 26 MMOL/L (ref 22–29)
DIASTOLIC BLOOD PRESSURE - MUSE: NORMAL MMHG
EGFRCR SERPLBLD CKD-EPI 2021: 56 ML/MIN/1.73M2
EGFRCR SERPLBLD CKD-EPI 2021: 63 ML/MIN/1.73M2
EGFRCR SERPLBLD CKD-EPI 2021: 64 ML/MIN/1.73M2
EGFRCR SERPLBLD CKD-EPI 2021: 73 ML/MIN/1.73M2
EGFRCR SERPLBLD CKD-EPI 2021: 74 ML/MIN/1.73M2
EGFRCR SERPLBLD CKD-EPI 2021: 76 ML/MIN/1.73M2
EGFRCR SERPLBLD CKD-EPI 2021: 78 ML/MIN/1.73M2
EGFRCR SERPLBLD CKD-EPI 2021: 81 ML/MIN/1.73M2
EGFRCR SERPLBLD CKD-EPI 2021: 86 ML/MIN/1.73M2
EGFRCR SERPLBLD CKD-EPI 2021: 86 ML/MIN/1.73M2
EGFRCR SERPLBLD CKD-EPI 2021: 87 ML/MIN/1.73M2
EGFRCR SERPLBLD CKD-EPI 2021: 88 ML/MIN/1.73M2
EGFRCR SERPLBLD CKD-EPI 2021: 90 ML/MIN/1.73M2
EGFRCR SERPLBLD CKD-EPI 2021: >90 ML/MIN/1.73M2
EOSINOPHIL # BLD AUTO: 0 10E3/UL (ref 0–0.7)
EOSINOPHIL # BLD AUTO: 0.1 10E3/UL (ref 0–0.7)
EOSINOPHIL # BLD AUTO: 0.3 10E3/UL (ref 0–0.7)
EOSINOPHIL # BLD AUTO: 0.6 10E3/UL (ref 0–0.7)
EOSINOPHIL NFR BLD AUTO: 0 %
EOSINOPHIL NFR BLD AUTO: 1 %
EOSINOPHIL NFR BLD AUTO: 3 %
EOSINOPHIL NFR BLD AUTO: 3 %
EOSINOPHIL NFR BLD AUTO: 5 %
EOSINOPHIL NFR BLD AUTO: 5 %
ERYTHROCYTE [DISTWIDTH] IN BLOOD BY AUTOMATED COUNT: 15.4 % (ref 10–15)
ERYTHROCYTE [DISTWIDTH] IN BLOOD BY AUTOMATED COUNT: 15.8 % (ref 10–15)
ERYTHROCYTE [DISTWIDTH] IN BLOOD BY AUTOMATED COUNT: 15.8 % (ref 10–15)
ERYTHROCYTE [DISTWIDTH] IN BLOOD BY AUTOMATED COUNT: 15.9 % (ref 10–15)
ERYTHROCYTE [DISTWIDTH] IN BLOOD BY AUTOMATED COUNT: 16.1 % (ref 10–15)
ERYTHROCYTE [DISTWIDTH] IN BLOOD BY AUTOMATED COUNT: 16.2 % (ref 10–15)
ERYTHROCYTE [DISTWIDTH] IN BLOOD BY AUTOMATED COUNT: 16.7 % (ref 10–15)
ERYTHROCYTE [DISTWIDTH] IN BLOOD BY AUTOMATED COUNT: 16.8 % (ref 10–15)
ERYTHROCYTE [DISTWIDTH] IN BLOOD BY AUTOMATED COUNT: 17.2 % (ref 10–15)
ERYTHROCYTE [DISTWIDTH] IN BLOOD BY AUTOMATED COUNT: 17.4 % (ref 10–15)
ERYTHROCYTE [DISTWIDTH] IN BLOOD BY AUTOMATED COUNT: 17.4 % (ref 10–15)
FERRITIN SERPL-MCNC: 97 NG/ML (ref 11–328)
GFR SERPL CREATININE-BSD FRML MDRD: 89 ML/MIN/1.73M2
GLUCOSE BLDC GLUCOMTR-MCNC: 100 MG/DL (ref 70–99)
GLUCOSE BLDC GLUCOMTR-MCNC: 112 MG/DL (ref 70–99)
GLUCOSE BLDC GLUCOMTR-MCNC: 116 MG/DL (ref 70–99)
GLUCOSE BLDC GLUCOMTR-MCNC: 119 MG/DL (ref 70–99)
GLUCOSE BLDC GLUCOMTR-MCNC: 143 MG/DL (ref 70–99)
GLUCOSE SERPL-MCNC: 103 MG/DL (ref 70–99)
GLUCOSE SERPL-MCNC: 104 MG/DL (ref 70–99)
GLUCOSE SERPL-MCNC: 106 MG/DL (ref 70–99)
GLUCOSE SERPL-MCNC: 123 MG/DL (ref 70–99)
GLUCOSE SERPL-MCNC: 124 MG/DL (ref 70–99)
GLUCOSE SERPL-MCNC: 140 MG/DL (ref 70–99)
GLUCOSE SERPL-MCNC: 141 MG/DL (ref 70–99)
GLUCOSE SERPL-MCNC: 66 MG/DL (ref 70–99)
GLUCOSE SERPL-MCNC: 80 MG/DL (ref 70–99)
GLUCOSE SERPL-MCNC: 83 MG/DL (ref 70–99)
GLUCOSE SERPL-MCNC: 84 MG/DL (ref 70–99)
GLUCOSE SERPL-MCNC: 86 MG/DL (ref 70–99)
GLUCOSE SERPL-MCNC: 93 MG/DL (ref 70–99)
GLUCOSE SERPL-MCNC: 94 MG/DL (ref 70–99)
GLUCOSE SERPL-MCNC: 95 MG/DL (ref 70–99)
GLUCOSE SERPL-MCNC: 96 MG/DL (ref 70–99)
GLUCOSE SERPL-MCNC: 97 MG/DL (ref 70–99)
GLUCOSE UR STRIP-MCNC: NEGATIVE MG/DL
HAPTOGLOB SERPL-MCNC: 351 MG/DL (ref 32–197)
HCO3 BLD-SCNC: 15 MMOL/L (ref 23–29)
HCT VFR BLD AUTO: 24.3 % (ref 35–47)
HCT VFR BLD AUTO: 24.6 % (ref 35–47)
HCT VFR BLD AUTO: 25.8 % (ref 35–47)
HCT VFR BLD AUTO: 26.6 % (ref 35–47)
HCT VFR BLD AUTO: 26.8 % (ref 35–47)
HCT VFR BLD AUTO: 26.9 % (ref 35–47)
HCT VFR BLD AUTO: 27.3 % (ref 35–47)
HCT VFR BLD AUTO: 27.4 % (ref 35–47)
HCT VFR BLD AUTO: 28.4 % (ref 35–47)
HCT VFR BLD AUTO: 28.8 % (ref 35–47)
HCT VFR BLD AUTO: 30.9 % (ref 35–47)
HCT VFR BLD AUTO: 31.4 % (ref 35–47)
HCT VFR BLD AUTO: 31.5 % (ref 35–47)
HCT VFR BLD AUTO: 33.3 % (ref 35–47)
HCT VFR BLD AUTO: 33.5 % (ref 35–47)
HCT VFR BLD AUTO: 35.1 % (ref 35–47)
HCT VFR BLD AUTO: 38.4 % (ref 35–47)
HCT VFR BLD AUTO: 38.8 % (ref 35–47)
HDLC SERPL-MCNC: 59 MG/DL
HGB BLD-MCNC: 10 G/DL (ref 11.7–15.7)
HGB BLD-MCNC: 10.1 G/DL (ref 11.7–15.7)
HGB BLD-MCNC: 10.8 G/DL (ref 11.7–15.7)
HGB BLD-MCNC: 10.8 G/DL (ref 11.7–15.7)
HGB BLD-MCNC: 11.1 G/DL (ref 11.7–15.7)
HGB BLD-MCNC: 12.2 G/DL (ref 11.7–15.7)
HGB BLD-MCNC: 13.1 G/DL (ref 11.7–15.7)
HGB BLD-MCNC: 7.9 G/DL (ref 11.7–15.7)
HGB BLD-MCNC: 8.1 G/DL (ref 11.7–15.7)
HGB BLD-MCNC: 8.3 G/DL (ref 11.7–15.7)
HGB BLD-MCNC: 8.4 G/DL (ref 11.7–15.7)
HGB BLD-MCNC: 8.6 G/DL (ref 11.7–15.7)
HGB BLD-MCNC: 8.6 G/DL (ref 11.7–15.7)
HGB BLD-MCNC: 8.7 G/DL (ref 11.7–15.7)
HGB BLD-MCNC: 8.8 G/DL (ref 11.7–15.7)
HGB BLD-MCNC: 9 G/DL (ref 11.7–15.7)
HGB BLD-MCNC: 9.1 G/DL (ref 11.7–15.7)
HGB BLD-MCNC: 9.3 G/DL (ref 11.7–15.7)
HGB BLD-MCNC: 9.5 G/DL (ref 11.7–15.7)
HGB BLD-MCNC: 9.6 G/DL (ref 11.7–15.7)
HGB UR QL STRIP: NEGATIVE
HOLD SPECIMEN: NORMAL
HYALINE CASTS: 3 /LPF
IMM GRANULOCYTES # BLD: 0 10E3/UL
IMM GRANULOCYTES # BLD: 0.1 10E3/UL
IMM GRANULOCYTES # BLD: 0.3 10E3/UL
IMM GRANULOCYTES NFR BLD: 0 %
IMM GRANULOCYTES NFR BLD: 1 %
IMM GRANULOCYTES NFR BLD: 1 %
IMM GRANULOCYTES NFR BLD: 2 %
INR PPP: 1.07 (ref 0.85–1.15)
INTERPRETATION ECG - MUSE: NORMAL
ION CA PH 7.4: 1.09 MMOL/L (ref 1.11–1.3)
IRON BINDING CAPACITY (ROCHE): 134 UG/DL (ref 240–430)
IRON BINDING CAPACITY (ROCHE): 338 UG/DL (ref 240–430)
IRON SATN MFR SERPL: 5 % (ref 15–46)
IRON SATN MFR SERPL: 57 % (ref 15–46)
IRON SERPL-MCNC: 192 UG/DL (ref 37–145)
IRON SERPL-MCNC: 7 UG/DL (ref 37–145)
KETONES UR STRIP-MCNC: 20 MG/DL
KETONES UR STRIP-MCNC: 40 MG/DL
KETONES UR STRIP-MCNC: 80 MG/DL
LACTATE SERPL-SCNC: 1.2 MMOL/L (ref 0.7–2)
LACTATE SERPL-SCNC: 1.2 MMOL/L (ref 0.7–2)
LACTATE SERPL-SCNC: 2.2 MMOL/L (ref 0.7–2)
LDH SERPL L TO P-CCNC: 395 U/L (ref 0–250)
LDLC SERPL CALC-MCNC: 121 MG/DL
LEUKOCYTE ESTERASE UR QL STRIP: ABNORMAL
LIPASE SERPL-CCNC: 21 U/L (ref 13–60)
LIPASE SERPL-CCNC: 67 U/L (ref 13–60)
LYMPHOCYTES # BLD AUTO: 0.9 10E3/UL (ref 0.8–5.3)
LYMPHOCYTES # BLD AUTO: 1.1 10E3/UL (ref 0.8–5.3)
LYMPHOCYTES # BLD AUTO: 1.2 10E3/UL (ref 0.8–5.3)
LYMPHOCYTES # BLD AUTO: 1.2 10E3/UL (ref 0.8–5.3)
LYMPHOCYTES # BLD AUTO: 1.3 10E3/UL (ref 0.8–5.3)
LYMPHOCYTES # BLD AUTO: 1.8 10E3/UL (ref 0.8–5.3)
LYMPHOCYTES # BLD AUTO: 2 10E3/UL (ref 0.8–5.3)
LYMPHOCYTES # BLD AUTO: 2.4 10E3/UL (ref 0.8–5.3)
LYMPHOCYTES NFR BLD AUTO: 10 %
LYMPHOCYTES NFR BLD AUTO: 21 %
LYMPHOCYTES NFR BLD AUTO: 22 %
LYMPHOCYTES NFR BLD AUTO: 23 %
LYMPHOCYTES NFR BLD AUTO: 23 %
LYMPHOCYTES NFR BLD AUTO: 4 %
MAGNESIUM SERPL-MCNC: 1.5 MG/DL (ref 1.7–2.3)
MAGNESIUM SERPL-MCNC: 1.7 MG/DL (ref 1.7–2.3)
MAGNESIUM SERPL-MCNC: 1.7 MG/DL (ref 1.7–2.3)
MAGNESIUM SERPL-MCNC: 1.8 MG/DL (ref 1.7–2.3)
MAGNESIUM SERPL-MCNC: 2 MG/DL (ref 1.7–2.3)
MAGNESIUM SERPL-MCNC: 2 MG/DL (ref 1.7–2.3)
MAGNESIUM SERPL-MCNC: 2.4 MG/DL (ref 1.7–2.3)
MCH RBC QN AUTO: 26.2 PG (ref 26.5–33)
MCH RBC QN AUTO: 27.7 PG (ref 26.5–33)
MCH RBC QN AUTO: 27.9 PG (ref 26.5–33)
MCH RBC QN AUTO: 27.9 PG (ref 26.5–33)
MCH RBC QN AUTO: 28.3 PG (ref 26.5–33)
MCH RBC QN AUTO: 28.4 PG (ref 26.5–33)
MCH RBC QN AUTO: 28.4 PG (ref 26.5–33)
MCH RBC QN AUTO: 28.5 PG (ref 26.5–33)
MCH RBC QN AUTO: 28.5 PG (ref 26.5–33)
MCH RBC QN AUTO: 28.6 PG (ref 26.5–33)
MCH RBC QN AUTO: 28.6 PG (ref 26.5–33)
MCH RBC QN AUTO: 28.8 PG (ref 26.5–33)
MCH RBC QN AUTO: 28.9 PG (ref 26.5–33)
MCH RBC QN AUTO: 29 PG (ref 26.5–33)
MCH RBC QN AUTO: 29.1 PG (ref 26.5–33)
MCH RBC QN AUTO: 29.1 PG (ref 26.5–33)
MCH RBC QN AUTO: 29.2 PG (ref 26.5–33)
MCH RBC QN AUTO: 29.2 PG (ref 26.5–33)
MCHC RBC AUTO-ENTMCNC: 30.7 G/DL (ref 31.5–36.5)
MCHC RBC AUTO-ENTMCNC: 30.8 G/DL (ref 31.5–36.5)
MCHC RBC AUTO-ENTMCNC: 31.2 G/DL (ref 31.5–36.5)
MCHC RBC AUTO-ENTMCNC: 31.4 G/DL (ref 31.5–36.5)
MCHC RBC AUTO-ENTMCNC: 31.7 G/DL (ref 31.5–36.5)
MCHC RBC AUTO-ENTMCNC: 31.8 G/DL (ref 31.5–36.5)
MCHC RBC AUTO-ENTMCNC: 32 G/DL (ref 31.5–36.5)
MCHC RBC AUTO-ENTMCNC: 32 G/DL (ref 31.5–36.5)
MCHC RBC AUTO-ENTMCNC: 32.1 G/DL (ref 31.5–36.5)
MCHC RBC AUTO-ENTMCNC: 32.2 G/DL (ref 31.5–36.5)
MCHC RBC AUTO-ENTMCNC: 32.6 G/DL (ref 31.5–36.5)
MCHC RBC AUTO-ENTMCNC: 33.3 G/DL (ref 31.5–36.5)
MCHC RBC AUTO-ENTMCNC: 33.6 G/DL (ref 31.5–36.5)
MCHC RBC AUTO-ENTMCNC: 33.8 G/DL (ref 31.5–36.5)
MCV RBC AUTO: 85 FL (ref 78–100)
MCV RBC AUTO: 86 FL (ref 78–100)
MCV RBC AUTO: 86 FL (ref 78–100)
MCV RBC AUTO: 87 FL (ref 78–100)
MCV RBC AUTO: 88 FL (ref 78–100)
MCV RBC AUTO: 89 FL (ref 78–100)
MCV RBC AUTO: 90 FL (ref 78–100)
MCV RBC AUTO: 90 FL (ref 78–100)
MCV RBC AUTO: 91 FL (ref 78–100)
MCV RBC AUTO: 91 FL (ref 78–100)
MCV RBC AUTO: 92 FL (ref 78–100)
MONOCYTES # BLD AUTO: 0.5 10E3/UL (ref 0–1.3)
MONOCYTES # BLD AUTO: 0.5 10E3/UL (ref 0–1.3)
MONOCYTES # BLD AUTO: 0.6 10E3/UL (ref 0–1.3)
MONOCYTES # BLD AUTO: 0.7 10E3/UL (ref 0–1.3)
MONOCYTES # BLD AUTO: 1.1 10E3/UL (ref 0–1.3)
MONOCYTES # BLD AUTO: 1.1 10E3/UL (ref 0–1.3)
MONOCYTES # BLD AUTO: 1.2 10E3/UL (ref 0–1.3)
MONOCYTES # BLD AUTO: 1.3 10E3/UL (ref 0–1.3)
MONOCYTES NFR BLD AUTO: 10 %
MONOCYTES NFR BLD AUTO: 10 %
MONOCYTES NFR BLD AUTO: 6 %
MONOCYTES NFR BLD AUTO: 6 %
MONOCYTES NFR BLD AUTO: 7 %
MONOCYTES NFR BLD AUTO: 7 %
MONOCYTES NFR BLD AUTO: 9 %
MONOCYTES NFR BLD AUTO: 9 %
MUCOUS THREADS #/AREA URNS LPF: PRESENT /LPF
NEUTROPHILS # BLD AUTO: 10 10E3/UL (ref 1.6–8.3)
NEUTROPHILS # BLD AUTO: 17.5 10E3/UL (ref 1.6–8.3)
NEUTROPHILS # BLD AUTO: 3.7 10E3/UL (ref 1.6–8.3)
NEUTROPHILS # BLD AUTO: 5.3 10E3/UL (ref 1.6–8.3)
NEUTROPHILS # BLD AUTO: 5.6 10E3/UL (ref 1.6–8.3)
NEUTROPHILS # BLD AUTO: 6.9 10E3/UL (ref 1.6–8.3)
NEUTROPHILS # BLD AUTO: 9.2 10E3/UL (ref 1.6–8.3)
NEUTROPHILS # BLD AUTO: 9.2 10E3/UL (ref 1.6–8.3)
NEUTROPHILS NFR BLD AUTO: 64 %
NEUTROPHILS NFR BLD AUTO: 67 %
NEUTROPHILS NFR BLD AUTO: 67 %
NEUTROPHILS NFR BLD AUTO: 68 %
NEUTROPHILS NFR BLD AUTO: 75 %
NEUTROPHILS NFR BLD AUTO: 79 %
NEUTROPHILS NFR BLD AUTO: 80 %
NEUTROPHILS NFR BLD AUTO: 88 %
NITRATE UR QL: NEGATIVE
NONHDLC SERPL-MCNC: 134 MG/DL
NRBC # BLD AUTO: 0 10E3/UL
NRBC BLD AUTO-RTO: 0 /100
OXYHGB MFR BLD: 93.4 % (ref 96–97)
P AXIS - MUSE: 35 DEGREES
P AXIS - MUSE: 46 DEGREES
P AXIS - MUSE: 58 DEGREES
P AXIS - MUSE: 66 DEGREES
PATH REPORT.ADDENDUM SPEC: ABNORMAL
PATH REPORT.ADDENDUM SPEC: ABNORMAL
PATH REPORT.COMMENTS IMP SPEC: ABNORMAL
PATH REPORT.COMMENTS IMP SPEC: YES
PATH REPORT.FINAL DX SPEC: ABNORMAL
PATH REPORT.GROSS SPEC: ABNORMAL
PATH REPORT.INTRAOP OBS SPEC DOC: ABNORMAL
PATH REPORT.MICROSCOPIC SPEC OTHER STN: ABNORMAL
PATH REPORT.RELEVANT HX SPEC: ABNORMAL
PCO2 BLD: 25 MM HG (ref 35–45)
PH BLD: 7.39 [PH] (ref 7.37–7.44)
PH UR STRIP: 5.5 [PH] (ref 5–7)
PH: 7.37 (ref 7.35–7.45)
PHOSPHATE SERPL-MCNC: 3.8 MG/DL (ref 2.5–4.5)
PHOSPHATE SERPL-MCNC: 4.3 MG/DL (ref 2.5–4.5)
PHOTO IMAGE: ABNORMAL
PHOTO IMAGE: ABNORMAL
PLATELET # BLD AUTO: 463 10E3/UL (ref 150–450)
PLATELET # BLD AUTO: 510 10E3/UL (ref 150–450)
PLATELET # BLD AUTO: 526 10E3/UL (ref 150–450)
PLATELET # BLD AUTO: 529 10E3/UL (ref 150–450)
PLATELET # BLD AUTO: 539 10E3/UL (ref 150–450)
PLATELET # BLD AUTO: 552 10E3/UL (ref 150–450)
PLATELET # BLD AUTO: 571 10E3/UL (ref 150–450)
PLATELET # BLD AUTO: 580 10E3/UL (ref 150–450)
PLATELET # BLD AUTO: 613 10E3/UL (ref 150–450)
PLATELET # BLD AUTO: 629 10E3/UL (ref 150–450)
PLATELET # BLD AUTO: 664 10E3/UL (ref 150–450)
PLATELET # BLD AUTO: 665 10E3/UL (ref 150–450)
PLATELET # BLD AUTO: 729 10E3/UL (ref 150–450)
PLATELET # BLD AUTO: 743 10E3/UL (ref 150–450)
PLATELET # BLD AUTO: 756 10E3/UL (ref 150–450)
PLATELET # BLD AUTO: 759 10E3/UL (ref 150–450)
PLATELET # BLD AUTO: 767 10E3/UL (ref 150–450)
PLATELET # BLD AUTO: 935 10E3/UL (ref 150–450)
PLATELET # BLD AUTO: 988 10E3/UL (ref 150–450)
PLATELET # BLD AUTO: ABNORMAL 10*3/UL
PO2 BLD: 72 MM HG (ref 80–90)
POTASSIUM SERPL-SCNC: 2.6 MMOL/L (ref 3.4–5.3)
POTASSIUM SERPL-SCNC: 3.1 MMOL/L (ref 3.4–5.3)
POTASSIUM SERPL-SCNC: 3.2 MMOL/L (ref 3.4–5.3)
POTASSIUM SERPL-SCNC: 3.3 MMOL/L (ref 3.4–5.3)
POTASSIUM SERPL-SCNC: 3.4 MMOL/L (ref 3.4–5.3)
POTASSIUM SERPL-SCNC: 3.4 MMOL/L (ref 3.4–5.3)
POTASSIUM SERPL-SCNC: 3.5 MMOL/L (ref 3.4–5.3)
POTASSIUM SERPL-SCNC: 3.6 MMOL/L (ref 3.4–5.3)
POTASSIUM SERPL-SCNC: 3.7 MMOL/L (ref 3.4–5.3)
POTASSIUM SERPL-SCNC: 3.9 MMOL/L (ref 3.4–5.3)
POTASSIUM SERPL-SCNC: 4 MMOL/L (ref 3.4–5.3)
POTASSIUM SERPL-SCNC: 4.1 MMOL/L (ref 3.4–5.3)
PR INTERVAL - MUSE: 132 MS
PR INTERVAL - MUSE: 150 MS
PR INTERVAL - MUSE: 158 MS
PR INTERVAL - MUSE: 158 MS
PROCALCITONIN SERPL IA-MCNC: 0.4 NG/ML
PROT SERPL-MCNC: 5.7 G/DL (ref 6.4–8.3)
PROT SERPL-MCNC: 6 G/DL (ref 6.4–8.3)
PROT SERPL-MCNC: 6.7 G/DL (ref 6.4–8.3)
PROT SERPL-MCNC: 6.7 G/DL (ref 6.4–8.3)
PROT SERPL-MCNC: 7.9 G/DL (ref 6.4–8.3)
QRS DURATION - MUSE: 72 MS
QRS DURATION - MUSE: 80 MS
QRS DURATION - MUSE: 82 MS
QRS DURATION - MUSE: 90 MS
QT - MUSE: 320 MS
QT - MUSE: 332 MS
QT - MUSE: 376 MS
QT - MUSE: 410 MS
QTC - MUSE: 430 MS
QTC - MUSE: 450 MS
QTC - MUSE: 470 MS
QTC - MUSE: 559 MS
R AXIS - MUSE: -1 DEGREES
R AXIS - MUSE: -9 DEGREES
R AXIS - MUSE: 45 DEGREES
R AXIS - MUSE: 7 DEGREES
RBC # BLD AUTO: 2.77 10E6/UL (ref 3.8–5.2)
RBC # BLD AUTO: 2.78 10E6/UL (ref 3.8–5.2)
RBC # BLD AUTO: 2.92 10E6/UL (ref 3.8–5.2)
RBC # BLD AUTO: 2.93 10E6/UL (ref 3.8–5.2)
RBC # BLD AUTO: 3.03 10E6/UL (ref 3.8–5.2)
RBC # BLD AUTO: 3.08 10E6/UL (ref 3.8–5.2)
RBC # BLD AUTO: 3.09 10E6/UL (ref 3.8–5.2)
RBC # BLD AUTO: 3.1 10E6/UL (ref 3.8–5.2)
RBC # BLD AUTO: 3.29 10E6/UL (ref 3.8–5.2)
RBC # BLD AUTO: 3.29 10E6/UL (ref 3.8–5.2)
RBC # BLD AUTO: 3.43 10E6/UL (ref 3.8–5.2)
RBC # BLD AUTO: 3.55 10E6/UL (ref 3.8–5.2)
RBC # BLD AUTO: 3.63 10E6/UL (ref 3.8–5.2)
RBC # BLD AUTO: 3.77 10E6/UL (ref 3.8–5.2)
RBC # BLD AUTO: 3.84 10E6/UL (ref 3.8–5.2)
RBC # BLD AUTO: 3.87 10E6/UL (ref 3.8–5.2)
RBC # BLD AUTO: 4.27 10E6/UL (ref 3.8–5.2)
RBC # BLD AUTO: 4.51 10E6/UL (ref 3.8–5.2)
RBC URINE: 0 /HPF
RBC URINE: 2 /HPF
RBC URINE: 20 /HPF
RETICS # AUTO: 0.12 10E6/UL (ref 0.01–0.11)
RETICS/RBC NFR AUTO: 4.1 % (ref 0.8–2.7)
SODIUM SERPL-SCNC: 133 MMOL/L (ref 135–145)
SODIUM SERPL-SCNC: 133 MMOL/L (ref 135–145)
SODIUM SERPL-SCNC: 135 MMOL/L (ref 135–145)
SODIUM SERPL-SCNC: 135 MMOL/L (ref 135–145)
SODIUM SERPL-SCNC: 137 MMOL/L (ref 135–145)
SODIUM SERPL-SCNC: 137 MMOL/L (ref 135–145)
SODIUM SERPL-SCNC: 138 MMOL/L (ref 135–145)
SODIUM SERPL-SCNC: 138 MMOL/L (ref 136–145)
SODIUM SERPL-SCNC: 139 MMOL/L (ref 136–145)
SODIUM SERPL-SCNC: 140 MMOL/L (ref 135–145)
SODIUM SERPL-SCNC: 140 MMOL/L (ref 135–145)
SODIUM SERPL-SCNC: 144 MMOL/L (ref 135–145)
SODIUM SERPL-SCNC: 145 MMOL/L (ref 135–145)
SODIUM SERPL-SCNC: 145 MMOL/L (ref 135–145)
SODIUM SERPL-SCNC: 146 MMOL/L (ref 135–145)
SODIUM SERPL-SCNC: 146 MMOL/L (ref 135–145)
SODIUM SERPL-SCNC: 147 MMOL/L (ref 135–145)
SP GR UR STRIP: 1.01 (ref 1–1.03)
SP GR UR STRIP: 1.02 (ref 1–1.03)
SP GR UR STRIP: 1.02 (ref 1–1.03)
SPECIMEN EXPIRATION DATE: NORMAL
SQUAMOUS EPITHELIAL: 1 /HPF
SQUAMOUS EPITHELIAL: 1 /HPF
SQUAMOUS EPITHELIAL: 3 /HPF
SYSTOLIC BLOOD PRESSURE - MUSE: NORMAL MMHG
T AXIS - MUSE: -7 DEGREES
T AXIS - MUSE: 0 DEGREES
T AXIS - MUSE: 21 DEGREES
T AXIS - MUSE: 255 DEGREES
TEMPERATURE: 37 DEGREES C
TRANSITIONAL EPI: <1 /HPF
TRIGL SERPL-MCNC: 66 MG/DL
TROPONIN T SERPL HS-MCNC: 154 NG/L
TROPONIN T SERPL HS-MCNC: 189 NG/L
TSH SERPL DL<=0.005 MIU/L-ACNC: 2.71 UIU/ML (ref 0.3–4.2)
TSH SERPL DL<=0.005 MIU/L-ACNC: 4.67 UIU/ML (ref 0.3–4.2)
UPPER GI ENDOSCOPY: NORMAL
UROBILINOGEN UR STRIP-MCNC: <2 MG/DL
VANCOMYCIN SERPL-MCNC: 10.4 UG/ML
VENTRICULAR RATE- MUSE: 101 BPM
VENTRICULAR RATE- MUSE: 112 BPM
VENTRICULAR RATE- MUSE: 119 BPM
VENTRICULAR RATE- MUSE: 94 BPM
WBC # BLD AUTO: 10.1 10E3/UL (ref 4–11)
WBC # BLD AUTO: 10.1 10E3/UL (ref 4–11)
WBC # BLD AUTO: 10.6 10E3/UL (ref 4–11)
WBC # BLD AUTO: 11.1 10E3/UL (ref 4–11)
WBC # BLD AUTO: 11.3 10E3/UL (ref 4–11)
WBC # BLD AUTO: 11.5 10E3/UL (ref 4–11)
WBC # BLD AUTO: 11.5 10E3/UL (ref 4–11)
WBC # BLD AUTO: 12.3 10E3/UL (ref 4–11)
WBC # BLD AUTO: 12.6 10E3/UL (ref 4–11)
WBC # BLD AUTO: 13.4 10E3/UL (ref 4–11)
WBC # BLD AUTO: 13.8 10E3/UL (ref 4–11)
WBC # BLD AUTO: 15.2 10E3/UL (ref 4–11)
WBC # BLD AUTO: 18.8 10E3/UL (ref 4–11)
WBC # BLD AUTO: 20 10E3/UL (ref 4–11)
WBC # BLD AUTO: 5.8 10E3/UL (ref 4–11)
WBC # BLD AUTO: 8 10E3/UL (ref 4–11)
WBC # BLD AUTO: 8.4 10E3/UL (ref 4–11)
WBC # BLD AUTO: 9.2 10E3/UL (ref 4–11)
WBC URINE: 26 /HPF
WBC URINE: 28 /HPF
WBC URINE: 9 /HPF

## 2023-01-01 PROCEDURE — 85025 COMPLETE CBC W/AUTO DIFF WBC: CPT | Performed by: INTERNAL MEDICINE

## 2023-01-01 PROCEDURE — 250N000011 HC RX IP 250 OP 636: Performed by: SURGERY

## 2023-01-01 PROCEDURE — 36415 COLL VENOUS BLD VENIPUNCTURE: CPT | Performed by: INTERNAL MEDICINE

## 2023-01-01 PROCEDURE — 999N000157 HC STATISTIC RCP TIME EA 10 MIN

## 2023-01-01 PROCEDURE — 99024 POSTOP FOLLOW-UP VISIT: CPT | Performed by: PHYSICIAN ASSISTANT

## 2023-01-01 PROCEDURE — 250N000013 HC RX MED GY IP 250 OP 250 PS 637: Performed by: INTERNAL MEDICINE

## 2023-01-01 PROCEDURE — P9045 ALBUMIN (HUMAN), 5%, 250 ML: HCPCS | Mod: JZ | Performed by: NURSE ANESTHETIST, CERTIFIED REGISTERED

## 2023-01-01 PROCEDURE — 250N000011 HC RX IP 250 OP 636: Mod: JZ | Performed by: RADIOLOGY

## 2023-01-01 PROCEDURE — 83550 IRON BINDING TEST: CPT | Performed by: PHYSICIAN ASSISTANT

## 2023-01-01 PROCEDURE — 250N000011 HC RX IP 250 OP 636: Performed by: INTERNAL MEDICINE

## 2023-01-01 PROCEDURE — 36415 COLL VENOUS BLD VENIPUNCTURE: CPT | Performed by: STUDENT IN AN ORGANIZED HEALTH CARE EDUCATION/TRAINING PROGRAM

## 2023-01-01 PROCEDURE — C1788 PORT, INDWELLING, IMP: HCPCS

## 2023-01-01 PROCEDURE — 999N000141 HC STATISTIC PRE-PROCEDURE NURSING ASSESSMENT: Performed by: SURGERY

## 2023-01-01 PROCEDURE — 120N000001 HC R&B MED SURG/OB

## 2023-01-01 PROCEDURE — 99232 SBSQ HOSP IP/OBS MODERATE 35: CPT | Performed by: INTERNAL MEDICINE

## 2023-01-01 PROCEDURE — 99233 SBSQ HOSP IP/OBS HIGH 50: CPT | Performed by: CLINICAL NURSE SPECIALIST

## 2023-01-01 PROCEDURE — 250N000011 HC RX IP 250 OP 636: Performed by: STUDENT IN AN ORGANIZED HEALTH CARE EDUCATION/TRAINING PROGRAM

## 2023-01-01 PROCEDURE — 258N000003 HC RX IP 258 OP 636: Performed by: FAMILY MEDICINE

## 2023-01-01 PROCEDURE — 258N000003 HC RX IP 258 OP 636: Performed by: INTERNAL MEDICINE

## 2023-01-01 PROCEDURE — 71045 X-RAY EXAM CHEST 1 VIEW: CPT

## 2023-01-01 PROCEDURE — 93005 ELECTROCARDIOGRAM TRACING: CPT

## 2023-01-01 PROCEDURE — 360N000076 HC SURGERY LEVEL 3, PER MIN: Performed by: SURGERY

## 2023-01-01 PROCEDURE — 250N000011 HC RX IP 250 OP 636: Mod: JZ | Performed by: EMERGENCY MEDICINE

## 2023-01-01 PROCEDURE — 93005 ELECTROCARDIOGRAM TRACING: CPT | Performed by: STUDENT IN AN ORGANIZED HEALTH CARE EDUCATION/TRAINING PROGRAM

## 2023-01-01 PROCEDURE — 250N000013 HC RX MED GY IP 250 OP 250 PS 637: Performed by: EMERGENCY MEDICINE

## 2023-01-01 PROCEDURE — 250N000011 HC RX IP 250 OP 636: Mod: JZ | Performed by: SURGERY

## 2023-01-01 PROCEDURE — 88305 TISSUE EXAM BY PATHOLOGIST: CPT | Mod: 26 | Performed by: PATHOLOGY

## 2023-01-01 PROCEDURE — 250N000013 HC RX MED GY IP 250 OP 250 PS 637: Performed by: SURGERY

## 2023-01-01 PROCEDURE — 250N000011 HC RX IP 250 OP 636: Performed by: NURSE ANESTHETIST, CERTIFIED REGISTERED

## 2023-01-01 PROCEDURE — 84132 ASSAY OF SERUM POTASSIUM: CPT | Performed by: INTERNAL MEDICINE

## 2023-01-01 PROCEDURE — 258N000003 HC RX IP 258 OP 636: Performed by: STUDENT IN AN ORGANIZED HEALTH CARE EDUCATION/TRAINING PROGRAM

## 2023-01-01 PROCEDURE — 0DBU0ZZ EXCISION OF OMENTUM, OPEN APPROACH: ICD-10-PCS | Performed by: SURGERY

## 2023-01-01 PROCEDURE — 74177 CT ABD & PELVIS W/CONTRAST: CPT

## 2023-01-01 PROCEDURE — 36415 COLL VENOUS BLD VENIPUNCTURE: CPT | Performed by: EMERGENCY MEDICINE

## 2023-01-01 PROCEDURE — 87040 BLOOD CULTURE FOR BACTERIA: CPT | Performed by: PHYSICIAN ASSISTANT

## 2023-01-01 PROCEDURE — 93005 ELECTROCARDIOGRAM TRACING: CPT | Performed by: EMERGENCY MEDICINE

## 2023-01-01 PROCEDURE — 80053 COMPREHEN METABOLIC PANEL: CPT | Performed by: PHYSICIAN ASSISTANT

## 2023-01-01 PROCEDURE — 83605 ASSAY OF LACTIC ACID: CPT | Performed by: EMERGENCY MEDICINE

## 2023-01-01 PROCEDURE — 250N000011 HC RX IP 250 OP 636: Mod: JZ | Performed by: HOSPITALIST

## 2023-01-01 PROCEDURE — 88329 PATH CONSLTJ DRG SURG: CPT | Mod: 59 | Performed by: PATHOLOGY

## 2023-01-01 PROCEDURE — 272N000001 HC OR GENERAL SUPPLY STERILE: Performed by: SURGERY

## 2023-01-01 PROCEDURE — 80048 BASIC METABOLIC PNL TOTAL CA: CPT | Performed by: INTERNAL MEDICINE

## 2023-01-01 PROCEDURE — 84132 ASSAY OF SERUM POTASSIUM: CPT | Performed by: FAMILY MEDICINE

## 2023-01-01 PROCEDURE — 84100 ASSAY OF PHOSPHORUS: CPT | Performed by: INTERNAL MEDICINE

## 2023-01-01 PROCEDURE — 82565 ASSAY OF CREATININE: CPT | Performed by: STUDENT IN AN ORGANIZED HEALTH CARE EDUCATION/TRAINING PROGRAM

## 2023-01-01 PROCEDURE — 85027 COMPLETE CBC AUTOMATED: CPT | Performed by: FAMILY MEDICINE

## 2023-01-01 PROCEDURE — 76775 US EXAM ABDO BACK WALL LIM: CPT

## 2023-01-01 PROCEDURE — 83010 ASSAY OF HAPTOGLOBIN QUANT: CPT | Performed by: PHYSICIAN ASSISTANT

## 2023-01-01 PROCEDURE — 0DBW0ZX EXCISION OF PERITONEUM, OPEN APPROACH, DIAGNOSTIC: ICD-10-PCS | Performed by: SURGERY

## 2023-01-01 PROCEDURE — 87086 URINE CULTURE/COLONY COUNT: CPT | Performed by: PHYSICIAN ASSISTANT

## 2023-01-01 PROCEDURE — 82306 VITAMIN D 25 HYDROXY: CPT | Mod: ORL | Performed by: FAMILY MEDICINE

## 2023-01-01 PROCEDURE — 250N000011 HC RX IP 250 OP 636: Performed by: HOSPITALIST

## 2023-01-01 PROCEDURE — 250N000011 HC RX IP 250 OP 636: Mod: JZ | Performed by: STUDENT IN AN ORGANIZED HEALTH CARE EDUCATION/TRAINING PROGRAM

## 2023-01-01 PROCEDURE — 36415 COLL VENOUS BLD VENIPUNCTURE: CPT | Performed by: FAMILY MEDICINE

## 2023-01-01 PROCEDURE — 83735 ASSAY OF MAGNESIUM: CPT | Performed by: EMERGENCY MEDICINE

## 2023-01-01 PROCEDURE — 258N000003 HC RX IP 258 OP 636: Performed by: ANESTHESIOLOGY

## 2023-01-01 PROCEDURE — 258N000003 HC RX IP 258 OP 636: Performed by: EMERGENCY MEDICINE

## 2023-01-01 PROCEDURE — 94660 CPAP INITIATION&MGMT: CPT

## 2023-01-01 PROCEDURE — 80048 BASIC METABOLIC PNL TOTAL CA: CPT | Performed by: FAMILY MEDICINE

## 2023-01-01 PROCEDURE — 85045 AUTOMATED RETICULOCYTE COUNT: CPT | Performed by: PHYSICIAN ASSISTANT

## 2023-01-01 PROCEDURE — 99285 EMERGENCY DEPT VISIT HI MDM: CPT | Mod: 25

## 2023-01-01 PROCEDURE — 250N000013 HC RX MED GY IP 250 OP 250 PS 637: Performed by: FAMILY MEDICINE

## 2023-01-01 PROCEDURE — 83550 IRON BINDING TEST: CPT | Mod: ORL | Performed by: FAMILY MEDICINE

## 2023-01-01 PROCEDURE — 99232 SBSQ HOSP IP/OBS MODERATE 35: CPT | Performed by: FAMILY MEDICINE

## 2023-01-01 PROCEDURE — 83605 ASSAY OF LACTIC ACID: CPT | Performed by: STUDENT IN AN ORGANIZED HEALTH CARE EDUCATION/TRAINING PROGRAM

## 2023-01-01 PROCEDURE — 250N000009 HC RX 250: Performed by: NURSE ANESTHETIST, CERTIFIED REGISTERED

## 2023-01-01 PROCEDURE — 85027 COMPLETE CBC AUTOMATED: CPT | Performed by: HOSPITALIST

## 2023-01-01 PROCEDURE — 83735 ASSAY OF MAGNESIUM: CPT | Mod: ORL | Performed by: FAMILY MEDICINE

## 2023-01-01 PROCEDURE — 88342 IMHCHEM/IMCYTCHM 1ST ANTB: CPT | Mod: TC | Performed by: SURGERY

## 2023-01-01 PROCEDURE — 96365 THER/PROPH/DIAG IV INF INIT: CPT | Mod: 59

## 2023-01-01 PROCEDURE — 258N000001 HC RX 258: Performed by: HOSPITALIST

## 2023-01-01 PROCEDURE — 88331 PATH CONSLTJ SURG 1 BLK 1SPC: CPT | Mod: TC | Performed by: SURGERY

## 2023-01-01 PROCEDURE — 07BB4ZX EXCISION OF MESENTERIC LYMPHATIC, PERCUTANEOUS ENDOSCOPIC APPROACH, DIAGNOSTIC: ICD-10-PCS | Performed by: SURGERY

## 2023-01-01 PROCEDURE — 80053 COMPREHEN METABOLIC PANEL: CPT | Mod: ORL | Performed by: FAMILY MEDICINE

## 2023-01-01 PROCEDURE — 999N000127 HC STATISTIC PERIPHERAL IV START W US GUIDANCE

## 2023-01-01 PROCEDURE — 88342 IMHCHEM/IMCYTCHM 1ST ANTB: CPT | Mod: 26 | Performed by: PATHOLOGY

## 2023-01-01 PROCEDURE — 36415 COLL VENOUS BLD VENIPUNCTURE: CPT | Performed by: PHYSICIAN ASSISTANT

## 2023-01-01 PROCEDURE — 99231 SBSQ HOSP IP/OBS SF/LOW 25: CPT | Performed by: INTERNAL MEDICINE

## 2023-01-01 PROCEDURE — 93010 ELECTROCARDIOGRAM REPORT: CPT | Mod: RTG | Performed by: INTERNAL MEDICINE

## 2023-01-01 PROCEDURE — 83735 ASSAY OF MAGNESIUM: CPT | Performed by: INTERNAL MEDICINE

## 2023-01-01 PROCEDURE — 85049 AUTOMATED PLATELET COUNT: CPT | Performed by: SURGERY

## 2023-01-01 PROCEDURE — 80048 BASIC METABOLIC PNL TOTAL CA: CPT | Performed by: STUDENT IN AN ORGANIZED HEALTH CARE EDUCATION/TRAINING PROGRAM

## 2023-01-01 PROCEDURE — 82565 ASSAY OF CREATININE: CPT | Performed by: PHYSICIAN ASSISTANT

## 2023-01-01 PROCEDURE — 258N000003 HC RX IP 258 OP 636: Performed by: NURSE ANESTHETIST, CERTIFIED REGISTERED

## 2023-01-01 PROCEDURE — 99152 MOD SED SAME PHYS/QHP 5/>YRS: CPT

## 2023-01-01 PROCEDURE — 44050 REDUCE BOWEL OBSTRUCTION: CPT | Mod: 22 | Performed by: SURGERY

## 2023-01-01 PROCEDURE — 97116 GAIT TRAINING THERAPY: CPT | Mod: GP

## 2023-01-01 PROCEDURE — 84484 ASSAY OF TROPONIN QUANT: CPT | Performed by: STUDENT IN AN ORGANIZED HEALTH CARE EDUCATION/TRAINING PROGRAM

## 2023-01-01 PROCEDURE — 82378 CARCINOEMBRYONIC ANTIGEN: CPT | Performed by: PHYSICIAN ASSISTANT

## 2023-01-01 PROCEDURE — 258N000003 HC RX IP 258 OP 636: Performed by: SURGERY

## 2023-01-01 PROCEDURE — 83615 LACTATE (LD) (LDH) ENZYME: CPT | Performed by: PHYSICIAN ASSISTANT

## 2023-01-01 PROCEDURE — 88360 TUMOR IMMUNOHISTOCHEM/MANUAL: CPT | Mod: 26 | Performed by: PATHOLOGY

## 2023-01-01 PROCEDURE — 82805 BLOOD GASES W/O2 SATURATION: CPT | Performed by: PHYSICIAN ASSISTANT

## 2023-01-01 PROCEDURE — 80048 BASIC METABOLIC PNL TOTAL CA: CPT | Performed by: HOSPITALIST

## 2023-01-01 PROCEDURE — 0DN80ZZ RELEASE SMALL INTESTINE, OPEN APPROACH: ICD-10-PCS | Performed by: SURGERY

## 2023-01-01 PROCEDURE — 81001 URINALYSIS AUTO W/SCOPE: CPT | Performed by: PHYSICIAN ASSISTANT

## 2023-01-01 PROCEDURE — 85027 COMPLETE CBC AUTOMATED: CPT | Performed by: INTERNAL MEDICINE

## 2023-01-01 PROCEDURE — 94799 UNLISTED PULMONARY SVC/PX: CPT

## 2023-01-01 PROCEDURE — 0DB68ZX EXCISION OF STOMACH, VIA NATURAL OR ARTIFICIAL OPENING ENDOSCOPIC, DIAGNOSTIC: ICD-10-PCS | Performed by: SURGERY

## 2023-01-01 PROCEDURE — 96376 TX/PRO/DX INJ SAME DRUG ADON: CPT

## 2023-01-01 PROCEDURE — 272N000500 HC NEEDLE CR2

## 2023-01-01 PROCEDURE — 36561 INSERT TUNNELED CV CATH: CPT

## 2023-01-01 PROCEDURE — 97535 SELF CARE MNGMENT TRAINING: CPT | Mod: GO

## 2023-01-01 PROCEDURE — 86880 COOMBS TEST DIRECT: CPT | Performed by: PHYSICIAN ASSISTANT

## 2023-01-01 PROCEDURE — 87086 URINE CULTURE/COLONY COUNT: CPT | Performed by: EMERGENCY MEDICINE

## 2023-01-01 PROCEDURE — 999N000156 HC STATISTIC RCP CONSULT EA 30 MIN

## 2023-01-01 PROCEDURE — 250N000025 HC SEVOFLURANE, PER MIN: Performed by: SURGERY

## 2023-01-01 PROCEDURE — 99207 PR NO BILLABLE SERVICE THIS VISIT: CPT | Performed by: INTERNAL MEDICINE

## 2023-01-01 PROCEDURE — 250N000013 HC RX MED GY IP 250 OP 250 PS 637: Performed by: HOSPITALIST

## 2023-01-01 PROCEDURE — 97165 OT EVAL LOW COMPLEX 30 MIN: CPT | Mod: GO

## 2023-01-01 PROCEDURE — 85027 COMPLETE CBC AUTOMATED: CPT | Performed by: STUDENT IN AN ORGANIZED HEALTH CARE EDUCATION/TRAINING PROGRAM

## 2023-01-01 PROCEDURE — 82330 ASSAY OF CALCIUM: CPT | Performed by: HOSPITALIST

## 2023-01-01 PROCEDURE — 80202 ASSAY OF VANCOMYCIN: CPT | Performed by: INTERNAL MEDICINE

## 2023-01-01 PROCEDURE — 85018 HEMOGLOBIN: CPT | Performed by: ANESTHESIOLOGY

## 2023-01-01 PROCEDURE — 99233 SBSQ HOSP IP/OBS HIGH 50: CPT | Performed by: STUDENT IN AN ORGANIZED HEALTH CARE EDUCATION/TRAINING PROGRAM

## 2023-01-01 PROCEDURE — 710N000009 HC RECOVERY PHASE 1, LEVEL 1, PER MIN: Performed by: SURGERY

## 2023-01-01 PROCEDURE — 86140 C-REACTIVE PROTEIN: CPT | Performed by: STUDENT IN AN ORGANIZED HEALTH CARE EDUCATION/TRAINING PROGRAM

## 2023-01-01 PROCEDURE — 999N000248 HC STATISTIC IV INSERT WITH US BY RN

## 2023-01-01 PROCEDURE — 36415 COLL VENOUS BLD VENIPUNCTURE: CPT | Performed by: HOSPITALIST

## 2023-01-01 PROCEDURE — 88305 TISSUE EXAM BY PATHOLOGIST: CPT | Mod: TC | Performed by: SURGERY

## 2023-01-01 PROCEDURE — 85004 AUTOMATED DIFF WBC COUNT: CPT | Performed by: INTERNAL MEDICINE

## 2023-01-01 PROCEDURE — 71275 CT ANGIOGRAPHY CHEST: CPT

## 2023-01-01 PROCEDURE — 84132 ASSAY OF SERUM POTASSIUM: CPT | Performed by: STUDENT IN AN ORGANIZED HEALTH CARE EDUCATION/TRAINING PROGRAM

## 2023-01-01 PROCEDURE — 88341 IMHCHEM/IMCYTCHM EA ADD ANTB: CPT | Mod: 26 | Performed by: PATHOLOGY

## 2023-01-01 PROCEDURE — 99233 SBSQ HOSP IP/OBS HIGH 50: CPT | Performed by: NURSE PRACTITIONER

## 2023-01-01 PROCEDURE — 43239 EGD BIOPSY SINGLE/MULTIPLE: CPT | Performed by: SURGERY

## 2023-01-01 PROCEDURE — 85014 HEMATOCRIT: CPT | Performed by: INTERNAL MEDICINE

## 2023-01-01 PROCEDURE — 99233 SBSQ HOSP IP/OBS HIGH 50: CPT | Performed by: INTERNAL MEDICINE

## 2023-01-01 PROCEDURE — 81001 URINALYSIS AUTO W/SCOPE: CPT | Performed by: STUDENT IN AN ORGANIZED HEALTH CARE EDUCATION/TRAINING PROGRAM

## 2023-01-01 PROCEDURE — 250N000009 HC RX 250: Performed by: SURGERY

## 2023-01-01 PROCEDURE — 5A09357 ASSISTANCE WITH RESPIRATORY VENTILATION, LESS THAN 24 CONSECUTIVE HOURS, CONTINUOUS POSITIVE AIRWAY PRESSURE: ICD-10-PCS | Performed by: INTERNAL MEDICINE

## 2023-01-01 PROCEDURE — 83605 ASSAY OF LACTIC ACID: CPT | Performed by: SURGERY

## 2023-01-01 PROCEDURE — 250N000011 HC RX IP 250 OP 636: Mod: JZ | Performed by: NURSE ANESTHETIST, CERTIFIED REGISTERED

## 2023-01-01 PROCEDURE — 370N000017 HC ANESTHESIA TECHNICAL FEE, PER MIN: Performed by: SURGERY

## 2023-01-01 PROCEDURE — 0DN60ZZ RELEASE STOMACH, OPEN APPROACH: ICD-10-PCS | Performed by: SURGERY

## 2023-01-01 PROCEDURE — 36600 WITHDRAWAL OF ARTERIAL BLOOD: CPT

## 2023-01-01 PROCEDURE — 710N000010 HC RECOVERY PHASE 1, LEVEL 2, PER MIN: Performed by: SURGERY

## 2023-01-01 PROCEDURE — 85025 COMPLETE CBC W/AUTO DIFF WBC: CPT | Mod: ORL | Performed by: FAMILY MEDICINE

## 2023-01-01 PROCEDURE — 96375 TX/PRO/DX INJ NEW DRUG ADDON: CPT

## 2023-01-01 PROCEDURE — 250N000011 HC RX IP 250 OP 636: Mod: JZ | Performed by: INTERNAL MEDICINE

## 2023-01-01 PROCEDURE — 83690 ASSAY OF LIPASE: CPT | Mod: ORL | Performed by: FAMILY MEDICINE

## 2023-01-01 PROCEDURE — 74019 RADEX ABDOMEN 2 VIEWS: CPT

## 2023-01-01 PROCEDURE — 81001 URINALYSIS AUTO W/SCOPE: CPT | Performed by: EMERGENCY MEDICINE

## 2023-01-01 PROCEDURE — 83690 ASSAY OF LIPASE: CPT | Performed by: EMERGENCY MEDICINE

## 2023-01-01 PROCEDURE — 80053 COMPREHEN METABOLIC PANEL: CPT | Performed by: EMERGENCY MEDICINE

## 2023-01-01 PROCEDURE — 99233 SBSQ HOSP IP/OBS HIGH 50: CPT | Mod: 25 | Performed by: STUDENT IN AN ORGANIZED HEALTH CARE EDUCATION/TRAINING PROGRAM

## 2023-01-01 PROCEDURE — 85027 COMPLETE CBC AUTOMATED: CPT | Performed by: PHYSICIAN ASSISTANT

## 2023-01-01 PROCEDURE — 80061 LIPID PANEL: CPT | Mod: ORL | Performed by: FAMILY MEDICINE

## 2023-01-01 PROCEDURE — 97530 THERAPEUTIC ACTIVITIES: CPT | Mod: GP

## 2023-01-01 PROCEDURE — 99239 HOSP IP/OBS DSCHRG MGMT >30: CPT | Performed by: INTERNAL MEDICINE

## 2023-01-01 PROCEDURE — 85025 COMPLETE CBC W/AUTO DIFF WBC: CPT | Performed by: EMERGENCY MEDICINE

## 2023-01-01 PROCEDURE — 250N000011 HC RX IP 250 OP 636: Performed by: ANESTHESIOLOGY

## 2023-01-01 PROCEDURE — 82140 ASSAY OF AMMONIA: CPT | Performed by: PHYSICIAN ASSISTANT

## 2023-01-01 PROCEDURE — 99223 1ST HOSP IP/OBS HIGH 75: CPT | Performed by: HOSPITALIST

## 2023-01-01 PROCEDURE — 74018 RADEX ABDOMEN 1 VIEW: CPT

## 2023-01-01 PROCEDURE — 250N000011 HC RX IP 250 OP 636: Mod: JZ | Performed by: NURSE PRACTITIONER

## 2023-01-01 PROCEDURE — 84145 PROCALCITONIN (PCT): CPT | Performed by: STUDENT IN AN ORGANIZED HEALTH CARE EDUCATION/TRAINING PROGRAM

## 2023-01-01 PROCEDURE — 84443 ASSAY THYROID STIM HORMONE: CPT | Mod: ORL | Performed by: FAMILY MEDICINE

## 2023-01-01 PROCEDURE — 93005 ELECTROCARDIOGRAM TRACING: CPT | Performed by: ANESTHESIOLOGY

## 2023-01-01 PROCEDURE — 93010 ELECTROCARDIOGRAM REPORT: CPT | Performed by: INTERNAL MEDICINE

## 2023-01-01 PROCEDURE — 84484 ASSAY OF TROPONIN QUANT: CPT | Performed by: PHYSICIAN ASSISTANT

## 2023-01-01 PROCEDURE — 70450 CT HEAD/BRAIN W/O DYE: CPT

## 2023-01-01 PROCEDURE — 84443 ASSAY THYROID STIM HORMONE: CPT | Performed by: STUDENT IN AN ORGANIZED HEALTH CARE EDUCATION/TRAINING PROGRAM

## 2023-01-01 PROCEDURE — 97161 PT EVAL LOW COMPLEX 20 MIN: CPT | Mod: GP

## 2023-01-01 PROCEDURE — 82728 ASSAY OF FERRITIN: CPT | Performed by: PHYSICIAN ASSISTANT

## 2023-01-01 PROCEDURE — 360N000075 HC SURGERY LEVEL 2, PER MIN: Performed by: SURGERY

## 2023-01-01 PROCEDURE — 36415 COLL VENOUS BLD VENIPUNCTURE: CPT | Performed by: SURGERY

## 2023-01-01 PROCEDURE — 250N000009 HC RX 250: Performed by: RADIOLOGY

## 2023-01-01 PROCEDURE — 85610 PROTHROMBIN TIME: CPT | Performed by: HOSPITALIST

## 2023-01-01 PROCEDURE — 0WBF0ZX EXCISION OF ABDOMINAL WALL, OPEN APPROACH, DIAGNOSTIC: ICD-10-PCS | Performed by: SURGERY

## 2023-01-01 PROCEDURE — 99222 1ST HOSP IP/OBS MODERATE 55: CPT | Mod: 57 | Performed by: SURGERY

## 2023-01-01 PROCEDURE — 99255 IP/OBS CONSLTJ NEW/EST HI 80: CPT | Performed by: CLINICAL NURSE SPECIALIST

## 2023-01-01 PROCEDURE — 93010 ELECTROCARDIOGRAM REPORT: CPT | Performed by: GENERAL ACUTE CARE HOSPITAL

## 2023-01-01 PROCEDURE — 96361 HYDRATE IV INFUSION ADD-ON: CPT

## 2023-01-01 PROCEDURE — 85018 HEMOGLOBIN: CPT | Performed by: FAMILY MEDICINE

## 2023-01-01 RX ORDER — FLUTICASONE FUROATE AND VILANTEROL 200; 25 UG/1; UG/1
1 POWDER RESPIRATORY (INHALATION) DAILY
Status: DISCONTINUED | OUTPATIENT
Start: 2023-01-01 | End: 2023-01-01 | Stop reason: HOSPADM

## 2023-01-01 RX ORDER — MULTIPLE VITAMINS W/ MINERALS TAB 9MG-400MCG
1 TAB ORAL DAILY
Qty: 30 TABLET | Refills: 0 | Status: SHIPPED | OUTPATIENT
Start: 2023-01-01 | End: 2023-01-01

## 2023-01-01 RX ORDER — ONDANSETRON 2 MG/ML
4 INJECTION INTRAMUSCULAR; INTRAVENOUS
Status: COMPLETED | OUTPATIENT
Start: 2023-01-01 | End: 2023-01-01

## 2023-01-01 RX ORDER — LIDOCAINE HYDROCHLORIDE 10 MG/ML
INJECTION, SOLUTION INFILTRATION; PERINEURAL PRN
Status: DISCONTINUED | OUTPATIENT
Start: 2023-01-01 | End: 2023-01-01

## 2023-01-01 RX ORDER — SODIUM CHLORIDE, SODIUM LACTATE, POTASSIUM CHLORIDE, CALCIUM CHLORIDE 600; 310; 30; 20 MG/100ML; MG/100ML; MG/100ML; MG/100ML
INJECTION, SOLUTION INTRAVENOUS CONTINUOUS
Status: DISCONTINUED | OUTPATIENT
Start: 2023-01-01 | End: 2023-01-01

## 2023-01-01 RX ORDER — SODIUM CHLORIDE 9 MG/ML
INJECTION, SOLUTION INTRAVENOUS CONTINUOUS
Status: ACTIVE | OUTPATIENT
Start: 2023-01-01 | End: 2023-01-01

## 2023-01-01 RX ORDER — CEFTRIAXONE 1 G/1
1 INJECTION, POWDER, FOR SOLUTION INTRAMUSCULAR; INTRAVENOUS ONCE
Status: COMPLETED | OUTPATIENT
Start: 2023-01-01 | End: 2023-01-01

## 2023-01-01 RX ORDER — GABAPENTIN 300 MG/1
300 CAPSULE ORAL 2 TIMES DAILY
Status: DISCONTINUED | OUTPATIENT
Start: 2023-01-01 | End: 2023-01-01

## 2023-01-01 RX ORDER — FERROUS SULFATE 325(65) MG
325 TABLET ORAL
Status: DISCONTINUED | OUTPATIENT
Start: 2023-01-01 | End: 2023-01-01

## 2023-01-01 RX ORDER — HYDROMORPHONE HCL IN WATER/PF 6 MG/30 ML
0.2 PATIENT CONTROLLED ANALGESIA SYRINGE INTRAVENOUS
Status: DISCONTINUED | OUTPATIENT
Start: 2023-01-01 | End: 2023-01-01

## 2023-01-01 RX ORDER — CEFPODOXIME PROXETIL 100 MG/1
200 TABLET, FILM COATED ORAL 2 TIMES DAILY
Status: DISCONTINUED | OUTPATIENT
Start: 2023-01-01 | End: 2023-01-01

## 2023-01-01 RX ORDER — HYDROCODONE BITARTRATE AND ACETAMINOPHEN 7.5; 325 MG/1; MG/1
1 TABLET ORAL 3 TIMES DAILY
Status: ON HOLD | COMMUNITY
End: 2023-01-01

## 2023-01-01 RX ORDER — ESMOLOL HYDROCHLORIDE 10 MG/ML
INJECTION INTRAVENOUS PRN
Status: DISCONTINUED | OUTPATIENT
Start: 2023-01-01 | End: 2023-01-01

## 2023-01-01 RX ORDER — HYDROCODONE BITARTRATE AND ACETAMINOPHEN 5; 325 MG/1; MG/1
1 TABLET ORAL EVERY 6 HOURS PRN
Status: DISCONTINUED | OUTPATIENT
Start: 2023-01-01 | End: 2023-01-01

## 2023-01-01 RX ORDER — ONDANSETRON 4 MG/1
4 TABLET, ORALLY DISINTEGRATING ORAL EVERY 6 HOURS PRN
Status: DISCONTINUED | OUTPATIENT
Start: 2023-01-01 | End: 2023-01-01

## 2023-01-01 RX ORDER — HEPARIN SODIUM (PORCINE) LOCK FLUSH IV SOLN 100 UNIT/ML 100 UNIT/ML
5-10 SOLUTION INTRAVENOUS
Status: DISCONTINUED | OUTPATIENT
Start: 2023-01-01 | End: 2023-01-01 | Stop reason: HOSPADM

## 2023-01-01 RX ORDER — POTASSIUM CHLORIDE 1500 MG/1
40 TABLET, EXTENDED RELEASE ORAL ONCE
Status: COMPLETED | OUTPATIENT
Start: 2023-01-01 | End: 2023-01-01

## 2023-01-01 RX ORDER — NALOXONE HYDROCHLORIDE 0.4 MG/ML
0.4 INJECTION, SOLUTION INTRAMUSCULAR; INTRAVENOUS; SUBCUTANEOUS
Status: DISCONTINUED | OUTPATIENT
Start: 2023-01-01 | End: 2023-01-01 | Stop reason: HOSPADM

## 2023-01-01 RX ORDER — HEPARIN SODIUM,PORCINE 10 UNIT/ML
5-10 VIAL (ML) INTRAVENOUS EVERY 24 HOURS
Status: DISCONTINUED | OUTPATIENT
Start: 2023-01-01 | End: 2023-01-01 | Stop reason: HOSPADM

## 2023-01-01 RX ORDER — SODIUM CHLORIDE 9 MG/ML
INJECTION, SOLUTION INTRAVENOUS CONTINUOUS
Status: DISCONTINUED | OUTPATIENT
Start: 2023-01-01 | End: 2023-01-01 | Stop reason: HOSPADM

## 2023-01-01 RX ORDER — PROPOFOL 10 MG/ML
INJECTION, EMULSION INTRAVENOUS CONTINUOUS PRN
Status: DISCONTINUED | OUTPATIENT
Start: 2023-01-01 | End: 2023-01-01

## 2023-01-01 RX ORDER — LIDOCAINE HYDROCHLORIDE AND EPINEPHRINE 10; 10 MG/ML; UG/ML
20 INJECTION, SOLUTION INFILTRATION; PERINEURAL ONCE
Status: COMPLETED | OUTPATIENT
Start: 2023-01-01 | End: 2023-01-01

## 2023-01-01 RX ORDER — POTASSIUM CHLORIDE 750 MG/1
20 TABLET, EXTENDED RELEASE ORAL DAILY
Qty: 60 TABLET | Refills: 0 | Status: SHIPPED | OUTPATIENT
Start: 2023-01-01 | End: 2023-01-01

## 2023-01-01 RX ORDER — TRAZODONE HYDROCHLORIDE 50 MG/1
100 TABLET, FILM COATED ORAL AT BEDTIME
Status: DISCONTINUED | OUTPATIENT
Start: 2023-01-01 | End: 2023-01-01 | Stop reason: HOSPADM

## 2023-01-01 RX ORDER — CEFAZOLIN SODIUM 1 G/50ML
750 SOLUTION INTRAVENOUS EVERY 12 HOURS
Status: DISCONTINUED | OUTPATIENT
Start: 2023-01-01 | End: 2023-01-01

## 2023-01-01 RX ORDER — NALOXONE HYDROCHLORIDE 0.4 MG/ML
0.2 INJECTION, SOLUTION INTRAMUSCULAR; INTRAVENOUS; SUBCUTANEOUS
Status: DISCONTINUED | OUTPATIENT
Start: 2023-01-01 | End: 2023-01-01 | Stop reason: HOSPADM

## 2023-01-01 RX ORDER — POTASSIUM CHLORIDE 7.45 MG/ML
10 INJECTION INTRAVENOUS
Status: COMPLETED | OUTPATIENT
Start: 2023-01-01 | End: 2023-01-01

## 2023-01-01 RX ORDER — VENLAFAXINE HYDROCHLORIDE 75 MG/1
75 CAPSULE, EXTENDED RELEASE ORAL
Status: DISCONTINUED | OUTPATIENT
Start: 2023-01-01 | End: 2023-01-01

## 2023-01-01 RX ORDER — VASOPRESSIN IN 0.9 % NACL 2 UNIT/2ML
SYRINGE (ML) INTRAVENOUS PRN
Status: DISCONTINUED | OUTPATIENT
Start: 2023-01-01 | End: 2023-01-01

## 2023-01-01 RX ORDER — AMLODIPINE BESYLATE 2.5 MG/1
2.5 TABLET ORAL DAILY
Status: DISCONTINUED | OUTPATIENT
Start: 2023-01-01 | End: 2023-01-01

## 2023-01-01 RX ORDER — SODIUM CHLORIDE 9 MG/ML
INJECTION, SOLUTION INTRAVENOUS CONTINUOUS
Status: DISCONTINUED | OUTPATIENT
Start: 2023-01-01 | End: 2023-01-01

## 2023-01-01 RX ORDER — ONDANSETRON 4 MG/1
4 TABLET, ORALLY DISINTEGRATING ORAL EVERY 6 HOURS PRN
Status: DISCONTINUED | OUTPATIENT
Start: 2023-01-01 | End: 2023-01-01 | Stop reason: HOSPADM

## 2023-01-01 RX ORDER — ONDANSETRON 2 MG/ML
4 INJECTION INTRAMUSCULAR; INTRAVENOUS EVERY 30 MIN PRN
Status: DISCONTINUED | OUTPATIENT
Start: 2023-01-01 | End: 2023-01-01 | Stop reason: HOSPADM

## 2023-01-01 RX ORDER — IPRATROPIUM BROMIDE AND ALBUTEROL SULFATE 2.5; .5 MG/3ML; MG/3ML
3 SOLUTION RESPIRATORY (INHALATION) EVERY 4 HOURS PRN
Status: DISCONTINUED | OUTPATIENT
Start: 2023-01-01 | End: 2023-01-01 | Stop reason: HOSPADM

## 2023-01-01 RX ORDER — MORPHINE SULFATE 4 MG/ML
1 INJECTION, SOLUTION INTRAMUSCULAR; INTRAVENOUS
Status: DISCONTINUED | OUTPATIENT
Start: 2023-01-01 | End: 2023-01-01

## 2023-01-01 RX ORDER — LIDOCAINE 40 MG/G
CREAM TOPICAL
Status: DISCONTINUED | OUTPATIENT
Start: 2023-01-01 | End: 2023-01-01 | Stop reason: HOSPADM

## 2023-01-01 RX ORDER — AMLODIPINE BESYLATE 5 MG/1
5 TABLET ORAL DAILY
Status: DISCONTINUED | OUTPATIENT
Start: 2023-01-01 | End: 2023-01-01

## 2023-01-01 RX ORDER — CEFAZOLIN SODIUM/WATER 2 G/20 ML
2 SYRINGE (ML) INTRAVENOUS
Status: COMPLETED | OUTPATIENT
Start: 2023-01-01 | End: 2023-01-01

## 2023-01-01 RX ORDER — TRAMADOL HYDROCHLORIDE 50 MG/1
50 TABLET ORAL EVERY 6 HOURS PRN
Status: DISCONTINUED | OUTPATIENT
Start: 2023-01-01 | End: 2023-01-01

## 2023-01-01 RX ORDER — CEFAZOLIN SODIUM 1 G/50ML
1250 SOLUTION INTRAVENOUS ONCE
Status: COMPLETED | OUTPATIENT
Start: 2023-01-01 | End: 2023-01-01

## 2023-01-01 RX ORDER — FLUMAZENIL 0.1 MG/ML
0.2 INJECTION, SOLUTION INTRAVENOUS
Status: DISCONTINUED | OUTPATIENT
Start: 2023-01-01 | End: 2023-01-01 | Stop reason: HOSPADM

## 2023-01-01 RX ORDER — NALOXONE HYDROCHLORIDE 0.4 MG/ML
0.4 INJECTION, SOLUTION INTRAMUSCULAR; INTRAVENOUS; SUBCUTANEOUS
Status: DISCONTINUED | OUTPATIENT
Start: 2023-01-01 | End: 2023-01-01

## 2023-01-01 RX ORDER — HEPARIN SODIUM,PORCINE 10 UNIT/ML
5-10 VIAL (ML) INTRAVENOUS EVERY 24 HOURS
Status: CANCELLED | OUTPATIENT
Start: 2023-01-01

## 2023-01-01 RX ORDER — ONDANSETRON 4 MG/1
4 TABLET, ORALLY DISINTEGRATING ORAL EVERY 30 MIN PRN
Status: DISCONTINUED | OUTPATIENT
Start: 2023-01-01 | End: 2023-01-01 | Stop reason: HOSPADM

## 2023-01-01 RX ORDER — ENOXAPARIN SODIUM 100 MG/ML
40 INJECTION SUBCUTANEOUS EVERY 24 HOURS
Status: DISCONTINUED | OUTPATIENT
Start: 2023-01-01 | End: 2023-01-01

## 2023-01-01 RX ORDER — MORPHINE SULFATE 4 MG/ML
2 INJECTION, SOLUTION INTRAMUSCULAR; INTRAVENOUS
Status: DISCONTINUED | OUTPATIENT
Start: 2023-01-01 | End: 2023-01-01

## 2023-01-01 RX ORDER — ROSUVASTATIN CALCIUM 10 MG/1
10 TABLET, COATED ORAL DAILY
Status: DISCONTINUED | OUTPATIENT
Start: 2023-01-01 | End: 2023-01-01 | Stop reason: HOSPADM

## 2023-01-01 RX ORDER — GABAPENTIN 300 MG/1
600 CAPSULE ORAL AT BEDTIME
Status: DISCONTINUED | OUTPATIENT
Start: 2023-01-01 | End: 2023-01-01

## 2023-01-01 RX ORDER — HEPARIN SODIUM (PORCINE) LOCK FLUSH IV SOLN 100 UNIT/ML 100 UNIT/ML
5-10 SOLUTION INTRAVENOUS
Status: CANCELLED | OUTPATIENT
Start: 2023-01-01

## 2023-01-01 RX ORDER — NALOXONE HYDROCHLORIDE 0.4 MG/ML
0.2 INJECTION, SOLUTION INTRAMUSCULAR; INTRAVENOUS; SUBCUTANEOUS
Status: DISCONTINUED | OUTPATIENT
Start: 2023-01-01 | End: 2023-01-01

## 2023-01-01 RX ORDER — CLINDAMYCIN PHOSPHATE 900 MG/50ML
900 INJECTION, SOLUTION INTRAVENOUS SEE ADMIN INSTRUCTIONS
Status: DISCONTINUED | OUTPATIENT
Start: 2023-01-01 | End: 2023-01-01 | Stop reason: ALTCHOICE

## 2023-01-01 RX ORDER — GABAPENTIN 300 MG/1
300 CAPSULE ORAL 3 TIMES DAILY
Status: DISCONTINUED | OUTPATIENT
Start: 2023-01-01 | End: 2023-01-01

## 2023-01-01 RX ORDER — ONDANSETRON 2 MG/ML
INJECTION INTRAMUSCULAR; INTRAVENOUS PRN
Status: DISCONTINUED | OUTPATIENT
Start: 2023-01-01 | End: 2023-01-01

## 2023-01-01 RX ORDER — POTASSIUM CHLORIDE 7.45 MG/ML
10 INJECTION INTRAVENOUS ONCE
Status: COMPLETED | OUTPATIENT
Start: 2023-01-01 | End: 2023-01-01

## 2023-01-01 RX ORDER — TOPIRAMATE 100 MG/1
100 TABLET, FILM COATED ORAL AT BEDTIME
Status: DISCONTINUED | OUTPATIENT
Start: 2023-01-01 | End: 2023-01-01

## 2023-01-01 RX ORDER — FAMOTIDINE 20 MG/1
20 TABLET, FILM COATED ORAL 2 TIMES DAILY
Qty: 60 TABLET | Refills: 0 | Status: SHIPPED | OUTPATIENT
Start: 2023-01-01 | End: 2023-01-01

## 2023-01-01 RX ORDER — CYCLOBENZAPRINE HCL 5 MG
5 TABLET ORAL EVERY 8 HOURS PRN
Status: DISCONTINUED | OUTPATIENT
Start: 2023-01-01 | End: 2023-01-01 | Stop reason: HOSPADM

## 2023-01-01 RX ORDER — POLYETHYLENE GLYCOL 3350 17 G/17G
17 POWDER, FOR SOLUTION ORAL DAILY
Status: DISCONTINUED | OUTPATIENT
Start: 2023-01-01 | End: 2023-01-01 | Stop reason: HOSPADM

## 2023-01-01 RX ORDER — PROCHLORPERAZINE 25 MG
25 SUPPOSITORY, RECTAL RECTAL EVERY 12 HOURS PRN
Status: DISCONTINUED | OUTPATIENT
Start: 2023-01-01 | End: 2023-01-01 | Stop reason: HOSPADM

## 2023-01-01 RX ORDER — VENLAFAXINE HYDROCHLORIDE 37.5 MG/1
37.5 CAPSULE, EXTENDED RELEASE ORAL 2 TIMES DAILY
Status: DISCONTINUED | OUTPATIENT
Start: 2023-01-01 | End: 2023-01-01

## 2023-01-01 RX ORDER — PROCHLORPERAZINE MALEATE 10 MG
10 TABLET ORAL EVERY 6 HOURS PRN
Status: DISCONTINUED | OUTPATIENT
Start: 2023-01-01 | End: 2023-01-01

## 2023-01-01 RX ORDER — ONDANSETRON 2 MG/ML
4 INJECTION INTRAMUSCULAR; INTRAVENOUS EVERY 6 HOURS PRN
Status: DISCONTINUED | OUTPATIENT
Start: 2023-01-01 | End: 2023-01-01 | Stop reason: HOSPADM

## 2023-01-01 RX ORDER — IOPAMIDOL 755 MG/ML
75 INJECTION, SOLUTION INTRAVASCULAR ONCE
Status: COMPLETED | OUTPATIENT
Start: 2023-01-01 | End: 2023-01-01

## 2023-01-01 RX ORDER — SODIUM CHLORIDE, SODIUM LACTATE, POTASSIUM CHLORIDE, CALCIUM CHLORIDE 600; 310; 30; 20 MG/100ML; MG/100ML; MG/100ML; MG/100ML
INJECTION, SOLUTION INTRAVENOUS CONTINUOUS
Status: DISCONTINUED | OUTPATIENT
Start: 2023-01-01 | End: 2023-01-01 | Stop reason: HOSPADM

## 2023-01-01 RX ORDER — MEROPENEM 1 G/1
1 INJECTION, POWDER, FOR SOLUTION INTRAVENOUS EVERY 8 HOURS
Status: DISCONTINUED | OUTPATIENT
Start: 2023-01-01 | End: 2023-01-01

## 2023-01-01 RX ORDER — HYDROXYZINE PAMOATE 25 MG/1
25 CAPSULE ORAL 3 TIMES DAILY PRN
Status: DISCONTINUED | OUTPATIENT
Start: 2023-01-01 | End: 2023-01-01

## 2023-01-01 RX ORDER — VANCOMYCIN HYDROCHLORIDE 1 G/200ML
1000 INJECTION, SOLUTION INTRAVENOUS EVERY 12 HOURS
Status: DISCONTINUED | OUTPATIENT
Start: 2023-01-01 | End: 2023-01-01

## 2023-01-01 RX ORDER — FUROSEMIDE 20 MG
40 TABLET ORAL DAILY
Status: DISCONTINUED | OUTPATIENT
Start: 2023-01-01 | End: 2023-01-01

## 2023-01-01 RX ORDER — AMOXICILLIN 250 MG
1 CAPSULE ORAL 2 TIMES DAILY
Status: DISCONTINUED | OUTPATIENT
Start: 2023-01-01 | End: 2023-01-01 | Stop reason: HOSPADM

## 2023-01-01 RX ORDER — GABAPENTIN 100 MG/1
100 CAPSULE ORAL 3 TIMES DAILY
Status: DISCONTINUED | OUTPATIENT
Start: 2023-01-01 | End: 2023-01-01

## 2023-01-01 RX ORDER — GABAPENTIN 300 MG/1
600 CAPSULE ORAL AT BEDTIME
Status: ON HOLD | COMMUNITY
End: 2023-01-01

## 2023-01-01 RX ORDER — MONTELUKAST SODIUM 10 MG/1
10 TABLET ORAL AT BEDTIME
Status: DISCONTINUED | OUTPATIENT
Start: 2023-01-01 | End: 2023-01-01 | Stop reason: HOSPADM

## 2023-01-01 RX ORDER — POTASSIUM CHLORIDE 7.45 MG/ML
10 INJECTION INTRAVENOUS
Qty: 400 ML | Refills: 0 | Status: COMPLETED | OUTPATIENT
Start: 2023-01-01 | End: 2023-01-01

## 2023-01-01 RX ORDER — IOPAMIDOL 755 MG/ML
90 INJECTION, SOLUTION INTRAVASCULAR ONCE
Status: COMPLETED | OUTPATIENT
Start: 2023-01-01 | End: 2023-01-01

## 2023-01-01 RX ORDER — AZITHROMYCIN 250 MG/1
250 TABLET, FILM COATED ORAL DAILY
Status: COMPLETED | OUTPATIENT
Start: 2023-01-01 | End: 2023-01-01

## 2023-01-01 RX ORDER — HEPARIN SODIUM,PORCINE 10 UNIT/ML
5-10 VIAL (ML) INTRAVENOUS
Status: DISCONTINUED | OUTPATIENT
Start: 2023-01-01 | End: 2023-01-01 | Stop reason: HOSPADM

## 2023-01-01 RX ORDER — PROCHLORPERAZINE MALEATE 10 MG
10 TABLET ORAL EVERY 6 HOURS PRN
Qty: 60 TABLET | Refills: 0 | Status: SHIPPED | OUTPATIENT
Start: 2023-01-01 | End: 2023-01-01

## 2023-01-01 RX ORDER — PROCHLORPERAZINE MALEATE 10 MG
10 TABLET ORAL EVERY 6 HOURS PRN
Status: DISCONTINUED | OUTPATIENT
Start: 2023-01-01 | End: 2023-01-01 | Stop reason: HOSPADM

## 2023-01-01 RX ORDER — HYDRALAZINE HYDROCHLORIDE 20 MG/ML
10 INJECTION INTRAMUSCULAR; INTRAVENOUS EVERY 6 HOURS PRN
Status: DISCONTINUED | OUTPATIENT
Start: 2023-01-01 | End: 2023-01-01 | Stop reason: HOSPADM

## 2023-01-01 RX ORDER — TOPIRAMATE 50 MG/1
50 TABLET, FILM COATED ORAL
Status: ON HOLD | COMMUNITY
End: 2023-01-01

## 2023-01-01 RX ORDER — FENTANYL CITRATE 50 UG/ML
50 INJECTION, SOLUTION INTRAMUSCULAR; INTRAVENOUS EVERY 5 MIN PRN
Status: DISCONTINUED | OUTPATIENT
Start: 2023-01-01 | End: 2023-01-01 | Stop reason: HOSPADM

## 2023-01-01 RX ORDER — DULOXETIN HYDROCHLORIDE 30 MG/1
30 CAPSULE, DELAYED RELEASE ORAL 2 TIMES DAILY
Status: DISCONTINUED | OUTPATIENT
Start: 2023-01-01 | End: 2023-01-01 | Stop reason: HOSPADM

## 2023-01-01 RX ORDER — DEXAMETHASONE SODIUM PHOSPHATE 10 MG/ML
INJECTION, SOLUTION INTRAMUSCULAR; INTRAVENOUS PRN
Status: DISCONTINUED | OUTPATIENT
Start: 2023-01-01 | End: 2023-01-01

## 2023-01-01 RX ORDER — MULTIPLE VITAMINS W/ MINERALS TAB 9MG-400MCG
1 TAB ORAL DAILY
Status: DISCONTINUED | OUTPATIENT
Start: 2023-01-01 | End: 2023-01-01 | Stop reason: HOSPADM

## 2023-01-01 RX ORDER — CEFDINIR 300 MG/1
300 CAPSULE ORAL EVERY 12 HOURS SCHEDULED
Status: COMPLETED | OUTPATIENT
Start: 2023-01-01 | End: 2023-01-01

## 2023-01-01 RX ORDER — TRAZODONE HYDROCHLORIDE 50 MG/1
200 TABLET, FILM COATED ORAL AT BEDTIME
Status: DISCONTINUED | OUTPATIENT
Start: 2023-01-01 | End: 2023-01-01

## 2023-01-01 RX ORDER — POTASSIUM CHLORIDE 1.5 G/1.58G
40 POWDER, FOR SOLUTION ORAL ONCE
Status: COMPLETED | OUTPATIENT
Start: 2023-01-01 | End: 2023-01-01

## 2023-01-01 RX ORDER — ALBUTEROL SULFATE 90 UG/1
1-2 AEROSOL, METERED RESPIRATORY (INHALATION) EVERY 4 HOURS PRN
Status: DISCONTINUED | OUTPATIENT
Start: 2023-01-01 | End: 2023-01-01 | Stop reason: HOSPADM

## 2023-01-01 RX ORDER — DULOXETIN HYDROCHLORIDE 30 MG/1
30 CAPSULE, DELAYED RELEASE ORAL DAILY
Status: DISCONTINUED | OUTPATIENT
Start: 2023-01-01 | End: 2023-01-01

## 2023-01-01 RX ORDER — GABAPENTIN 300 MG/1
300 CAPSULE ORAL 2 TIMES DAILY
Status: ON HOLD | COMMUNITY
End: 2023-01-01

## 2023-01-01 RX ORDER — AMLODIPINE BESYLATE 2.5 MG/1
2.5 TABLET ORAL DAILY
Status: ON HOLD | COMMUNITY
End: 2023-01-01

## 2023-01-01 RX ORDER — FENTANYL CITRATE 50 UG/ML
25 INJECTION, SOLUTION INTRAMUSCULAR; INTRAVENOUS EVERY 5 MIN PRN
Status: DISCONTINUED | OUTPATIENT
Start: 2023-01-01 | End: 2023-01-01 | Stop reason: HOSPADM

## 2023-01-01 RX ORDER — HYDROCHLOROTHIAZIDE 25 MG/1
25 TABLET ORAL DAILY
Status: DISCONTINUED | OUTPATIENT
Start: 2023-01-01 | End: 2023-01-01 | Stop reason: HOSPADM

## 2023-01-01 RX ORDER — AMLODIPINE BESYLATE 5 MG/1
5 TABLET ORAL DAILY
Status: DISCONTINUED | OUTPATIENT
Start: 2023-01-01 | End: 2023-01-01 | Stop reason: HOSPADM

## 2023-01-01 RX ORDER — SUMATRIPTAN 50 MG/1
50 TABLET, FILM COATED ORAL
Status: DISCONTINUED | OUTPATIENT
Start: 2023-01-01 | End: 2023-01-01 | Stop reason: HOSPADM

## 2023-01-01 RX ORDER — PROPOFOL 10 MG/ML
INJECTION, EMULSION INTRAVENOUS PRN
Status: DISCONTINUED | OUTPATIENT
Start: 2023-01-01 | End: 2023-01-01

## 2023-01-01 RX ORDER — TOPIRAMATE 25 MG/1
50 TABLET, FILM COATED ORAL EVERY MORNING
Status: DISCONTINUED | OUTPATIENT
Start: 2023-01-01 | End: 2023-01-01

## 2023-01-01 RX ORDER — CLINDAMYCIN PHOSPHATE 900 MG/50ML
900 INJECTION, SOLUTION INTRAVENOUS
Status: DISCONTINUED | OUTPATIENT
Start: 2023-01-01 | End: 2023-01-01 | Stop reason: ALTCHOICE

## 2023-01-01 RX ORDER — MAGNESIUM SULFATE 4 G/50ML
4 INJECTION INTRAVENOUS ONCE
Status: COMPLETED | OUTPATIENT
Start: 2023-01-01 | End: 2023-01-01

## 2023-01-01 RX ORDER — ACETAMINOPHEN 325 MG/1
975 TABLET ORAL EVERY 8 HOURS
Status: DISCONTINUED | OUTPATIENT
Start: 2023-01-01 | End: 2023-01-01

## 2023-01-01 RX ORDER — TIOTROPIUM BROMIDE 18 UG/1
18 CAPSULE ORAL; RESPIRATORY (INHALATION) DAILY
COMMUNITY

## 2023-01-01 RX ORDER — HYDROMORPHONE HCL IN WATER/PF 6 MG/30 ML
0.4 PATIENT CONTROLLED ANALGESIA SYRINGE INTRAVENOUS
Status: DISCONTINUED | OUTPATIENT
Start: 2023-01-01 | End: 2023-01-01

## 2023-01-01 RX ORDER — HEPARIN SODIUM,PORCINE 10 UNIT/ML
5-10 VIAL (ML) INTRAVENOUS
Status: CANCELLED | OUTPATIENT
Start: 2023-01-01

## 2023-01-01 RX ORDER — TOPIRAMATE 50 MG/1
100 TABLET, FILM COATED ORAL AT BEDTIME
Status: ON HOLD | COMMUNITY
End: 2023-01-01

## 2023-01-01 RX ORDER — HYDROMORPHONE HYDROCHLORIDE 1 MG/ML
0.5 INJECTION, SOLUTION INTRAMUSCULAR; INTRAVENOUS; SUBCUTANEOUS EVERY 30 MIN PRN
Status: COMPLETED | OUTPATIENT
Start: 2023-01-01 | End: 2023-01-01

## 2023-01-01 RX ORDER — BISACODYL 10 MG
10 SUPPOSITORY, RECTAL RECTAL DAILY PRN
Status: DISCONTINUED | OUTPATIENT
Start: 2023-01-01 | End: 2023-01-01 | Stop reason: HOSPADM

## 2023-01-01 RX ORDER — MAGNESIUM HYDROXIDE 1200 MG/15ML
LIQUID ORAL PRN
Status: DISCONTINUED | OUTPATIENT
Start: 2023-01-01 | End: 2023-01-01 | Stop reason: HOSPADM

## 2023-01-01 RX ORDER — IBUPROFEN 800 MG/1
800 TABLET, FILM COATED ORAL 2 TIMES DAILY PRN
COMMUNITY

## 2023-01-01 RX ORDER — ONDANSETRON 2 MG/ML
4 INJECTION INTRAMUSCULAR; INTRAVENOUS EVERY 30 MIN PRN
Status: COMPLETED | OUTPATIENT
Start: 2023-01-01 | End: 2023-01-01

## 2023-01-01 RX ORDER — GABAPENTIN 300 MG/1
300 CAPSULE ORAL AT BEDTIME
Status: DISCONTINUED | OUTPATIENT
Start: 2023-01-01 | End: 2023-01-01

## 2023-01-01 RX ORDER — FAMOTIDINE 20 MG/1
20 TABLET, FILM COATED ORAL 2 TIMES DAILY
Status: DISCONTINUED | OUTPATIENT
Start: 2023-01-01 | End: 2023-01-01 | Stop reason: HOSPADM

## 2023-01-01 RX ORDER — BUPIVACAINE HYDROCHLORIDE 2.5 MG/ML
INJECTION, SOLUTION INFILTRATION; PERINEURAL PRN
Status: DISCONTINUED | OUTPATIENT
Start: 2023-01-01 | End: 2023-01-01 | Stop reason: HOSPADM

## 2023-01-01 RX ORDER — CEFAZOLIN SODIUM/WATER 2 G/20 ML
2 SYRINGE (ML) INTRAVENOUS SEE ADMIN INSTRUCTIONS
Status: DISCONTINUED | OUTPATIENT
Start: 2023-01-01 | End: 2023-01-01 | Stop reason: HOSPADM

## 2023-01-01 RX ORDER — AMLODIPINE BESYLATE 5 MG/1
5 TABLET ORAL DAILY
Qty: 30 TABLET | Refills: 0 | Status: SHIPPED | OUTPATIENT
Start: 2023-01-01 | End: 2023-01-01

## 2023-01-01 RX ORDER — HYDROCHLOROTHIAZIDE 25 MG/1
25 TABLET ORAL DAILY
Status: DISCONTINUED | OUTPATIENT
Start: 2023-01-01 | End: 2023-01-01

## 2023-01-01 RX ORDER — HYDROXYZINE HYDROCHLORIDE 25 MG/1
25 TABLET, FILM COATED ORAL 3 TIMES DAILY PRN
Status: DISCONTINUED | OUTPATIENT
Start: 2023-01-01 | End: 2023-01-01 | Stop reason: HOSPADM

## 2023-01-01 RX ORDER — ACETAMINOPHEN 325 MG/1
975 TABLET ORAL EVERY 8 HOURS
Status: COMPLETED | OUTPATIENT
Start: 2023-01-01 | End: 2023-01-01

## 2023-01-01 RX ORDER — MECLIZINE HYDROCHLORIDE 25 MG/1
25 TABLET ORAL 3 TIMES DAILY PRN
Status: DISCONTINUED | OUTPATIENT
Start: 2023-01-01 | End: 2023-01-01 | Stop reason: HOSPADM

## 2023-01-01 RX ORDER — DEXTROSE MONOHYDRATE, SODIUM CHLORIDE, AND POTASSIUM CHLORIDE 50; 1.49; 9 G/1000ML; G/1000ML; G/1000ML
100 INJECTION, SOLUTION INTRAVENOUS CONTINUOUS
Status: DISPENSED | OUTPATIENT
Start: 2023-01-01 | End: 2023-01-01

## 2023-01-01 RX ORDER — BUSPIRONE HYDROCHLORIDE 5 MG/1
5 TABLET ORAL 3 TIMES DAILY
Status: DISCONTINUED | OUTPATIENT
Start: 2023-01-01 | End: 2023-01-01

## 2023-01-01 RX ORDER — IOPAMIDOL 755 MG/ML
70 INJECTION, SOLUTION INTRAVASCULAR ONCE
Status: COMPLETED | OUTPATIENT
Start: 2023-01-01 | End: 2023-01-01

## 2023-01-01 RX ORDER — FENTANYL CITRATE 50 UG/ML
25-50 INJECTION, SOLUTION INTRAMUSCULAR; INTRAVENOUS EVERY 5 MIN PRN
Status: DISCONTINUED | OUTPATIENT
Start: 2023-01-01 | End: 2023-01-01 | Stop reason: HOSPADM

## 2023-01-01 RX ORDER — CEFTRIAXONE 1 G/1
1 INJECTION, POWDER, FOR SOLUTION INTRAMUSCULAR; INTRAVENOUS EVERY 24 HOURS
Status: DISCONTINUED | OUTPATIENT
Start: 2023-01-01 | End: 2023-01-01

## 2023-01-01 RX ORDER — ONDANSETRON 2 MG/ML
4 INJECTION INTRAMUSCULAR; INTRAVENOUS EVERY 6 HOURS PRN
Status: DISCONTINUED | OUTPATIENT
Start: 2023-01-01 | End: 2023-01-01

## 2023-01-01 RX ORDER — ACETAMINOPHEN 650 MG/1
650 SUPPOSITORY RECTAL EVERY 4 HOURS PRN
Status: DISCONTINUED | OUTPATIENT
Start: 2023-01-01 | End: 2023-01-01 | Stop reason: HOSPADM

## 2023-01-01 RX ORDER — FENTANYL CITRATE 50 UG/ML
INJECTION, SOLUTION INTRAMUSCULAR; INTRAVENOUS PRN
Status: DISCONTINUED | OUTPATIENT
Start: 2023-01-01 | End: 2023-01-01

## 2023-01-01 RX ORDER — ACETAMINOPHEN 325 MG/1
650 TABLET ORAL EVERY 4 HOURS PRN
Status: DISCONTINUED | OUTPATIENT
Start: 2023-01-01 | End: 2023-01-01 | Stop reason: HOSPADM

## 2023-01-01 RX ORDER — OXYCODONE HYDROCHLORIDE 5 MG/1
5 TABLET ORAL EVERY 4 HOURS PRN
Status: DISCONTINUED | OUTPATIENT
Start: 2023-01-01 | End: 2023-01-01

## 2023-01-01 RX ORDER — SODIUM CHLORIDE AND POTASSIUM CHLORIDE 150; 900 MG/100ML; MG/100ML
INJECTION, SOLUTION INTRAVENOUS CONTINUOUS
Status: DISCONTINUED | OUTPATIENT
Start: 2023-01-01 | End: 2023-01-01

## 2023-01-01 RX ADMIN — ACETAMINOPHEN 975 MG: 325 TABLET ORAL at 04:36

## 2023-01-01 RX ADMIN — MONTELUKAST 10 MG: 10 TABLET, FILM COATED ORAL at 21:07

## 2023-01-01 RX ADMIN — FAMOTIDINE 20 MG: 10 INJECTION, SOLUTION INTRAVENOUS at 17:31

## 2023-01-01 RX ADMIN — FAMOTIDINE 20 MG: 10 INJECTION, SOLUTION INTRAVENOUS at 18:48

## 2023-01-01 RX ADMIN — BUSPIRONE HYDROCHLORIDE 5 MG: 5 TABLET ORAL at 09:41

## 2023-01-01 RX ADMIN — FENTANYL CITRATE 50 MCG: 50 INJECTION INTRAMUSCULAR; INTRAVENOUS at 15:44

## 2023-01-01 RX ADMIN — Medication 1 UNITS: at 12:07

## 2023-01-01 RX ADMIN — DULOXETINE HYDROCHLORIDE 30 MG: 30 CAPSULE, DELAYED RELEASE ORAL at 20:10

## 2023-01-01 RX ADMIN — MONTELUKAST 10 MG: 10 TABLET, FILM COATED ORAL at 20:49

## 2023-01-01 RX ADMIN — TOPIRAMATE 100 MG: 100 TABLET, FILM COATED ORAL at 19:17

## 2023-01-01 RX ADMIN — GABAPENTIN 600 MG: 300 CAPSULE ORAL at 21:16

## 2023-01-01 RX ADMIN — PROPOFOL 180 MG: 10 INJECTION, EMULSION INTRAVENOUS at 15:11

## 2023-01-01 RX ADMIN — OXYCODONE HYDROCHLORIDE 5 MG: 5 TABLET ORAL at 20:11

## 2023-01-01 RX ADMIN — GABAPENTIN 300 MG: 300 CAPSULE ORAL at 14:19

## 2023-01-01 RX ADMIN — TOPIRAMATE 50 MG: 25 TABLET, FILM COATED ORAL at 08:19

## 2023-01-01 RX ADMIN — SENNOSIDES AND DOCUSATE SODIUM 1 TABLET: 8.6; 5 TABLET ORAL at 08:20

## 2023-01-01 RX ADMIN — HYDROMORPHONE HYDROCHLORIDE 0.4 MG: 0.2 INJECTION, SOLUTION INTRAMUSCULAR; INTRAVENOUS; SUBCUTANEOUS at 14:06

## 2023-01-01 RX ADMIN — THIAMINE HCL TAB 100 MG 100 MG: 100 TAB at 12:52

## 2023-01-01 RX ADMIN — MEROPENEM 1 G: 1 INJECTION, POWDER, FOR SOLUTION INTRAVENOUS at 17:12

## 2023-01-01 RX ADMIN — SODIUM CHLORIDE, POTASSIUM CHLORIDE, SODIUM LACTATE AND CALCIUM CHLORIDE: 600; 310; 30; 20 INJECTION, SOLUTION INTRAVENOUS at 06:51

## 2023-01-01 RX ADMIN — MEROPENEM 1 G: 1 INJECTION, POWDER, FOR SOLUTION INTRAVENOUS at 00:41

## 2023-01-01 RX ADMIN — POLYETHYLENE GLYCOL 3350 17 G: 17 POWDER, FOR SOLUTION ORAL at 09:19

## 2023-01-01 RX ADMIN — CEFTRIAXONE SODIUM 1 G: 1 INJECTION, POWDER, FOR SOLUTION INTRAMUSCULAR; INTRAVENOUS at 00:16

## 2023-01-01 RX ADMIN — FUROSEMIDE 40 MG: 20 TABLET ORAL at 09:51

## 2023-01-01 RX ADMIN — FAMOTIDINE 20 MG: 10 INJECTION, SOLUTION INTRAVENOUS at 06:31

## 2023-01-01 RX ADMIN — GABAPENTIN 100 MG: 100 CAPSULE ORAL at 08:20

## 2023-01-01 RX ADMIN — ONDANSETRON 4 MG: 4 TABLET, ORALLY DISINTEGRATING ORAL at 12:01

## 2023-01-01 RX ADMIN — FAMOTIDINE 20 MG: 10 INJECTION, SOLUTION INTRAVENOUS at 06:21

## 2023-01-01 RX ADMIN — BUSPIRONE HYDROCHLORIDE 5 MG: 5 TABLET ORAL at 19:17

## 2023-01-01 RX ADMIN — CEFTRIAXONE SODIUM 1 G: 1 INJECTION, POWDER, FOR SOLUTION INTRAMUSCULAR; INTRAVENOUS at 23:53

## 2023-01-01 RX ADMIN — Medication 2 G: at 14:59

## 2023-01-01 RX ADMIN — UMECLIDINIUM 1 PUFF: 62.5 AEROSOL, POWDER ORAL at 09:53

## 2023-01-01 RX ADMIN — OXYCODONE HYDROCHLORIDE 5 MG: 5 TABLET ORAL at 16:12

## 2023-01-01 RX ADMIN — TOPIRAMATE 50 MG: 25 TABLET, FILM COATED ORAL at 09:40

## 2023-01-01 RX ADMIN — FAMOTIDINE 20 MG: 10 INJECTION, SOLUTION INTRAVENOUS at 18:10

## 2023-01-01 RX ADMIN — DULOXETINE HYDROCHLORIDE 30 MG: 30 CAPSULE, DELAYED RELEASE ORAL at 19:58

## 2023-01-01 RX ADMIN — ONDANSETRON 4 MG: 2 INJECTION INTRAMUSCULAR; INTRAVENOUS at 16:30

## 2023-01-01 RX ADMIN — SENNOSIDES AND DOCUSATE SODIUM 1 TABLET: 8.6; 5 TABLET ORAL at 20:48

## 2023-01-01 RX ADMIN — ALBUMIN HUMAN: 0.05 INJECTION, SOLUTION INTRAVENOUS at 12:09

## 2023-01-01 RX ADMIN — VANCOMYCIN HYDROCHLORIDE 750 MG: 5 INJECTION, POWDER, LYOPHILIZED, FOR SOLUTION INTRAVENOUS at 04:16

## 2023-01-01 RX ADMIN — GABAPENTIN 600 MG: 300 CAPSULE ORAL at 21:29

## 2023-01-01 RX ADMIN — UMECLIDINIUM 1 PUFF: 62.5 AEROSOL, POWDER ORAL at 09:45

## 2023-01-01 RX ADMIN — FAMOTIDINE 20 MG: 10 INJECTION, SOLUTION INTRAVENOUS at 17:58

## 2023-01-01 RX ADMIN — UMECLIDINIUM 1 PUFF: 62.5 AEROSOL, POWDER ORAL at 08:19

## 2023-01-01 RX ADMIN — NALOXONE HYDROCHLORIDE 0.4 MG: 0.4 INJECTION, SOLUTION INTRAMUSCULAR; INTRAVENOUS; SUBCUTANEOUS at 13:11

## 2023-01-01 RX ADMIN — ENOXAPARIN SODIUM 40 MG: 40 INJECTION SUBCUTANEOUS at 12:25

## 2023-01-01 RX ADMIN — POLYETHYLENE GLYCOL 3350 17 G: 17 POWDER, FOR SOLUTION ORAL at 08:19

## 2023-01-01 RX ADMIN — GABAPENTIN 300 MG: 300 CAPSULE ORAL at 09:32

## 2023-01-01 RX ADMIN — ROSUVASTATIN CALCIUM 10 MG: 10 TABLET, FILM COATED ORAL at 09:33

## 2023-01-01 RX ADMIN — ESMOLOL HYDROCHLORIDE 20 MG: 10 INJECTION, SOLUTION INTRAVENOUS at 12:02

## 2023-01-01 RX ADMIN — VENLAFAXINE HYDROCHLORIDE 37.5 MG: 37.5 CAPSULE, EXTENDED RELEASE ORAL at 19:58

## 2023-01-01 RX ADMIN — SENNOSIDES AND DOCUSATE SODIUM 1 TABLET: 8.6; 5 TABLET ORAL at 19:58

## 2023-01-01 RX ADMIN — GABAPENTIN 100 MG: 100 CAPSULE ORAL at 13:57

## 2023-01-01 RX ADMIN — FENTANYL CITRATE 25 MCG: 50 INJECTION, SOLUTION INTRAMUSCULAR; INTRAVENOUS at 19:00

## 2023-01-01 RX ADMIN — POLYETHYLENE GLYCOL 3350 17 G: 17 POWDER, FOR SOLUTION ORAL at 08:28

## 2023-01-01 RX ADMIN — HYDROMORPHONE HYDROCHLORIDE 0.5 MG: 1 INJECTION, SOLUTION INTRAMUSCULAR; INTRAVENOUS; SUBCUTANEOUS at 03:12

## 2023-01-01 RX ADMIN — DULOXETINE HYDROCHLORIDE 30 MG: 30 CAPSULE, DELAYED RELEASE ORAL at 09:22

## 2023-01-01 RX ADMIN — GABAPENTIN 100 MG: 100 CAPSULE ORAL at 20:10

## 2023-01-01 RX ADMIN — AZITHROMYCIN DIHYDRATE 250 MG: 250 TABLET, FILM COATED ORAL at 20:37

## 2023-01-01 RX ADMIN — PHENYLEPHRINE HYDROCHLORIDE 100 MCG: 10 INJECTION INTRAVENOUS at 16:35

## 2023-01-01 RX ADMIN — ROSUVASTATIN CALCIUM 10 MG: 10 TABLET, FILM COATED ORAL at 09:20

## 2023-01-01 RX ADMIN — CEFTRIAXONE SODIUM 1 G: 1 INJECTION, POWDER, FOR SOLUTION INTRAMUSCULAR; INTRAVENOUS at 03:15

## 2023-01-01 RX ADMIN — POTASSIUM CHLORIDE 40 MEQ: 1500 TABLET, EXTENDED RELEASE ORAL at 08:57

## 2023-01-01 RX ADMIN — DULOXETINE HYDROCHLORIDE 30 MG: 30 CAPSULE, DELAYED RELEASE ORAL at 08:20

## 2023-01-01 RX ADMIN — Medication 1 TABLET: at 12:52

## 2023-01-01 RX ADMIN — BUSPIRONE HYDROCHLORIDE 5 MG: 5 TABLET ORAL at 08:20

## 2023-01-01 RX ADMIN — ONDANSETRON 4 MG: 2 INJECTION INTRAMUSCULAR; INTRAVENOUS at 09:05

## 2023-01-01 RX ADMIN — POLYETHYLENE GLYCOL 3350 17 G: 17 POWDER, FOR SOLUTION ORAL at 09:40

## 2023-01-01 RX ADMIN — ENOXAPARIN SODIUM 40 MG: 40 INJECTION SUBCUTANEOUS at 14:38

## 2023-01-01 RX ADMIN — HYDROMORPHONE HYDROCHLORIDE 0.5 MG: 1 INJECTION, SOLUTION INTRAMUSCULAR; INTRAVENOUS; SUBCUTANEOUS at 23:18

## 2023-01-01 RX ADMIN — GABAPENTIN 600 MG: 300 CAPSULE ORAL at 22:10

## 2023-01-01 RX ADMIN — FAMOTIDINE 20 MG: 10 INJECTION, SOLUTION INTRAVENOUS at 05:31

## 2023-01-01 RX ADMIN — VENLAFAXINE HYDROCHLORIDE 37.5 MG: 37.5 CAPSULE, EXTENDED RELEASE ORAL at 19:17

## 2023-01-01 RX ADMIN — ROCURONIUM BROMIDE 30 MG: 50 INJECTION, SOLUTION INTRAVENOUS at 15:16

## 2023-01-01 RX ADMIN — OXYCODONE HYDROCHLORIDE 5 MG: 5 TABLET ORAL at 13:34

## 2023-01-01 RX ADMIN — BUSPIRONE HYDROCHLORIDE 5 MG: 5 TABLET ORAL at 09:50

## 2023-01-01 RX ADMIN — FLUTICASONE FUROATE AND VILANTEROL TRIFENATATE 1 PUFF: 200; 25 POWDER RESPIRATORY (INHALATION) at 10:10

## 2023-01-01 RX ADMIN — POLYETHYLENE GLYCOL 3350 17 G: 17 POWDER, FOR SOLUTION ORAL at 08:57

## 2023-01-01 RX ADMIN — SENNOSIDES AND DOCUSATE SODIUM 1 TABLET: 8.6; 5 TABLET ORAL at 21:06

## 2023-01-01 RX ADMIN — SODIUM CHLORIDE: 9 INJECTION, SOLUTION INTRAVENOUS at 14:17

## 2023-01-01 RX ADMIN — SODIUM CHLORIDE: 9 INJECTION, SOLUTION INTRAVENOUS at 06:21

## 2023-01-01 RX ADMIN — DULOXETINE HYDROCHLORIDE 30 MG: 30 CAPSULE, DELAYED RELEASE ORAL at 20:55

## 2023-01-01 RX ADMIN — HYDRALAZINE HYDROCHLORIDE 10 MG: 20 INJECTION INTRAMUSCULAR; INTRAVENOUS at 00:06

## 2023-01-01 RX ADMIN — TRAZODONE HYDROCHLORIDE 100 MG: 50 TABLET ORAL at 21:17

## 2023-01-01 RX ADMIN — Medication 1 ML: at 04:21

## 2023-01-01 RX ADMIN — POLYETHYLENE GLYCOL 3350 17 G: 17 POWDER, FOR SOLUTION ORAL at 09:31

## 2023-01-01 RX ADMIN — Medication 1 ML: at 21:07

## 2023-01-01 RX ADMIN — BUSPIRONE HYDROCHLORIDE 5 MG: 5 TABLET ORAL at 19:58

## 2023-01-01 RX ADMIN — PROCHLORPERAZINE EDISYLATE 10 MG: 5 INJECTION INTRAMUSCULAR; INTRAVENOUS at 04:50

## 2023-01-01 RX ADMIN — CEFDINIR 300 MG: 300 CAPSULE ORAL at 22:33

## 2023-01-01 RX ADMIN — SENNOSIDES AND DOCUSATE SODIUM 1 TABLET: 8.6; 5 TABLET ORAL at 09:41

## 2023-01-01 RX ADMIN — OXYCODONE HYDROCHLORIDE 5 MG: 5 TABLET ORAL at 00:26

## 2023-01-01 RX ADMIN — HYDROMORPHONE HYDROCHLORIDE 0.4 MG: 0.2 INJECTION, SOLUTION INTRAMUSCULAR; INTRAVENOUS; SUBCUTANEOUS at 08:01

## 2023-01-01 RX ADMIN — UMECLIDINIUM 1 PUFF: 62.5 AEROSOL, POWDER ORAL at 08:40

## 2023-01-01 RX ADMIN — ALBUMIN HUMAN: 0.05 INJECTION, SOLUTION INTRAVENOUS at 16:22

## 2023-01-01 RX ADMIN — VANCOMYCIN HYDROCHLORIDE 750 MG: 5 INJECTION, POWDER, LYOPHILIZED, FOR SOLUTION INTRAVENOUS at 05:29

## 2023-01-01 RX ADMIN — VENLAFAXINE HYDROCHLORIDE 37.5 MG: 37.5 CAPSULE, EXTENDED RELEASE ORAL at 09:53

## 2023-01-01 RX ADMIN — FAMOTIDINE 20 MG: 20 TABLET ORAL at 08:31

## 2023-01-01 RX ADMIN — MEROPENEM 1 G: 1 INJECTION, POWDER, FOR SOLUTION INTRAVENOUS at 15:51

## 2023-01-01 RX ADMIN — HYDROMORPHONE HYDROCHLORIDE 0.4 MG: 1 INJECTION, SOLUTION INTRAMUSCULAR; INTRAVENOUS; SUBCUTANEOUS at 19:12

## 2023-01-01 RX ADMIN — SENNOSIDES AND DOCUSATE SODIUM 1 TABLET: 8.6; 5 TABLET ORAL at 22:10

## 2023-01-01 RX ADMIN — VANCOMYCIN HYDROCHLORIDE 1000 MG: 1 INJECTION, SOLUTION INTRAVENOUS at 17:31

## 2023-01-01 RX ADMIN — ACETAMINOPHEN 975 MG: 325 TABLET ORAL at 21:47

## 2023-01-01 RX ADMIN — BUSPIRONE HYDROCHLORIDE 5 MG: 5 TABLET ORAL at 14:19

## 2023-01-01 RX ADMIN — ALBUTEROL SULFATE 2 PUFF: 90 AEROSOL, METERED RESPIRATORY (INHALATION) at 22:17

## 2023-01-01 RX ADMIN — OXYCODONE HYDROCHLORIDE 5 MG: 5 TABLET ORAL at 20:06

## 2023-01-01 RX ADMIN — FENTANYL CITRATE 25 MCG: 50 INJECTION, SOLUTION INTRAMUSCULAR; INTRAVENOUS at 14:21

## 2023-01-01 RX ADMIN — MONTELUKAST 10 MG: 10 TABLET, FILM COATED ORAL at 22:09

## 2023-01-01 RX ADMIN — SODIUM CHLORIDE, PRESERVATIVE FREE: 5 INJECTION INTRAVENOUS at 11:19

## 2023-01-01 RX ADMIN — BUSPIRONE HYDROCHLORIDE 5 MG: 5 TABLET ORAL at 20:11

## 2023-01-01 RX ADMIN — LIDOCAINE HYDROCHLORIDE,EPINEPHRINE BITARTRATE 20 ML: 10; .01 INJECTION, SOLUTION INFILTRATION; PERINEURAL at 14:19

## 2023-01-01 RX ADMIN — GABAPENTIN 100 MG: 100 CAPSULE ORAL at 08:55

## 2023-01-01 RX ADMIN — FENTANYL CITRATE 25 MCG: 50 INJECTION, SOLUTION INTRAMUSCULAR; INTRAVENOUS at 18:53

## 2023-01-01 RX ADMIN — HYDROMORPHONE HYDROCHLORIDE 0.4 MG: 0.2 INJECTION, SOLUTION INTRAMUSCULAR; INTRAVENOUS; SUBCUTANEOUS at 10:12

## 2023-01-01 RX ADMIN — DULOXETINE HYDROCHLORIDE 30 MG: 30 CAPSULE, DELAYED RELEASE ORAL at 20:43

## 2023-01-01 RX ADMIN — ONDANSETRON 4 MG: 2 INJECTION INTRAMUSCULAR; INTRAVENOUS at 12:02

## 2023-01-01 RX ADMIN — VENLAFAXINE HYDROCHLORIDE 37.5 MG: 37.5 CAPSULE, EXTENDED RELEASE ORAL at 09:42

## 2023-01-01 RX ADMIN — FLUTICASONE FUROATE AND VILANTEROL TRIFENATATE 1 PUFF: 200; 25 POWDER RESPIRATORY (INHALATION) at 09:53

## 2023-01-01 RX ADMIN — POLYETHYLENE GLYCOL 3350 17 G: 17 POWDER, FOR SOLUTION ORAL at 08:55

## 2023-01-01 RX ADMIN — FAMOTIDINE 20 MG: 10 INJECTION, SOLUTION INTRAVENOUS at 06:09

## 2023-01-01 RX ADMIN — TOPIRAMATE 50 MG: 25 TABLET, FILM COATED ORAL at 09:54

## 2023-01-01 RX ADMIN — ROSUVASTATIN CALCIUM 10 MG: 10 TABLET, FILM COATED ORAL at 08:57

## 2023-01-01 RX ADMIN — SENNOSIDES AND DOCUSATE SODIUM 1 TABLET: 8.6; 5 TABLET ORAL at 08:58

## 2023-01-01 RX ADMIN — HYDROMORPHONE HYDROCHLORIDE 0.4 MG: 0.2 INJECTION, SOLUTION INTRAMUSCULAR; INTRAVENOUS; SUBCUTANEOUS at 23:53

## 2023-01-01 RX ADMIN — MONTELUKAST 10 MG: 10 TABLET, FILM COATED ORAL at 21:29

## 2023-01-01 RX ADMIN — TOPIRAMATE 100 MG: 100 TABLET, FILM COATED ORAL at 20:55

## 2023-01-01 RX ADMIN — FENTANYL CITRATE 50 MCG: 50 INJECTION, SOLUTION INTRAMUSCULAR; INTRAVENOUS at 14:11

## 2023-01-01 RX ADMIN — SENNOSIDES AND DOCUSATE SODIUM 1 TABLET: 8.6; 5 TABLET ORAL at 20:06

## 2023-01-01 RX ADMIN — VENLAFAXINE HYDROCHLORIDE 37.5 MG: 37.5 CAPSULE, EXTENDED RELEASE ORAL at 19:25

## 2023-01-01 RX ADMIN — DULOXETINE HYDROCHLORIDE 30 MG: 30 CAPSULE, DELAYED RELEASE ORAL at 09:32

## 2023-01-01 RX ADMIN — ONDANSETRON 4 MG: 2 INJECTION INTRAMUSCULAR; INTRAVENOUS at 03:13

## 2023-01-01 RX ADMIN — Medication 1 TABLET: at 09:51

## 2023-01-01 RX ADMIN — HYDROCHLOROTHIAZIDE 25 MG: 25 TABLET ORAL at 09:20

## 2023-01-01 RX ADMIN — OXYCODONE HYDROCHLORIDE 5 MG: 5 TABLET ORAL at 12:52

## 2023-01-01 RX ADMIN — OXYCODONE HYDROCHLORIDE 5 MG: 5 TABLET ORAL at 06:08

## 2023-01-01 RX ADMIN — POTASSIUM CHLORIDE 40 MEQ: 1500 TABLET, EXTENDED RELEASE ORAL at 13:36

## 2023-01-01 RX ADMIN — GABAPENTIN 300 MG: 300 CAPSULE ORAL at 09:41

## 2023-01-01 RX ADMIN — SENNOSIDES AND DOCUSATE SODIUM 1 TABLET: 8.6; 5 TABLET ORAL at 08:38

## 2023-01-01 RX ADMIN — MEROPENEM 1 G: 1 INJECTION, POWDER, FOR SOLUTION INTRAVENOUS at 00:56

## 2023-01-01 RX ADMIN — GABAPENTIN 100 MG: 100 CAPSULE ORAL at 08:37

## 2023-01-01 RX ADMIN — ROSUVASTATIN CALCIUM 10 MG: 10 TABLET, FILM COATED ORAL at 08:20

## 2023-01-01 RX ADMIN — TOPIRAMATE 100 MG: 100 TABLET, FILM COATED ORAL at 19:10

## 2023-01-01 RX ADMIN — MONTELUKAST 10 MG: 10 TABLET, FILM COATED ORAL at 22:10

## 2023-01-01 RX ADMIN — POTASSIUM CHLORIDE 40 MEQ: 1500 TABLET, EXTENDED RELEASE ORAL at 20:41

## 2023-01-01 RX ADMIN — OXYCODONE HYDROCHLORIDE 5 MG: 5 TABLET ORAL at 16:57

## 2023-01-01 RX ADMIN — MAGNESIUM SULFATE HEPTAHYDRATE 4 G: 80 INJECTION, SOLUTION INTRAVENOUS at 09:45

## 2023-01-01 RX ADMIN — ACETAMINOPHEN 975 MG: 325 TABLET ORAL at 06:08

## 2023-01-01 RX ADMIN — FAMOTIDINE 20 MG: 20 TABLET ORAL at 08:57

## 2023-01-01 RX ADMIN — POTASSIUM CHLORIDE AND SODIUM CHLORIDE: 900; 150 INJECTION, SOLUTION INTRAVENOUS at 04:57

## 2023-01-01 RX ADMIN — POLYETHYLENE GLYCOL 3350 17 G: 17 POWDER, FOR SOLUTION ORAL at 09:50

## 2023-01-01 RX ADMIN — HYDROMORPHONE HYDROCHLORIDE 0.4 MG: 0.2 INJECTION, SOLUTION INTRAMUSCULAR; INTRAVENOUS; SUBCUTANEOUS at 02:53

## 2023-01-01 RX ADMIN — AMLODIPINE BESYLATE 5 MG: 5 TABLET ORAL at 08:32

## 2023-01-01 RX ADMIN — GABAPENTIN 300 MG: 300 CAPSULE ORAL at 14:38

## 2023-01-01 RX ADMIN — PROCHLORPERAZINE EDISYLATE 5 MG: 5 INJECTION INTRAMUSCULAR; INTRAVENOUS at 04:52

## 2023-01-01 RX ADMIN — OXYCODONE HYDROCHLORIDE 5 MG: 5 TABLET ORAL at 00:10

## 2023-01-01 RX ADMIN — ONDANSETRON 4 MG: 2 INJECTION INTRAMUSCULAR; INTRAVENOUS at 23:14

## 2023-01-01 RX ADMIN — SENNOSIDES AND DOCUSATE SODIUM 1 TABLET: 8.6; 5 TABLET ORAL at 20:55

## 2023-01-01 RX ADMIN — MONTELUKAST 10 MG: 10 TABLET, FILM COATED ORAL at 21:17

## 2023-01-01 RX ADMIN — ROSUVASTATIN CALCIUM 10 MG: 10 TABLET, FILM COATED ORAL at 09:54

## 2023-01-01 RX ADMIN — GABAPENTIN 100 MG: 100 CAPSULE ORAL at 22:10

## 2023-01-01 RX ADMIN — IOPAMIDOL 70 ML: 755 INJECTION, SOLUTION INTRAVENOUS at 10:29

## 2023-01-01 RX ADMIN — ALBUMIN HUMAN: 0.05 INJECTION, SOLUTION INTRAVENOUS at 16:42

## 2023-01-01 RX ADMIN — FAMOTIDINE 20 MG: 10 INJECTION, SOLUTION INTRAVENOUS at 05:53

## 2023-01-01 RX ADMIN — TRAZODONE HYDROCHLORIDE 100 MG: 50 TABLET ORAL at 22:33

## 2023-01-01 RX ADMIN — MEROPENEM 1 G: 1 INJECTION, POWDER, FOR SOLUTION INTRAVENOUS at 16:29

## 2023-01-01 RX ADMIN — ENOXAPARIN SODIUM 40 MG: 40 INJECTION SUBCUTANEOUS at 12:01

## 2023-01-01 RX ADMIN — POTASSIUM CHLORIDE 10 MEQ: 10 INJECTION, SOLUTION INTRAVENOUS at 08:40

## 2023-01-01 RX ADMIN — CYCLOBENZAPRINE HYDROCHLORIDE 5 MG: 5 TABLET, FILM COATED ORAL at 09:41

## 2023-01-01 RX ADMIN — HYDROMORPHONE HYDROCHLORIDE 0.5 MG: 1 INJECTION, SOLUTION INTRAMUSCULAR; INTRAVENOUS; SUBCUTANEOUS at 16:53

## 2023-01-01 RX ADMIN — FAMOTIDINE 20 MG: 10 INJECTION, SOLUTION INTRAVENOUS at 18:18

## 2023-01-01 RX ADMIN — FAMOTIDINE 20 MG: 10 INJECTION, SOLUTION INTRAVENOUS at 05:28

## 2023-01-01 RX ADMIN — BUSPIRONE HYDROCHLORIDE 5 MG: 5 TABLET ORAL at 09:55

## 2023-01-01 RX ADMIN — ROSUVASTATIN CALCIUM 10 MG: 10 TABLET, FILM COATED ORAL at 09:41

## 2023-01-01 RX ADMIN — VENLAFAXINE HYDROCHLORIDE 37.5 MG: 37.5 CAPSULE, EXTENDED RELEASE ORAL at 08:20

## 2023-01-01 RX ADMIN — FAMOTIDINE 20 MG: 10 INJECTION, SOLUTION INTRAVENOUS at 17:16

## 2023-01-01 RX ADMIN — FAMOTIDINE 20 MG: 10 INJECTION, SOLUTION INTRAVENOUS at 17:10

## 2023-01-01 RX ADMIN — ENOXAPARIN SODIUM 40 MG: 40 INJECTION SUBCUTANEOUS at 12:52

## 2023-01-01 RX ADMIN — MONTELUKAST 10 MG: 10 TABLET, FILM COATED ORAL at 21:24

## 2023-01-01 RX ADMIN — GABAPENTIN 300 MG: 300 CAPSULE ORAL at 09:51

## 2023-01-01 RX ADMIN — CEFTRIAXONE SODIUM 1 G: 1 INJECTION, POWDER, FOR SOLUTION INTRAMUSCULAR; INTRAVENOUS at 00:12

## 2023-01-01 RX ADMIN — Medication 1 TABLET: at 09:32

## 2023-01-01 RX ADMIN — BISACODYL 10 MG: 10 SUPPOSITORY RECTAL at 09:45

## 2023-01-01 RX ADMIN — BUSPIRONE HYDROCHLORIDE 5 MG: 5 TABLET ORAL at 20:55

## 2023-01-01 RX ADMIN — SODIUM CHLORIDE, POTASSIUM CHLORIDE, SODIUM LACTATE AND CALCIUM CHLORIDE: 600; 310; 30; 20 INJECTION, SOLUTION INTRAVENOUS at 13:01

## 2023-01-01 RX ADMIN — SODIUM CHLORIDE, POTASSIUM CHLORIDE, SODIUM LACTATE AND CALCIUM CHLORIDE: 600; 310; 30; 20 INJECTION, SOLUTION INTRAVENOUS at 20:04

## 2023-01-01 RX ADMIN — CYCLOBENZAPRINE HYDROCHLORIDE 5 MG: 5 TABLET, FILM COATED ORAL at 17:41

## 2023-01-01 RX ADMIN — HYDROMORPHONE HYDROCHLORIDE 0.5 MG: 1 INJECTION, SOLUTION INTRAMUSCULAR; INTRAVENOUS; SUBCUTANEOUS at 16:07

## 2023-01-01 RX ADMIN — ENOXAPARIN SODIUM 40 MG: 40 INJECTION SUBCUTANEOUS at 12:10

## 2023-01-01 RX ADMIN — ROCURONIUM BROMIDE 20 MG: 50 INJECTION, SOLUTION INTRAVENOUS at 17:16

## 2023-01-01 RX ADMIN — POTASSIUM CHLORIDE 40 MEQ: 1500 TABLET, EXTENDED RELEASE ORAL at 08:20

## 2023-01-01 RX ADMIN — SENNOSIDES AND DOCUSATE SODIUM 1 TABLET: 8.6; 5 TABLET ORAL at 09:20

## 2023-01-01 RX ADMIN — OXYCODONE HYDROCHLORIDE 5 MG: 5 TABLET ORAL at 19:24

## 2023-01-01 RX ADMIN — FENTANYL CITRATE 25 MCG: 50 INJECTION, SOLUTION INTRAMUSCULAR; INTRAVENOUS at 14:27

## 2023-01-01 RX ADMIN — VENLAFAXINE HYDROCHLORIDE 37.5 MG: 37.5 CAPSULE, EXTENDED RELEASE ORAL at 20:55

## 2023-01-01 RX ADMIN — ROSUVASTATIN CALCIUM 10 MG: 10 TABLET, FILM COATED ORAL at 08:32

## 2023-01-01 RX ADMIN — GABAPENTIN 100 MG: 100 CAPSULE ORAL at 20:11

## 2023-01-01 RX ADMIN — PHENYLEPHRINE HYDROCHLORIDE 100 MCG: 10 INJECTION INTRAVENOUS at 12:12

## 2023-01-01 RX ADMIN — OXYCODONE HYDROCHLORIDE 5 MG: 5 TABLET ORAL at 18:51

## 2023-01-01 RX ADMIN — FAMOTIDINE 20 MG: 10 INJECTION, SOLUTION INTRAVENOUS at 05:52

## 2023-01-01 RX ADMIN — FAMOTIDINE 20 MG: 20 TABLET ORAL at 21:06

## 2023-01-01 RX ADMIN — Medication 100 MG: at 15:11

## 2023-01-01 RX ADMIN — TRAZODONE HYDROCHLORIDE 100 MG: 50 TABLET ORAL at 22:09

## 2023-01-01 RX ADMIN — POTASSIUM CHLORIDE 10 MEQ: 7.46 INJECTION, SOLUTION INTRAVENOUS at 13:42

## 2023-01-01 RX ADMIN — Medication 1 UNITS: at 12:11

## 2023-01-01 RX ADMIN — PHENYLEPHRINE HYDROCHLORIDE 200 MCG: 10 INJECTION INTRAVENOUS at 12:15

## 2023-01-01 RX ADMIN — TRAZODONE HYDROCHLORIDE 100 MG: 50 TABLET ORAL at 21:41

## 2023-01-01 RX ADMIN — TOPIRAMATE 50 MG: 25 TABLET, FILM COATED ORAL at 09:30

## 2023-01-01 RX ADMIN — TOPIRAMATE 100 MG: 100 TABLET, FILM COATED ORAL at 19:25

## 2023-01-01 RX ADMIN — PROPOFOL 130 MCG/KG/MIN: 10 INJECTION, EMULSION INTRAVENOUS at 11:58

## 2023-01-01 RX ADMIN — SODIUM CHLORIDE, POTASSIUM CHLORIDE, SODIUM LACTATE AND CALCIUM CHLORIDE: 600; 310; 30; 20 INJECTION, SOLUTION INTRAVENOUS at 16:16

## 2023-01-01 RX ADMIN — UMECLIDINIUM 1 PUFF: 62.5 AEROSOL, POWDER ORAL at 09:12

## 2023-01-01 RX ADMIN — LIDOCAINE HYDROCHLORIDE 50 MG: 10 INJECTION, SOLUTION INFILTRATION; PERINEURAL at 12:02

## 2023-01-01 RX ADMIN — FAMOTIDINE 20 MG: 10 INJECTION, SOLUTION INTRAVENOUS at 19:16

## 2023-01-01 RX ADMIN — FAMOTIDINE 20 MG: 10 INJECTION, SOLUTION INTRAVENOUS at 17:00

## 2023-01-01 RX ADMIN — Medication 5 ML: at 14:29

## 2023-01-01 RX ADMIN — VENLAFAXINE HYDROCHLORIDE 37.5 MG: 37.5 CAPSULE, EXTENDED RELEASE ORAL at 20:10

## 2023-01-01 RX ADMIN — TOPIRAMATE 50 MG: 25 TABLET, FILM COATED ORAL at 09:45

## 2023-01-01 RX ADMIN — HYDROCHLOROTHIAZIDE 25 MG: 25 TABLET ORAL at 08:29

## 2023-01-01 RX ADMIN — PHENYLEPHRINE HYDROCHLORIDE 100 MCG: 10 INJECTION INTRAVENOUS at 12:05

## 2023-01-01 RX ADMIN — GABAPENTIN 100 MG: 100 CAPSULE ORAL at 13:30

## 2023-01-01 RX ADMIN — DEXMEDETOMIDINE HYDROCHLORIDE 8 MCG: 100 INJECTION, SOLUTION INTRAVENOUS at 17:57

## 2023-01-01 RX ADMIN — DEXAMETHASONE SODIUM PHOSPHATE 10 MG: 10 INJECTION, SOLUTION INTRAMUSCULAR; INTRAVENOUS at 15:32

## 2023-01-01 RX ADMIN — THIAMINE HCL TAB 100 MG 100 MG: 100 TAB at 09:33

## 2023-01-01 RX ADMIN — ONDANSETRON 4 MG: 2 INJECTION INTRAMUSCULAR; INTRAVENOUS at 18:16

## 2023-01-01 RX ADMIN — DULOXETINE HYDROCHLORIDE 30 MG: 30 CAPSULE, DELAYED RELEASE ORAL at 09:51

## 2023-01-01 RX ADMIN — DULOXETINE HYDROCHLORIDE 30 MG: 30 CAPSULE, DELAYED RELEASE ORAL at 19:16

## 2023-01-01 RX ADMIN — SUGAMMADEX 130 MG: 100 INJECTION, SOLUTION INTRAVENOUS at 18:23

## 2023-01-01 RX ADMIN — THIAMINE HCL TAB 100 MG 100 MG: 100 TAB at 09:20

## 2023-01-01 RX ADMIN — ONDANSETRON 4 MG: 2 INJECTION INTRAMUSCULAR; INTRAVENOUS at 14:08

## 2023-01-01 RX ADMIN — PROCHLORPERAZINE EDISYLATE 10 MG: 5 INJECTION INTRAMUSCULAR; INTRAVENOUS at 13:23

## 2023-01-01 RX ADMIN — HYDROCHLOROTHIAZIDE 25 MG: 25 TABLET ORAL at 08:58

## 2023-01-01 RX ADMIN — HYDROMORPHONE HYDROCHLORIDE 0.5 MG: 1 INJECTION, SOLUTION INTRAMUSCULAR; INTRAVENOUS; SUBCUTANEOUS at 01:28

## 2023-01-01 RX ADMIN — FAMOTIDINE 20 MG: 10 INJECTION, SOLUTION INTRAVENOUS at 17:40

## 2023-01-01 RX ADMIN — ACETAMINOPHEN 975 MG: 325 TABLET ORAL at 20:11

## 2023-01-01 RX ADMIN — ROCURONIUM BROMIDE 10 MG: 50 INJECTION, SOLUTION INTRAVENOUS at 17:30

## 2023-01-01 RX ADMIN — PROPOFOL 20 MG: 10 INJECTION, EMULSION INTRAVENOUS at 12:02

## 2023-01-01 RX ADMIN — SENNOSIDES AND DOCUSATE SODIUM 1 TABLET: 8.6; 5 TABLET ORAL at 08:55

## 2023-01-01 RX ADMIN — FLUTICASONE FUROATE AND VILANTEROL TRIFENATATE 1 PUFF: 200; 25 POWDER RESPIRATORY (INHALATION) at 08:19

## 2023-01-01 RX ADMIN — FLUTICASONE FUROATE AND VILANTEROL TRIFENATATE 1 PUFF: 200; 25 POWDER RESPIRATORY (INHALATION) at 09:45

## 2023-01-01 RX ADMIN — POLYETHYLENE GLYCOL 3350 17 G: 17 POWDER, FOR SOLUTION ORAL at 08:43

## 2023-01-01 RX ADMIN — POTASSIUM CHLORIDE AND SODIUM CHLORIDE: 900; 150 INJECTION, SOLUTION INTRAVENOUS at 20:55

## 2023-01-01 RX ADMIN — SENNOSIDES AND DOCUSATE SODIUM 1 TABLET: 8.6; 5 TABLET ORAL at 09:32

## 2023-01-01 RX ADMIN — PHENYLEPHRINE HYDROCHLORIDE 200 MCG: 10 INJECTION INTRAVENOUS at 12:22

## 2023-01-01 RX ADMIN — DULOXETINE HYDROCHLORIDE 30 MG: 30 CAPSULE, DELAYED RELEASE ORAL at 08:58

## 2023-01-01 RX ADMIN — FAMOTIDINE 20 MG: 10 INJECTION, SOLUTION INTRAVENOUS at 05:57

## 2023-01-01 RX ADMIN — POTASSIUM CHLORIDE 10 MEQ: 7.46 INJECTION, SOLUTION INTRAVENOUS at 14:25

## 2023-01-01 RX ADMIN — OXYCODONE HYDROCHLORIDE 5 MG: 5 TABLET ORAL at 06:06

## 2023-01-01 RX ADMIN — IOPAMIDOL 75 ML: 755 INJECTION, SOLUTION INTRAVENOUS at 20:52

## 2023-01-01 RX ADMIN — THIAMINE HCL TAB 100 MG 100 MG: 100 TAB at 09:55

## 2023-01-01 RX ADMIN — ONDANSETRON 4 MG: 2 INJECTION INTRAMUSCULAR; INTRAVENOUS at 01:20

## 2023-01-01 RX ADMIN — BUSPIRONE HYDROCHLORIDE 5 MG: 5 TABLET ORAL at 09:31

## 2023-01-01 RX ADMIN — MONTELUKAST 10 MG: 10 TABLET, FILM COATED ORAL at 22:33

## 2023-01-01 RX ADMIN — MEROPENEM 1 G: 1 INJECTION, POWDER, FOR SOLUTION INTRAVENOUS at 08:37

## 2023-01-01 RX ADMIN — DULOXETINE HYDROCHLORIDE 30 MG: 30 CAPSULE, DELAYED RELEASE ORAL at 09:41

## 2023-01-01 RX ADMIN — ACETAMINOPHEN 975 MG: 325 TABLET ORAL at 13:30

## 2023-01-01 RX ADMIN — AMLODIPINE BESYLATE 2.5 MG: 2.5 TABLET ORAL at 09:07

## 2023-01-01 RX ADMIN — SENNOSIDES AND DOCUSATE SODIUM 1 TABLET: 8.6; 5 TABLET ORAL at 20:11

## 2023-01-01 RX ADMIN — POTASSIUM CHLORIDE 40 MEQ: 1500 TABLET, EXTENDED RELEASE ORAL at 00:15

## 2023-01-01 RX ADMIN — FLUTICASONE FUROATE AND VILANTEROL TRIFENATATE 1 PUFF: 200; 25 POWDER RESPIRATORY (INHALATION) at 09:31

## 2023-01-01 RX ADMIN — ENOXAPARIN SODIUM 40 MG: 40 INJECTION SUBCUTANEOUS at 14:18

## 2023-01-01 RX ADMIN — FAMOTIDINE 20 MG: 10 INJECTION, SOLUTION INTRAVENOUS at 06:57

## 2023-01-01 RX ADMIN — UMECLIDINIUM 1 PUFF: 62.5 AEROSOL, POWDER ORAL at 09:31

## 2023-01-01 RX ADMIN — VENLAFAXINE HYDROCHLORIDE 37.5 MG: 37.5 CAPSULE, EXTENDED RELEASE ORAL at 20:01

## 2023-01-01 RX ADMIN — SENNOSIDES AND DOCUSATE SODIUM 1 TABLET: 8.6; 5 TABLET ORAL at 09:51

## 2023-01-01 RX ADMIN — POTASSIUM CHLORIDE 40 MEQ: 1.5 POWDER, FOR SOLUTION ORAL at 12:48

## 2023-01-01 RX ADMIN — FUROSEMIDE 40 MG: 20 TABLET ORAL at 09:20

## 2023-01-01 RX ADMIN — OXYCODONE HYDROCHLORIDE 5 MG: 5 TABLET ORAL at 08:49

## 2023-01-01 RX ADMIN — POTASSIUM CHLORIDE 10 MEQ: 10 INJECTION, SOLUTION INTRAVENOUS at 12:30

## 2023-01-01 RX ADMIN — TRAZODONE HYDROCHLORIDE 100 MG: 50 TABLET ORAL at 20:48

## 2023-01-01 RX ADMIN — THIAMINE HCL TAB 100 MG 100 MG: 100 TAB at 09:41

## 2023-01-01 RX ADMIN — FAMOTIDINE 20 MG: 10 INJECTION, SOLUTION INTRAVENOUS at 17:01

## 2023-01-01 RX ADMIN — IOPAMIDOL 90 ML: 755 INJECTION, SOLUTION INTRAVENOUS at 01:42

## 2023-01-01 RX ADMIN — SODIUM CHLORIDE, POTASSIUM CHLORIDE, SODIUM LACTATE AND CALCIUM CHLORIDE: 600; 310; 30; 20 INJECTION, SOLUTION INTRAVENOUS at 20:09

## 2023-01-01 RX ADMIN — POTASSIUM CHLORIDE 10 MEQ: 10 INJECTION, SOLUTION INTRAVENOUS at 10:33

## 2023-01-01 RX ADMIN — GABAPENTIN 300 MG: 300 CAPSULE ORAL at 09:54

## 2023-01-01 RX ADMIN — MONTELUKAST 10 MG: 10 TABLET, FILM COATED ORAL at 23:11

## 2023-01-01 RX ADMIN — ACETAMINOPHEN 650 MG: 325 TABLET ORAL at 06:06

## 2023-01-01 RX ADMIN — HYDROMORPHONE HYDROCHLORIDE 0.4 MG: 0.2 INJECTION, SOLUTION INTRAMUSCULAR; INTRAVENOUS; SUBCUTANEOUS at 09:00

## 2023-01-01 RX ADMIN — SODIUM CHLORIDE: 9 INJECTION, SOLUTION INTRAVENOUS at 14:20

## 2023-01-01 RX ADMIN — BUSPIRONE HYDROCHLORIDE 5 MG: 5 TABLET ORAL at 19:10

## 2023-01-01 RX ADMIN — MONTELUKAST 10 MG: 10 TABLET, FILM COATED ORAL at 21:48

## 2023-01-01 RX ADMIN — VANCOMYCIN HYDROCHLORIDE 750 MG: 5 INJECTION, POWDER, LYOPHILIZED, FOR SOLUTION INTRAVENOUS at 03:29

## 2023-01-01 RX ADMIN — VANCOMYCIN HYDROCHLORIDE 750 MG: 5 INJECTION, POWDER, LYOPHILIZED, FOR SOLUTION INTRAVENOUS at 17:02

## 2023-01-01 RX ADMIN — MONTELUKAST 10 MG: 10 TABLET, FILM COATED ORAL at 21:41

## 2023-01-01 RX ADMIN — FAMOTIDINE 20 MG: 20 TABLET ORAL at 20:44

## 2023-01-01 RX ADMIN — DEXMEDETOMIDINE HYDROCHLORIDE 8 MCG: 100 INJECTION, SOLUTION INTRAVENOUS at 17:24

## 2023-01-01 RX ADMIN — PROCHLORPERAZINE EDISYLATE 10 MG: 5 INJECTION INTRAMUSCULAR; INTRAVENOUS at 10:26

## 2023-01-01 RX ADMIN — POLYETHYLENE GLYCOL 3350 17 G: 17 POWDER, FOR SOLUTION ORAL at 09:53

## 2023-01-01 RX ADMIN — SODIUM CHLORIDE 1000 ML: 9 INJECTION, SOLUTION INTRAVENOUS at 23:13

## 2023-01-01 RX ADMIN — VENLAFAXINE HYDROCHLORIDE 37.5 MG: 37.5 CAPSULE, EXTENDED RELEASE ORAL at 19:11

## 2023-01-01 RX ADMIN — HYDROMORPHONE HYDROCHLORIDE 0.2 MG: 1 INJECTION, SOLUTION INTRAMUSCULAR; INTRAVENOUS; SUBCUTANEOUS at 20:03

## 2023-01-01 RX ADMIN — GABAPENTIN 300 MG: 300 CAPSULE ORAL at 09:23

## 2023-01-01 RX ADMIN — POTASSIUM CHLORIDE 40 MEQ: 1500 TABLET, EXTENDED RELEASE ORAL at 09:54

## 2023-01-01 RX ADMIN — MEROPENEM 1 G: 1 INJECTION, POWDER, FOR SOLUTION INTRAVENOUS at 16:20

## 2023-01-01 RX ADMIN — HYDROMORPHONE HYDROCHLORIDE 0.4 MG: 0.2 INJECTION, SOLUTION INTRAMUSCULAR; INTRAVENOUS; SUBCUTANEOUS at 12:06

## 2023-01-01 RX ADMIN — HYDROMORPHONE HYDROCHLORIDE 0.5 MG: 1 INJECTION, SOLUTION INTRAMUSCULAR; INTRAVENOUS; SUBCUTANEOUS at 15:56

## 2023-01-01 RX ADMIN — FAMOTIDINE 20 MG: 10 INJECTION, SOLUTION INTRAVENOUS at 17:09

## 2023-01-01 RX ADMIN — ONDANSETRON 4 MG: 2 INJECTION INTRAMUSCULAR; INTRAVENOUS at 09:59

## 2023-01-01 RX ADMIN — SODIUM CHLORIDE: 9 INJECTION, SOLUTION INTRAVENOUS at 10:17

## 2023-01-01 RX ADMIN — Medication 1 TABLET: at 09:55

## 2023-01-01 RX ADMIN — ACETAMINOPHEN 975 MG: 325 TABLET ORAL at 22:10

## 2023-01-01 RX ADMIN — PHENYLEPHRINE HYDROCHLORIDE 200 MCG: 10 INJECTION INTRAVENOUS at 12:07

## 2023-01-01 RX ADMIN — PROPOFOL 20 MG: 10 INJECTION, EMULSION INTRAVENOUS at 18:21

## 2023-01-01 RX ADMIN — HYDROMORPHONE HYDROCHLORIDE 0.5 MG: 1 INJECTION, SOLUTION INTRAMUSCULAR; INTRAVENOUS; SUBCUTANEOUS at 17:36

## 2023-01-01 RX ADMIN — CEFTRIAXONE SODIUM 1 G: 1 INJECTION, POWDER, FOR SOLUTION INTRAMUSCULAR; INTRAVENOUS at 00:10

## 2023-01-01 RX ADMIN — MEROPENEM 1 G: 1 INJECTION, POWDER, FOR SOLUTION INTRAVENOUS at 09:19

## 2023-01-01 RX ADMIN — FAMOTIDINE 20 MG: 10 INJECTION, SOLUTION INTRAVENOUS at 05:13

## 2023-01-01 RX ADMIN — DULOXETINE HYDROCHLORIDE 30 MG: 30 CAPSULE, DELAYED RELEASE ORAL at 20:01

## 2023-01-01 RX ADMIN — VANCOMYCIN HYDROCHLORIDE 1250 MG: 500 INJECTION, POWDER, LYOPHILIZED, FOR SOLUTION INTRAVENOUS at 17:12

## 2023-01-01 RX ADMIN — POTASSIUM CHLORIDE 10 MEQ: 7.46 INJECTION, SOLUTION INTRAVENOUS at 00:16

## 2023-01-01 RX ADMIN — FAMOTIDINE 20 MG: 10 INJECTION, SOLUTION INTRAVENOUS at 06:08

## 2023-01-01 RX ADMIN — FLUTICASONE FUROATE AND VILANTEROL TRIFENATATE 1 PUFF: 200; 25 POWDER RESPIRATORY (INHALATION) at 09:12

## 2023-01-01 RX ADMIN — ENOXAPARIN SODIUM 40 MG: 40 INJECTION SUBCUTANEOUS at 14:17

## 2023-01-01 RX ADMIN — HYDROMORPHONE HYDROCHLORIDE 0.4 MG: 0.2 INJECTION, SOLUTION INTRAMUSCULAR; INTRAVENOUS; SUBCUTANEOUS at 05:57

## 2023-01-01 RX ADMIN — ENOXAPARIN SODIUM 40 MG: 40 INJECTION SUBCUTANEOUS at 14:26

## 2023-01-01 RX ADMIN — PROCHLORPERAZINE EDISYLATE 10 MG: 5 INJECTION INTRAMUSCULAR; INTRAVENOUS at 20:05

## 2023-01-01 RX ADMIN — DULOXETINE HYDROCHLORIDE 30 MG: 30 CAPSULE, DELAYED RELEASE ORAL at 09:54

## 2023-01-01 RX ADMIN — SENNOSIDES AND DOCUSATE SODIUM 1 TABLET: 8.6; 5 TABLET ORAL at 09:54

## 2023-01-01 RX ADMIN — MIDAZOLAM HYDROCHLORIDE 1 MG: 1 INJECTION, SOLUTION INTRAMUSCULAR; INTRAVENOUS at 14:20

## 2023-01-01 RX ADMIN — VENLAFAXINE HYDROCHLORIDE 37.5 MG: 37.5 CAPSULE, EXTENDED RELEASE ORAL at 09:50

## 2023-01-01 RX ADMIN — FUROSEMIDE 40 MG: 20 TABLET ORAL at 08:20

## 2023-01-01 RX ADMIN — AMLODIPINE BESYLATE 5 MG: 5 TABLET ORAL at 08:57

## 2023-01-01 RX ADMIN — FLUTICASONE FUROATE AND VILANTEROL TRIFENATATE 1 PUFF: 200; 25 POWDER RESPIRATORY (INHALATION) at 08:40

## 2023-01-01 RX ADMIN — SODIUM CHLORIDE, POTASSIUM CHLORIDE, SODIUM LACTATE AND CALCIUM CHLORIDE 500 ML: 600; 310; 30; 20 INJECTION, SOLUTION INTRAVENOUS at 18:06

## 2023-01-01 RX ADMIN — MIDAZOLAM HYDROCHLORIDE 0.5 MG: 1 INJECTION, SOLUTION INTRAMUSCULAR; INTRAVENOUS at 14:15

## 2023-01-01 RX ADMIN — ACETAMINOPHEN 975 MG: 325 TABLET ORAL at 12:05

## 2023-01-01 RX ADMIN — THIAMINE HCL TAB 100 MG 100 MG: 100 TAB at 09:51

## 2023-01-01 RX ADMIN — GABAPENTIN 300 MG: 300 CAPSULE ORAL at 08:19

## 2023-01-01 RX ADMIN — LIDOCAINE HYDROCHLORIDE 5 ML: 10 INJECTION, SOLUTION INFILTRATION; PERINEURAL at 15:11

## 2023-01-01 RX ADMIN — MEROPENEM 1 G: 1 INJECTION, POWDER, FOR SOLUTION INTRAVENOUS at 00:19

## 2023-01-01 RX ADMIN — POTASSIUM CHLORIDE 40 MEQ: 1500 TABLET, EXTENDED RELEASE ORAL at 08:32

## 2023-01-01 RX ADMIN — SENNOSIDES AND DOCUSATE SODIUM 1 TABLET: 8.6; 5 TABLET ORAL at 20:01

## 2023-01-01 RX ADMIN — TOPIRAMATE 100 MG: 100 TABLET, FILM COATED ORAL at 20:11

## 2023-01-01 RX ADMIN — ACETAMINOPHEN 650 MG: 325 TABLET ORAL at 00:10

## 2023-01-01 RX ADMIN — FAMOTIDINE 20 MG: 10 INJECTION, SOLUTION INTRAVENOUS at 04:09

## 2023-01-01 RX ADMIN — TOPIRAMATE 100 MG: 100 TABLET, FILM COATED ORAL at 19:58

## 2023-01-01 RX ADMIN — DEXMEDETOMIDINE HYDROCHLORIDE 8 MCG: 100 INJECTION, SOLUTION INTRAVENOUS at 17:43

## 2023-01-01 RX ADMIN — ROSUVASTATIN CALCIUM 10 MG: 10 TABLET, FILM COATED ORAL at 09:51

## 2023-01-01 RX ADMIN — POTASSIUM CHLORIDE, DEXTROSE MONOHYDRATE AND SODIUM CHLORIDE 100 ML/HR: 150; 5; 900 INJECTION, SOLUTION INTRAVENOUS at 04:09

## 2023-01-01 RX ADMIN — TOPIRAMATE 100 MG: 100 TABLET, FILM COATED ORAL at 20:01

## 2023-01-01 RX ADMIN — DULOXETINE HYDROCHLORIDE 30 MG: 30 CAPSULE, DELAYED RELEASE ORAL at 19:10

## 2023-01-01 RX ADMIN — BUSPIRONE HYDROCHLORIDE 5 MG: 5 TABLET ORAL at 19:26

## 2023-01-01 RX ADMIN — DULOXETINE HYDROCHLORIDE 30 MG: 30 CAPSULE, DELAYED RELEASE ORAL at 08:30

## 2023-01-01 RX ADMIN — BUSPIRONE HYDROCHLORIDE 5 MG: 5 TABLET ORAL at 14:25

## 2023-01-01 RX ADMIN — OXYCODONE HYDROCHLORIDE 5 MG: 5 TABLET ORAL at 04:36

## 2023-01-01 RX ADMIN — POTASSIUM CHLORIDE 10 MEQ: 7.46 INJECTION, SOLUTION INTRAVENOUS at 12:12

## 2023-01-01 RX ADMIN — FENTANYL CITRATE 50 MCG: 50 INJECTION INTRAMUSCULAR; INTRAVENOUS at 15:23

## 2023-01-01 RX ADMIN — PROCHLORPERAZINE EDISYLATE 5 MG: 5 INJECTION INTRAMUSCULAR; INTRAVENOUS at 12:05

## 2023-01-01 RX ADMIN — VENLAFAXINE HYDROCHLORIDE 37.5 MG: 37.5 CAPSULE, EXTENDED RELEASE ORAL at 09:32

## 2023-01-01 RX ADMIN — Medication 1 TABLET: at 08:57

## 2023-01-01 RX ADMIN — SODIUM CHLORIDE, POTASSIUM CHLORIDE, SODIUM LACTATE AND CALCIUM CHLORIDE: 600; 310; 30; 20 INJECTION, SOLUTION INTRAVENOUS at 11:36

## 2023-01-01 RX ADMIN — DULOXETINE HYDROCHLORIDE 30 MG: 30 CAPSULE, DELAYED RELEASE ORAL at 19:26

## 2023-01-01 RX ADMIN — UMECLIDINIUM 1 PUFF: 62.5 AEROSOL, POWDER ORAL at 10:45

## 2023-01-01 RX ADMIN — VANCOMYCIN HYDROCHLORIDE 750 MG: 5 INJECTION, POWDER, LYOPHILIZED, FOR SOLUTION INTRAVENOUS at 17:12

## 2023-01-01 RX ADMIN — POTASSIUM CHLORIDE 10 MEQ: 10 INJECTION, SOLUTION INTRAVENOUS at 09:30

## 2023-01-01 RX ADMIN — SODIUM CHLORIDE: 9 INJECTION, SOLUTION INTRAVENOUS at 00:56

## 2023-01-01 RX ADMIN — GABAPENTIN 600 MG: 300 CAPSULE ORAL at 21:41

## 2023-01-01 RX ADMIN — POTASSIUM CHLORIDE 40 MEQ: 1500 TABLET, EXTENDED RELEASE ORAL at 08:37

## 2023-01-01 RX ADMIN — ROCURONIUM BROMIDE 20 MG: 50 INJECTION, SOLUTION INTRAVENOUS at 15:38

## 2023-01-01 RX ADMIN — DULOXETINE HYDROCHLORIDE 30 MG: 30 CAPSULE, DELAYED RELEASE ORAL at 21:05

## 2023-01-01 RX ADMIN — MEROPENEM 1 G: 1 INJECTION, POWDER, FOR SOLUTION INTRAVENOUS at 09:12

## 2023-01-01 RX ADMIN — Medication 1 TABLET: at 09:42

## 2023-01-01 RX ADMIN — GABAPENTIN 300 MG: 300 CAPSULE ORAL at 14:25

## 2023-01-01 RX ADMIN — GABAPENTIN 600 MG: 300 CAPSULE ORAL at 21:24

## 2023-01-01 RX ADMIN — OXYCODONE HYDROCHLORIDE 5 MG: 5 TABLET ORAL at 09:23

## 2023-01-01 RX ADMIN — BUSPIRONE HYDROCHLORIDE 5 MG: 5 TABLET ORAL at 14:38

## 2023-01-01 RX ADMIN — POTASSIUM CHLORIDE 10 MEQ: 7.46 INJECTION, SOLUTION INTRAVENOUS at 13:08

## 2023-01-01 RX ADMIN — Medication 2 G: at 14:03

## 2023-01-01 RX ADMIN — BUSPIRONE HYDROCHLORIDE 5 MG: 5 TABLET ORAL at 20:01

## 2023-01-01 ASSESSMENT — ACTIVITIES OF DAILY LIVING (ADL)
ADLS_ACUITY_SCORE: 34
ADLS_ACUITY_SCORE: 37
ADLS_ACUITY_SCORE: 34
ADLS_ACUITY_SCORE: 40
ADLS_ACUITY_SCORE: 35
ADLS_ACUITY_SCORE: 34
ADLS_ACUITY_SCORE: 32
ADLS_ACUITY_SCORE: 28
ADLS_ACUITY_SCORE: 34
ADLS_ACUITY_SCORE: 34
ADLS_ACUITY_SCORE: 38
ADLS_ACUITY_SCORE: 34
ADLS_ACUITY_SCORE: 38
ADLS_ACUITY_SCORE: 34
ADLS_ACUITY_SCORE: 34
ADLS_ACUITY_SCORE: 38
ADLS_ACUITY_SCORE: 35
ADLS_ACUITY_SCORE: 28
ADLS_ACUITY_SCORE: 34
ADLS_ACUITY_SCORE: 38
ADLS_ACUITY_SCORE: 41
ADLS_ACUITY_SCORE: 38
ADLS_ACUITY_SCORE: 34
ADLS_ACUITY_SCORE: 38
ADLS_ACUITY_SCORE: 37
ADLS_ACUITY_SCORE: 34
ADLS_ACUITY_SCORE: 32
ADLS_ACUITY_SCORE: 34
ADLS_ACUITY_SCORE: 34
ADLS_ACUITY_SCORE: 36
ADLS_ACUITY_SCORE: 32
ADLS_ACUITY_SCORE: 34
ADLS_ACUITY_SCORE: 32
ADLS_ACUITY_SCORE: 34
ADLS_ACUITY_SCORE: 35
ADLS_ACUITY_SCORE: 34
ADLS_ACUITY_SCORE: 34
ADLS_ACUITY_SCORE: 28
ADLS_ACUITY_SCORE: 40
ADLS_ACUITY_SCORE: 38
ADLS_ACUITY_SCORE: 34
ADLS_ACUITY_SCORE: 34
ADLS_ACUITY_SCORE: 37
ADLS_ACUITY_SCORE: 34
ADLS_ACUITY_SCORE: 35
ADLS_ACUITY_SCORE: 32
ADLS_ACUITY_SCORE: 34
ADLS_ACUITY_SCORE: 30
ADLS_ACUITY_SCORE: 34
ADLS_ACUITY_SCORE: 38
ADLS_ACUITY_SCORE: 34
ADLS_ACUITY_SCORE: 34
ADLS_ACUITY_SCORE: 36
ADLS_ACUITY_SCORE: 38
ADLS_ACUITY_SCORE: 34
ADLS_ACUITY_SCORE: 38
ADLS_ACUITY_SCORE: 34
ADLS_ACUITY_SCORE: 34
ADLS_ACUITY_SCORE: 38
ADLS_ACUITY_SCORE: 34
ADLS_ACUITY_SCORE: 38
ADLS_ACUITY_SCORE: 35
ADLS_ACUITY_SCORE: 34
ADLS_ACUITY_SCORE: 34
ADLS_ACUITY_SCORE: 37
ADLS_ACUITY_SCORE: 34
ADLS_ACUITY_SCORE: 28
ADLS_ACUITY_SCORE: 34
ADLS_ACUITY_SCORE: 32
ADLS_ACUITY_SCORE: 34
ADLS_ACUITY_SCORE: 40
ADLS_ACUITY_SCORE: 37
ADLS_ACUITY_SCORE: 34
ADLS_ACUITY_SCORE: 34
ADLS_ACUITY_SCORE: 41
ADLS_ACUITY_SCORE: 28
ADLS_ACUITY_SCORE: 34
ADLS_ACUITY_SCORE: 30
ADLS_ACUITY_SCORE: 34
ADLS_ACUITY_SCORE: 38
ADLS_ACUITY_SCORE: 40
ADLS_ACUITY_SCORE: 37
ADLS_ACUITY_SCORE: 34
ADLS_ACUITY_SCORE: 38
ADLS_ACUITY_SCORE: 28
ADLS_ACUITY_SCORE: 38
ADLS_ACUITY_SCORE: 34
ADLS_ACUITY_SCORE: 35
ADLS_ACUITY_SCORE: 38
ADLS_ACUITY_SCORE: 34
ADLS_ACUITY_SCORE: 28
ADLS_ACUITY_SCORE: 34
ADLS_ACUITY_SCORE: 38
ADLS_ACUITY_SCORE: 28
ADLS_ACUITY_SCORE: 32
ADLS_ACUITY_SCORE: 34
ADLS_ACUITY_SCORE: 38
ADLS_ACUITY_SCORE: 34
ADLS_ACUITY_SCORE: 38
ADLS_ACUITY_SCORE: 34
ADLS_ACUITY_SCORE: 38
ADLS_ACUITY_SCORE: 28
ADLS_ACUITY_SCORE: 34
ADLS_ACUITY_SCORE: 36
ADLS_ACUITY_SCORE: 34
ADLS_ACUITY_SCORE: 34
ADLS_ACUITY_SCORE: 38
ADLS_ACUITY_SCORE: 32
ADLS_ACUITY_SCORE: 34
ADLS_ACUITY_SCORE: 28
ADLS_ACUITY_SCORE: 34
ADLS_ACUITY_SCORE: 28
ADLS_ACUITY_SCORE: 34
ADLS_ACUITY_SCORE: 36
ADLS_ACUITY_SCORE: 34
ADLS_ACUITY_SCORE: 38
ADLS_ACUITY_SCORE: 34
ADLS_ACUITY_SCORE: 38
ADLS_ACUITY_SCORE: 34
ADLS_ACUITY_SCORE: 36
ADLS_ACUITY_SCORE: 34
ADLS_ACUITY_SCORE: 36
ADLS_ACUITY_SCORE: 34
ADLS_ACUITY_SCORE: 40
ADLS_ACUITY_SCORE: 34
ADLS_ACUITY_SCORE: 44
ADLS_ACUITY_SCORE: 40
ADLS_ACUITY_SCORE: 38
ADLS_ACUITY_SCORE: 38
ADLS_ACUITY_SCORE: 28
ADLS_ACUITY_SCORE: 36
ADLS_ACUITY_SCORE: 34
ADLS_ACUITY_SCORE: 34
ADLS_ACUITY_SCORE: 38
ADLS_ACUITY_SCORE: 34
ADLS_ACUITY_SCORE: 36
ADLS_ACUITY_SCORE: 32
ADLS_ACUITY_SCORE: 34
ADLS_ACUITY_SCORE: 34
ADLS_ACUITY_SCORE: 36
ADLS_ACUITY_SCORE: 32
ADLS_ACUITY_SCORE: 35
ADLS_ACUITY_SCORE: 36
ADLS_ACUITY_SCORE: 34
ADLS_ACUITY_SCORE: 36
ADLS_ACUITY_SCORE: 34
ADLS_ACUITY_SCORE: 38
ADLS_ACUITY_SCORE: 28
ADLS_ACUITY_SCORE: 34
ADLS_ACUITY_SCORE: 37
ADLS_ACUITY_SCORE: 34
ADLS_ACUITY_SCORE: 34
ADLS_ACUITY_SCORE: 41
ADLS_ACUITY_SCORE: 34

## 2023-01-01 ASSESSMENT — ENCOUNTER SYMPTOMS
DIARRHEA: 0
VOMITING: 1
NAUSEA: 1
ABDOMINAL PAIN: 1

## 2023-01-01 ASSESSMENT — COPD QUESTIONNAIRES: COPD: 1

## 2023-01-01 ASSESSMENT — LIFESTYLE VARIABLES
TOBACCO_USE: 1
TOBACCO_USE: 1

## 2023-01-05 DIAGNOSIS — G43.109 MIGRAINE WITH AURA AND WITHOUT STATUS MIGRAINOSUS, NOT INTRACTABLE: ICD-10-CM

## 2023-01-05 NOTE — TELEPHONE ENCOUNTER
Refill request for: sumatriptan  50mg  Directions: Take 1 tablet (50 mg) by mouth at onset of headache for migraine     LOV: 01/03/22  NOV: 02/21/23    30 day supply with 1 refills Medication T'd for review and signature    Tanvi Becker LPN on 1/5/2023 at 1:48 PM

## 2023-01-06 RX ORDER — SUMATRIPTAN 50 MG/1
50 TABLET, FILM COATED ORAL
Qty: 9 TABLET | Refills: 1 | Status: SHIPPED | OUTPATIENT
Start: 2023-01-06 | End: 2023-02-21

## 2023-01-09 ENCOUNTER — ANCILLARY PROCEDURE (OUTPATIENT)
Dept: MAMMOGRAPHY | Facility: CLINIC | Age: 58
End: 2023-01-09
Attending: FAMILY MEDICINE
Payer: COMMERCIAL

## 2023-01-09 DIAGNOSIS — Z12.31 VISIT FOR SCREENING MAMMOGRAM: ICD-10-CM

## 2023-01-09 PROCEDURE — 77067 SCR MAMMO BI INCL CAD: CPT

## 2023-01-26 ENCOUNTER — TELEPHONE (OUTPATIENT)
Dept: NEUROLOGY | Facility: CLINIC | Age: 58
End: 2023-01-26
Payer: COMMERCIAL

## 2023-01-26 NOTE — TELEPHONE ENCOUNTER
Spoke to pt, she states she has a dentist appt on the same day so she would like to reschedule the 2/21/23 appt. Writer did let pt know she will have to wait a few months to see Dr. Yoon. Pt states she will check with dental office to see if that appt can be changed more easily. She will call back if she still wants to reschedule.    Tanvi Becker LPN on 1/26/2023 at 10:48 AM

## 2023-01-26 NOTE — TELEPHONE ENCOUNTER
Health Call Center    Phone Message    May a detailed message be left on voicemail: yes     Reason for Call: Other: Pt  called to reschedule her appt with Dr. Yoon. Writer advised pt next available is in August, pt wanted to speak with team before cancelling her 2/21 appt. Pt states she fell and Dr. Yoon also wanted to see her and have her complete blood work. Please call pt back to discuss.     Action Taken: Message routed to:  Other: [facility]    Travel Screening: Not Applicable

## 2023-02-20 NOTE — PROGRESS NOTES
In person evaluation    History of Present Illness :  1/21/2020, in person visit  7/10/2020, video visit  1/11/2021, telephone visit  7/13/2021 no-show  11/23/2021, no-show canceled day visit  1/3/2022, in person visit  8/22/2022, canceled afternoon of visit  2/21/2023, in person visit      57-year-old seen for  Neurologic difficulties  Chronic ataxia uses motorized wheelchair  Migraine headaches  Breast cancer followed by oncologist  Colon cancer followed by GI/rectal surgery      Since last seen about a year ago  Headaches are stable  Imitrex still helps shorten the severity  Headaches a little bit more often late in the day or evening    No new neurologic symptoms    Summer 2022 was trying to get some ice out of the fridge was not sure if she had a cane her leg gave out and she fractured her left ankle  Has osteoporosis took a long time to heal  Just recently got out of the boot        A.  Migraine headaches                                    Past                       2/2023       Frequency      4-9/month               3-4/month         Duration           2 hours                   2 hours                               Imitrex                      Imitrex            May go through 9 Imitrex in a month          Currently       No vomiting need a little bit of nausea       Some photophobia       No visual changes                Imitrex 50 mg tablet 1 p.o. daily as needed severe headache #9 with 11 refills           Topamax 50 mg tablet 1 in the morning 2 at night as a preventative    Stay well-hydrated to prevent kidney stones  Knows risk factors of kidney stones glaucoma paresthesias cognitive difficulties    B.   Chronic ataxia multifactorial         uses motorized scooter         Patient has chronic gait ataxia multifactorial in nature extensive work-up as far back as 2006           Fell possibly hit her head 12/1/2021        This was at nighttime coming back from the bathroom          Went to the ER on  "12/23/2021 reviewed MRI scan personally with her             No intracranial changes         We discussed gait safety           Left ankle fracture summer 2022         Slow healing due to her osteoporosis        C.   History of cancer x2         Breast cancer followed by primary oncologist         Colon cancer followed by her physicians        D.   Lives on her own         Has a PCA, 5hours/day         2 sons that help her out         Gets around with a motorized scooter when she is outside the home does have a walker and a cane to use inside the home    E.  History of sleep apnea          Patient has migraine headaches  Frequency is about 9/month  Uses Imitrex 50 mill grams once per day for abortive therapy tolerates well without side effects  Uses Topamax 50 mg tablet 1 in the morning 2 at night with a level of 6.1 February 2019  Chloride of 111 CO2 of 22 knows the side effects of Topamax we discussed today at length        Patient is pretty much scooter bound when she is out and about  Uses a walker inside her apartment  Has a shower chair  Lives on the 10th floor but at one level has an elevator  Sons do the grocery shopping for her  Has a PCA who does cleaning and cooking 7 days/week 5 hours/day            Patient s current difficulties include:    1. Difficulty with gait which is chronic in nature, uses a motorized scooter wheelchair, has a four-wheeled walker also. Gait difficulties have been going on as far back as 2006, multifactorial in nature.  2. Has a past history of bowel and bladder dysfunction, mild incontinence, not progressive. No structural lesion had been found in the past in her spine during previous workups.  3. History of breast cancer, currently quiet for the last few years, is \"cancer free.\"  4. History of colon cancer with colonoscopy followed by oncologist, and tracked by Dr. Chua who removed a polyp in the past.  5. Breast cancer was originally diagnosed in 2010.  6. Has a history of " hysterectomy, oophorectomy, and fibroids removed. No cancer when those were removed in 2013 workup.    Patient has a history of chronic daily headaches:    a. Does not want to use Botox for chronic headaches.  b. Has been on Topamax as a preventative with the last level of 6.4 in April 2018.  c. Does use Imitrex as an abortive therapy.  d. Have talked to her about medication overuse headaches and proper headache hygiene.    Complicating factors include:    1. Complex history of ataxia went back at least to 2006 previous workup for this is outlined in the 2010 notes and 2013 notes for reference.  2. History of obstructive sleep apnea could not tolerate CPAP, was switched more to nasal pillows, but this can exacerbate her headache control also.    Summary of medical issues:    1. Cancer, stromal sarcoma of the breast, and malignant colonic polyp.  2. Possible CVA in 2010.  3. Asthma and COPD followed by pulmonary.  4. Obstructive sleep apnea uses nasal pillows or prongs, cannot do CPAP mask.  5. Hypertension and hypothyroidism.  6. Chronic back pain.  7. Depression.  8. Chronic headache disorder, migraines on Topamax, which helps make them better.  9. Patient is disabled from competitive gainful employment due to multiple medical issues as above confirmed on October 19, 2012 note for details.    Work-up review  HDL 51/ (February 2021)  TSH 0.99 (February 21)  CT Head 12 /23 /2021  1.  No CT findings of acute intracranial process.  2.  Mild generalized brain parenchymal volume loss.  3.  Partially empty sella. This is a nonspecific finding and can be incidental, but may also be seen in some patients with pituitary endocrine abnormalities/dysfunction or idiopathic intracranial hypertension in the appropriate clinical context.  HEAD CTA: 12/23/2021  1.  No significant stenosis, aneurysm, or high flow vascular malformation identified.  2.  Variant [place] anatomy as above.  NECK CTA: 12/23/2021  1.  Small  eccentric soft tissue filling defects along the posterior walls of the bilateral internal carotid arteries, right greater than left. These are nonspecific, but are concerning for bilateral carotid webs versus less likely noncalcified   atherosclerosis. No flow-limiting stenosis of the cervical carotid arteries.  2.  No significant stenosis of the vertebral arteries.  3.  Aberrant right subclavian artery.    Laboratory review                                      1/2020 2/19/2021 12/23/2021 4/2022  Topamax                       6.9                6.6                                             5.6    NA/K                             143/4.1         141/4.2             141/4.3             139/3.5    Cl/CO2                         112/24          111/24               109/20             101/26    BUN/Cr                         7/0.81          7/0.8                   4/0.8                10/0.95    AST                              10                14                         19                    12    GLU                                                                              89                     90    WBC/Hgb                                     5.7/11.5              10.2/12.2              10.7/7.5    PLTs                                            454,000               496,000                554,000    Cortisol                                                                                                  4.2    TSH                                                                                                       1.50    Prolactin                                                                                                6.0          Review of Systems  Headaches as above        No diplopia dysarthria dysphasia  No numbness  No new weakness of the arms  Chronic ataxia no change  Uses the motorized scooter    No chest pain today no shortness of breath  No nausea vomiting or  diarrhea  Denies any significant constipation      General exam  Blood pressure 142/78, pulse 89  HEENT is missing some teeth speech is just a little bit dysarthric that is her baseline  Lungs clear  Heart rate regular  Abdomen soft positive bowel sounds  No edema the feet    Follows closely with her oncologist for her breast cancer and follows up with primary for colon cancer      Neurologic exam  Alert oriented x3  Normal process speech  Normal naming  Normal comprehension  Normal repetition  Memory recall adequate  No neglect    Cranial nerves II through XII normal  No ophthalmoplegia  No nystagmus  Tongue twisters are good face is symmetrical (always has a little bit of thickness to her speech which is her baseline)  No visual field cut  Wears glasses right eye chronically blurrier than the left      Upper extremities no drift  No tremor  Normal finger-nose    Lower extremities   can move her legs in the scooter  With some help was able to turn stand up march in place with a little bit help for balance  Has some problems with ataxia better with assistive device or scooter    Seems to have more of a postural problem he is to hang onto things when she transfers uses the motorized scooter for longer distances    Assessment/Plan    1.  Ataxia (R27.8)   Motorized scooter for longer distances  Does have a walker for inside the home and transferring  Does have a shower chair  Has a PCA 7 days/week 5 hours/day for cooking and cleaning      2.  Migraine headaches       Topamax 50 mg tablet 1 in the morning 2 at night       Imitrex 50, 1 tab p.o. daily as needed severe headache #9 try not to overuse    Discussed staying well-hydrated avoid side effects from the Topamax  We will check laboratory data  Should follow-up November 2023    We discussed gait safety  Fracture left ankle July 2022 with fall      Total care time today 32 minutes discussing and evaluating the above.      As part of visit today  Reviewed laboratory  data

## 2023-02-21 ENCOUNTER — OFFICE VISIT (OUTPATIENT)
Dept: NEUROLOGY | Facility: CLINIC | Age: 58
End: 2023-02-21
Payer: COMMERCIAL

## 2023-02-21 VITALS
HEART RATE: 89 BPM | SYSTOLIC BLOOD PRESSURE: 142 MMHG | BODY MASS INDEX: 34.27 KG/M2 | HEIGHT: 59 IN | DIASTOLIC BLOOD PRESSURE: 78 MMHG | WEIGHT: 170 LBS

## 2023-02-21 DIAGNOSIS — R27.9 LACK OF COORDINATION: ICD-10-CM

## 2023-02-21 DIAGNOSIS — G43.109 MIGRAINE WITH AURA AND WITHOUT STATUS MIGRAINOSUS, NOT INTRACTABLE: Primary | ICD-10-CM

## 2023-02-21 PROCEDURE — 99214 OFFICE O/P EST MOD 30 MIN: CPT | Performed by: PSYCHIATRY & NEUROLOGY

## 2023-02-21 RX ORDER — TOPIRAMATE 50 MG/1
TABLET, FILM COATED ORAL
Qty: 90 TABLET | Refills: 11 | Status: SHIPPED | OUTPATIENT
Start: 2023-02-21 | End: 2023-01-01

## 2023-02-21 RX ORDER — ROSUVASTATIN CALCIUM 10 MG/1
10 TABLET, COATED ORAL DAILY
COMMUNITY

## 2023-02-21 RX ORDER — SUMATRIPTAN 50 MG/1
50 TABLET, FILM COATED ORAL
Qty: 9 TABLET | Refills: 11 | Status: SHIPPED | OUTPATIENT
Start: 2023-02-21

## 2023-02-21 NOTE — NURSING NOTE
Chief Complaint   Patient presents with     Migraine     Annual follow up. Pt states she has about 3-4 migraines per month, mainly at night.     Tanvi Becker LPN on 2/21/2023 at 12:34 PM

## 2023-02-21 NOTE — LETTER
2/21/2023         RE: Peri Irizarry  [address]  [place] [address]        Dear Colleague,    Thank you for referring your patient, Peri Irizarry, to the [facility] [place]. Please see a copy of my visit note below.    In person evaluation    History of Present Illness :  1/21/2020, in person visit  7/10/2020, video visit  1/11/2021, telephone visit  7/13/2021 no-show  11/23/2021, no-show canceled day visit  1/3/2022, in person visit  8/22/2022, canceled afternoon of visit  2/21/2023, in person visit      57-year-old seen for  Neurologic difficulties  Chronic ataxia uses motorized wheelchair  Migraine headaches  Breast cancer followed by oncologist  Colon cancer followed by GI/rectal surgery      Since last seen about a year ago  Headaches are stable  Imitrex still helps shorten the severity  Headaches a little bit more often late in the day or evening    No new neurologic symptoms    Summer 2022 was trying to get some ice out of the fridge was not sure if she had a cane her leg gave out and she fractured her left ankle  Has osteoporosis took a long time to heal  Just recently got out of the boot        A.  Migraine headaches                                    Past                       2/2023       Frequency      4-9/month               3-4/month         Duration           2 hours                   2 hours                               Imitrex                      Imitrex            May go through 9 Imitrex in a month          Currently       No vomiting need a little bit of nausea       Some photophobia       No visual changes                Imitrex 50 mg tablet 1 p.o. daily as needed severe headache #9 with 11 refills           Topamax 50 mg tablet 1 in the morning 2 at night as a preventative    Stay well-hydrated to prevent kidney stones  Knows risk factors of kidney stones glaucoma paresthesias cognitive difficulties    B.   Chronic ataxia multifactorial          uses motorized scooter         Patient has chronic gait ataxia multifactorial in nature extensive work-up as far back as 2006           Fell possibly hit her head 12/1/2021        This was at nighttime coming back from the bathroom          Went to the ER on 12/23/2021 reviewed MRI scan personally with her             No intracranial changes         We discussed gait safety           Left ankle fracture summer 2022         Slow healing due to her osteoporosis        C.   History of cancer x2         Breast cancer followed by primary oncologist         Colon cancer followed by her physicians        D.   Lives on her own         Has a PCA, 5hours/day         2 sons that help her out         Gets around with a motorized scooter when she is outside the home does have a walker and a cane to use inside the home    E.  History of sleep apnea          Patient has migraine headaches  Frequency is about 9/month  Uses Imitrex 50 mill grams once per day for abortive therapy tolerates well without side effects  Uses Topamax 50 mg tablet 1 in the morning 2 at night with a level of 6.1 February 2019  Chloride of 111 CO2 of 22 knows the side effects of Topamax we discussed today at length        Patient is pretty much scooter bound when she is out and about  Uses a walker inside her apartment  Has a shower chair  Lives on the 10th floor but at one level has an elevator  Sons do the grocery shopping for her  Has a PCA who does cleaning and cooking 7 days/week 5 hours/day            Patient s current difficulties include:    1. Difficulty with gait which is chronic in nature, uses a motorized scooter wheelchair, has a four-wheeled walker also. Gait difficulties have been going on as far back as 2006, multifactorial in nature.  2. Has a past history of bowel and bladder dysfunction, mild incontinence, not progressive. No structural lesion had been found in the past in her spine during previous workups.  3. History of breast cancer,  "currently quiet for the last few years, is \"cancer free.\"  4. History of colon cancer with colonoscopy followed by oncologist, and tracked by Dr. Chua who removed a polyp in the past.  5. Breast cancer was originally diagnosed in 2010.  6. Has a history of hysterectomy, oophorectomy, and fibroids removed. No cancer when those were removed in 2013 workup.    Patient has a history of chronic daily headaches:    a. Does not want to use Botox for chronic headaches.  b. Has been on Topamax as a preventative with the last level of 6.4 in April 2018.  c. Does use Imitrex as an abortive therapy.  d. Have talked to her about medication overuse headaches and proper headache hygiene.    Complicating factors include:    1. Complex history of ataxia went back at least to 2006 previous workup for this is outlined in the 2010 notes and 2013 notes for reference.  2. History of obstructive sleep apnea could not tolerate CPAP, was switched more to nasal pillows, but this can exacerbate her headache control also.    Summary of medical issues:    1. Cancer, stromal sarcoma of the breast, and malignant colonic polyp.  2. Possible CVA in 2010.  3. Asthma and COPD followed by pulmonary.  4. Obstructive sleep apnea uses nasal pillows or prongs, cannot do CPAP mask.  5. Hypertension and hypothyroidism.  6. Chronic back pain.  7. Depression.  8. Chronic headache disorder, migraines on Topamax, which helps make them better.  9. Patient is disabled from competitive gainful employment due to multiple medical issues as above confirmed on October 19, 2012 note for details.    Work-up review  HDL 51/ (February 2021)  TSH 0.99 (February 21)  CT Head 12 /23 /2021  1.  No CT findings of acute intracranial process.  2.  Mild generalized brain parenchymal volume loss.  3.  Partially empty sella. This is a nonspecific finding and can be incidental, but may also be seen in some patients with pituitary endocrine abnormalities/dysfunction or " idiopathic intracranial hypertension in the appropriate clinical context.  HEAD CTA: 12/23/2021  1.  No significant stenosis, aneurysm, or high flow vascular malformation identified.  2.  Variant [place] of [place] anatomy as above.  NECK CTA: 12/23/2021  1.  Small eccentric soft tissue filling defects along the posterior walls of the bilateral internal carotid arteries, right greater than left. These are nonspecific, but are concerning for bilateral carotid webs versus less likely noncalcified   atherosclerosis. No flow-limiting stenosis of the cervical carotid arteries.  2.  No significant stenosis of the vertebral arteries.  3.  Aberrant right subclavian artery.    Laboratory review                                      1/2020 2/19/2021 12/23/2021 4/2022  Topamax                       6.9                6.6                                             5.6    NA/K                             143/4.1         141/4.2             141/4.3             139/3.5    Cl/CO2                         112/24          111/24               109/20             101/26    BUN/Cr                         7/0.81          7/0.8                   4/0.8                10/0.95    AST                              10                14                         19                    12    GLU                                                                              89                     90    WBC/Hgb                                     5.7/11.5              10.2/12.2              10.7/7.5    PLTs                                            454,000               496,000                554,000    Cortisol                                                                                                  4.2    TSH                                                                                                       1.50    Prolactin                                                                                                 6.0          Review of Systems  Headaches as above        No diplopia dysarthria dysphasia  No numbness  No new weakness of the arms  Chronic ataxia no change  Uses the motorized scooter    No chest pain today no shortness of breath  No nausea vomiting or diarrhea  Denies any significant constipation      General exam  Blood pressure 142/78, pulse 89  HEENT is missing some teeth speech is just a little bit dysarthric that is her baseline  Lungs clear  Heart rate regular  Abdomen soft positive bowel sounds  No edema the feet    Follows closely with her oncologist for her breast cancer and follows up with primary for colon cancer      Neurologic exam  Alert oriented x3  Normal process speech  Normal naming  Normal comprehension  Normal repetition  Memory recall adequate  No neglect    Cranial nerves II through XII normal  No ophthalmoplegia  No nystagmus  Tongue twisters are good face is symmetrical (always has a little bit of thickness to her speech which is her baseline)  No visual field cut  Wears glasses right eye chronically blurrier than the left      Upper extremities no drift  No tremor  Normal finger-nose    Lower extremities   can move her legs in the scooter  With some help was able to turn stand up march in place with a little bit help for balance  Has some problems with ataxia better with assistive device or scooter    Seems to have more of a postural problem he is to hang onto things when she transfers uses the motorized scooter for longer distances    Assessment/Plan    1.  Ataxia (R27.8)   Motorized scooter for longer distances  Does have a walker for inside the home and transferring  Does have a shower chair  Has a PCA 7 days/week 5 hours/day for cooking and cleaning      2.  Migraine headaches       Topamax 50 mg tablet 1 in the morning 2 at night       Imitrex 50, 1 tab p.o. daily as needed severe headache #9 try not to overuse    Discussed staying well-hydrated avoid side effects from the  Topamax  We will check laboratory data  Should follow-up November 2023    We discussed gait safety  Fracture left ankle July 2022 with fall      Total care time today 32 minutes discussing and evaluating the above.      As part of visit today  Reviewed laboratory data        Again, thank you for allowing me to participate in the care of your patient.        Sincerely,        Bigg Yoon MD

## 2023-09-14 NOTE — TELEPHONE ENCOUNTER
Health Call Center    Phone Message    May a detailed message be left on voicemail: yes     Reason for Call: Other: Pt called and stated that she had a CT scan done yesterday and she would like to discuss her results with a care team member. Please call patient and advise at [phone].     Action Taken: Message routed to:  Other: [facility]     Travel Screening: Not Applicable

## 2023-09-14 NOTE — TELEPHONE ENCOUNTER
Spoke with Peri- she had a CT of her abdomen. I asked she follow up with ordering provider and any next steps that are necessary.  She verbalized understanding- no further questions at this time  Dianna Gautam CMA on 9/14/2023 at 2:24 PM

## 2023-10-09 PROBLEM — R10.10 UPPER ABDOMINAL PAIN: Status: ACTIVE | Noted: 2023-01-01

## 2023-10-09 PROBLEM — R19.07 GENERALIZED ABDOMINAL MASS: Status: ACTIVE | Noted: 2023-01-01

## 2023-10-09 PROBLEM — N39.0 ACUTE UTI: Status: ACTIVE | Noted: 2023-01-01

## 2023-10-09 PROBLEM — K56.609 SBO (SMALL BOWEL OBSTRUCTION) (H): Status: ACTIVE | Noted: 2023-01-01

## 2023-10-09 PROBLEM — E86.0 MILD DEHYDRATION: Status: ACTIVE | Noted: 2023-01-01

## 2023-10-09 PROBLEM — E87.6 HYPOKALEMIA: Status: ACTIVE | Noted: 2023-01-01

## 2023-10-09 NOTE — OP NOTE
Name:  Peri Irizarry  PCP:  Brain Camacho  Procedure Date:  10/8/2023 - 10/9/2023      Procedure(s):  LAPAROSCOPY, LYSIS OF ADHESIONS  LAPAROTOMY    Pre-Procedure Diagnosis:  SBO (small bowel obstruction) (H) [K56.609]     Post-Procedure Diagnosis:    Metastatic malignancy     Surgeon(s):  Vielka Gordon, Matthew Cota DO    Circulator: Rosio Bashir, LUCY; Giselle Sánchez, LUCY; Stacey Haines, RN  Relief Scrub: Hiral Rader  Scrub Person: Tammy Mars; Josie Marsh    Anesthesia Type: General      Findings:  Diffuse metastatic cancer of unknown primary  Closed loop obstruction of proximal small bowel  Gastric mass with evidence of near erosion into small bowel    Operative Report:    Patient was taken the operating room placed in a supine position.  Endotracheal intubation was performed and the abdomen was prepped and draped in sterile fashion.  Our PA Willem Gordon was present throughout the entire case and instrumental in exposure and dissection.  I first established a pneumoperitoneum by injecting local anesthetic in the right upper quadrant and made a 5 mm transverse incision.  I then establish pneumoperitoneum with a Veress needle technique.  I then placed a 5 mm trocar with a 5 mm camera into the abdomen.  I immediately encountered significant adhesions throughout the abdomen.  I placed 2 additional trocars 1 in the right mid abdominal region and 1 in the right subcostal region.  I upsized the middle trocar for a 11 millimeter trocar.  I then proceeded to spend quite some time performing adhesiolysis with laparoscopic LigaSure device.  I was able to finally uncover the stomach which demonstrated a mass protruding from the greater curvature into what appeared to be small intestine.  The small mesenteric nodule was biopsied and sample sent off the field as specimen.  This came back as metastatic malignancy of an unclear etiology possibly breast versus gastric.    At this  point because of the closed-loop obstruction noted on the CT scan and the inability to uncover this laparoscopically I then elected to convert to an open procedure.  I then removed all trocars and made an upper midline incision.  I gained entry into the abdomen in the standard fashion.  I spent some time taking down the adhesions surrounding the entire abdomen there is evidence of previous tumor deposits or nodularity that were resected from the abdominal wall and sent off the field the specimen as well.  I spent several hours dissecting out the stomach as well as the small intestine in order to uncover where the small bowel obstruction/closed-loop obstruction resided.  Eventually I was able to liberate portion of the transverse colon and uncovered a closed-loop obstruction of the proximal small bowel just distal to the ligament of Treitz.  The bowel did appear viable.  However, there was evidence of a narrow tunnel/internal hernia.  I then spent several hours taking down adhesions and trying to liberate the internal hernia.  A large portion of the omentum was transected and removed from the abdomen which involved several nodules.  This was done with LigaSure sealing device.  At one point I entered the lesser sac of the stomach and noted that a portion of the gastric mass that was easily palpable was extending into what appeared to be the serosa of the small intestine.  There is evidence of a large internal hernia likely from the patient's previous surgery.  After quite some dissection I was able to finally liberate the small intestine from all of the scar tissue and the tumor deposits that were creating obstruction points within the abdomen.  Several small serosal injuries were oversewn with 3-0 silk pop-off sutures.  At no time did I encounter any succus.  Once the entire abdomen was liberated I evaluated the remainder of the abdomen.  There is evidence of nodularity just about everywhere unfortunately.  Several  other nodules were biopsied and sent off the field.  I then irrigated the abdomen and placed 219 Giovanny drains in the abdomen through incisions in the right and left abdominal valve.  NG tube was placed and manually confirmed.  I then closed the abdominal wall with a running oh looped PDS suture.  Skin edges were reapproximated with surgical staples.  Remaining laparoscopic port sites were closed with surgical staples as well.  All wounds were cleaned and covered with dry sterile dressing.  Lap counts and needle counts were correct at the end the procedure.  Patient was subsequently extubated sent to the PACU in recovery.    Estimated Blood Loss:   200cc    Specimens:    ID Type Source Tests Collected by Time Destination   1 : PERITONEUM FLUID Peritoneal Fluid Peritoneum NON-GYNECOLOGIC CYTOLOGY Matthew Ramey, DO 10/9/2023  3:50 PM    2 : MESENTERIC IMPLANT Tissue Other SURGICAL PATHOLOGY EXAM Matthew Ramey, DO 10/9/2023  3:54 PM    3 : ABDOMINAL WALL NODULE Tissue Abdomen SURGICAL PATHOLOGY EXAM Matthew Ramey, DO 10/9/2023  4:18 PM    4 : MESENTERY Tissue Mesentery SURGICAL PATHOLOGY EXAM Matthew Ramey, DO 10/9/2023  4:54 PM    5 : MESENTERY NODULE Tissue Mesentery SURGICAL PATHOLOGY EXAM Matthew Ramey, DO 10/9/2023  4:55 PM    6 : OMENTUM Tissue Omentum SURGICAL PATHOLOGY EXAM Matthew Ramey, DO 10/9/2023  6:03 PM           Drains:   Closed/Suction Drain 1 Right;Anterior RLQ Bulb 19 Malian (Active)       Closed/Suction Drain 2 Left;Anterior LLQ Bulb 19 Malian (Active)       NG/OG/NJ Tube Nasogastric Right nostril (Active)       Urethral Catheter 10/09/23 Latex 16 fr (Active)       Complications:    None    Matthew Ramey DO

## 2023-10-09 NOTE — MEDICATION SCRIBE - ADMISSION MEDICATION HISTORY
Medication Scribe Admission Medication History    Admission medication history is complete. The information provided in this note is only as accurate as the sources available at the time of the update.    Information Source(s): Patient via in-person    Pertinent Information: Patient reported active med list interview by personal filling out the Admission Rx form.  Says she hasn't taken meds since nausea started on late fri.    Changes made to PTA medication list:  Added: Advil 800 mg  Deleted: Miralax (duplicate)  Changed: Fosamax takes on Tuesdays    Medication Affordability:  Not including over the counter (OTC) medications, was there a time in the past 3 months when you did not take your medications as prescribed because of cost?: No    Allergies reviewed with patient and updates made in EHR: yes    Medication History Completed By: Екатерина Ventura 10/9/2023 3:26 AM  Prior to Admission medications    Medication Sig Last Dose Taking? Auth Provider Long Term End Date   ADVAIR DISKUS 500-50 MCG/DOSE inhaler Inhale 1 puff into the lungs 2 times daily Past Week at fri Yes Reported, Patient Yes    albuterol (PROAIR HFA/PROVENTIL HFA/VENTOLIN HFA) 108 (90 Base) MCG/ACT inhaler Inhale 1-2 puffs into the lungs Past Week at fri Yes Reported, Patient Yes    albuterol (PROVENTIL) (2.5 MG/3ML) 0.083% neb solution NEBULIZE 1 VIAL AS DIRECTED EVERY 4-6 HOURS AS NEEDED VIA NEBULIZER Past Week at fri Yes Reported, Patient Yes    alendronate (FOSAMAX) 35 MG tablet Take 35 mg by mouth every 7 days Tuesdays Past Week at tues Yes Reported, Patient Yes    amLODIPine (NORVASC) 5 MG tablet Take 5 mg by mouth Past Week at fri Yes Reported, Patient Yes    bisacodyl (DULCOLAX) 5 MG EC tablet Use as instructed for colonoscopy prep Unknown at prn Yes Evin Wright,      busPIRone (BUSPAR) 5 MG tablet Take 5 mg by mouth 3 times daily Past Week at fri Yes Reported, Patient Yes    calcium carbonate (OS-CHERY) 600 MG tablet  Past Week at fri Yes  Reported, Patient     conjugated estrogens (PREMARIN) 0.625 MG/GM vaginal cream Place 1 g vaginally Past Month at prn Yes Reported, Patient     cyclobenzaprine (FLEXERIL) 5 MG tablet Take 5 mg by mouth prn Past Week at prn Yes Reported, Patient     DULoxetine (CYMBALTA) 30 MG capsule Take 30 mg by mouth Past Week at fri Yes Reported, Patient Yes    ferrous sulfate (FEROSUL) 325 (65 Fe) MG tablet Take 325 mg by mouth every 48 hours Past Week at fri Yes Reported, Patient     furosemide (LASIX) 40 MG tablet Take 40 mg by mouth Past Week at fri Yes Reported, Patient Yes    gabapentin (NEURONTIN) 300 MG capsule One in the morning, one in the evening and 2 at bedtime Past Week at fri Yes Reported, Patient Yes    GNP NATURAL FIBER 48.57 % POWD TAKE 2G ONCE DAILY AS DIRECTED **184 DAYS SUPPLY** 30 Past Week at fri Yes Reported, Patient No    hydrochlorothiazide (HYDRODIURIL) 25 MG tablet Take 25 mg by mouth Past Week at fri Yes Reported, Patient Yes    HYDROcodone-acetaminophen (NORCO) 5-325 MG tablet  Past Week at fri Yes Reported, Patient     hydrOXYzine (VISTARIL) 25 MG capsule Take 25 mg by mouth 3 times daily as needed Past Week at fri Yes Reported, Patient     ibuprofen (ADVIL/MOTRIN) 800 MG tablet Take 800 mg by mouth 2 times daily as needed for moderate pain Past Week at fri Yes Reported, Patient     meclizine (ANTIVERT) 25 MG tablet Take 1 tablet (25 mg) by mouth 3 times daily as needed for dizziness Past Week at fri Yes Heri Kaufman MD     montelukast (SINGULAIR) 10 MG tablet Take 10 mg by mouth Past Week at fri Yes Reported, Patient Yes    naproxen (NAPROSYN) 500 MG tablet Take 500 mg by mouth 2 times daily as needed for moderate pain Past Week at prn Yes Reported, Patient     polyethylene glycol (MIRALAX) 17 GM/Dose powder Take 17 g by mouth as needed for constipation Past Week at prn Yes Reported, Patient     potassium chloride ER (KLOR-CON M) 10 MEQ CR tablet  Past Week at fri Yes Reported, Patient      rosuvastatin (CRESTOR) 10 MG tablet Take 10 mg by mouth daily Past Week at fri Yes Reported, Patient Yes    senna-docusate (SENOKOT-S/PERICOLACE) 8.6-50 MG tablet Take 2 tablets by mouth Past Week at fri Yes Reported, Patient     SPIRIVA RESPIMAT 1.25 MCG/ACT inhaler  Past Week at fri Yes Reported, Patient Yes    SUMAtriptan (IMITREX) 50 MG tablet Take 1 tablet (50 mg) by mouth at onset of headache for migraine More than a month at prn Yes Bigg Yoon MD     topiramate (TOPAMAX) 50 MG tablet One tab po qam and two tabs po qhs Past Week at fri Yes Bigg Yoon MD Yes    traZODone (DESYREL) 50 MG tablet Take 200 mg by mouth Past Week at fri Yes Reported, Patient Yes    venlafaxine (EFFEXOR XR) 37.5 MG 24 hr capsule TAKE TWO CAPSULES BY MOUTH ONCE DAILY FOR HOT FLASHES Past Week at fri Yes Reported, Patient Yes    vitamin D3 (CHOLECALCIFEROL) 50 mcg (2000 units) tablet  Past Week at fri Yes Reported, Patient

## 2023-10-09 NOTE — PHARMACY-ADMISSION MEDICATION HISTORY
Pharmacist Admission Medication History    Admission medication history is complete. The information provided in this note is only as accurate as the sources available at the time of the update.    Re-do, spoke with pt again    Medication History Completed By: Merlene Hughes [facility] 10/9/2023 10:21 AM    PTA Med List   Medication Sig Note Last Dose    ADVAIR DISKUS 500-50 MCG/DOSE inhaler Inhale 1 puff into the lungs 2 times daily  Past Week at fri    albuterol (PROAIR HFA/PROVENTIL HFA/VENTOLIN HFA) 108 (90 Base) MCG/ACT inhaler Inhale 1-2 puffs into the lungs every 4 hours as needed for wheezing or shortness of breath  Past Week at fri    albuterol (PROVENTIL) (2.5 MG/3ML) 0.083% neb solution NEBULIZE 1 VIAL AS DIRECTED EVERY 4-6 HOURS AS NEEDED VIA NEBULIZER  Past Week at fri    alendronate (FOSAMAX) 35 MG tablet Take 35 mg by mouth every 7 days Tuesdays  Past Week at tues    amLODIPine (NORVASC) 2.5 MG tablet Take 2.5 mg by mouth daily  Past Week at am    busPIRone (BUSPAR) 5 MG tablet Take 5 mg by mouth 3 times daily  Past Week at fri    calcium carbonate (OS-CHERY) 600 MG tablet Take 600 mg by mouth 2 times daily (with meals)  Past Week at fri    conjugated estrogens (PREMARIN) 0.625 MG/GM vaginal cream Place 1 g vaginally every 72 hours as needed  Past Month at prn    cyclobenzaprine (FLEXERIL) 5 MG tablet Take 5 mg by mouth 3 times daily as needed for muscle spasms prn  Past Week at prn    DULoxetine (CYMBALTA) 30 MG capsule Take 30 mg by mouth 2 times daily  Past Week at fri    ferrous sulfate (FEROSUL) 325 (65 Fe) MG tablet Take 325 mg by mouth every 48 hours  Past Week at fri    furosemide (LASIX) 40 MG tablet Take 40 mg by mouth daily  Past Week at fri    gabapentin (NEURONTIN) 300 MG capsule Take 300 mg by mouth 2 times daily Morning and afternoon dose  Past Week at wk    gabapentin (NEURONTIN) 300 MG capsule Take 600 mg by mouth at bedtime  Past Week at pm    GNP NATURAL FIBER 48.57 % POWD TAKE 2G ONCE  DAILY AS DIRECTED **184 DAYS SUPPLY** 30 10/9/2023: 1 scoop daily Past Week at fri    hydrochlorothiazide (HYDRODIURIL) 25 MG tablet Take 25 mg by mouth daily  Past Week at fri    HYDROcodone-acetaminophen (NORCO) 7.5-325 MG per tablet Take 1 tablet by mouth 3 times daily Chronic pain  Unknown at wk    hydrOXYzine (VISTARIL) 25 MG capsule Take 25 mg by mouth 3 times daily as needed  Past Week at fri    ibuprofen (ADVIL/MOTRIN) 800 MG tablet Take 800 mg by mouth 2 times daily as needed for moderate pain  Past Week at fri    meclizine (ANTIVERT) 25 MG tablet Take 1 tablet (25 mg) by mouth 3 times daily as needed for dizziness  Past Week at fri    montelukast (SINGULAIR) 10 MG tablet Take 10 mg by mouth at bedtime  Past Week at fri    potassium chloride ER (KLOR-CON M) 10 MEQ CR tablet Take 20 mEq by mouth daily  Past Week at fri    rosuvastatin (CRESTOR) 10 MG tablet Take 10 mg by mouth daily  Past Week at fri    senna-docusate (SENOKOT-S/PERICOLACE) 8.6-50 MG tablet Take 2 tablets by mouth daily as needed for constipation  Unknown at prn    SUMAtriptan (IMITREX) 50 MG tablet Take 1 tablet (50 mg) by mouth at onset of headache for migraine  More than a month at prn    tiotropium (SPIRIVA) 18 MCG inhaled capsule Inhale 18 mcg into the lungs daily  Past Week at fri    topiramate (TOPAMAX) 50 MG tablet Take 50 mg by mouth daily before breakfast  Past Week at am    topiramate (TOPAMAX) 50 MG tablet Take 100 mg by mouth at bedtime  Past Week at hs    traZODone (DESYREL) 50 MG tablet Take 100 mg by mouth at bedtime  Past Week at fri    venlafaxine (EFFEXOR XR) 37.5 MG 24 hr capsule Take 37.5 mg by mouth 2 times daily  Past Week at fri    vitamin D3 (CHOLECALCIFEROL) 50 mcg (2000 units) tablet Take 50 mcg by mouth daily  Past Week at fri

## 2023-10-09 NOTE — ED PROVIDER NOTES
NAME: Peri Irizarry  AGE: 58 year old female  YOB: 1965  MRN: [id number]  EVALUATION DATE & TIME: 10/8/2023 10:29 PM    PCP: Brain Camacho    ED PROVIDER: Gerson Trujillo M.D.      Chief Complaint   Patient presents with    Abdominal Pain     FINAL IMPRESSION:  1. Mild dehydration    2. Hypokalemia    3. Upper abdominal pain    4. Generalized abdominal mass    5. SBO (small bowel obstruction) (H)    6. Acute UTI      MEDICAL DECISION MAKING:    10:40 PM I met with the patient, obtained history, performed an initial exam, and discussed options and plan for diagnostics and treatment here in the ED. Patient was clinically assessed and consented to treatment. After assessment, medical decision making and workup were discussed with the patient. The patient was agreeable to plan for testing, workup, and treatment.  2:35 AM Rechecked and updated patient.   2:45 AM Spoke with the Hospitalist, Dr. Taylor, who accepts the patient for admission.     Pertinent Labs & Imaging studies reviewed. (See chart for details)       Medical Decision Making    History:  Supplemental history from: Documented in chart, if applicable  External Record(s) reviewed: Documented in chart, if applicable.    Work Up:  Chart documentation includes differential considered and any EKGs or imaging independently interpreted by provider, where specified.  In additional to work up documented, I considered the following work up: Documented in chart, if applicable.    External consultation:  Discussion of management with another provider: Documented in chart, if applicable    Complicating factors:  Care impacted by chronic illness: Cancer/Chemotherapy, Cerebrovascular Disease, Chronic Lung Disease, Chronic Pain, Hypertension, and Mental Health  Care affected by social determinants of health: N/A    Disposition considerations: Admit.    Peri Irizarry is a 58 year old female who presents with abdominal pain with nausea and  vomiting.   Differential diagnosis includes but not limited to pancreatitis, small bowel obstruction, ileus, dehydration, acute kidney injury, hypomagnesemia.  Patient with history of colon cancer that was in remission.  Patient recently found in June of this year to have possible remission.  She had masses found on the pancreas and the left colon.  Patient previously had the ascending colon removed.  Patient with well-healed midline scar from prior surgeries and does appear slightly bloated with slightly higher pitched bowel sounds.  Small bowel obstruction is a consideration given the lack of bowel movements lately.  Patient attributes this to not eating as she had nausea and vomiting.  Labs returned showing slight leukocytosis but stable hemoglobin.  Patient's magnesium was normal lactate was normal.  Electrolytes did show some hypokalemia, hyponatremia, and slight creatinine elevation.  Patient has not been eating well and I did give her some IV fluids along with potassium repletion both oral and IV.  Patient's pain controlled and antiemetics given.  Patient will be plan for CT scan to evaluate the abdominal pain and nausea with vomiting.  Urine also sent did show some leukocytosis and concern for infection.  On questioning further patient did have some urinary urgency but no dysuria or hematuria.  Patient comfortable and will go to CT.  CT did show findings of concern for metastatic disease including mass in the left lower quadrant and the pancreas as well as multiple lymph nodes concerning for metastatic disease.  She did have a dilated proximal ileum and concern for transition point and small bowel obstruction.  Possible ileus though I feel this is likely more consistent with small bowel obstruction given the pain with nausea and vomiting.  She also had tenderness over the left lower quadrant and ureter did have some inflammation there which could be consistent with UTI.  Given the urine I will treat with  antibiotics.  I did speak with internal medicine regarding admission and will plan for admission.    0 minutes of critical care time    MEDICATIONS GIVEN IN THE EMERGENCY:  Medications   ondansetron (ZOFRAN) injection 4 mg (4 mg Intravenous $Given 10/8/23 2314)   HYDROmorphone (PF) (DILAUDID) injection 0.5 mg (0.5 mg Intravenous $Given 10/9/23 0128)   cefTRIAXone (ROCEPHIN) 1 g vial to attach to  mL bag for ADULTS or NS 50 mL bag for PEDS (has no administration in time range)   sodium chloride 0.9% BOLUS 1,000 mL (0 mLs Intravenous Stopped 10/9/23 0137)   potassium chloride 10 mEq in 100 mL sterile water infusion (0 mEq Intravenous Stopped 10/9/23 0137)   potassium chloride ER (KLOR-CON M) CR tablet 40 mEq (40 mEq Oral $Given 10/9/23 0015)   iopamidol (ISOVUE-370) solution 90 mL (90 mLs Intravenous $Given 10/9/23 0142)       NEW PRESCRIPTIONS STARTED AT TODAY'S ER VISIT:  New Prescriptions    No medications on file          =================================================================    HPI    Patient information was obtained from: Patient and Patient's Daughter-in-law    Use of : N/A    Peri Irizarry is a 58 year old female with a pertinent past medical history of colon cancer, breast cancer, radiation therapy, diverticular disease of colon, pyelonephritis, anemia, stroke, HTN, morbid obesity, anxiety, who presents to the ED for evaluation of abdominal pain and vomiting.    Per Chart Review, patient CT Abdomen and Pelvis with Contrast performed on 09/08/23 with [facility] had the following impression:   1. Asymmetric nodularity along the posterior gastric wall superimposed on  significant diffuse gastric and distal esophageal wall thickening, appearance  concerning for neoplasm, particularly in light of findings elsewhere. Upper  endoscopy recommended.  2. Short segment thickening within the mid transverse colon is nonspecific,  neoplasm not excluded. Colonoscopy  recommended.  3. Enlarged lymph nodes in the gastrohepatic ligament, highly suspicious for  metastatic adenopathy.  4. Omental haziness and peritoneal thickening/nodularity, concerning for  malignant involvement.  5. Asymmetric thickened appearance of the pancreatic tail without definable mass  or obvious ductal dilatation. Nonspecific appearance could relate t localized  inflammation given other findings in the upper abdomen. Consider MRI.     Per Chart Review, patient labs performed on 09/08/23 with [facility] were notable for lipase 67, potassium 3.3, urea nitrogen 6.3,, glucose 124, RDW 15.8, and platelet count 664. All other labs otherwise unremarkable.     Patient endorses the histories above. She endorses a PMH of colon cancer and notes that there is concern for new remission, as well as for new pancreatic cancer, after finding out the results of the CT imaging and laboratory studies performed on 09/08/23. She states she was first diagnosed with colon cancer in 2010, and has been in remission ever since, until maybe now. She endorses having a biopsy scheduled on 10/18/23. She endorses undergoing abdominal surgery in the past secondary to her colon cancer, including colon surgery, a hysterectomy, and a bilateral oophorectomy. She states that her Oncologist is Dr. Mani Malcolm with [facility].     Patient reports having abdominal pain since June or July 2023, which has progressively worsened the past 2-3 days. She notes that initially her abdominal pain was epigastric, however, it recently became generalized, but primarily left lower quadrant abdominal pain. She also reports having vomiting approximately 2-3 days. She states initially her emesis was characterized as yellow and brown, but now it is characterized as green with blood in it. She endorses her vomiting being provoked by any type of consumption. She notes she has attempted to take Hydrocodone for her abdominal pain  "recently, but she mentions that she has been unable to keep the medication down secondary to vomiting. She endorses nausea within the ED. She endorses allergies to Penicillin. She denies any recent diarrhea, or any other complications at this time.     Patient's daughter-in-law reports that the patient has lost approximately 70 pounds since June 2023.    REVIEW OF SYSTEMS   Review of Systems   Gastrointestinal:  Positive for abdominal pain, nausea and vomiting. Negative for diarrhea.   All other systems reviewed and are negative.     PAST MEDICAL HISTORY:  Past Medical History:   Diagnosis Date    Anemia     Anxiety     Arthritis     Asthma     Asthma     Breast cancer (H) 01/01/2010    left     Cancer (H)     Colon cancer (H) 01/01/2011    COPD (chronic obstructive pulmonary disease) (H)     Depression     Difficulty walking     Dyspnea on exertion     History of migraine headaches     Hx of radiation therapy 01/01/2010    Hypertension     Migraines     Motion sickness     Orthopnea     Other chronic pain     Shortness of breath     Sleep apnea     CPAP    Stroke (H) 01/01/2010    \"I HAD A SLIGHT STROKE BACK IN 2010\"    Walking troubles        PAST SURGICAL HISTORY:  Past Surgical History:   Procedure Laterality Date    BIOPSY BREAST Left 2010    BREAST SURGERY      COLON SURGERY  2011    20% OF COLON REMOVED-COLON CANCER    COLONOSCOPY N/A 6/24/2016    Procedure: COLONOSCOPY WITH MAC SEDATION;  Surgeon: Kumar Horowitz MD;  Location: [facility] OR;  Service:     COLONOSCOPY N/A 7/31/2019    Procedure: COLONOSCOPY;  Surgeon: Chintan Peter MD;  Location: [facility];  Service: General    COLONOSCOPY N/A 12/7/2022    Procedure: COLONOSCOPY WITH POLYPECTOMY;  Surgeon: Evin Wright DO;  Location: [facility] OR    HYSTERECTOMY  2013    LUMPECTOMY BREAST Left 2010    OOPHORECTOMY Bilateral 2013       CURRENT MEDICATIONS:      Current Facility-Administered Medications:     cefTRIAXone (ROCEPHIN) 1 g " vial to attach to  mL bag for ADULTS or NS 50 mL bag for PEDS, 1 g, Intravenous, Once, Gerson Trujillo MD    HYDROmorphone (PF) (DILAUDID) injection 0.5 mg, 0.5 mg, Intravenous, Q30 Min PRN, Gerson Trujillo MD, 0.5 mg at 10/09/23 0128    ondansetron (ZOFRAN) injection 4 mg, 4 mg, Intravenous, Q30 Min PRN, Gerson Trujillo MD, 4 mg at 10/08/23 2314    Current Outpatient Medications:     ADVAIR DISKUS 500-50 MCG/DOSE inhaler, , Disp: , Rfl:     albuterol (PROAIR HFA/PROVENTIL HFA/VENTOLIN HFA) 108 (90 Base) MCG/ACT inhaler, Inhale 1-2 puffs into the lungs, Disp: , Rfl:     albuterol (PROVENTIL) (2.5 MG/3ML) 0.083% neb solution, NEBULIZE 1 VIAL AS DIRECTED EVERY 4-6 HOURS AS NEEDED VIA NEBULIZER, Disp: , Rfl:     alendronate (FOSAMAX) 35 MG tablet, , Disp: , Rfl:     amLODIPine (NORVASC) 5 MG tablet, Take 5 mg by mouth, Disp: , Rfl:     bisacodyl (DULCOLAX) 5 MG EC tablet, Use as instructed for colonoscopy prep, Disp: 2 tablet, Rfl: 0    busPIRone (BUSPAR) 5 MG tablet, Take 5 mg by mouth 2 times daily, Disp: , Rfl:     calcium carbonate (OS-CHERY) 600 MG tablet, , Disp: , Rfl:     conjugated estrogens (PREMARIN) 0.625 MG/GM vaginal cream, Place 1 g vaginally, Disp: , Rfl:     cyclobenzaprine (FLEXERIL) 5 MG tablet, Take 5 mg by mouth prn, Disp: , Rfl:     DULoxetine (CYMBALTA) 30 MG capsule, Take 30 mg by mouth, Disp: , Rfl:     ferrous sulfate (FEROSUL) 325 (65 Fe) MG tablet, Take 325 mg by mouth every 48 hours, Disp: , Rfl:     furosemide (LASIX) 40 MG tablet, Take 40 mg by mouth, Disp: , Rfl:     gabapentin (NEURONTIN) 300 MG capsule, One in the morning, one in the evening and 2 at bedtime, Disp: , Rfl:     GNP NATURAL FIBER 48.57 % POWD, TAKE 2G ONCE DAILY AS DIRECTED **184 DAYS SUPPLY** 30, Disp: , Rfl:     hydrochlorothiazide (HYDRODIURIL) 25 MG tablet, Take 25 mg by mouth, Disp: , Rfl:     HYDROcodone-acetaminophen (NORCO) 5-325 MG tablet, , Disp: , Rfl:     hydrOXYzine  (VISTARIL) 25 MG capsule, Take 25 mg by mouth 3 times daily as needed, Disp: , Rfl:     meclizine (ANTIVERT) 25 MG tablet, Take 1 tablet (25 mg) by mouth 3 times daily as needed for dizziness, Disp: 20 tablet, Rfl: 0    montelukast (SINGULAIR) 10 MG tablet, Take 10 mg by mouth, Disp: , Rfl:     naproxen (NAPROSYN) 500 MG tablet, Take 500 mg by mouth, Disp: , Rfl:     polyethylene glycol (MIRALAX) 17 GM/Dose powder, Use as instructed for colonoscopy prep., Disp: 527 g, Rfl: 0    polyethylene glycol (MIRALAX) 17 GM/Dose powder, Take 17 g by mouth, Disp: , Rfl:     potassium chloride ER (KLOR-CON M) 10 MEQ CR tablet, , Disp: , Rfl:     rosuvastatin (CRESTOR) 10 MG tablet, Take 10 mg by mouth daily, Disp: , Rfl:     senna-docusate (SENOKOT-S/PERICOLACE) 8.6-50 MG tablet, Take 2 tablets by mouth, Disp: , Rfl:     SPIRIVA RESPIMAT 1.25 MCG/ACT inhaler, , Disp: , Rfl:     SUMAtriptan (IMITREX) 50 MG tablet, Take 1 tablet (50 mg) by mouth at onset of headache for migraine, Disp: 9 tablet, Rfl: 11    topiramate (TOPAMAX) 50 MG tablet, One tab po qam and two tabs po qhs, Disp: 90 tablet, Rfl: 11    traZODone (DESYREL) 50 MG tablet, Take 200 mg by mouth, Disp: , Rfl:     venlafaxine (EFFEXOR XR) 37.5 MG 24 hr capsule, TAKE TWO CAPSULES BY MOUTH ONCE DAILY FOR HOT FLASHES, Disp: , Rfl:     vitamin D3 (CHOLECALCIFEROL) 50 mcg (2000 units) tablet, , Disp: , Rfl:     ALLERGIES:  Allergies   Allergen Reactions    Hydrocodone-Acetaminophen Unknown     Pt takes this med 3 times daily, Brand Name Hydrocodone without the sparkles pt can use    Lisinopril     Penicillin G        FAMILY HISTORY:  Family History   Problem Relation Age of Onset    Heart Disease Mother     No Known Problems Father     Breast Cancer Sister 53.00    Breast Cancer Maternal Aunt 45.00    Colon Cancer Paternal Aunt 45.00       SOCIAL HISTORY:   Social History     Socioeconomic History    Marital status: Single   Tobacco Use    Smoking status: Some Days      "Packs/day: 2.00     Years: 20.00     Pack years: 40.00     Types: Cigarettes    Smokeless tobacco: Never   Substance and Sexual Activity    Alcohol use: Not Currently    Drug use: Never    Sexual activity: Yes   Other Topics Concern    Parent/sibling w/ CABG, MI or angioplasty before 65F 55M? No     PHYSICAL EXAM:    Vitals: /65   Pulse 72   Temp 98.2  F (36.8  C) (Oral)   Resp 18   Ht 1.499 m (4' 11\")   Wt 63 kg (139 lb)   LMP  (LMP Unknown)   SpO2 98%   BMI 28.07 kg/m     Physical Exam  Vitals and nursing note reviewed.   Constitutional:       General: She is not in acute distress.     Appearance: She is well-developed. She is not ill-appearing or toxic-appearing.   HENT:      Head: Normocephalic.   Eyes:      General: No scleral icterus.  Cardiovascular:      Rate and Rhythm: Normal rate and regular rhythm.      Heart sounds: Normal heart sounds.   Pulmonary:      Effort: Pulmonary effort is normal. No respiratory distress.      Breath sounds: Normal breath sounds.   Abdominal:      General: Abdomen is protuberant. A surgical scar is present. Bowel sounds are decreased.      Palpations: Abdomen is soft.      Tenderness: There is abdominal tenderness in the epigastric area, left upper quadrant and left lower quadrant. There is guarding. There is no right CVA tenderness, left CVA tenderness or rebound.      Hernia: No hernia is present.   Skin:     General: Skin is warm and dry.      Coloration: Skin is not jaundiced.   Neurological:      General: No focal deficit present.      Mental Status: She is alert.   Psychiatric:         Behavior: Behavior normal.        LAB:  All pertinent labs reviewed and interpreted.  Labs Ordered and Resulted from Time of ED Arrival to Time of ED Departure   COMPREHENSIVE METABOLIC PANEL - Abnormal       Result Value    Sodium 133 (*)     Potassium 3.1 (*)     Carbon Dioxide (CO2) 23      Anion Gap 18 (*)     Urea Nitrogen 7.9 (*)     Creatinine 1.02 (*)     GFR Estimate " 63      Calcium 10.1 (*)     Chloride 92 (*)     Glucose 94      Alkaline Phosphatase 118 (*)     AST 23      ALT 20      Protein Total 7.9      Albumin 4.5      Bilirubin Total 0.6     ROUTINE UA WITH MICROSCOPIC REFLEX TO CULTURE - Abnormal    Color Urine Light Yellow      Appearance Urine Clear      Glucose Urine Negative      Bilirubin Urine Negative      Ketones Urine 80 (*)     Specific Gravity Urine 1.014      Blood Urine Negative      pH Urine 5.5      Protein Albumin Urine 10 (*)     Urobilinogen Urine <2.0      Nitrite Urine Negative      Leukocyte Esterase Urine 250 Tea/uL (*)     Bacteria Urine Few (*)     RBC Urine 2      WBC Urine 26 (*)     Squamous Epithelials Urine 3 (*)     Hyaline Casts Urine 3 (*)    CBC WITH PLATELETS AND DIFFERENTIAL - Abnormal    WBC Count 11.5 (*)     RBC Count 4.51      Hemoglobin 13.1      Hematocrit 38.8      MCV 86      MCH 29.0      MCHC 33.8      RDW 15.9 (*)     Platelet Count 756 (*)     % Neutrophils 79      % Lymphocytes 10      % Monocytes 10      Mids % (Monos, Eos, Basos)        % Eosinophils 0      % Basophils 0      % Immature Granulocytes 1      NRBCs per 100 WBC 0      Absolute Neutrophils 9.2 (*)     Absolute Lymphocytes 1.1      Absolute Monocytes 1.1      Mids Abs (Monos, Eos, Basos)        Absolute Eosinophils 0.0      Absolute Basophils 0.0      Absolute Immature Granulocytes 0.1      Absolute NRBCs 0.0     LIPASE - Normal    Lipase 21     LACTIC ACID WHOLE BLOOD - Normal    Lactic Acid 1.2     MAGNESIUM - Normal    Magnesium 2.0     ROUTINE UA WITH MICROSCOPIC REFLEX TO CULTURE   URINE CULTURE     RADIOLOGY:  CT Abdomen Pelvis w Contrast   Final Result   IMPRESSION:    1.  Mural thickening of the stomach most pronounced in the distal body and antrum (image 31, series 3 which corresponds the area of increased activity on the prior PET/CT, which could reflect a neoplastic process, correlation with patient's oncology    notes    2.   dilatation of the  proximal jejunum with an apparent transition point seen in the left upper quadrant, (image 76, series 3) may reflect a low to intermediate grade proximal small bowel obstruction, versus focal ileus. Correlation with patient's    clinical symptomatology is recommended.   3.  Mesenteric stranding and lymphadenopathy, concerning for metastatic disease, with the largest lymph node seen in the stomach measuring 2.9 x 2.4 cm (image 142, series 3)   4.  interval development of a left-sided hydroureteronephrosis, no obstructing calculi is identified there is inflammatory stranding surrounding the left ureter distally which could reflect neoplastic invasion and/or inflammatory scarring.         EKG:   Performed at: 08-OCT-2023, 22:03  Impression: Sinus rhythm with PVCs, nonspecific T wave flattening, no signs of acute ST elevation ischemia, no atrial fibrillation.  Rate: 94 BPM  Rhythm: Sinus rhythm with PVCs  QRS Interval: 80 ms  QTc Interval: 470 ms  Comparison: Comparison prior EKG from December 23, 2021 with PVCs now present, nonspecific T wave      I have independently reviewed and interpreted the EKG(s) documented above.     PROCEDURES:   Procedures   None.    I, Vinny Carbjaal, am serving as a scribe to document services personally performed by Dr. Gerson Trujillo  based on my observation and the provider's statements to me. I, Gerson Trujillo MD attest that Vinny Carbajal is acting in a scribe capacity, has observed my performance of the services and has documented them in accordance with my direction.      Gerson Trujillo M.D.  Emergency Medicine  [facility] Emergency Department       Gerson Trujillo MD  10/09/23 3501

## 2023-10-09 NOTE — ANESTHESIA PROCEDURE NOTES
Feeding Tube Insertion    Staff -        CRNA: Sherrie Mendieta APRN CRNA       Performed By: CRNA       Procedure performed by resident/fellow/CRNA in presence of a teaching physician.         Procedure Start/Stop Times: 10/9/2023 6:10 PM and 10/9/2023 6:12 PM  Tube type: nasogastric  Tube size: 16 Fr  Tube placement site: right nostril  Placement/position confirmed: gastric contents aspirated (surgeon verified during procedure)  Number of attempts: 1  Ease of procedure: easy  Secured with: tape

## 2023-10-09 NOTE — ANESTHESIA CARE TRANSFER NOTE
Patient: Peri Irizarry    Procedure: Procedure(s):  DIAGNOSTIC LAPAROSCOPY, LYSIS OF ADHESIONS  LAPAROTOMY       Diagnosis: SBO (small bowel obstruction) (H) [K56.609]  Diagnosis Additional Information: No value filed.    Anesthesia Type:   General     Note:    Oropharynx: spontaneously breathing and oropharynx clear of all foreign objects  Level of Consciousness: drowsy  Oxygen Supplementation: face mask  Level of Supplemental Oxygen (L/min / FiO2): 8  Independent Airway: airway patency satisfactory and stable  Dentition: dentition unchanged  Vital Signs Stable: post-procedure vital signs reviewed and stable  Report to RN Given: handoff report given  Patient transferred to: PACU    Handoff Report: Identifed the Patient, Identified the Reponsible Provider, Reviewed the pertinent medical history, Discussed the surgical course, Reviewed Intra-OP anesthesia mangement and issues during anesthesia, Set expectations for post-procedure period and Allowed opportunity for questions and acknowledgement of understanding      Vitals:  Vitals Value Taken Time   /73 10/09/23 1837   Temp 36.6  C (97.8  F) 10/09/23 1837   Pulse 62 10/09/23 1838   Resp 11 10/09/23 1838   SpO2 100 % 10/09/23 1838   Vitals shown include unvalidated device data.    Electronically Signed By: EUFEMIA Azul CRNA  October 9, 2023  6:40 PM

## 2023-10-09 NOTE — ED TRIAGE NOTES
Pt states abd pain since Fri with n/v. States unable to keep anything down. States voiding in small amounts and having chills. Also some chest pain     Triage Assessment       Row Name 10/08/23 2203       Triage Assessment (Adult)    Airway WDL WDL

## 2023-10-09 NOTE — CONSULTS
"  Consultation    Peri Irizarry MRN# [id number]   YOB: 1965 Age: 58 year old   Date of Admission: 10/8/2023  Requesting physician: Dr. Taylor  Reason for consult: Abnormal findings on imaging worrisome for neoplasm           Assessment and Plan:     1. Small bowel obstruction  -CT showed dilatation of the proximal jejunum with an apparent transition point seen in the left upper quadrant  -Nothing by mouth  -NG tube to low intermittent suction  -Pain and nausea management  -General surgery consult      2. History of colorectal cancer: S/p sigmoid colon resection back in 2012. Colonoscopy in December 2022 did not show any significant abnormality. Some polyps were found, and they were removed with cold biopsy forceps. The pathology was benign.     On 9/11/23 CT CAP showed asymmetric nodularity along the posterior gastric wall superimposed on significant diffuse gastric and distal esophageal wall thickening, appearance concerning for neoplasm. Enlarged lymph nodes in the gastrohepatic ligament, highly suspicious for metastatic adenopathy. Asymmetric thickened appearance of the pancreatic tail without definable mass or obvious ductal dilatation.     -  IR reviewed: \"Mesenteric lymph nodes are not accessible from a percutaneous approach. They are near the stomach, however. Recommend GI consult to assess for EUS biopsy. \"    3. 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 To date, there has been no evidence for a local recurrence or more distant metastatic disease.         - Bilateral screening mammogram on 1/9/23 showed no evidence of malignancy. We will continue active surveillance.         Plan  1. Appreciate GI eval to obtain tissue, patient had been set up for EUS outpatient.  Likely has recurrent colorectal cancer.      2. Continue best supportive care for SBO " per hospitalist.   3.  Await pathology results to provide oncologic treatment plan. Patient can follow-up outpatient for results if clinically able to discharge prior to finalized.    Veronica SPENCER Mathew  [facility]  Office: [phone]  Cell: [phone] Monday through Friday, 8AM-5PM. After hours and weekends, please use answering service.               Chief Complaint:   Abdominal Pain           History of Present Illness:   This patient is a 58 year old female admitted for abdominal pain, nauseda and vomiting now with SBO.    Breast cancer:  The patient was diagnosed in 2010 with a low-grade stromal sarcoma of the breast.  Her treatment incorporated the combination of wide local excision and sentinel lymph node biopsy and radiation therapy. To date, there is been no evidence for a local recurrence or more distant metastatic disease.    Bilateral screening mammogram on 1/9/23 showed no evidence of malignancy.    Colon cancer:  The patient underwent sigmoid colon resection back in 2012.  Colonoscopy in December 2022 did not show any significant abnormality. Some polyps were found, and they were removed with cold biopsy forceps. The pathology was benign.     Interval history:  CT AP on 9/11/23 showed asymmetric nodularity along the posterior gastric wall superimposed on significant diffuse gastric and distal esophageal wall thickening, appearance concerning for neoplasm, particularly in light of findings elsewhere. Upper endoscopy recommended. Short segment thickening within the mid transverse colon is nonspecific, neoplasm not excluded. Colonoscopy recommended. Enlarged lymph nodes in the gastrohepatic ligament, highly suspicious for metastatic adenopathy. Omental haziness and peritoneal thickening/nodularity, concerning for malignant involvement. Asymmetric thickened appearance of the pancreatic tail without definable mass or obvious ductal dilatation. Nonspecific appearance could relate to localized  "inflammation given other findings in the upper abdomen.   23 PET CT showing FDG avid wall thickening In the distal esophagus and proximal stomach with FDG avid gastrohepatic ligament lymphadenopathy Is nonspecific. This appearance can be seen with esophageal or gastric cancer (particularly signet ring cell adenocarclnoma), metastatic breast cancer, lymphoma, and occasionally noncancerous entitles such as esophagltis or gastritis.    Brenna continues with nausea. She states her pain is better while receiving pain medication.          Physical Exam:   Vitals were reviewed  Blood pressure (!) 150/69, pulse 71, temperature 98.2  F (36.8  C), temperature source Oral, resp. rate 18, height 1.499 m (4' 11\"), weight 63 kg (139 lb), SpO2 96 %, not currently breastfeeding.  Temperatures:  Current - Temp: 98.2  F (36.8  C); Max - Temp  Av.2  F (36.8  C)  Min: 98.2  F (36.8  C)  Max: 98.2  F (36.8  C)  Respiration range: Resp  Av  Min: 18  Max: 18  Pulse range: Pulse  Av.3  Min: 68  Max: 88  Blood pressure range: Systolic (24hrs), Av , Min:118 , Max:157   ; Diastolic (24hrs), Av, Min:63, Max:84    Pulse oximetry range: SpO2  Av.1 %  Min: 96 %  Max: 100 %    Intake/Output Summary (Last 24 hours) at 10/9/2023 0957  Last data filed at 10/9/2023 0345  Gross per 24 hour   Intake 2100 ml   Output --   Net 2100 ml       GENERAL: No acute distress.  SKIN: No rashes or jaundice.  HEENT: Normocephalic, atraumatic. Eyes anicteric. Oropharynx is clear.  LYMPH: No palpable lymphadenopathy in the cervical, supraclavicular, axillary, or inguinal regions.  HEART: Regular rate and rhythm with no murmurs.  LUNGS: Clear bilaterally.  ABDOMEN: Soft, nontender, nondistended with no palpable hepatosplenomegaly.  EXTREMITIES: No clubbing, cyanosis, or edema.  MUSCULOSKELETAL: No pain to percussion over entire spine.  MENTAL: Alert and oriented to person, place, and time.  NEURO: Cranial nerves II through XII grossly " "intact with no focal motor or sensory deficits.              Past Medical History:   I have reviewed this patient's past medical history  Past Medical History:   Diagnosis Date    Anemia     Anxiety     Arthritis     Asthma     Asthma     Breast cancer (H) 01/01/2010    left     Cancer (H)     Colon cancer (H) 01/01/2011    COPD (chronic obstructive pulmonary disease) (H)     Depression     Difficulty walking     Dyspnea on exertion     History of migraine headaches     Hx of radiation therapy 01/01/2010    Hypertension     Migraines     Motion sickness     Orthopnea     Other chronic pain     Shortness of breath     Sleep apnea     CPAP    Stroke (H) 01/01/2010    \"I HAD A SLIGHT STROKE BACK IN 2010\"    Walking troubles              Past Surgical History:   I have reviewed this patient's past surgical history  Past Surgical History:   Procedure Laterality Date    BIOPSY BREAST Left 2010    BREAST SURGERY      COLON SURGERY  2011    20% OF COLON REMOVED-COLON CANCER    COLONOSCOPY N/A 6/24/2016    Procedure: COLONOSCOPY WITH MAC SEDATION;  Surgeon: Kumar Horowitz MD;  Location: [facility];  Service:     COLONOSCOPY N/A 7/31/2019    Procedure: COLONOSCOPY;  Surgeon: Chintan Peter MD;  Location: [facility] OR;  Service: General    COLONOSCOPY N/A 12/7/2022    Procedure: COLONOSCOPY WITH POLYPECTOMY;  Surgeon: Evin Wright DO;  Location: [facility]    HYSTERECTOMY  2013    LUMPECTOMY BREAST Left 2010    OOPHORECTOMY Bilateral 2013               Social History:   I have reviewed this patient's social history  Social History     Tobacco Use    Smoking status: Some Days     Packs/day: 2.00     Years: 20.00     Pack years: 40.00     Types: Cigarettes    Smokeless tobacco: Never   Substance Use Topics    Alcohol use: Not Currently             Family History:   I have reviewed this patient's family history  Family History   Problem Relation Age of Onset    Heart Disease Mother     No Known Problems " Father     Breast Cancer Sister 53.00    Breast Cancer Maternal Aunt 45.00    Colon Cancer Paternal Aunt 45.00             Allergies:     Allergies   Allergen Reactions    Hydrocodone-Acetaminophen Unknown     Pt takes this med 3 times daily, Brand Name Hydrocodone without the sparkles pt can use  -Monitor hydrocodone/apap supply (ok to take as long as no blue sparkles like brand lortab)        Lisinopril     Penicillin G              Medications:   I have reviewed this patient's current medications  (Not in a hospital admission)    Current Facility-Administered Medications Ordered in Epic   Medication Dose Route Frequency Last Rate Last Admin    acetaminophen (TYLENOL) Suppository 650 mg  650 mg Rectal Q4H PRN        albuterol (PROVENTIL HFA/VENTOLIN HFA) inhaler  1-2 puff Inhalation Q4H PRN        [Held by provider] amLODIPine (NORVASC) tablet 5 mg  5 mg Oral Daily        [Held by provider] busPIRone (BUSPAR) tablet 5 mg  5 mg Oral TID        [START ON 10/10/2023] cefTRIAXone (ROCEPHIN) 1 g vial to attach to  mL bag for ADULTS or NS 50 mL bag for PEDS  1 g Intravenous Q24H        cyclobenzaprine (FLEXERIL) tablet 5 mg  5 mg Oral Q8H PRN        dextrose 5% and 0.9% NaCl + KCL 20 mEq/L infusion  100 mL/hr Intravenous Continuous 100 mL/hr at 10/09/23 0910 100 mL/hr at 10/09/23 0910    [Held by provider] DULoxetine (CYMBALTA) DR capsule 30 mg  30 mg Oral BID        famotidine (PEPCID) injection 20 mg  20 mg Intravenous Q12H   20 mg at 10/09/23 0609    [Held by provider] ferrous sulfate (FEROSUL) tablet 325 mg  325 mg Oral Q48H        fluticasone-vilanterol (BREO ELLIPTA) 200-25 MCG/ACT inhaler 1 puff  1 puff Inhalation Daily        [Held by provider] furosemide (LASIX) tablet 40 mg  40 mg Oral Daily        [Held by provider] gabapentin (NEURONTIN) capsule 300 mg  300 mg Oral BID        [Held by provider] gabapentin (NEURONTIN) capsule 600 mg  600 mg Oral At Bedtime        hydrALAZINE (APRESOLINE) injection 10 mg   10 mg Intravenous Q6H PRN        [Held by provider] hydrochlorothiazide (HYDRODIURIL) tablet 25 mg  25 mg Oral Daily        HYDROmorphone (DILAUDID) injection 0.2 mg  0.2 mg Intravenous Q2H PRN        HYDROmorphone (DILAUDID) injection 0.4 mg  0.4 mg Intravenous Q2H PRN   0.4 mg at 10/09/23 0900    hydrOXYzine (ATARAX) tablet 25 mg  25 mg Oral TID PRN        ipratropium - albuterol 0.5 mg/2.5 mg/3 mL (DUONEB) neb solution 3 mL  3 mL Nebulization Q4H PRN        meclizine (ANTIVERT) tablet 25 mg  25 mg Oral TID PRN        [Held by provider] montelukast (SINGULAIR) tablet 10 mg  10 mg Oral At Bedtime        naloxone (NARCAN) injection 0.2 mg  0.2 mg Intravenous Q2 Min PRN        Or    naloxone (NARCAN) injection 0.4 mg  0.4 mg Intravenous Q2 Min PRN        Or    naloxone (NARCAN) injection 0.2 mg  0.2 mg Intramuscular Q2 Min PRN        Or    naloxone (NARCAN) injection 0.4 mg  0.4 mg Intramuscular Q2 Min PRN        ondansetron (ZOFRAN ODT) ODT tab 4 mg  4 mg Oral Q6H PRN        Or    ondansetron (ZOFRAN) injection 4 mg  4 mg Intravenous Q6H PRN        phenol (CHLORASEPTIC) 1.4 % spray 1 mL  1 spray Mouth/Throat Q1H PRN   1 mL at 10/09/23 0421    prochlorperazine (COMPAZINE) injection 10 mg  10 mg Intravenous Q6H PRN        Or    prochlorperazine (COMPAZINE) tablet 10 mg  10 mg Oral Q6H PRN        Or    prochlorperazine (COMPAZINE) suppository 25 mg  25 mg Rectal Q12H PRN        prochlorperazine (COMPAZINE) injection 5 mg  5 mg Intravenous Q6H PRN        [Held by provider] rosuvastatin (CRESTOR) tablet 10 mg  10 mg Oral Daily        SUMAtriptan (IMITREX) tablet 50 mg  50 mg Oral at onset of headache        [Held by provider] topiramate (TOPAMAX) tablet 100 mg  100 mg Oral At Bedtime        [Held by provider] topiramate (TOPAMAX) tablet 50 mg  50 mg Oral QAM        [Held by provider] traZODone (DESYREL) tablet 200 mg  200 mg Oral At Bedtime        umeclidinium (INCRUSE ELLIPTA) 62.5 MCG/ACT inhaler 1 puff  1 puff  Inhalation Daily        [Held by provider] venlafaxine (EFFEXOR XR) 24 hr capsule 75 mg  75 mg Oral Daily with breakfast         Current Outpatient Medications Ordered in Epic   Medication    ADVAIR DISKUS 500-50 MCG/DOSE inhaler    albuterol (PROAIR HFA/PROVENTIL HFA/VENTOLIN HFA) 108 (90 Base) MCG/ACT inhaler    albuterol (PROVENTIL) (2.5 MG/3ML) 0.083% neb solution    alendronate (FOSAMAX) 35 MG tablet    amLODIPine (NORVASC) 2.5 MG tablet    amLODIPine (NORVASC) 5 MG tablet    bisacodyl (DULCOLAX) 5 MG EC tablet    busPIRone (BUSPAR) 5 MG tablet    calcium carbonate (OS-CHERY) 600 MG tablet    conjugated estrogens (PREMARIN) 0.625 MG/GM vaginal cream    cyclobenzaprine (FLEXERIL) 5 MG tablet    DULoxetine (CYMBALTA) 30 MG capsule    ferrous sulfate (FEROSUL) 325 (65 Fe) MG tablet    furosemide (LASIX) 40 MG tablet    gabapentin (NEURONTIN) 300 MG capsule    gabapentin (NEURONTIN) 300 MG capsule    GNP NATURAL FIBER 48.57 % POWD    hydrochlorothiazide (HYDRODIURIL) 25 MG tablet    HYDROcodone-acetaminophen (NORCO) 7.5-325 MG per tablet    hydrOXYzine (VISTARIL) 25 MG capsule    ibuprofen (ADVIL/MOTRIN) 800 MG tablet    meclizine (ANTIVERT) 25 MG tablet    montelukast (SINGULAIR) 10 MG tablet    naproxen (NAPROSYN) 500 MG tablet    polyethylene glycol (MIRALAX) 17 GM/Dose powder    potassium chloride ER (KLOR-CON M) 10 MEQ CR tablet    rosuvastatin (CRESTOR) 10 MG tablet    senna-docusate (SENOKOT-S/PERICOLACE) 8.6-50 MG tablet    SUMAtriptan (IMITREX) 50 MG tablet    tiotropium (SPIRIVA) 18 MCG inhaled capsule    topiramate (TOPAMAX) 50 MG tablet    traZODone (DESYREL) 50 MG tablet    venlafaxine (EFFEXOR XR) 37.5 MG 24 hr capsule    vitamin D3 (CHOLECALCIFEROL) 50 mcg (2000 units) tablet             Review of Systems:     The 10 point Review of Systems is negative other than noted in the HPI.            Data:   Data   Results for orders placed or performed during the hospital encounter of 10/08/23 (from the past  24 hour(s))   [place] Draw    Narrative    The following orders were created for panel order [place] Draw.  Procedure                               Abnormality         Status                     ---------                               -----------         ------                     Extra Red Top Tube[[id number]]                               Final result               Extra Green Top (Lithium...[[id number]]                      Final result               Extra Purple Top Tube[[id number]]                                                         Please view results for these tests on the individual orders.   Extra Red Top Tube   Result Value Ref Range    Hold Specimen JIC    Extra Green Top (Lithium Heparin) Tube   Result Value Ref Range    Hold Specimen JIC    CBC with Platelets & Differential    Narrative    The following orders were created for panel order CBC with Platelets & Differential.  Procedure                               Abnormality         Status                     ---------                               -----------         ------                     CBC with platelets and d...[[id number]]  Abnormal            Final result                 Please view results for these tests on the individual orders.   Comprehensive metabolic panel   Result Value Ref Range    Sodium 133 (L) 135 - 145 mmol/L    Potassium 3.1 (L) 3.4 - 5.3 mmol/L    Carbon Dioxide (CO2) 23 22 - 29 mmol/L    Anion Gap 18 (H) 7 - 15 mmol/L    Urea Nitrogen 7.9 (L) 8.0 - 23.0 mg/dL    Creatinine 1.02 (H) 0.51 - 0.95 mg/dL    GFR Estimate 63 >60 mL/min/1.73m2    Calcium 10.1 (H) 8.6 - 10.0 mg/dL    Chloride 92 (L) 98 - 107 mmol/L    Glucose 94 70 - 99 mg/dL    Alkaline Phosphatase 118 (H) 35 - 104 U/L    AST 23 0 - 45 U/L    ALT 20 0 - 50 U/L    Protein Total 7.9 6.4 - 8.3 g/dL    Albumin 4.5 3.5 - 5.2 g/dL    Bilirubin Total 0.6 <=1.2 mg/dL   Lipase   Result Value Ref Range    Lipase 21 13 - 60 U/L   CBC with platelets and differential   Result  Value Ref Range    WBC Count 11.5 (H) 4.0 - 11.0 10e3/uL    RBC Count 4.51 3.80 - 5.20 10e6/uL    Hemoglobin 13.1 11.7 - 15.7 g/dL    Hematocrit 38.8 35.0 - 47.0 %    MCV 86 78 - 100 fL    MCH 29.0 26.5 - 33.0 pg    MCHC 33.8 31.5 - 36.5 g/dL    RDW 15.9 (H) 10.0 - 15.0 %    Platelet Count 756 (H) 150 - 450 10e3/uL    % Neutrophils 79 %    % Lymphocytes 10 %    % Monocytes 10 %    Mids % (Monos, Eos, Basos)      % Eosinophils 0 %    % Basophils 0 %    % Immature Granulocytes 1 %    NRBCs per 100 WBC 0 <1 /100    Absolute Neutrophils 9.2 (H) 1.6 - 8.3 10e3/uL    Absolute Lymphocytes 1.1 0.8 - 5.3 10e3/uL    Absolute Monocytes 1.1 0.0 - 1.3 10e3/uL    Mids Abs (Monos, Eos, Basos)      Absolute Eosinophils 0.0 0.0 - 0.7 10e3/uL    Absolute Basophils 0.0 0.0 - 0.2 10e3/uL    Absolute Immature Granulocytes 0.1 <=0.4 10e3/uL    Absolute NRBCs 0.0 10e3/uL   Lactic acid whole blood   Result Value Ref Range    Lactic Acid 1.2 0.7 - 2.0 mmol/L   Magnesium   Result Value Ref Range    Magnesium 2.0 1.7 - 2.3 mg/dL   UA with Microscopic reflex to Culture    Specimen: Urine, Clean Catch   Result Value Ref Range    Color Urine Light Yellow Colorless, Straw, Light Yellow, Yellow    Appearance Urine Clear Clear    Glucose Urine Negative Negative mg/dL    Bilirubin Urine Negative Negative    Ketones Urine 80 (A) Negative mg/dL    Specific Gravity Urine 1.014 1.001 - 1.030    Blood Urine Negative Negative    pH Urine 5.5 5.0 - 7.0    Protein Albumin Urine 10 (A) Negative mg/dL    Urobilinogen Urine <2.0 <2.0 mg/dL    Nitrite Urine Negative Negative    Leukocyte Esterase Urine 250 Tea/uL (A) Negative    Bacteria Urine Few (A) None Seen /HPF    RBC Urine 2 <=2 /HPF    WBC Urine 26 (H) <=5 /HPF    Squamous Epithelials Urine 3 (H) <=1 /HPF    Hyaline Casts Urine 3 (H) <=2 /LPF    Narrative    Urine Culture ordered based on laboratory criteria   CT Abdomen Pelvis w Contrast    Narrative    EXAM: CT ABDOMEN PELVIS W CONTRAST  LOCATION: [facility]  [facility]  DATE: 10/9/2023    INDICATION: Eval for worsening abdominal pain now with nausea vomiting and poor intake for 3 days  COMPARISON: PET/CT dated 09/28/2023  TECHNIQUE: CT scan of the abdomen and pelvis was performed following injection of IV contrast. Multiplanar reformats were obtained. Dose reduction techniques were used.  CONTRAST: isovue 370 90ml    FINDINGS:   LOWER CHEST: Normal.    HEPATOBILIARY: No significant mass or bile duct dilatation. No calcified gallstones. Gallbladder is not distended, there is a layering sludge in the gallbladder. A small amount of pericholecystic fluid is identified.     PANCREAS: No significant mass, duct dilatation, or inflammatory change.    SPLEEN: Normal size.    ADRENAL GLANDS: No significant nodules.    KIDNEYS/BLADDER: There is mild left-sided hydroureteronephrosis. Retroperitoneal inflammatory changes are seen adjacent to the distal left ureter (image 140, series 3). No obstructing renal stone is identified. The right kidney and ureter are normal in   size and caliber. The bladder is decompressed.    BOWEL: There is mural thickening of the stomach most pronounced in the distal body/antrum (image 31, series 3, corresponding with increased activity in this area on prior PET/CT.  There is mild dilatation of the proximal jejunum measuring up to 3.8 cm in   diameter with a transition point in the left upper quadrant (image 76, series 3). Remainder the small bowel is normal in size and caliber. The appendix is normal. Postsurgical changes from prior transverse colectomy are again noted the colon is normal   in size and caliber.    LYMPH NODES: Mesenteric stranding at the root of the mesentery with multiple subcentimeter lymph nodes are seen throughout the abdomen and retroperitoneum. It is more larger distended lymph nodes seen in the root of mesentery the largest of which   measures 2.9 x 2.4 cm, adjacent to the proximal stomach (image 142,  series 3)     VASCULATURE: No abdominal aortic aneurysm.    PELVIC ORGANS: No pelvic masses.    MUSCULOSKELETAL: Unremarkable.      Impression    IMPRESSION:   1.  Mural thickening of the stomach most pronounced in the distal body and antrum (image 31, series 3 which corresponds the area of increased activity on the prior PET/CT, which could reflect a neoplastic process, correlation with patient's oncology   notes   2.   dilatation of the proximal jejunum with an apparent transition point seen in the left upper quadrant, (image 76, series 3) may reflect a low to intermediate grade proximal small bowel obstruction, versus focal ileus. Correlation with patient's   clinical symptomatology is recommended.  3.  Mesenteric stranding and lymphadenopathy, concerning for metastatic disease, with the largest lymph node seen in the stomach measuring 2.9 x 2.4 cm (image 142, series 3)  4.  interval development of a left-sided hydroureteronephrosis, no obstructing calculi is identified there is inflammatory stranding surrounding the left ureter distally which could reflect neoplastic invasion and/or inflammatory scarring.    UA with Microscopic reflex to Culture    Specimen: Urine, Catheter   Result Value Ref Range    Color Urine Light Yellow Colorless, Straw, Light Yellow, Yellow    Appearance Urine Clear Clear    Glucose Urine Negative Negative mg/dL    Bilirubin Urine Negative Negative    Ketones Urine 40 (A) Negative mg/dL    Specific Gravity Urine 1.020 1.003 - 1.035    Blood Urine Negative Negative    pH Urine 5.5 5.0 - 7.0    Protein Albumin Urine Negative Negative mg/dL    Urobilinogen Urine <2.0 <2.0 mg/dL    Nitrite Urine Negative Negative    Leukocyte Esterase Urine 250 Tea/uL (A) Negative    RBC Urine 0 <=2 /HPF    WBC Urine 9 (H) <=5 /HPF    Squamous Epithelials Urine 1 <=1 /HPF    Transitional Epithelials Urine <1 <=1 /HPF    Narrative    Urine Culture ordered based on laboratory criteria   XR Abdomen Port 1 View     Narrative    EXAM: XR ABDOMEN PORT 1 VIEW  LOCATION: [facility]  DATE: 10/9/2023    INDICATION: check ngt location  COMPARISON: None.      Impression    IMPRESSION: NG tube seen with the tip and side-port in the stomach. Contrast is seen within the left and less so right renal collecting system with a bladder. The bowel gas pattern is nonobstructive.   Basic metabolic panel   Result Value Ref Range    Sodium 133 (L) 135 - 145 mmol/L    Potassium 3.7 3.4 - 5.3 mmol/L    Chloride 97 (L) 98 - 107 mmol/L    Carbon Dioxide (CO2) 24 22 - 29 mmol/L    Anion Gap 12 7 - 15 mmol/L    Urea Nitrogen 7.1 (L) 8.0 - 23.0 mg/dL    Creatinine 1.13 (H) 0.51 - 0.95 mg/dL    GFR Estimate 56 (L) >60 mL/min/1.73m2    Calcium 8.9 8.6 - 10.0 mg/dL    Glucose 103 (H) 70 - 99 mg/dL   CBC with platelets   Result Value Ref Range    WBC Count 9.2 4.0 - 11.0 10e3/uL    RBC Count 3.84 3.80 - 5.20 10e6/uL    Hemoglobin 11.1 (L) 11.7 - 15.7 g/dL    Hematocrit 33.3 (L) 35.0 - 47.0 %    MCV 87 78 - 100 fL    MCH 28.9 26.5 - 33.0 pg    MCHC 33.3 31.5 - 36.5 g/dL    RDW 15.9 (H) 10.0 - 15.0 %    Platelet Count 613 (H) 150 - 450 10e3/uL   INR   Result Value Ref Range    INR 1.07 0.85 - 1.15   Ionized Calcium   Result Value Ref Range    Calcium, Ionized pH 7.4 1.09 (L) 1.11 - 1.30 mmol/L    pH 7.37 7.35 - 7.45    Calcium, Ionized Measured 1.11 1.11 - 1.30 mmol/L

## 2023-10-09 NOTE — ANESTHESIA PROCEDURE NOTES
Airway       Patient location during procedure: OR       Procedure Start/Stop Times: 10/9/2023 3:12 PM  Staff -        Anesthesiologist:  Nishant Daly MD       CRNA: Sherrie Mendieta APRN CRNA       Performed By: CRNAIndications and Patient Condition       Indications for airway management: cesar-procedural       Induction type:RSI       Mask difficulty assessment: 0 - not attempted    Final Airway Details       Final airway type: endotracheal airway       Successful airway: ETT - single and Oral  Endotracheal Airway Details        ETT size (mm): 7.0       Cuffed: yes       Successful intubation technique: video laryngoscopy       VL Blade Size: Glidescope 3       Grade View of Cords: 1       Adjucts: stylet       Position: Right       Measured from: gums/teeth       Secured at (cm): 22       Bite block used: None    Post intubation assessment        Placement verified by: capnometry, equal breath sounds and chest rise        Number of attempts at approach: 1       Number of other approaches attempted: 0       Secured with: silk tape       Ease of procedure: easy       Dentition: Intact and Unchanged    Medication(s) Administered   Medication Administration Time: 10/9/2023 3:12 PM

## 2023-10-09 NOTE — H&P
[facility]    History and Physical - Hospitalist Service       Date of Admission:  10/8/2023    Assessment & Plan      Peri Irizarry is a 58 year old female admitted on 10/8/2023. She came to the emergency department for evaluation of abdominal pain, nausea and vomiting    #Small bowel obstruction  -CT showed dilatation of the proximal jejunum with an apparent transition point seen in the left upper quadrant  -Nothing by mouth  -NG tube to low intermittent suction  -Pain and nausea management  -General surgery consult    #Acute kidney injury  #Hyponatremia  #Hypochloremia with associated high anion gap  #Hypercalcemia, mild  -Secondary to volume depletion from decreased oral intake, furosemide, and hydrochlorothiazide versus obstructive nephropathy  -IV hydration and recheck BMP  -Check ionized calcium  -Avoid nephrotoxins    #Hypokalemia  -Secondary to GI loss  -Replace    #Mural thickening of the stomach  #Mesenteric stranding and lymphadenopathy  #History of colon and breast cancer  -CT showed most pronounced in the distal body and antrum, corresponding to area of increased activity on PET/CT which could reflect a neoplastic process; mesenteric stranding and lymphadenopathy concerning for metastatic disease with the largest lymph node seen in the stomach measuring 2.9 x 2.4 cm  -Scheduled for biopsy on 10/18/2023; however, given small bowel obstruction and left hydroureteronephrosis we will request IR evaluation for biopsy while in the hospital  -[place] oncology consult    #Left hydroureteronephrosis  -CT showed no obstructing calculi, there is inflammatory stranding surrounding the left ureter distally which could reflect neoplastic invasion and/or inflammatory scaring  -Urology consult    #Acute cystitis without hematuria, probable  -Contaminated specimen, complains of low urine output without dysuria, frequency or hematuria  -Repeat UA, straight cath  -IV ceftriaxone  -Follow-up  urine culture    #COPD/asthma  -Some wheezing on exam but no stridors, increased work of breathing or signs of acute exacerbation  -DuoNebs as needed    #Essential hypertension  -IV hydralazine as needed    #Chronic thrombocytosis  -Monitor platelet count    #JAMMIE  -Unable to use CPAP with NG tube in place  -Oxygen via nasal cannula/mask as needed for sleep to keep saturation above 88%    #Chronic thoracic back pain  -Pain control with IV medications until able to take PTA medications by mouth    #Chronic non-intractable headache  -Supportive management     Diet: NPO for Medical/Clinical Reasons Except for: No Exceptions    DVT Prophylaxis: Pneumatic Compression Devices, consider starting pharmacologic prophylaxis after surgical and IR consults if no plan for procedures  Gomez Catheter: Not present  Lines: None     Cardiac Monitoring: None  Code Status: Full Code        Disposition Plan      Expected Discharge Date: 10/11/2023                  Shamar Taylor MD  Hospitalist Service  [facility]  Securely message with Daily [employer] (more info)  Text page via [employer] Paging/Directory     ______________________________________________________________________    Chief Complaint   Abdominal pain, nausea, vomiting    History is obtained from the patient, electronic health record, and emergency department physician    History of Present Illness   Peri Irizarry is a 58 year old female who came to the emergency department for evaluation of above chief complaint.  Past medical history of breast cancer status post left lumpectomy followed by radiation in 2010, colon cancer resection in 2011, COPD/asthma, JAMMIE on CPAP, chronic headache, chronic thoracic back pain, essential hypertension.  Patient has had upper abdominal discomfort and issues with constipation since 7/2023.  She had a PET/CT last month, not available for review, that showed lymphadenopathy concerning for cancer recurrence and was scheduled for  "a biopsy later this month.  Her last bowel movement was more than 4 days ago and she has felt bloated.  Since 10/6/2023 she has had nausea and vomiting with associated chills but without fevers.  She has not been able to take her regular daily medications since then due to her gastric symptoms.  The pain is in the epigastric area without radiation in addition to the left lower quadrant.  She denies hematemesis, melena or hematochezia.  She has had decreased oral intake and has been urinating very little and dark but without dysuria, hematuria or odor.  She had a colonoscopy in 12/2022 and pathology showed fragments of hyperplastic polyp.  Patient quit tobacco in 2010, denies alcohol or drugs.  She lives by herself, uses a roller walker for ambulation and denies recent falls.      Past Medical History    Past Medical History:   Diagnosis Date    Anemia     Anxiety     Arthritis     Asthma     Asthma     Breast cancer (H) 01/01/2010    left     Cancer (H)     Colon cancer (H) 01/01/2011    COPD (chronic obstructive pulmonary disease) (H)     Depression     Difficulty walking     Dyspnea on exertion     History of migraine headaches     Hx of radiation therapy 01/01/2010    Hypertension     Migraines     Motion sickness     Orthopnea     Other chronic pain     Shortness of breath     Sleep apnea     CPAP    Stroke (H) 01/01/2010    \"I HAD A SLIGHT STROKE BACK IN 2010\"    Walking troubles        Past Surgical History   Past Surgical History:   Procedure Laterality Date    BIOPSY BREAST Left 2010    BREAST SURGERY      COLON SURGERY  2011    20% OF COLON REMOVED-COLON CANCER    COLONOSCOPY N/A 6/24/2016    Procedure: COLONOSCOPY WITH MAC SEDATION;  Surgeon: Kumar Horowitz MD;  Location: [facility];  Service:     COLONOSCOPY N/A 7/31/2019    Procedure: COLONOSCOPY;  Surgeon: Chintan Peter MD;  [facility];  Service: General    COLONOSCOPY N/A 12/7/2022    Procedure: COLONOSCOPY WITH " POLYPECTOMY;  Surgeon: Evin Wright DO;  Location: [facility]    HYSTERECTOMY  2013    LUMPECTOMY BREAST Left 2010    OOPHORECTOMY Bilateral 2013       Prior to Admission Medications   Prior to Admission Medications   Prescriptions Last Dose Informant Patient Reported? Taking?   ADVAIR DISKUS 500-50 MCG/DOSE inhaler Past Week at fri Self Yes Yes   Sig: Inhale 1 puff into the lungs 2 times daily   DULoxetine (CYMBALTA) 30 MG capsule Past Week at fri Self Yes Yes   Sig: Take 30 mg by mouth   GNP NATURAL FIBER 48.57 % POWD  Self Yes No   Sig: TAKE 2G ONCE DAILY AS DIRECTED **184 DAYS SUPPLY** 30   HYDROcodone-acetaminophen (NORCO) 5-325 MG tablet  Self Yes No   SPIRIVA RESPIMAT 1.25 MCG/ACT inhaler  Self Yes No   SUMAtriptan (IMITREX) 50 MG tablet  Self No No   Sig: Take 1 tablet (50 mg) by mouth at onset of headache for migraine   albuterol (PROAIR HFA/PROVENTIL HFA/VENTOLIN HFA) 108 (90 Base) MCG/ACT inhaler Past Week at fri Self Yes Yes   Sig: Inhale 1-2 puffs into the lungs   albuterol (PROVENTIL) (2.5 MG/3ML) 0.083% neb solution Past Week at fri Self Yes Yes   Sig: NEBULIZE 1 VIAL AS DIRECTED EVERY 4-6 HOURS AS NEEDED VIA NEBULIZER   alendronate (FOSAMAX) 35 MG tablet Past Week at tues Self Yes Yes   Sig: Take 35 mg by mouth every 7 days Tuesdays   amLODIPine (NORVASC) 5 MG tablet Past Week at fri Self Yes Yes   Sig: Take 5 mg by mouth   bisacodyl (DULCOLAX) 5 MG EC tablet Unknown at prn Self No Yes   Sig: Use as instructed for colonoscopy prep   busPIRone (BUSPAR) 5 MG tablet Past Week at fri Self Yes Yes   Sig: Take 5 mg by mouth 3 times daily   calcium carbonate (OS-CHERY) 600 MG tablet Past Week at fri Self Yes Yes   conjugated estrogens (PREMARIN) 0.625 MG/GM vaginal cream Past Month at prn Self Yes Yes   Sig: Place 1 g vaginally   cyclobenzaprine (FLEXERIL) 5 MG tablet Past Week at prn Self Yes Yes   Sig: Take 5 mg by mouth prn   ferrous sulfate (FEROSUL) 325 (65 Fe) MG tablet Past Week at fri Self Yes  Yes   Sig: Take 325 mg by mouth every 48 hours   furosemide (LASIX) 40 MG tablet  Self Yes No   Sig: Take 40 mg by mouth   gabapentin (NEURONTIN) 300 MG capsule  Self Yes No   Sig: One in the morning, one in the evening and 2 at bedtime   hydrOXYzine (VISTARIL) 25 MG capsule  Self Yes No   Sig: Take 25 mg by mouth 3 times daily as needed   hydrochlorothiazide (HYDRODIURIL) 25 MG tablet  Self Yes No   Sig: Take 25 mg by mouth   ibuprofen (ADVIL/MOTRIN) 800 MG tablet  Self Yes No   Sig: Take 800 mg by mouth 2 times daily as needed for moderate pain   meclizine (ANTIVERT) 25 MG tablet  Self No No   Sig: Take 1 tablet (25 mg) by mouth 3 times daily as needed for dizziness   montelukast (SINGULAIR) 10 MG tablet  Self Yes No   Sig: Take 10 mg by mouth   naproxen (NAPROSYN) 500 MG tablet  Self Yes No   Sig: Take 500 mg by mouth   polyethylene glycol (MIRALAX) 17 GM/Dose powder  Self Yes No   Sig: Take 17 g by mouth   polyethylene glycol (MIRALAX) 17 GM/Dose powder  Self No No   Sig: Use as instructed for colonoscopy prep.   potassium chloride ER (KLOR-CON M) 10 MEQ CR tablet  Self Yes No   rosuvastatin (CRESTOR) 10 MG tablet  Self Yes No   Sig: Take 10 mg by mouth daily   senna-docusate (SENOKOT-S/PERICOLACE) 8.6-50 MG tablet  Self Yes No   Sig: Take 2 tablets by mouth   topiramate (TOPAMAX) 50 MG tablet  Self No No   Sig: One tab po qam and two tabs po qhs   traZODone (DESYREL) 50 MG tablet  Self Yes No   Sig: Take 200 mg by mouth   venlafaxine (EFFEXOR XR) 37.5 MG 24 hr capsule  Self Yes No   Sig: TAKE TWO CAPSULES BY MOUTH ONCE DAILY FOR HOT FLASHES   vitamin D3 (CHOLECALCIFEROL) 50 mcg (2000 units) tablet  Self Yes No      Facility-Administered Medications: None           Physical Exam   Vital Signs: Temp: 98.2  F (36.8  C) Temp src: Oral BP: 139/65 Pulse: 72   Resp: 18 SpO2: 98 % O2 Device: None (Room air)    Weight: 139 lbs 0 oz    Constitutional: awake  Respiratory: no increased work of breathing, good air exchange,  and wheeze diffuse  Cardiovascular: regular rate and rhythm  GI: hypoactive bowel sounds, soft, and tenderness noted in the epigastric region and in the left lower quadrant  Skin: no bruising or bleeding  Musculoskeletal: no lower extremity pitting edema present  Neurologic: Mental Status Exam:  Level of Alertness:   awake    Medical Decision Making       55 MINUTES SPENT BY ME on the date of service doing chart review, history, exam, documentation & further activities per the note.  MANAGEMENT DISCUSSED with the following over the past 24 hours: Patient       Data     I have personally reviewed the following data over the past 24 hrs:    11.5 (H)  \   13.1   / 756 (H)     133 (L) 92 (L) 7.9 (L) /  94   3.1 (L) 23 1.02 (H) \     ALT: 20 AST: 23 AP: 118 (H) TBILI: 0.6   ALB: 4.5 TOT PROTEIN: 7.9 LIPASE: 21     Procal: N/A CRP: N/A Lactic Acid: 1.2         Imaging results reviewed over the past 24 hrs:   Recent Results (from the past 24 hour(s))   CT Abdomen Pelvis w Contrast    Narrative    EXAM: CT ABDOMEN PELVIS W CONTRAST  LOCATION: [facility]  DATE: 10/9/2023    INDICATION: Eval for worsening abdominal pain now with nausea vomiting and poor intake for 3 days  COMPARISON: PET/CT dated 09/28/2023  TECHNIQUE: CT scan of the abdomen and pelvis was performed following injection of IV contrast. Multiplanar reformats were obtained. Dose reduction techniques were used.  CONTRAST: isovue 370 90ml    FINDINGS:   LOWER CHEST: Normal.    HEPATOBILIARY: No significant mass or bile duct dilatation. No calcified gallstones. Gallbladder is not distended, there is a layering sludge in the gallbladder. A small amount of pericholecystic fluid is identified.     PANCREAS: No significant mass, duct dilatation, or inflammatory change.    SPLEEN: Normal size.    ADRENAL GLANDS: No significant nodules.    KIDNEYS/BLADDER: There is mild left-sided hydroureteronephrosis. Retroperitoneal inflammatory changes are  seen adjacent to the distal left ureter (image 140, series 3). No obstructing renal stone is identified. The right kidney and ureter are normal in   size and caliber. The bladder is decompressed.    BOWEL: There is mural thickening of the stomach most pronounced in the distal body/antrum (image 31, series 3, corresponding with increased activity in this area on prior PET/CT.  There is mild dilatation of the proximal jejunum measuring up to 3.8 cm in   diameter with a transition point in the left upper quadrant (image 76, series 3). Remainder the small bowel is normal in size and caliber. The appendix is normal. Postsurgical changes from prior transverse colectomy are again noted the colon is normal   in size and caliber.    LYMPH NODES: Mesenteric stranding at the root of the mesentery with multiple subcentimeter lymph nodes are seen throughout the abdomen and retroperitoneum. It is more larger distended lymph nodes seen in the root of mesentery the largest of which   measures 2.9 x 2.4 cm, adjacent to the proximal stomach (image 142, series 3)     VASCULATURE: No abdominal aortic aneurysm.    PELVIC ORGANS: No pelvic masses.    MUSCULOSKELETAL: Unremarkable.      Impression    IMPRESSION:   1.  Mural thickening of the stomach most pronounced in the distal body and antrum (image 31, series 3 which corresponds the area of increased activity on the prior PET/CT, which could reflect a neoplastic process, correlation with patient's oncology   notes   2.   dilatation of the proximal jejunum with an apparent transition point seen in the left upper quadrant, (image 76, series 3) may reflect a low to intermediate grade proximal small bowel obstruction, versus focal ileus. Correlation with patient's   clinical symptomatology is recommended.  3.  Mesenteric stranding and lymphadenopathy, concerning for metastatic disease, with the largest lymph node seen in the stomach measuring 2.9 x 2.4 cm (image 142, series 3)  4.   interval development of a left-sided hydroureteronephrosis, no obstructing calculi is identified there is inflammatory stranding surrounding the left ureter distally which could reflect neoplastic invasion and/or inflammatory scarring.

## 2023-10-09 NOTE — PROGRESS NOTES
Patient was admitted earlier today.  H&P by  Results reviewed.  Patient is a 58-year-old female with PMH of breast cancer admitted with small bowel obstruction.  NGT to LIS.  Still nauseous and vomiting despite NGT.  General surgery, GI, oncology, IR consulted.  -Laparoscopic laparotomy 10/9.  -Continue POC per MAR.    Yulisa Prieto MD

## 2023-10-09 NOTE — ANESTHESIA PREPROCEDURE EVALUATION
"Anesthesia Pre-Procedure Evaluation    Patient: Peri Irizarry   MRN: [id number] : 1965        Procedure : Procedure(s):  LAPAROSCOPY  possible laparotomy          Past Medical History:   Diagnosis Date    Anemia     Anxiety     Arthritis     Asthma     Breast cancer (H) 2010    left     Cancer (H)     Colon cancer (H) 2011    COPD (chronic obstructive pulmonary disease) (H)     Depression     Difficulty walking     Dyspnea on exertion     History of migraine headaches     Hx of radiation therapy 2010    Hypertension     Migraines     Motion sickness     Orthopnea     Other chronic pain     Shortness of breath     Sleep apnea     CPAP    Stroke (H) 2010    \"I HAD A SLIGHT STROKE BACK IN \"    Walking troubles       Past Surgical History:   Procedure Laterality Date    BIOPSY BREAST Left 2010    BREAST SURGERY      COLON SURGERY      20% OF COLON REMOVED-COLON CANCER    COLONOSCOPY N/A 2016    Procedure: COLONOSCOPY WITH MAC SEDATION;  Surgeon: Kumar Horowitz MD;  Location: [facility];  Service:     COLONOSCOPY N/A 2019    Procedure: COLONOSCOPY;  Surgeon: Chintan Peter MD;  Location: [facility];  Service: General    COLONOSCOPY N/A 2022    Procedure: COLONOSCOPY WITH POLYPECTOMY;  Surgeon: Evin Wright DO;  Location: [facility]    HYSTERECTOMY  2013    LUMPECTOMY BREAST Left 2010    OOPHORECTOMY Bilateral       Allergies   Allergen Reactions    Hydrocodone-Acetaminophen Unknown     Pt takes this med 3 times daily, Brand Name Hydrocodone without the sparkles pt can use  -Monitor hydrocodone/apap supply (ok to take as long as no blue sparkles like brand lortab)        Lisinopril     Penicillin G       Social History     Tobacco Use    Smoking status: Some Days     Packs/day: 2.00     Years: 20.00     Pack years: 40.00     Types: Cigarettes    Smokeless tobacco: Never   Substance Use Topics    Alcohol use: Not Currently      Wt " Readings from Last 1 Encounters:   10/08/23 63 kg (139 lb)        Anesthesia Evaluation            ROS/MED HX  ENT/Pulmonary:     (+) sleep apnea, uses CPAP,              tobacco use, Current use,   Intermittent, asthma                  Neurologic:  - neg neurologic ROS     Cardiovascular:     (+)  hypertension- -   -  - -                                      METS/Exercise Tolerance:     Hematologic:  - neg hematologic  ROS     Musculoskeletal:  - neg musculoskeletal ROS     GI/Hepatic:  - neg GI/hepatic ROS     Renal/Genitourinary:       Endo:       Psychiatric/Substance Use:  - neg psychiatric ROS     Infectious Disease:  - neg infectious disease ROS     Malignancy:       Other:            Physical Exam    Airway        Mallampati: II   TM distance: > 3 FB   Neck ROM: full   Mouth opening: > 3 cm    Respiratory Devices and Support         Dental           Cardiovascular   cardiovascular exam normal          Pulmonary   pulmonary exam normal                OUTSIDE LABS:  CBC:   Lab Results   Component Value Date    WBC 9.2 10/09/2023    WBC 11.5 (H) 10/08/2023    HGB 11.1 (L) 10/09/2023    HGB 13.1 10/08/2023    HCT 33.3 (L) 10/09/2023    HCT 38.8 10/08/2023     (H) 10/09/2023     (H) 10/08/2023     BMP:   Lab Results   Component Value Date     (L) 10/09/2023     (L) 10/08/2023    POTASSIUM 3.7 10/09/2023    POTASSIUM 3.1 (L) 10/08/2023    CHLORIDE 97 (L) 10/09/2023    CHLORIDE 92 (L) 10/08/2023    CO2 24 10/09/2023    CO2 23 10/08/2023    BUN 7.1 (L) 10/09/2023    BUN 7.9 (L) 10/08/2023    CR 1.13 (H) 10/09/2023    CR 1.02 (H) 10/08/2023     (H) 10/09/2023    GLC 94 10/08/2023     COAGS:   Lab Results   Component Value Date    INR 1.07 10/09/2023     POC: No results found for: BGM, HCG, HCGS  HEPATIC:   Lab Results   Component Value Date    ALBUMIN 4.5 10/08/2023    PROTTOTAL 7.9 10/08/2023    ALT 20 10/08/2023    AST 23 10/08/2023    ALKPHOS 118 (H) 10/08/2023    BILITOTAL 0.6  10/08/2023     OTHER:   Lab Results   Component Value Date    PH 7.37 10/09/2023    LACT 1.2 10/08/2023    CHERY 8.9 10/09/2023    MAG 2.0 10/08/2023    LIPASE 21 10/08/2023    TSH 2.71 04/05/2023       Anesthesia Plan    ASA Status:  3, emergent       Anesthesia Type: General.     - Airway: ETT   Induction: Intravenous, RSI.   Maintenance: Inhalation.   Techniques and Equipment:     - Airway: Video-Laryngoscope       Consents          - Extended Intubation/Ventilatory Support Discussed: No.      - Patient is DNR/DNI Status: No     Use of blood products discussed: No .     Postoperative Care    Pain management: IV analgesics.        Comments:                SAYRA PHAM MD

## 2023-10-09 NOTE — CONSULTS
Name:  Peri Irizarry  PCP:  Brain Camacho  Procedure Date:  10/8/2023 - 10/9/2023      Procedure(s):  LAPAROSCOPY  possible laparotomy    Pre-Procedure Diagnosis:  SBO (small bowel obstruction) (H) [K56.609]     Post-Procedure Diagnosis:    * No post-op diagnosis entered *     HPI  58 year old year old female who I have been consulted by No ref. provider found for evaluation of Abdominal Pain  58-year-old female with significant medical history of colon cancer status post partial colectomy and surveillance colonoscopies who presents with several days of epigastric discomfort.  She states the pain started approximately 1 week ago and cause nausea and vomiting.  Pain is described as sharp and diffuse with a primary localization to the epigastric region but migration to the back as well as the left upper and right upper quadrants.  She has had a reported 60 pound weight loss over the past several months of an unclear etiology.  She states that she has been unable to eat because of the discomfort.  No recent travel or sick contacts.  Her colectomy for her colon cancer was done in 2010 and she has been reported as disease-free since then.  However, she did have a recent PET scan at an outside facility which demonstrated PET avid lesions in the lymph nodes.  Unfortunately these images are not available for review at this time.      Allergies:Hydrocodone-acetaminophen, Lisinopril, and Penicillin g    Past Medical History:   Diagnosis Date    Anemia     Anxiety     Arthritis     Asthma     Breast cancer (H) 01/01/2010    left     Cancer (H)     Colon cancer (H) 01/01/2011    COPD (chronic obstructive pulmonary disease) (H)     Depression     Difficulty walking     Dyspnea on exertion     History of migraine headaches     Hx of radiation therapy 01/01/2010    Hypertension     Migraines     Motion sickness     Orthopnea     Other chronic pain     Shortness of breath     Sleep apnea     CPAP    Stroke (H)  "01/01/2010    \"I HAD A SLIGHT STROKE BACK IN 2010\"    Walking troubles        Past Surgical History:   Procedure Laterality Date    BIOPSY BREAST Left 2010    BREAST SURGERY      COLON SURGERY  2011    20% OF COLON REMOVED-COLON CANCER    COLONOSCOPY N/A 6/24/2016    Procedure: COLONOSCOPY WITH MAC SEDATION;  Surgeon: Kumra Horowitz MD;  Location: [facility] OR;  Service:     COLONOSCOPY N/A 7/31/2019    Procedure: COLONOSCOPY;  Surgeon: Chintan Peter MD;  Location: [facility];  Service: General    COLONOSCOPY N/A 12/7/2022    Procedure: COLONOSCOPY WITH POLYPECTOMY;  Surgeon: Evin Wright DO;  Location: [facility]    HYSTERECTOMY  2013    LUMPECTOMY BREAST Left 2010    OOPHORECTOMY Bilateral 2013         CURRENT MEDS:    Current Facility-Administered Medications:     [Auto Hold] acetaminophen (TYLENOL) Suppository 650 mg, 650 mg, Rectal, Q4H PRN, Shamar Taylor MD    [Auto Hold] albuterol (PROVENTIL HFA/VENTOLIN HFA) inhaler, 1-2 puff, Inhalation, Q4H PRN, Shamar Taylor MD    [Held by provider] amLODIPine (NORVASC) tablet 2.5 mg, 2.5 mg, Oral, Daily, Shamar Taylor MD    [Held by provider] busPIRone (BUSPAR) tablet 5 mg, 5 mg, Oral, TID, Shamar Taylor MD    ceFAZolin Sodium (ANCEF) injection 2 g, 2 g, Intravenous, Pre-Op/Pre-procedure x 1 dose, Matthew Ramey DO    ceFAZolin Sodium (ANCEF) injection 2 g, 2 g, Intravenous, See Admin Instructions, Matthew Ramey DO    [Auto Hold] cefTRIAXone (ROCEPHIN) 1 g vial to attach to  mL bag for ADULTS or NS 50 mL bag for PEDS, 1 g, Intravenous, Q24H, Shamar Taylor MD    [Auto Hold] cyclobenzaprine (FLEXERIL) tablet 5 mg, 5 mg, Oral, Q8H PRN, Shamar Taylor MD    dextrose 5% and 0.9% NaCl + KCL 20 mEq/L infusion, 100 mL/hr, Intravenous, Continuous, Shamar Taylor MD, Last Rate: 100 mL/hr at 10/09/23 0910, 100 mL/hr at 10/09/23 0910    [Held by provider] DULoxetine (CYMBALTA) DR capsule 30 mg, " 30 mg, Oral, BID, Helio Sunshine MD    [Auto Hold] famotidine (PEPCID) injection 20 mg, 20 mg, Intravenous, Q12H, Shamar Taylor MD, 20 mg at 10/09/23 0609    [Held by provider] ferrous sulfate (FEROSUL) tablet 325 mg, 325 mg, Oral, Q48H, Shamar Taylor MD    [Auto Hold] fluticasone-vilanterol (BREO ELLIPTA) 200-25 MCG/ACT inhaler 1 puff, 1 puff, Inhalation, Daily, Shamar Taylor MD    [Held by provider] furosemide (LASIX) tablet 40 mg, 40 mg, Oral, Daily, Shamar Taylor MD    [Held by provider] gabapentin (NEURONTIN) capsule 300 mg, 300 mg, Oral, BID, Shamar Taylor MD    [Held by provider] gabapentin (NEURONTIN) capsule 600 mg, 600 mg, Oral, At Bedtime, Shamar Taylor MD    [Auto Hold] hydrALAZINE (APRESOLINE) injection 10 mg, 10 mg, Intravenous, Q6H PRN, Shamar Taylor MD    [Held by provider] hydrochlorothiazide (HYDRODIURIL) tablet 25 mg, 25 mg, Oral, Daily, Shamar Taylor MD    [Auto Hold] HYDROmorphone (DILAUDID) injection 0.2 mg, 0.2 mg, Intravenous, Q2H PRN, Shamar Taylor MD    [Auto Hold] HYDROmorphone (DILAUDID) injection 0.4 mg, 0.4 mg, Intravenous, Q2H PRN, Shamar Taylor MD, 0.4 mg at 10/09/23 0900    [Auto Hold] hydrOXYzine (ATARAX) tablet 25 mg, 25 mg, Oral, TID PRN, Shamar Taylor MD    [Auto Hold] ipratropium - albuterol 0.5 mg/2.5 mg/3 mL (DUONEB) neb solution 3 mL, 3 mL, Nebulization, Q4H PRN, Shamar Taylor MD    lactated ringers infusion, , Intravenous, Continuous, Wil Menchaca DO, Last Rate: 100 mL/hr at 10/09/23 1301, New Bag at 10/09/23 1301    lidocaine (LMX4) cream, , Topical, Q1H PRN, Wil Menchaca DO    lidocaine 1 % 0.1-1 mL, 0.1-1 mL, Other, Q1H PRN, Wil Menchaca DO    [Auto Hold] meclizine (ANTIVERT) tablet 25 mg, 25 mg, Oral, TID PRN, Shamar Taylor MD    [Held by provider] montelukast (SINGULAIR) tablet 10 mg, 10 mg, Oral, At Bedtime, Shamar Taylor MD    [Auto Hold]  naloxone (NARCAN) injection 0.2 mg, 0.2 mg, Intravenous, Q2 Min PRN **OR** [Auto Hold] naloxone (NARCAN) injection 0.4 mg, 0.4 mg, Intravenous, Q2 Min PRN **OR** [Auto Hold] naloxone (NARCAN) injection 0.2 mg, 0.2 mg, Intramuscular, Q2 Min PRN **OR** [Auto Hold] naloxone (NARCAN) injection 0.4 mg, 0.4 mg, Intramuscular, Q2 Min PRN, Helio Sunshine MD    [Auto Hold] ondansetron (ZOFRAN ODT) ODT tab 4 mg, 4 mg, Oral, Q6H PRN **OR** [Auto Hold] ondansetron (ZOFRAN) injection 4 mg, 4 mg, Intravenous, Q6H PRN, Shamar Taylor MD    [Auto Hold] phenol (CHLORASEPTIC) 1.4 % spray 1 mL, 1 spray, Mouth/Throat, Q1H PRN, Shamar Taylor MD, 1 mL at 10/09/23 0421    [Auto Hold] prochlorperazine (COMPAZINE) injection 5 mg, 5 mg, Intravenous, Q6H PRN, Yulisa Prieto MD    [DISCONTINUED] prochlorperazine (COMPAZINE) injection 10 mg, 10 mg, Intravenous, Q6H PRN, 10 mg at 10/09/23 1026 **OR** [Auto Hold] prochlorperazine (COMPAZINE) tablet 10 mg, 10 mg, Oral, Q6H PRN **OR** [Auto Hold] prochlorperazine (COMPAZINE) suppository 25 mg, 25 mg, Rectal, Q12H PRN, Shamar Taylor MD    [Held by provider] rosuvastatin (CRESTOR) tablet 10 mg, 10 mg, Oral, Daily, Shamar Taylor MD    sodium chloride (PF) 0.9% PF flush 3 mL, 3 mL, Intracatheter, Q8H, Wil Menchaca DO    sodium chloride (PF) 0.9% PF flush 3 mL, 3 mL, Intracatheter, q1 min prn, Wil Menchaca DO    sterile water (bottle) irrigation, , , PRN, Matthew Ramey, DO, 1,000 mL at 10/09/23 1410    [Auto Hold] SUMAtriptan (IMITREX) tablet 50 mg, 50 mg, Oral, at onset of headache, Shamar Taylor MD    [Held by provider] topiramate (TOPAMAX) tablet 100 mg, 100 mg, Oral, At Bedtime, Shamar Taylor MD    [Held by provider] topiramate (TOPAMAX) tablet 50 mg, 50 mg, Oral, QAM, Shamar Taylor MD    [Held by provider] traZODone (DESYREL) tablet 100 mg, 100 mg, Oral, At Bedtime, Shamar Taylor MD    [Auto Hold] umeclidinium  "(INCRUSE ELLIPTA) 62.5 MCG/ACT inhaler 1 puff, 1 puff, Inhalation, Daily, Shamar Taylor MD    [Held by provider] venlafaxine (EFFEXOR XR) 24 hr capsule 37.5 mg, 37.5 mg, Oral, BID, Shamar Taylor MD    FAMHX-reviewed     reports that she has been smoking cigarettes. She has a 40.00 pack-year smoking history. She has never used smokeless tobacco. She reports that she does not currently use alcohol. She reports that she does not use drugs.    Review of Systems:  The 12 point review of systems  is within normal limits except for as mentioned above in the HPI.  General ROS: No complaints or constitutional symptoms  Ophthalmic ROS: No complaints of visual changes  Skin: No complaints or symptoms   Endocrine: No complaints or symptoms  Hematologic/Lymphatic: No symptoms or complaints  Psychiatric: No symptoms or complaints  Respiratory ROS: no cough, shortness of breath, or wheezing  Cardiovascular ROS: no chest pain or dyspnea on exertion  Gastrointestinal ROS: As per HPI  Genito-Urinary ROS: no dysuria, trouble voiding, or hematuria  Musculoskeletal ROS: no joint or muscle pain  Neurological ROS: no TIA or stroke symptoms      EXAM:  BP (!) 142/70 (BP Location: Left arm)   Pulse 73   Temp 98.3  F (36.8  C) (Oral)   Resp 18   Ht 1.499 m (4' 11\")   Wt 63 kg (139 lb)   LMP  (LMP Unknown)   SpO2 95%   BMI 28.07 kg/m    GENERAL: Female in obvious discomfort and distress unable to lay flat on the gurney  EYES: Pupils equal, round and reactive, no scleral icterus  ABDOMEN: Significant tenderness to palpation in the epigastric and left upper quadrant right upper quadrant regions.  No palpable masses  SKIN: Pink, warm and dry, no obvious rashes or lesions   NEURO:No focal deficits, ambulatory  MUSCULOSKELETAL:No obvious deformities, no swelling, normal appearing      LABS:  Lab Results   Component Value Date    WBC 9.2 10/09/2023    HGB 11.1 10/09/2023    HCT 33.3 10/09/2023    MCV 87 10/09/2023     " 10/09/2023     INR/Prothrombin Time  Recent Labs   Lab 10/09/23  0608   *   CO2 24   BUN 7.1*     Lab Results   Component Value Date    ALT 20 10/08/2023    AST 23 10/08/2023    ALKPHOS 118 (H) 10/08/2023       IMAGES:   Relevant images were reviewed and discussed with the patient.  Notable findings were: CT scan was reviewed and concerning for closed-loop obstruction with a small portion of the first part of the jejunum.    Assessment/Plan:   Peri Irizarry is a 58 year old female with history of colon cancer and bowel obstruction concerning for closed-loop obstruction.  I reviewed the CT scans and discussed the findings with the patient.  Additionally in conjunction with her clinical picture, she is in quite a bit of pain and is unable to sit still on the gurney.  At this point we discussed options including diagnostic laparoscopy versus observation.  The plan will be for diagnostic laparoscopy with possible laparotomy.  Risk and benefits were explained and she has consented to proceed.      Dennis Ramey D.O. FACS  [phone]

## 2023-10-09 NOTE — CONSULTS
Imaging reviewed. These mesenteric lymph nodes are not accessible from a percutaneous approach. They are near the stomach, however. Recommend GI consult to assess for EUS biopsy.

## 2023-10-10 NOTE — PROGRESS NOTES
"CLINICAL NUTRITION SERVICES - ASSESSMENT NOTE     Nutrition Prescription    RECOMMENDATIONS FOR MDs/PROVIDERS TO ORDER:  When diet advanced recommend mvi with minerals, thiamine 100 mg x 10 days d/t chronic poor intake and risk for refeeding.     Malnutrition Status:    Severe in chronic illness    Recommendations already ordered by Registered Dietitian (RD):  None    Future/Additional Recommendations:  Will add supplements as diet advances. Pt likes ensure vanilla and is willing to try ensure clear.      REASON FOR ASSESSMENT  Peri Irizarry is a/an 58 year old female assessed by the dietitian for Admission Nutrition Risk Screen for positive with 34 lb + weight loss. No decrease in intake    Pt presents with SBO. Hernia repair and lysis of adhesions 10/9. Concern for metastatic gastric vs recurrent breast CA-bx pending  Hx colon CA with colectomy 2011, breast CA, chronic back pain, chronic headache, copd    NUTRITION HISTORY  N/V , abd pain x 1 week PTA  Pt has been having constipation, GI issues since 7/2023    Pt reports very poor intake since July d/t GI issues, < 50%. Pt started taking Ensure plus just before coming in and liked them well. Reports they helped stimulate a BM    At baseline only eats 1 meal/day with occasional snacks      CURRENT NUTRITION ORDERS  Diet: NPO day 3    Receiving NS IVF at 50 ml/hr    Pt reports she is feeling fine right now    LABS  Labs reviewed  Cr 1.0 improved    MEDICATIONS  Medications reviewed  Iv abx, pepecid iv q 12 hr, miralax daily, pericolace bid    ANTHROPOMETRICS  Height: 149.9 cm (4' 11\")  Most Recent Weight: 63 kg (139 lb)  10/8- likely stated in ED  IBW: 44.3 kg  BMI: Overweight BMI 25-29.9  Weight History:     Wt Readings from Last 10 Encounters:   10/08/23  10/06/23  09/08/23  04/20/23 63 kg (139 lb)  140 lb - office  154 lb - office  79.4 kg (175 lb )    02/21/23 77.1 kg (170 lb)   12/07/22 77.1 kg (170 lb)   01/03/22 90.3 kg (199 lb)   12/23/21 90.3 kg (199 lb) "   01/11/21 85.7 kg (189 lb)   07/10/20 88.9 kg (196 lb)   07/31/19 96.6 kg (213 lb)   06/24/19 99.8 kg (220 lb)   06/23/16 95.3 kg (210 lb)   20% weight loss x 6 months or less    Dosing Weight: 67.6 kg adjusted  weight    ASSESSED NUTRITION NEEDS  Estimated Energy Needs: 0999-1790 kcals/day (25 - 30 kcals/kg)  Justification: Maintenance  Estimated Protein Needs:  grams protein/day (1.2 - 1.5 grams of pro/kg)  Justification: Repletion  Estimated Fluid Needs: 2987-1422 mL/day (25 - 30 mL/kg)   Justification: Maintenance    PHYSICAL FINDINGS  See malnutrition section below.    NG was to LIS. Now clamped today  Abd distended, firm, no bowel sounds per nsg    MALNUTRITION:  % Weight Loss:  > 10% in 6 months (severe malnutrition)  % Intake:  </= 50% for >/= 1 month (severe malnutrition)  Subcutaneous Fat Loss:  Thoracic region mild depletion  Muscle Loss:  Temporal region moderate depletion, Acromion bone region moderate depletion, and Posterior calf region moderate depletion  Fluid Retention:  None noted    Malnutrition Diagnosis: Severe malnutrition  In Context of:  Chronic illness or disease    NUTRITION DIAGNOSIS  Malnutrition related to possible metastatic CA, altered GI function, poor appetite as evidenced by intake <50% x 3.5 months, 20% weight loss x 6 months or less     INTERVENTIONS  Implementation  -When diet advanced recommend mvi with minerals, thiamine 100 mg x 10 days d/t chronic poor intake and risk for refeeding.   -Will add supplements as diet advances. Pt likes ensure vanilla and is willing to try ensure clear.     Goals  Diet advancement vs nutrition support within 2-3 days.  Normalize stooling  Maintain weight     Monitoring/Evaluation  Progress toward goals will be monitored and evaluated per protocol.

## 2023-10-10 NOTE — PROGRESS NOTES
Peri Irizarry is doing relatively well.  Complains of incisional pain but previous pain that brought her into the hospital has disappeared.        Vitals:    10/09/23 2211 10/10/23 0019 10/10/23 0757 10/10/23 0801   BP: 124/68 131/61 139/66    BP Location: Left arm Left arm Left arm    Pulse: 84 78 105    Resp: 16 16 16    Temp: 98.3  F (36.8  C) 98.3  F (36.8  C) 98.4  F (36.9  C)    TempSrc: Oral Oral Oral    SpO2: 93% 95% 93% 91%   Weight:       Height:           PHYSICAL EXAM:  GEN: No acute distress, comfortable  ABD: Soft, incisions intact, CHAVA drain with scant serosanguineous output  EXT:No cyanosis, edema or obvious abnormalities        Recent Labs   Lab 10/10/23  0529   WBC 12.6*   HGB 10.8*   HCT 33.5*   *       Recent Labs   Lab 10/10/23  0529 10/09/23  0608 10/08/23  2315      < > 133*   CO2 25   < > 23   BUN 8.3   < > 7.9*   ALBUMIN  --   --  4.5   ALKPHOS  --   --  118*   ALT  --   --  20   AST  --   --  23    < > = values in this interval not displayed.         A/P:  Peri Irizarry is s/p exploratory laparotomy with reduction of internal hernia  -Malignancy is likely from metastatic gastric versus recurrent breast  -Awaiting final pathology results.  -At this point we will clamp her NG tube to see how she does.  If she does relatively well we can pull this and start her on clear liquids  -CHAVA drains to remain in place for now given she is high risk for leak secondary to tumor burden    Matthew Ramey DO  FACS  [phone]  [facility] Department of Surgery

## 2023-10-10 NOTE — CONSULTS
Care Management Initial Consult    General Information  Assessment completed with: Peri Merchant  Type of CM/SW Visit: Initial Assessment    Primary Care Provider verified and updated as needed: Yes   Readmission within the last 30 days: no previous admission in last 30 days         Advance Care Planning: Advance Care Planning Reviewed: other (see comments) (No ACP documents)          Communication Assessment  Patient's communication style: spoken language (English or Bilingual)    Hearing Difficulty or Deaf: no   Wear Glasses or Blind: no    Cognitive  Cognitive/Neuro/Behavioral: WDL  Level of Consciousness: somnolent  Arousal Level: arouses to voice  Orientation: oriented x 4  Mood/Behavior: calm, cooperative  Best Language: 0 - No aphasia  Speech: slow, spontaneous    Living Environment:   People in home: alone     Current living Arrangements: apartment      Able to return to prior arrangements: other (see comments) (Depending on therapy recs)       Family/Social Support:  Care provided by: self, other (see comments) (PCA)  Provides care for: no one, unable/limited ability to care for self  Marital Status: Single             Description of Support System:           Current Resources:   Patient receiving home care services: No     Community Resources: PCA (4-5 hours a day, 7 days a wk.)  Equipment currently used at home: wheelchair, power, cane, straight, walker, standard  Supplies currently used at home:      Employment/Financial:  Employment Status: disabled        Financial Concerns:             Does the patient's insurance plan have a 3 day qualifying hospital stay waiver?  No    Lifestyle & Psychosocial Needs:  Social Determinants of Health     Food Insecurity: Not on file   Depression: Not on file   Housing Stability: Not on file   Tobacco Use: Medium Risk (10/10/2023)    Patient History     Smoking Tobacco Use: Former     Smokeless Tobacco Use: Never     Passive Exposure: Not on file   Financial Resource  Strain: Not on file   Alcohol Use: Not on file   Transportation Needs: Not on file   Physical Activity: Not on file   Interpersonal Safety: Not on file   Stress: Not on file   Social Connections: Not on file       Functional Status:  Prior to admission patient needed assistance:   Dependent ADLs:: Ambulation-cane, Ambulation-walker, Wheelchair-independent  Dependent IADLs:: Cleaning, Meal Preparation, Shopping, Laundry, Cooking, Transportation, Medication Management       Mental Health Status:          Chemical Dependency Status:                Values/Beliefs:  Spiritual, Cultural Beliefs, Yarsanism Practices, Values that affect care:                 Additional Information:  Assessed. RNCM introduced self and CM role. Pt lives alone on the first floor of an apartment. Pt uses walker/cane for mobility inside home, and a power scooter for longer distances. Pt has [employer] [facility] and states having a CM, did not have CMs contact info. Pt received 4-5 hours a day of PCA services, 7 days a week. They assist with ADLS, and IADLS. Pt uses [facility] for transport. Pt has an established PCP. Pt is on disability. Hem/Onc consult placed, and Urology Consult placed. Pt states children are supportive. One of the sons would be the one to transport at discharge.       No therapy consults placed at this time.       Cm will continue to follow plan of care, review recommendations, and assist with any discharge needs anticipated.     Veronica Hernandez RN

## 2023-10-10 NOTE — PROGRESS NOTES
[facility] Progress Note - Hospitalist Service    Date of Admission:  10/8/2023    Assessment & Plan      Peri Irizarry is a 58 year old female admitted on 10/8/2023. She came to the emergency department for evaluation of abdominal pain, nausea and vomiting; found to have small bowel obstruction requiring surgical management.    #Small bowel obstruction  ---POD #1 s/p exploratory laparotomy with lysis of adhesions and reduction of internal hernia.  Surgical findings include; Diffuse metastatic cancer of unknown primary, Closed loop obstruction of proximal small bowel  Gastric mass with evidence of near erosion into small bowel  ---Concern for malignancy related, surgery feels metastatic gastric versus recurrent breast  ---Surgery following, clamping NG  ---CHAVA management per surgery.  -CT showed dilatation of the proximal jejunum with an apparent transition point seen in the left upper quadrant  --NG tube to low intermittent suction; clamping today  --- Discontinue Gomez  --- Discontinue telemetry is stable overnight.    #Acute kidney injury  --- Stable  --- Continue low-dose IV fluids while NG still in.    #Hyponatremia  #Hypochloremia with associated high anion gap  #Hypercalcemia, mild  -All improved overnight   --secondary to volume depletion from decreased oral intake, furosemide, and hydrochlorothiazide   --Continue IV hydration  -Stable when did check ionized calcium    #Hypokalemia  -Resolved  -Secondary to GI loss  -Replace    #Mural thickening of the stomach  #Mesenteric stranding and lymphadenopathy  #History of colon and breast cancer  -CT showed most pronounced in the distal body and antrum, corresponding to area of increased activity on PET/CT which could reflect a neoplastic process; mesenteric stranding and lymphadenopathy concerning for metastatic disease with the largest lymph node seen in the stomach measuring 2.9 x 2.4 cm  -Scheduled for biopsy on  10/18/2023; however, given small bowel obstruction and left hydroureteronephrosis we will request IR evaluation for biopsy while in the hospital  -[place] [facility] consult appreciated    #Left hydroureteronephrosis  -CT showed no obstructing calculi, there is inflammatory stranding surrounding the left ureter distally which could reflect neoplastic invasion and/or inflammatory scaring  -Urology consult    #Acute cystitis without hematuria, probable  -Contaminated specimen, complains of low urine output without dysuria, frequency or hematuria  -Repeat UA, straight cath  -IV ceftriaxone  -Follow-up urine culture  -Discontinued Gomez catheter    #COPD/asthma  -Some wheezing on exam but no stridors, increased work of breathing or signs of acute exacerbation  -DuoNebs as needed  -Continue home Incruse Ellipta    #Essential hypertension  -Holding Norvasc and Lasix at this time.  -IV hydralazine as needed    #Chronic thrombocytosis  -Monitor platelet count    #JAMMIE  -Unable to use CPAP with NG tube in place  -Oxygen via nasal cannula/mask as needed for sleep to keep saturation above 88%    #Chronic thoracic back pain  -Pain control with IV medications until able to take PTA medications by mouth    #Chronic non-intractable headache  -Supportive management    Dysthymia--holding home BuSpar and Cymbalta and gabapentin and Topamax and trazodone    Hyperlipidemia--holding statin       Diet: NPO for Medical/Clinical Reasons Except for: Meds    DVT Prophylaxis: Enoxaparin (Lovenox) SQ  Gomez Catheter: PRESENT, indication: /GI/GYN Pelvic Procedure  Lines: None     Cardiac Monitoring: None  Code Status: Full Code      Clinically Significant Risk Factors        # Hypokalemia: Lowest K = 3.1 mmol/L in last 2 days, will replace as needed   # Hypocalcemia: Lowest Ca = 8.2 mg/dL in last 2 days, will monitor and replace as appropriate         # Hypertension: Noted on problem list        # Overweight: Estimated body mass index is 28.07  "kg/m  as calculated from the following:    Height as of this encounter: 1.499 m (4' 11\").    Weight as of this encounter: 63 kg (139 lb)., PRESENT ON ADMISSION     # COPD: noted on problem list        Disposition Plan      Expected Discharge Date: 10/12/2023    Discharge Delays: IV Medication - consider oral or Home Infusion  Other (Add Comment)  Voiding/Eating Trial needed  Destination: home with family            Julienne Maldonado MD  Hospitalist Service  [facility]  Securely message with [employer] (more info)  Text page via [employer] Paging/Directory   ______________________________________________________________________    Interval History   --- Seen this morning with family in the room.  --- Several questions about what was biopsied yesterday  ----Seen by oncology and understand plan for outpatient management, usually has seen Dr. Malcolm  --- Newly tolerating NG clamping  --- No flatus yet  --- Abdominal pain okay    Physical Exam   Vital Signs: Temp: 98.4  F (36.9  C) Temp src: Oral BP: 139/66 Pulse: 105   Resp: 16 SpO2: 91 % O2 Device: None (Room air) Oxygen Delivery: 2 LPM  Weight: 139 lbs 0 oz    General Appearance: Pleasant female in no apparent distress  Respiratory: Clear to auscultation  Cardiovascular: Regular rate and rhythm  GI: Incisions clean dressed intact with 2 drains in place.  Skin: No significant lower extremity edema  Other: Neurologically grossly intact without focal deficits appreciated    Medical Decision Making             Data     I have personally reviewed the following data over the past 24 hrs:    12.6 (H)  \   10.8 (L)   / 580 (H)     137 101 8.3 /  95   3.6 25 1.01 (H) \       Imaging results reviewed over the past 24 hrs:   No results found for this or any previous visit (from the past 24 hour(s)).  "

## 2023-10-10 NOTE — PLAN OF CARE
Problem: Plan of Care - These are the overarching goals to be used throughout the patient stay.    Goal: Absence of Hospital-Acquired Illness or Injury  Intervention: Prevent Skin Injury  Recent Flowsheet Documentation  Taken 10/9/2023 2100 by Vielka Horowitz RN  Body Position: supine, head elevated  Intervention: Prevent and Manage VTE (Venous Thromboembolism) Risk  Recent Flowsheet Documentation  Taken 10/9/2023 2100 by Vielka Horowitz RN  VTE Prevention/Management: SCDs (sequential compression devices) off  Goal: Optimal Comfort and Wellbeing  Intervention: Monitor Pain and Promote Comfort  Recent Flowsheet Documentation  Taken 10/9/2023 2100 by Vielka Horowitz RN  Pain Management Interventions: distraction     Problem: Intestinal Obstruction  Goal: Optimal Bowel Function  Outcome: Not Progressing  Intervention: Promote Bowel Function  Recent Flowsheet Documentation  Taken 10/9/2023 2100 by Vielka Horowitz RN  Body Position: supine, head elevated  Head of Bed (HOB) Positioning: HOB at 30 degrees  Positioning/Transfer Devices: pillows  Goal: Fluid and Electrolyte Balance  Outcome: Progressing  Goal: Absence of Infection Signs and Symptoms  Outcome: Progressing  Goal: Optimize Nutrition Status  Outcome: Not Progressing  Goal: Optimal Pain Control and Function  Outcome: Progressing  Intervention: Prevent or Manage Pain  Recent Flowsheet Documentation  Taken 10/9/2023 2100 by Vielka Horowitz RN  Pain Management Interventions: distraction   Goal Outcome Evaluation:  Pt has been drowsy since coming to unit from PACU.  She has abdominal tenderness with palpation.  Dressings intact around CHAVA drains and midline incision.  Abdomen is firm, distended and bowel sounds are hypoactive with NG tube draining dark green thick fluid.  NPO except meds which were PO with suction turned off for 30 min.  She got tylenol and gabapentin for discomfort.  Apnea with shallow resps upon arrival to unit, now regular resps in the 16/min range.  CPAP  on per RT with O2 2 lpm.  O2 sats have been in the mid 90s.

## 2023-10-10 NOTE — ANESTHESIA POSTPROCEDURE EVALUATION
Patient: Peri Irizarry    Procedure: Procedure(s):  DIAGNOSTIC LAPAROSCOPY, LYSIS OF ADHESIONS  OPEN LYSIS OF ADHESIONS; REDUCTION INTERNAL HERNIA, PARTIAL OMENTECTOMY       Anesthesia Type:  General    Note:  Disposition: Inpatient   Postop Pain Control: Uneventful            Sign Out: Well controlled pain   PONV: No   Neuro/Psych: Uneventful            Sign Out: Acceptable/Baseline neuro status   Airway/Respiratory: Uneventful            Sign Out: Acceptable/Baseline resp. status   CV/Hemodynamics: Uneventful            Sign Out: Acceptable CV status; No obvious hypovolemia; No obvious fluid overload   Other NRE: NONE   DID A NON-ROUTINE EVENT OCCUR? No           Last vitals:  Vitals Value Taken Time   /67 10/09/23 2003   Temp     Pulse 84 10/09/23 2010   Resp 14 10/09/23 2010   SpO2 99 % 10/09/23 2011   Vitals shown include unvalidated device data.    Electronically Signed By: Sam Sherwood MD  October 9, 2023  10:07 PM

## 2023-10-10 NOTE — PLAN OF CARE
Problem: Intestinal Obstruction  Goal: Optimal Bowel Function  Outcome: Progressing  NG to LIS start of shift--> clamped at approximately 0900.   No increase in abdominal pain since that time.  Denies nausea throughout this shift.  Unable to pass flatus. Bowel sounds hypoactive.  Ambulated in [place] with staff assist, activity encouraged as tolerated.    Problem: Intestinal Obstruction  Goal: Fluid and Electrolyte Balance  Outcome: Progressing  Patient remains NPO at this time (except meds)  NS infusing @ 50cc/hr.  K protocol - 3.6 - recheck due in AM.    Telemetry discontinued.  Gomez removed at approximately 1100 (no void as of this note).  CHAVA output: right: 50cc; left: 70cc.  Dressing intact with dried drainage.    Estephania Espinoza RN

## 2023-10-10 NOTE — PLAN OF CARE
Problem: Intestinal Obstruction  Goal: Optimal Bowel Function  Outcome: Not Progressing  Intervention: Promote Bowel Function  Recent Flowsheet Documentation  Taken 10/9/2023 2353 by Dimitrios Hinds, RN  Body Position: supine  Head of Bed (HOB) Positioning: HOB at 20-30 degrees   Goal Outcome Evaluation:         Problem: Plan of Care - These are the overarching goals to be used throughout the patient stay.    Goal: Optimal Comfort and Wellbeing  Outcome: Progressing  Intervention: Monitor Pain and Promote Comfort  Recent Flowsheet Documentation  Taken 10/9/2023 2353 by Dimitrios Hinds, RN  Pain Management Interventions: medication (see MAR)         Patient has absent bowel sounds overnight. NG is to LIS with moderate thick green output. Patient denies nausea at this time. Patient receiving PRN IV Dilaudid x2 to this point overnight. Will continue to monitor.

## 2023-10-10 NOTE — PROGRESS NOTES
RESPIRATORY CARE NOTE  Patient is on RA, SPO2 low 90's, BS diminished. She refuse this morning breathing exercise due to stomach pain. Initiate FV in the afternoon, patient able to demonstrate great techniques on FV. She did  mL but had some pain. RT following.     Katiuska Rader, RT

## 2023-10-10 NOTE — PROGRESS NOTES
Progress Note     Primary Oncologist/Hematologist:  Dr. Mani Malcolm          Assessment and Plan:New actions/orders today (10/10/2023) are underlined.     1. Small bowel obstruction  -10/9/23 s/p diagnostic laparoscopy converted to laparotomy, hernia repair and lysis of adhesions. Multiple biopsies sent to pathology         2. On 9/11/23 CT CAP showed asymmetric nodularity along the posterior gastric wall superimposed on significant diffuse gastric and distal esophageal wall thickening, appearance concerning for neoplasm. Enlarged lymph nodes in the gastrohepatic ligament, highly suspicious for metastatic adenopathy. Asymmetric thickened appearance of the pancreatic tail without definable mass or obvious ductal dilatation.      -  10/9/23 biopsies of metastatic mass along with multiple nodes    3. History of colorectal cancer: S/p sigmoid colon resection back in 2012. Colonoscopy in December 2022 did not show any significant abnormality. Some polyps were found, and they were removed with cold biopsy forceps. The pathology was benign.         4. 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 To date, there has been no evidence for a local recurrence or more distant metastatic disease.          - Bilateral screening mammogram on 1/9/23 showed no evidence of malignancy. We will continue active surveillance.          Plan  1. Appreciate general surgery assistance in patient SBO and obtaining tissue from multiple sites       2. Continue best supportive care post-operatively   3.  Await pathology results to provide oncologic treatment plan. Patient can follow-up outpatient for results if clinically able to discharge prior to finalized.    Veronica Carmona  [facility]  Office: [phone]  Cell: [phone] Monday through Friday, 8AM-5PM. After hours and weekends,  please use answering service.             Medications:   Scheduled Medications   acetaminophen  975 mg Oral Q8H    [Held by provider] amLODIPine  2.5 mg Oral Daily    [Held by provider] busPIRone  5 mg Oral TID    cefTRIAXone  1 g Intravenous Q24H    [Held by provider] DULoxetine  30 mg Oral BID    enoxaparin ANTICOAGULANT  40 mg Subcutaneous Q24H    famotidine  20 mg Intravenous Q12H    [Held by provider] ferrous sulfate  325 mg Oral Q48H    fluticasone-vilanterol  1 puff Inhalation Daily    [Held by provider] furosemide  40 mg Oral Daily    gabapentin  100 mg Oral TID    [Held by provider] gabapentin  300 mg Oral BID    [Held by provider] gabapentin  600 mg Oral At Bedtime    [Held by provider] hydrochlorothiazide  25 mg Oral Daily    [Held by provider] montelukast  10 mg Oral At Bedtime    polyethylene glycol  17 g Oral Daily    [Held by provider] rosuvastatin  10 mg Oral Daily    senna-docusate  1 tablet Oral BID    sodium chloride (PF)  3 mL Intracatheter Q8H    [Held by provider] topiramate  100 mg Oral At Bedtime    [Held by provider] topiramate  50 mg Oral QAM    [Held by provider] traZODone  100 mg Oral At Bedtime    umeclidinium  1 puff Inhalation Daily    [Held by provider] venlafaxine  37.5 mg Oral BID     PRN Medications  acetaminophen, [START ON 10/12/2023] acetaminophen, albuterol, bisacodyl, cyclobenzaprine, hydrALAZINE, HYDROmorphone, HYDROmorphone, hydrOXYzine, ipratropium - albuterol 0.5 mg/2.5 mg/3 mL, lidocaine 4%, lidocaine (buffered or not buffered), magnesium hydroxide, meclizine, morphine **OR** morphine, naloxone **OR** naloxone **OR** naloxone **OR** naloxone, ondansetron **OR** ondansetron, ondansetron **OR** ondansetron, phenol MT, prochlorperazine **OR** prochlorperazine, prochlorperazine, [DISCONTINUED] prochlorperazine **OR** prochlorperazine **OR** prochlorperazine, sodium chloride (PF), SUMAtriptan, traMADol               Physical Exam:   Vitals were reviewed  Blood pressure 139/66,  "pulse 105, temperature 98.4  F (36.9  C), temperature source Oral, resp. rate 16, height 1.499 m (4' 11\"), weight 63 kg (139 lb), SpO2 91 %, not currently breastfeeding.  Wt Readings from Last 4 Encounters:   10/08/23 63 kg (139 lb)   02/21/23 77.1 kg (170 lb)   12/07/22 77.1 kg (170 lb)   01/03/22 90.3 kg (199 lb)       I/O last 3 completed shifts:  In: 2300 [I.V.:1800]  Out: 1085 [Urine:420; Emesis/NG output:200; Drains:265; Blood:200]           Data:   All laboratory data and imaging studies reviewed.    CMP  Recent Labs   Lab 10/10/23  0529 10/09/23  0608 10/08/23  2315    133* 133*   POTASSIUM 3.6 3.7 3.1*   CHLORIDE 101 97* 92*   CO2 25 24 23   ANIONGAP 11 12 18*   GLC 95 103* 94   BUN 8.3 7.1* 7.9*   CR 1.01* 1.13* 1.02*   GFRESTIMATED 64 56* 63   CHERY 8.2* 8.9 10.1*   MAG 1.7  --  2.0   PROTTOTAL  --   --  7.9   ALBUMIN  --   --  4.5   BILITOTAL  --   --  0.6   ALKPHOS  --   --  118*   AST  --   --  23   ALT  --   --  20     CBC  Recent Labs   Lab 10/10/23  0529 10/09/23  0608 10/08/23  2315   WBC 12.6* 9.2 11.5*   RBC 3.77* 3.84 4.51   HGB 10.8* 11.1* 13.1   HCT 33.5* 33.3* 38.8   MCV 89 87 86   MCH 28.6 28.9 29.0   MCHC 32.2 33.3 33.8   RDW 15.9* 15.9* 15.9*   * 613* 756*     INR  Recent Labs   Lab 10/09/23  0608   INR 1.07     Data   Results for orders placed or performed during the hospital encounter of 10/08/23 (from the past 24 hour(s))   Surgical Pathology Exam   Result Value Ref Range    Case Report       Surgical Pathology Report                         Case: [id number]                                  Authorizing Provider:  Matthew Ramey DO   Collected:           10/09/2023 03:54 PM          Ordering Location:     [facility]      Received:            10/09/2023 04:05 PM                                 [facility]                                                               Pathologist:           Galina Banks MD                                                    "        Intraop:               Galina Banks MD                                                           Specimen:    Other, MESENTERIC IMPLANT                                                                  Intraoperative Consultation       A(2). Other, MESENTERIC IMPLANT:  AFS1:  Malignant cells present    Galina Banks MD on 10/9/2023 at 4:24 PM           CBC with Platelets & Differential    Narrative    The following orders were created for panel order CBC with Platelets & Differential.  Procedure                               Abnormality         Status                     ---------                               -----------         ------                     CBC with platelets and d...[[id number]]  Abnormal            Final result                 Please view results for these tests on the individual orders.   Basic metabolic panel   Result Value Ref Range    Sodium 137 135 - 145 mmol/L    Potassium 3.6 3.4 - 5.3 mmol/L    Chloride 101 98 - 107 mmol/L    Carbon Dioxide (CO2) 25 22 - 29 mmol/L    Anion Gap 11 7 - 15 mmol/L    Urea Nitrogen 8.3 8.0 - 23.0 mg/dL    Creatinine 1.01 (H) 0.51 - 0.95 mg/dL    GFR Estimate 64 >60 mL/min/1.73m2    Calcium 8.2 (L) 8.6 - 10.0 mg/dL    Glucose 95 70 - 99 mg/dL   Magnesium   Result Value Ref Range    Magnesium 1.7 1.7 - 2.3 mg/dL   CBC with platelets and differential   Result Value Ref Range    WBC Count 12.6 (H) 4.0 - 11.0 10e3/uL    RBC Count 3.77 (L) 3.80 - 5.20 10e6/uL    Hemoglobin 10.8 (L) 11.7 - 15.7 g/dL    Hematocrit 33.5 (L) 35.0 - 47.0 %    MCV 89 78 - 100 fL    MCH 28.6 26.5 - 33.0 pg    MCHC 32.2 31.5 - 36.5 g/dL    RDW 15.9 (H) 10.0 - 15.0 %    Platelet Count 580 (H) 150 - 450 10e3/uL    % Neutrophils 80 %    % Lymphocytes 10 %    % Monocytes 10 %    Mids % (Monos, Eos, Basos)      % Eosinophils 0 %    % Basophils 0 %    % Immature Granulocytes 0 %    NRBCs per 100 WBC 0 <1 /100    Absolute Neutrophils 10.0 (H) 1.6 - 8.3 10e3/uL    Absolute Lymphocytes 1.2  0.8 - 5.3 10e3/uL    Absolute Monocytes 1.3 0.0 - 1.3 10e3/uL    Mids Abs (Monos, Eos, Basos)      Absolute Eosinophils 0.0 0.0 - 0.7 10e3/uL    Absolute Basophils 0.0 0.0 - 0.2 10e3/uL    Absolute Immature Granulocytes 0.1 <=0.4 10e3/uL    Absolute NRBCs 0.0 10e3/uL

## 2023-10-11 NOTE — PLAN OF CARE
Problem: Plan of Care - These are the overarching goals to be used throughout the patient stay.    Goal: Plan of Care Review  Description: The Plan of Care Review/Shift note should be completed every shift.  The Outcome Evaluation is a brief statement about your assessment that the patient is improving, declining, or no change.  This information will be displayed automatically on your shift note.  Outcome: Progressing     Problem: Intestinal Obstruction  Goal: Optimal Bowel Function  Outcome: Progressing  Goal: Optimal Pain Control and Function  Outcome: Progressing     Problem: Pain Chronic (Persistent)  Goal: Optimal Pain Control and Function  Outcome: Progressing     Problem: Pain Acute  Goal: Optimal Pain Control and Function  Outcome: Progressing     Problem: Surgery Nonspecified  Goal: Effective Oxygenation and Ventilation  Outcome: Progressing   Goal Outcome Evaluation:       Pt c/o pain in abdomen. PRN oxy 5mg given x2. NG taken out. Tolerating clear liquid diet. Drank 240mL of pop and 120mL of water. 2L O2 NC. Right and left CHAVA drains in abd. Midline abd dressing clean and intact, no drainage. Both CHAVA dressings clean and intact, no drainage. NS running at 50mL/hr.

## 2023-10-11 NOTE — PROGRESS NOTES
Urology Brief Note    10/11/23    57 yo F shmuel admitted with SBO s/p ex-lap with reduction of internal hernia on 10/9/23 which was notable for concern for diffuse metastatic disease of unknown primary.    Pre-op CT on admission showed c/f left mild hydronephrosis and inflammatory stranding around left ureter. Urology was consulted but has yet to be seen - likely due to miscommunication between the two Urology call groups.    I reviewed her records tonight. She is AF with stable vitals. Mild leukocytosis. Serum Cr is near her baseline at 0.87 (baseline is about 0.7 to 0.8). Recent UA is not concerning for infection and urine culture grew multiple low counts of bacteria c/w contamination (vs  roberth).    She may warrant further evaluation of hydronephrosis with possible intervention, namely ureteral stent placement; however, this is not urgent based on her current clinical status.    [facility] team to perform formal consult tomorrow, 10/12/23.      Kurt Paredes MD  [facility]  (p) [phone]

## 2023-10-11 NOTE — PROGRESS NOTES
[facility]    Medicine Progress Note - Hospitalist Service    Date of Admission:  10/8/2023    Assessment & Plan      Peri Irizarry is a 58 year old female admitted on 10/8/2023. She came to the emergency department for evaluation of abdominal pain, nausea and vomiting; found to have small bowel obstruction requiring surgical management.    #Small bowel obstruction  ---POD #2 s/p exploratory laparotomy with lysis of adhesions and reduction of internal hernia.  Surgical findings include; Diffuse metastatic cancer of unknown primary, Closed loop obstruction of proximal small bowel  Gastric mass with evidence of near erosion into small bowel  ---Concern for malignancy related, surgery feels metastatic gastric versus recurrent breast  ---Surgery following,  --- DC NG today  --- Clear liquid diet today, will stop IV fluids  ---CHAVA management per surgery.    #Acute kidney injury  --- Resolved  --- Stop IV fluids.    Acute blood loss anemia  ---stable - continue to monitor    #Hyponatremia  #Hypochloremia with associated high anion gap  #Hypercalcemia, mild  -All improved overnight   --secondary to volume depletion from decreased oral intake, furosemide, and hydrochlorothiazide   --Continue IV hydration  -Stable when did check ionized calcium    #Hypokalemia  -mild  -Secondary to GI loss  -Replace    #Mural thickening of the stomach  #Mesenteric stranding and lymphadenopathy  #History of colon and breast cancer  -CT showed most pronounced in the distal body and antrum, corresponding to area of increased activity on PET/CT which could reflect a neoplastic process; mesenteric stranding and lymphadenopathy concerning for metastatic disease with the largest lymph node seen in the stomach measuring 2.9 x 2.4 cm  -Status post biopsy at time of small bowel obstruction surgery.  Biopsy results pending  -[place] oncology consult appreciated    #Left hydroureteronephrosis  -CT showed no obstructing  "calculi, there is inflammatory stranding surrounding the left ureter distally which could reflect neoplastic invasion and/or inflammatory scaring  -Urology consulted from ED.  Will reconsult today  -- Gomez discontinued 10/10  --- PVRs have been negative    #Acute cystitis without hematuria, probable  -Urine culture polymicrobial, mainly gram-positive bacilli but all with low colony forming units  -IV ceftriaxone for now pending urology consult  -Follow-up urine culture  -Discontinued Gomez catheter    #COPD/asthma  -Quit smoking 2010   --No exacerbation  -DuoNebs as needed  -Continue home Incruse Ellipta    #Essential hypertension  -Holding Norvasc and Lasix and HCTZ since she has been n.p.o.  ---Resume Lasix  -IV hydralazine as needed    #Chronic thrombocytosis  -Monitor platelet count    #JAMMIE  -Unable to use CPAP with NG tube in place  -Oxygen via nasal cannula/mask as needed for sleep to keep saturation above 88%  -Now that NG out can resume CPAP    #Chronic thoracic back pain  -Today resumed Effexor, Cymbalta, gabapentin but gabapentin only at half dosing is not n.p.o.    #Chronic non-intractable headache  -Supportive management    Dysthymia--resume BuSpar and Cymbalta and gabapentin and Topamax and effexor and trazodone   ---resume slowly, some at only half dosing.    Hyperlipidemia--resume statin       Diet: Clear Liquid Diet  Snacks/Supplements Adult: Ensure Clear; Between Meals    DVT Prophylaxis: Enoxaparin (Lovenox) SQ  Gomez Catheter: Not present  Lines: None     Cardiac Monitoring: None  Code Status: Full Code      Clinically Significant Risk Factors        # Hypokalemia: Lowest K = 3.3 mmol/L in last 2 days, will replace as needed             # Hypertension: Noted on problem list        # Obesity: Estimated body mass index is 30.52 kg/m  as calculated from the following:    Height as of this encounter: 1.499 m (4' 11\").    Weight as of this encounter: 68.5 kg (151 lb 1.6 oz)., PRESENT ON ADMISSION  # " Severe Malnutrition: based on nutrition assessment, PRESENT ON ADMISSION   # COPD: noted on problem list        Disposition Plan      Expected Discharge Date: 10/12/2023    Discharge Delays: IV Medication - consider oral or Home Infusion  Other (Add Comment)  Voiding/Eating Trial needed  Destination: home with help/services            Julienne Maldonado MD  Hospitalist Service  [facility]  Securely message with [employer] (more info)  Text page via [employer] Paging/Directory   ______________________________________________________________________    Interval History   --- Seen today and tolerated NG clamping  --- No flatus  --- No nausea or vomiting  --- Agreeable to increasing ambulation  --- Pain overall controlled    Physical Exam   Vital Signs: Temp: 98.5  F (36.9  C) Temp src: Oral BP: 131/64 Pulse: 88   Resp: 18 SpO2: 97 % O2 Device: Nasal cannula Oxygen Delivery: 2 LPM  Weight: 151 lbs 1.6 oz    General Appearance: Pleasant female in no apparent distress, NG is in place  Respiratory: Clear to auscultation  Cardiovascular: Regular rate and rhythm  GI: Incisions clean dressed intact with 2 drains in place.  Skin: No significant lower extremity edema  Other: Neurologically grossly intact without focal deficits appreciated    Medical Decision Making             Data     I have personally reviewed the following data over the past 24 hrs:    13.8 (H)  \   9.6 (L)   / 510 (H)     137 102 12.1 /  93   4.1 23 0.87 \     Imaging results reviewed over the past 24 hrs:   No results found for this or any previous visit (from the past 24 hour(s)).

## 2023-10-11 NOTE — PLAN OF CARE
Goal Outcome Evaluation:    RCAT Treatment Plan    Patient Score: 11  Patient Acuity: 4    Clinical Indication for Therapy: prevent atelectasis    Therapy Ordered: Flutter valve qid    Assessment Summary: Will continue with current therapy    RT Nehemiah  10/10/2023

## 2023-10-11 NOTE — PROGRESS NOTES
General Surgery Progress Note:    Hospital Day # 2    ASSESSMENT:  1. Mild dehydration    2. Hypokalemia    3. Upper abdominal pain    4. Generalized abdominal mass    5. SBO (small bowel obstruction) (H)    6. Acute UTI        Peri Irizarry is a 58 year old female who is s/p exploratory laparotomy with reduction of internal hernia on 10/9/23. Patient doing okay post-operatively. NG tube has been clamped x24 hours without nausea, vomiting. Continue to wait return of bowel function. Pathology pending. Labs notable for mild increase in leukocytosis (WBC 13.8 < 12.6), high risk for leak secondary to tumor burden. Will continue to monitor.     PLAN:  - Please remove NG tube  - Okay to slowly advance to clear liquid diet. Patient instructed to go slow. If abdominal pain worsens or has nausea and vomiting, please back down to NPO  - Continue to encourage ambulation to promote bowel function  - Continue IV antibiotics  - Monitor drains  - General surgery will continue to follow    SUBJECTIVE:   She is doing okay. Endorses abdominal discomfort, managed with pain medication. Tolerated NG tube clamping with sips of water with meds. Denies nausea, vomiting, flatus or BM. Ambulating and voiding without issues.      Patient Vitals for the past 24 hrs:   BP Temp Temp src Pulse Resp SpO2 Weight   10/11/23 0748 131/64 98.5  F (36.9  C) Oral 88 18 97 % --   10/11/23 0436 -- -- -- -- -- -- 68.5 kg (151 lb 1.6 oz)   10/11/23 0004 (!) 145/62 98.5  F (36.9  C) Oral 100 17 97 % --   10/10/23 1700 -- -- -- -- -- 97 % --   10/10/23 1612 -- -- -- -- -- (!) 88 % --   10/10/23 1530 130/66 98.8  F (37.1  C) Oral 113 18 90 % --   10/10/23 1215 116/69 98.8  F (37.1  C) Oral 117 17 90 % --         PHYSICAL EXAM:  General: patient seen resting in bed, no acute distress  HEENT: NG tube clamped.  Resp: no respiratory distress, breathing comfortably on 2L via nasal canula  Abdomen: Softly distended, tender to palpation over midline incision.  Midline incision clean/dry/intact with staples. Laparoscopic sites intact with staples  Drains: Surgical CHAVA drains x2 with serosanguinous output.   Output by Drain (mL) 10/09/23 0700 - 10/09/23 1459 10/09/23 1500 - 10/09/23 2259 10/09/23 2300 - 10/10/23 0659 10/10/23 0700 - 10/10/23 1459 10/10/23 1500 - 10/10/23 2259 10/10/23 2300 - 10/11/23 0659 10/11/23 0700 - 10/11/23 1108   Closed/Suction Drain 1 Right;Anterior RLQ Bulb 19 [place]ian  80 50 35 30 15    Closed/Suction Drain 2 Left;Anterior LLQ Bulb 19 [place]  65 70 50 50 40    Extremities: warm and well perfused    10/10 0700 - 10/11 0659  In: 1125 [P.O.:120; I.V.:1005]  Out: 570 [Urine:350; Drains:220]    Admission on 10/08/2023   Component Date Value    Sodium 10/08/2023 133 (L)     Potassium 10/08/2023 3.1 (L)     Carbon Dioxide (CO2) 10/08/2023 23     Anion Gap 10/08/2023 18 (H)     Urea Nitrogen 10/08/2023 7.9 (L)     Creatinine 10/08/2023 1.02 (H)     GFR Estimate 10/08/2023 63     Calcium 10/08/2023 10.1 (H)     Chloride 10/08/2023 92 (L)     Glucose 10/08/2023 94     Alkaline Phosphatase 10/08/2023 118 (H)     AST 10/08/2023 23     ALT 10/08/2023 20     Protein Total 10/08/2023 7.9     Albumin 10/08/2023 4.5     Bilirubin Total 10/08/2023 0.6     Lipase 10/08/2023 21     Color Urine 10/09/2023 Light Yellow     Appearance Urine 10/09/2023 Clear     Glucose Urine 10/09/2023 Negative     Bilirubin Urine 10/09/2023 Negative     Ketones Urine 10/09/2023 80 (A)     Specific Gravity Urine 10/09/2023 1.014     Blood Urine 10/09/2023 Negative     pH Urine 10/09/2023 5.5     Protein Albumin Urine 10/09/2023 10 (A)     Urobilinogen Urine 10/09/2023 <2.0     Nitrite Urine 10/09/2023 Negative     Leukocyte Esterase Urine 10/09/2023 250 Tea/uL (A)     Bacteria Urine 10/09/2023 Few (A)     RBC Urine 10/09/2023 2     WBC Urine 10/09/2023 26 (H)     Squamous Epithelials Uri* 10/09/2023 3 (H)     Hyaline Casts Urine 10/09/2023 3 (H)     WBC Count 10/08/2023 11.5 (H)     RBC  Count 10/08/2023 4.51     Hemoglobin 10/08/2023 13.1     Hematocrit 10/08/2023 38.8     MCV 10/08/2023 86     MCH 10/08/2023 29.0     MCHC 10/08/2023 33.8     RDW 10/08/2023 15.9 (H)     Platelet Count 10/08/2023 756 (H)     % Neutrophils 10/08/2023 79     % Lymphocytes 10/08/2023 10     % Monocytes 10/08/2023 10     % Eosinophils 10/08/2023 0     % Basophils 10/08/2023 0     % Immature Granulocytes 10/08/2023 1     NRBCs per 100 WBC 10/08/2023 0     Absolute Neutrophils 10/08/2023 9.2 (H)     Absolute Lymphocytes 10/08/2023 1.1     Absolute Monocytes 10/08/2023 1.1     Absolute Eosinophils 10/08/2023 0.0     Absolute Basophils 10/08/2023 0.0     Absolute Immature Granul* 10/08/2023 0.1     Absolute NRBCs 10/08/2023 0.0     Hold Specimen 10/08/2023 JIC     Hold Specimen 10/08/2023 JI     Lactic Acid 10/08/2023 1.2     Magnesium 10/08/2023 2.0     Color Urine 10/09/2023 Light Yellow     Appearance Urine 10/09/2023 Clear     Glucose Urine 10/09/2023 Negative     Bilirubin Urine 10/09/2023 Negative     Ketones Urine 10/09/2023 40 (A)     Specific Gravity Urine 10/09/2023 1.020     Blood Urine 10/09/2023 Negative     pH Urine 10/09/2023 5.5     Protein Albumin Urine 10/09/2023 Negative     Urobilinogen Urine 10/09/2023 <2.0     Nitrite Urine 10/09/2023 Negative     Leukocyte Esterase Urine 10/09/2023 250 Tea/uL (A)     RBC Urine 10/09/2023 0     WBC Urine 10/09/2023 9 (H)     Squamous Epithelials Uri* 10/09/2023 1     Transitional Epithelials* 10/09/2023 <1     Sodium 10/09/2023 133 (L)     Potassium 10/09/2023 3.7     Chloride 10/09/2023 97 (L)     Carbon Dioxide (CO2) 10/09/2023 24     Anion Gap 10/09/2023 12     Urea Nitrogen 10/09/2023 7.1 (L)     Creatinine 10/09/2023 1.13 (H)     GFR Estimate 10/09/2023 56 (L)     Calcium 10/09/2023 8.9     Glucose 10/09/2023 103 (H)     WBC Count 10/09/2023 9.2     RBC Count 10/09/2023 3.84     Hemoglobin 10/09/2023 11.1 (L)     Hematocrit 10/09/2023 33.3 (L)     MCV 10/09/2023  87     MCH 10/09/2023 28.9     MCHC 10/09/2023 33.3     RDW 10/09/2023 15.9 (H)     Platelet Count 10/09/2023 613 (H)     INR 10/09/2023 1.07     Calcium, Ionized pH 7.4 10/09/2023 1.09 (L)     pH 10/09/2023 7.37     Calcium, Ionized Measured 10/09/2023 1.11     Culture 10/09/2023 <10,000 CFU/mL Gram positive bacilli, resembling diphtheroids (A)     Culture 10/09/2023 <10,000 CFU/mL Gram positive bacilli (A)     Culture 10/09/2023 <10,000 CFU/mL Gram positive bacilli (A)     Culture 10/09/2023 <10,000 CFU/mL Gram positive bacilli, resembling diphtheroids (A)     Culture 10/09/2023 <1,000 CFU/mL Gram positive cocci (A)     Case Report 10/09/2023                      Value:Surgical Pathology Report                         Case: [id number]                                  Authorizing Provider:  Matthew Ramey DO   Collected:           10/09/2023 03:54 PM          Ordering Location:     [facility]      Received:            10/09/2023 04:05 PM                                 [facility]                                                               Pathologist:           Galina Banks MD                                                           Intraop:               Galina Banks MD                                                           Specimen:    Other, MESENTERIC IMPLANT                                                                  Intraoperative Consultat* 10/09/2023                      Value:This result contains rich text formatting which cannot be displayed here.    Sodium 10/10/2023 137     Potassium 10/10/2023 3.6     Chloride 10/10/2023 101     Carbon Dioxide (CO2) 10/10/2023 25     Anion Gap 10/10/2023 11     Urea Nitrogen 10/10/2023 8.3     Creatinine 10/10/2023 1.01 (H)     GFR Estimate 10/10/2023 64     Calcium 10/10/2023 8.2 (L)     Glucose 10/10/2023 95     Magnesium 10/10/2023 1.7     WBC Count 10/10/2023 12.6 (H)     RBC Count 10/10/2023 3.77 (L)     Hemoglobin 10/10/2023  10.8 (L)     Hematocrit 10/10/2023 33.5 (L)     MCV 10/10/2023 89     MCH 10/10/2023 28.6     MCHC 10/10/2023 32.2     RDW 10/10/2023 15.9 (H)     Platelet Count 10/10/2023 580 (H)     % Neutrophils 10/10/2023 80     % Lymphocytes 10/10/2023 10     % Monocytes 10/10/2023 10     % Eosinophils 10/10/2023 0     % Basophils 10/10/2023 0     % Immature Granulocytes 10/10/2023 0     NRBCs per 100 WBC 10/10/2023 0     Absolute Neutrophils 10/10/2023 10.0 (H)     Absolute Lymphocytes 10/10/2023 1.2     Absolute Monocytes 10/10/2023 1.3     Absolute Eosinophils 10/10/2023 0.0     Absolute Basophils 10/10/2023 0.0     Absolute Immature Granul* 10/10/2023 0.1     Absolute NRBCs 10/10/2023 0.0     WBC Count 10/11/2023 13.8 (H)     RBC Count 10/11/2023 3.29 (L)     Hemoglobin 10/11/2023 9.6 (L)     Hematocrit 10/11/2023 28.8 (L)     MCV 10/11/2023 88     MCH 10/11/2023 29.2     MCHC 10/11/2023 33.3     RDW 10/11/2023 15.9 (H)     Platelet Count 10/11/2023 510 (H)     Sodium 10/11/2023 137     Potassium 10/11/2023 3.3 (L)     Chloride 10/11/2023 102     Carbon Dioxide (CO2) 10/11/2023 23     Anion Gap 10/11/2023 12     Urea Nitrogen 10/11/2023 12.1     Creatinine 10/11/2023 0.87     GFR Estimate 10/11/2023 76     Calcium 10/11/2023 8.3 (L)     Glucose 10/11/2023 93         Vielka Gordon PA-C  [facility]  [address]  [address]  [place] [address]

## 2023-10-11 NOTE — PROGRESS NOTES
Care Management Follow Up    Length of Stay (days): 2    Expected Discharge Date: 10/12/2023     Concerns to be Addressed:  discharge planning      Patient plan of care discussed at interdisciplinary rounds: Yes    Anticipated Discharge Disposition:       Anticipated Discharge Services:    Education Provided on the Discharge Plan:  Per Care Team   Patient/Family in Agreement with the Plan:  Yes    Referrals Placed by CM/SW:    Private pay costs discussed: Not applicable    Additional Information:  Chart reviewed.    Cm updates:  Pt being followed by hem/onc, no therapy consults placed at this time.     Surgery following pt, plan for removal of NG and diet advancement.     Social Hx:  Pt lives alone on the first floor of an apartment. Pt uses walker/cane for mobility inside home, and a power scooter for longer distances. Pt has [employer] [facility] and states having a CM, did not have CMs contact info. Pt received 4-5 hours a day of PCA services, 7 days a week. They assist with ADLS, and IADLS. Pt uses [facility] for transport. Pt has an established PCP. Pt is on disability. Hem/Onc consult placed, and Urology Consult placed. Pt states children are supportive. One of the sons would be the one to transport at discharge.        [facility] #[phone]     Cm will continue to follow plan of care, review recommendations, and assist with any discharge needs anticipated.        Veronica Hernandez RN

## 2023-10-11 NOTE — PLAN OF CARE
"  Problem: Plan of Care - These are the overarching goals to be used throughout the patient stay.    Goal: Plan of Care Review  Description: The Plan of Care Review/Shift note should be completed every shift.  The Outcome Evaluation is a brief statement about your assessment that the patient is improving, declining, or no change.  This information will be displayed automatically on your shift note.  Outcome: Progressing  Goal: Patient-Specific Goal (Individualized)  Description: You can add care plan individualizations to a care plan. Examples of Individualization might be:  \"Parent requests to be called daily at 9am for status\", \"I have a hard time hearing out of my right ear\", or \"Do not touch me to wake me up as it startles me\".  Outcome: Progressing  Goal: Absence of Hospital-Acquired Illness or Injury  Outcome: Progressing  Intervention: Prevent Skin Injury  Recent Flowsheet Documentation  Taken 10/10/2023 2000 by Edith Larios, RN  Body Position: position changed independently  Goal: Optimal Comfort and Wellbeing  Outcome: Progressing  Goal: Readiness for Transition of Care  Outcome: Progressing     Problem: Intestinal Obstruction  Goal: Optimize Nutrition Status  Outcome: Not Progressing   Goal Outcome Evaluation:     Pt c/o incisional pain 8/10. Treated with oxycodone 5 mg with good results. CHAVA drains present. Right CHAVA dressing with old blood. Incision dressing old blood. No bowel sounds heard this evening. NG tube clamped.     Edith Larios, RN on 10/10/2023 at 10:18 PM        "

## 2023-10-11 NOTE — PLAN OF CARE
"  Problem: Plan of Care - These are the overarching goals to be used throughout the patient stay.    Goal: Plan of Care Review  Description: The Plan of Care Review/Shift note should be completed every shift.  The Outcome Evaluation is a brief statement about your assessment that the patient is improving, declining, or no change.  This information will be displayed automatically on your shift note.  Outcome: Progressing  Goal: Patient-Specific Goal (Individualized)  Description: You can add care plan individualizations to a care plan. Examples of Individualization might be:  \"Parent requests to be called daily at 9am for status\", \"I have a hard time hearing out of my right ear\", or \"Do not touch me to wake me up as it startles me\".  Outcome: Progressing  Goal: Absence of Hospital-Acquired Illness or Injury  Outcome: Progressing  Intervention: Identify and Manage Fall Risk  Recent Flowsheet Documentation  Taken 10/11/2023 0026 by Yanique Romo RN  Safety Promotion/Fall Prevention:   lighting adjusted   activity supervised  Intervention: Prevent Skin Injury  Recent Flowsheet Documentation  Taken 10/11/2023 0026 by Yanique Romo RN  Body Position: position changed independently  Goal: Optimal Comfort and Wellbeing  Outcome: Progressing  Intervention: Monitor Pain and Promote Comfort  Recent Flowsheet Documentation  Taken 10/11/2023 0436 by Yanique Romo RN  Pain Management Interventions:   heat applied   medication (see MAR)  Taken 10/11/2023 0115 by Yanique Romo RN  Pain Management Interventions: heat applied  Taken 10/11/2023 0026 by Yanique Romo, RN  Pain Management Interventions:   medication (see MAR)   emotional support   heat applied  Goal: Readiness for Transition of Care  Outcome: Progressing   Goal Outcome Evaluation:       Pt continues to be painful but sleeping up to 4 hours at a time. NG remains clamped.  Pt given scheduled tylenol and prn oxycodone. Heat packs applied. Pt has " hypo bowel sounds and no flatus or nausea. Pt ambulated in room and to bathroom voiding with a PVR of 0. Will monitor.

## 2023-10-12 NOTE — PLAN OF CARE
Problem: Pain Acute  Goal: Optimal Pain Control and Function  Outcome: Progressing  Intervention: Develop Pain Management Plan  Recent Flowsheet Documentation  Taken 10/12/2023 1252 by Vielka Lawrence RN  Pain Management Interventions: medication (see MAR)   Goal Outcome Evaluation:       Patient continues to report abdominal pain managed effectively with oral medications. Patient abdomen rounded and soft, bowel sounds hypoactive. Dressings clean, dry and intact. Patient reports passing small amounts of flatus. Patient did ambulate about 150feet, and was up in the chair X1. Continues on clear liquid diet with minimal intake. Encouraged patient to increase fluids intake.

## 2023-10-12 NOTE — PLAN OF CARE
Problem: Intestinal Obstruction  Goal: Optimal Pain Control and Function  Outcome: Progressing     Problem: Intestinal Obstruction  Goal: Optimize Nutrition Status  Outcome: Progressing     Problem: Intestinal Obstruction  Goal: Fluid and Electrolyte Balance  Outcome: Progressing     Goal Outcome Evaluation:       Pt is POD #2 s/p SBO with open lysis of adhesions and hernia reduction. Biopsies are pending. Surgical findings concerning for malignancy. WBC trending up to 13.8. Will continue to monitor. Pt is high risk for leak 2/2 tumor. Oncology and surgery consulting. Pt is A&O x4. A1 with ambulation. NG tube removed this AM. Dressings on abdomen are clean, dry and intact. CHAVA drain x2 to bulb suction with approximately 30 ml serosanguineous drainage from left drain and 13 ml from right. Abdomen is rounded. Denies nausea. Tolerating a clear liquid diet. Appetite is fair. IV fluids discontinued. Na+ level has normalized. She has complaints of tenderness to the upper abdomen, treated with scheduled Tylenol and prn Oxycodone at 1850. On K+ protocol. Recheck in AM. Pt was weaned to RA during the day. O2 applied at 2 liters via NC at hs. Wears CPAP at [facility] with oxygen bled in. Pt is on IV abx in the form of Rocephin for UTI. Urology consulting.

## 2023-10-12 NOTE — PROGRESS NOTES
[facility]    Medicine Progress Note - Hospitalist Service    Date of Admission:  10/8/2023    Assessment & Plan      Peri Irizarry is a 58 year old female admitted on 10/8/2023. She came to the emergency department for evaluation of abdominal pain, nausea and vomiting; found to have small bowel obstruction requiring surgical management.    #Small bowel obstruction  ---POD #3 s/p exploratory laparotomy with lysis of adhesions and reduction of internal hernia.    ---Surgical findings include; Diffuse metastatic cancer of unknown primary, Closed loop obstruction of proximal small bowel  Gastric mass with evidence of near erosion into small bowel  ---Concern for malignancy related, surgery feels metastatic gastric versus recurrent breast  ---Surgery following,  --- NG out 10/11  --- Pathology still pending  --- Diet and further orders per surgery  ---CHAVA management per surgery.  --- Continue ambulation    #Acute kidney injury  --- Resolved  --- Stopped IV fluids.    Acute blood loss anemia  ---stable overall but hemoglobin significantly down since admission.    ---Continue to monitor; going down small amounts each day.  --- Weight up about 10 pounds since admission if scale correct.  Consider Lasix if dilution suspected  --- Start iron eventually    #Hyponatremia  -resolved  --secondary to volume depletion from decreased oral intake, furosemide, and hydrochlorothiazide   --stable off IV    #Hypokalemia  -mild  -Secondary to GI loss  -Replace    #Mural thickening of the stomach  #Mesenteric stranding and lymphadenopathy  #History of colon and breast cancer  -CT showed most pronounced in the distal body and antrum, corresponding to area of increased activity on PET/CT which could reflect a neoplastic process; mesenteric stranding and lymphadenopathy concerning for metastatic disease with the largest lymph node seen in the stomach measuring 2.9 x 2.4 cm  -Status post biopsy at time of small  "bowel obstruction surgery.   --- Biopsy results pending  -[facility] consult appreciated - seen [facility] in past    #Left hydroureteronephrosis  -CT showed no obstructing calculi, there is inflammatory stranding surrounding the left ureter distally which could reflect neoplastic invasion and/or inflammatory scaring  -Urology consulted from ED.  Await formal consult today - carinae note  -- Gomez discontinued 10/10  --- PVRs have been negative    #Acute cystitis without hematuria, probable  -Urine culture polymicrobial, mainly gram-positive bacilli but all with low colony forming units  -IV ceftriaxone for now pending urology consult  -Follow-up urine culture  -Discontinued Gomez catheter    #COPD/asthma  -Quit smoking 2010   --No exacerbation  -DuoNebs as needed  -Continue home Incruse Ellipta    #Essential hypertension  ---Holding Norvasc and Lasix and HCTZ since she has been n.p.o.  ---Resumed Lasix 10/11  ---keep holding norvasc and hydrochlorothiazide as bp ok  -IV hydralazine as needed    #Chronic thrombocytosis  -improing with surgery  --stable    #JAMMIE  --Now that NG out can resume CPAP    #Chronic thoracic back pain  -10/11resumed Effexor, Cymbalta,   ---gabapentin resumed at half dosing     Dysthymia--resume BuSpar and Cymbalta and gabapentin and Topamax and effexor and trazodone   ---resume slowly, some at only half dosing.    Hyperlipidemia--resume statin       Diet: Clear Liquid Diet  Snacks/Supplements Adult: Ensure Clear; Between Meals    DVT Prophylaxis: Enoxaparin (Lovenox) SQ  Gomez Catheter: Not present  Lines: None     Cardiac Monitoring: None  Code Status: Full Code      Clinically Significant Risk Factors        # Hypokalemia: Lowest K = 3.3 mmol/L in last 2 days, will replace as needed             # Hypertension: Noted on problem list        # Obesity: Estimated body mass index is 30.64 kg/m  as calculated from the following:    Height as of this encounter: 1.499 m (4' 11\").    Weight " as of this encounter: 68.8 kg (151 lb 11.2 oz)., PRESENT ON ADMISSION  # Severe Malnutrition: based on nutrition assessment, PRESENT ON ADMISSION   # COPD: noted on problem list        Disposition Plan      Expected Discharge Date: 10/13/2023    Discharge Delays: IV Medication - consider oral or Home Infusion  Other (Add Comment)  Voiding/Eating Trial needed  Specialist Consult (enter specialist & decision needed in comments)  Destination: home with help/services            Julienne Maldonado MD  Hospitalist Service  [facility]  Securely message with better. (more info)  Text page via [employer] Paging/Directory   ______________________________________________________________________    Interval History   --- Weaned off of oxygen  --- Starting to pass gas.  Ambulated well in the halls  --- No nausea or vomiting with NG removal  --- Abdominal pain is overall controlled.  Discussed readdition of her home pain regimen.  --- No excessive somnolence noted.    Physical Exam   Vital Signs: Temp: 98.3  F (36.8  C) Temp src: Oral BP: 138/65 Pulse: 90   Resp: 18 SpO2: 97 % O2 Device: None (Room air) Oxygen Delivery: 2 LPM  Weight: 151 lbs 11.2 oz    General Appearance: Pleasant female in no apparent distress  Respiratory: Clear to auscultation  Cardiovascular: Regular rate and rhythm  GI: Incisions clean dressed intact with 2 drains in place. -No significant tenderness  Skin: No significant lower extremity edema  Other: Neurologically grossly intact without focal deficits appreciated    Medical Decision Making             Data     I have personally reviewed the following data over the past 24 hrs:    11.1 (H)  \   8.4 (L)   / 463 (H)     135 102 11.1 /  97   3.3 (L) 25 0.83 \     Imaging results reviewed over the past 24 hrs:   No results found for this or any previous visit (from the past 24 hour(s)).

## 2023-10-12 NOTE — PLAN OF CARE
"  Problem: Plan of Care - These are the overarching goals to be used throughout the patient stay.    Goal: Plan of Care Review  Description: The Plan of Care Review/Shift note should be completed every shift.  The Outcome Evaluation is a brief statement about your assessment that the patient is improving, declining, or no change.  This information will be displayed automatically on your shift note.  Outcome: Progressing  Goal: Patient-Specific Goal (Individualized)  Description: You can add care plan individualizations to a care plan. Examples of Individualization might be:  \"Parent requests to be called daily at 9am for status\", \"I have a hard time hearing out of my right ear\", or \"Do not touch me to wake me up as it startles me\".  Outcome: Progressing  Goal: Absence of Hospital-Acquired Illness or Injury  Outcome: Progressing  Intervention: Identify and Manage Fall Risk  Recent Flowsheet Documentation  Taken 10/12/2023 0010 by Yanique Romo, RN  Safety Promotion/Fall Prevention:   lighting adjusted   activity supervised   assistive device/personal items within reach  Intervention: Prevent Skin Injury  Recent Flowsheet Documentation  Taken 10/12/2023 0010 by Yanique Romo, RN  Body Position: position changed independently  Goal: Optimal Comfort and Wellbeing  Outcome: Progressing  Intervention: Monitor Pain and Promote Comfort  Recent Flowsheet Documentation  Taken 10/12/2023 0010 by Yanique Romo, RN  Pain Management Interventions:   medication (see MAR)   emotional support   ambulation/increased activity  Goal: Readiness for Transition of Care  Outcome: Progressing   Goal Outcome Evaluation:       Pt a/o with VSS. Pt pain tolerable with prn oxycodone and scheduled tylenol. Pt ambulated in halls for about 7 minutes and is starting to pass flatus. Will monitor.                  "

## 2023-10-12 NOTE — CONSULTS
[facility] CONSULT     Type of consult: inpatient  Place of service: [facility]   Reason for consult: Left hydroureteronephrosis   Requested by: Shamar Taylor MD     History of Present Illness:   Peri Irizarry is a 58 year old female with hx of depression, TIA 2010, breast cancer s/p left lumpectomy followed by radiation in 2010, colon cancer resection in 2012, s/p hysterectomy '13, COPD/asthma, JAMMIE on CPAP, chronic headache, chronic thoracic back pain, essential hypertension; Admitted 10/8 for SBO (small bowel obstruction) (H), ANGELINA, hypokalemia, mural thickening of stomach, left HUN, suspected acute cystitis. S/p exploratory  laparotomy with lysis of adhesions and reduction of internal hernia and surgical findings of: Diffuse metastatic cancer of unknown primary, Closed loop obstruction of proximal small bowel. Gastric mass with evidence of near erosion into small bowel.   A urology consult was placed for further evaluation and recommendations regarding left hydroureteronephrosis noted on the CT Abd/Pelvis 10/9/23.. History obtained through patient and chart review.    Consult initially called to [facility], referred to [facility] on 10/11/23.     Pt presented to the ED 10/8/23 with c/o abdominal pain, nausea and vomiting. CT Abd/Pelvis 10/9/23 showed mild left-sided HUN, retroperitoneal inflammatory changes adjacent to the distal left ureter (concerning for neoplastic invasion and/or inflammatory scarring) without obstructing stone, with decompressed bladder, normal right kidney and right ureter.     Creatinine 0.83 today, as high as 1.13 on 10/9.     UA 10/9: 9 WBC, +leuks, neg nitrites. UC 10/9: multiple morphotypes, no predominant organism. WBC 11.1 today, 13.8 on 10/11, 11.5 on 10/8. Afebrile this hospitalization. Ceftriaxone initiated 10/9/23 (no dose on 10/10, resumed 10/11).      Pt denies hx of hydronephrosis, ureteral obstruction, bladder/ kidney cancer, gross hematuria. No family hx of  "kidney/bladder CA. Pt reports some blood in urine since surgery this hospitalization, denies clots, voiding without difficulty and feels she is emptying well.     Pt is a former smoker.     Past Medical History  Past Medical History:   Diagnosis Date    Anemia     Anxiety     Arthritis     Asthma     Breast cancer (H) 01/01/2010    left     Cancer (H)     Colon cancer (H) 01/01/2011    COPD (chronic obstructive pulmonary disease) (H)     Depression     Difficulty walking     Dyspnea on exertion     History of migraine headaches     Hx of radiation therapy 01/01/2010    Hypertension     Migraines     Motion sickness     Orthopnea     Other chronic pain     Shortness of breath     Sleep apnea     CPAP    Stroke (H) 01/01/2010    \"I HAD A SLIGHT STROKE BACK IN 2010\"    Walking troubles        Past Surgical History  Past Surgical History:   Procedure Laterality Date    BIOPSY BREAST Left 2010    BREAST SURGERY      COLON SURGERY  2011    20% OF COLON REMOVED-COLON CANCER    COLONOSCOPY N/A 6/24/2016    Procedure: COLONOSCOPY WITH MAC SEDATION;  Surgeon: Kumar Horowitz MD;  Location: [facility];  Service:     COLONOSCOPY N/A 7/31/2019    Procedure: COLONOSCOPY;  Surgeon: Chintan Peter MD;  Location: [facility] OR;  Service: General    COLONOSCOPY N/A 12/7/2022    Procedure: COLONOSCOPY WITH POLYPECTOMY;  Surgeon: Evin Wright DO;  Location: [facility] OR    HYSTERECTOMY  2013    LAPAROSCOPY DIAGNOSTIC (GYN) N/A 10/9/2023    Procedure: DIAGNOSTIC LAPAROSCOPY, LYSIS OF ADHESIONS;  Surgeon: Matthew Ramey DO;  Location: [facility] [place] OR    LAPAROTOMY EXPLORATORY N/A 10/9/2023    Procedure: OPEN LYSIS OF ADHESIONS; REDUCTION INTERNAL HERNIA, PARTIAL OMENTECTOMY;  Surgeon: Matthew Ramey DO;  Location: [facility] OR    LUMPECTOMY BREAST Left 2010    OOPHORECTOMY Bilateral 2013       Social History  Social History     Socioeconomic History    Marital status: Single     Spouse name: Not " on file    Number of children: Not on file    Years of education: Not on file    Highest education level: Not on file   Occupational History    Not on file   Tobacco Use    Smoking status: Former     Packs/day: 2.00     Years: 20.00     Additional pack years: 0.00     Total pack years: 40.00     Types: Cigarettes     Quit date:      Years since quittin.7    Smokeless tobacco: Never   Substance and Sexual Activity    Alcohol use: Not Currently    Drug use: Never    Sexual activity: Yes   Other Topics Concern    Parent/sibling w/ CABG, MI or angioplasty before 65F 55M? No   Social History Narrative    Not on file     Social Determinants of Health     Financial Resource Strain: Not on file   Food Insecurity: Not on file   Transportation Needs: Not on file   Physical Activity: Not on file   Stress: Not on file   Social Connections: Not on file   Interpersonal Safety: Not on file   Housing Stability: Not on file       Medications  Current Facility-Administered Medications   Medication    acetaminophen (TYLENOL) Suppository 650 mg    acetaminophen (TYLENOL) tablet 650 mg    acetaminophen (TYLENOL) tablet 975 mg    albuterol (PROVENTIL HFA/VENTOLIN HFA) inhaler    [Held by provider] amLODIPine (NORVASC) tablet 2.5 mg    bisacodyl (DULCOLAX) suppository 10 mg    busPIRone (BUSPAR) tablet 5 mg    cefTRIAXone (ROCEPHIN) 1 g vial to attach to  mL bag for ADULTS or NS 50 mL bag for PEDS    cyclobenzaprine (FLEXERIL) tablet 5 mg    DULoxetine (CYMBALTA) DR capsule 30 mg    enoxaparin ANTICOAGULANT (LOVENOX) injection 40 mg    famotidine (PEPCID) injection 20 mg    [Held by provider] ferrous sulfate (FEROSUL) tablet 325 mg    fluticasone-vilanterol (BREO ELLIPTA) 200-25 MCG/ACT inhaler 1 puff    furosemide (LASIX) tablet 40 mg    gabapentin (NEURONTIN) capsule 300 mg    gabapentin (NEURONTIN) capsule 600 mg    hydrALAZINE (APRESOLINE) injection 10 mg    [Held by provider] hydrochlorothiazide (HYDRODIURIL) tablet 25  mg    HYDROmorphone (DILAUDID) injection 0.2 mg    HYDROmorphone (DILAUDID) injection 0.4 mg    hydrOXYzine (ATARAX) tablet 25 mg    ipratropium - albuterol 0.5 mg/2.5 mg/3 mL (DUONEB) neb solution 3 mL    lidocaine (LMX4) cream    lidocaine 1 % 0.1-1 mL    magnesium hydroxide (MILK OF MAGNESIA) suspension 30 mL    meclizine (ANTIVERT) tablet 25 mg    montelukast (SINGULAIR) tablet 10 mg    morphine (PF) injection 1 mg    Or    morphine (PF) injection 2 mg    multivitamin w/minerals (THERA-VIT-M) tablet 1 tablet    naloxone (NARCAN) injection 0.2 mg    Or    naloxone (NARCAN) injection 0.4 mg    Or    naloxone (NARCAN) injection 0.2 mg    Or    naloxone (NARCAN) injection 0.4 mg    ondansetron (ZOFRAN ODT) ODT tab 4 mg    Or    ondansetron (ZOFRAN) injection 4 mg    ondansetron (ZOFRAN ODT) ODT tab 4 mg    Or    ondansetron (ZOFRAN) injection 4 mg    oxyCODONE (ROXICODONE) tablet 5 mg    phenol (CHLORASEPTIC) 1.4 % spray 1 mL    polyethylene glycol (MIRALAX) Packet 17 g    prochlorperazine (COMPAZINE) injection 10 mg    Or    prochlorperazine (COMPAZINE) tablet 10 mg    prochlorperazine (COMPAZINE) injection 5 mg    prochlorperazine (COMPAZINE) tablet 10 mg    Or    prochlorperazine (COMPAZINE) suppository 25 mg    rosuvastatin (CRESTOR) tablet 10 mg    senna-docusate (SENOKOT-S/PERICOLACE) 8.6-50 MG per tablet 1 tablet    sodium chloride (PF) 0.9% PF flush 3 mL    sodium chloride (PF) 0.9% PF flush 3 mL    SUMAtriptan (IMITREX) tablet 50 mg    thiamine (B-1) tablet 100 mg    topiramate (TOPAMAX) tablet 100 mg    topiramate (TOPAMAX) tablet 50 mg    [Held by provider] traZODone (DESYREL) tablet 100 mg    umeclidinium (INCRUSE ELLIPTA) 62.5 MCG/ACT inhaler 1 puff    venlafaxine (EFFEXOR XR) 24 hr capsule 37.5 mg       Allergies  Allergies   Allergen Reactions    Hydrocodone-Acetaminophen Unknown     Pt takes this med 3 times daily, Brand Name Hydrocodone without the sparkles pt can use  -Monitor hydrocodone/apap  "supply (ok to take as long as no blue sparkles like brand lortab)        Lisinopril     Penicillin G        ROS:   A full 12 point review of systems was taken and is negative aside from what is noted above in the HPI.    Physical exam  /65 (BP Location: Left arm)   Pulse 90   Temp 98.3  F (36.8  C) (Oral)   Resp 18   Ht 1.499 m (4' 11\")   Wt 68.8 kg (151 lb 11.2 oz)   LMP  (LMP Unknown)   SpO2 92%   BMI 30.64 kg/m    General: NAD, alert, cooperative  Head: normocephalic, without abnormality / atraumatic  Abdomen: soft, tender x 4Q, distended. No suprapubic fullness/tenderness. Mild aaron CVA tenderness noted, no right CVA tenderness.   Musculoskeletal: moves all four extremities equally  Psychological: alert and oriented, answers questions appropriately    Labs  Lab Results   Component Value Date    WBC 11.1 (H) 10/12/2023    HGB 8.4 (L) 10/12/2023    HCT 25.8 (L) 10/12/2023     (H) 10/12/2023    CHOL 193 04/05/2023    TRIG 66 04/05/2023    HDL 59 04/05/2023    ALT 20 10/08/2023    AST 23 10/08/2023     10/12/2023    BUN 11.1 10/12/2023    CO2 25 10/12/2023    TSH 2.71 04/05/2023    INR 1.07 10/09/2023     Creatinine   Date Value Ref Range Status   10/12/2023 0.83 0.51 - 0.95 mg/dL Final       UA RESULTS:  Recent Labs   Lab Test 10/09/23  0343   COLOR Light Yellow   APPEARANCE Clear   URINEGLC Negative   URINEBILI Negative   URINEKETONE 40*   SG 1.020   UBLD Negative   URINEPH 5.5   PROTEIN Negative   NITRITE Negative   LEUKEST 250 Tea/uL*   RBCU 0   WBCU 9*     Cultures:  Urine Culture 10/9: Multiple morphotypes, no predominant organism,   <10,000 CFU/mL Gram positive bacilli, resembling diphtheroids Abnormal       <10,000 CFU/mL Gram positive bacilli Abnormal       <10,000 CFU/mL Gram positive bacilli Abnormal       <10,000 CFU/mL Gram positive bacilli, resembling diphtheroids Abnormal       <1,000 CFU/mL Gram positive cocci Abnormal      Blood Cultures: None    Lab Results: personally " reviewed     Imaging:  EXAM: CT ABDOMEN PELVIS W CONTRAST  LOCATION: [facility]  DATE: 10/9/2023     INDICATION: Eval for worsening abdominal pain now with nausea vomiting and poor intake for 3 days  COMPARISON: PET/CT dated 09/28/2023  TECHNIQUE: CT scan of the abdomen and pelvis was performed following injection of IV contrast. Multiplanar reformats were obtained. Dose reduction techniques were used.  CONTRAST: isovue 370 90ml     FINDINGS:   LOWER CHEST: Normal.     HEPATOBILIARY: No significant mass or bile duct dilatation. No calcified gallstones. Gallbladder is not distended, there is a layering sludge in the gallbladder. A small amount of pericholecystic fluid is identified.      PANCREAS: No significant mass, duct dilatation, or inflammatory change.     SPLEEN: Normal size.     ADRENAL GLANDS: No significant nodules.     KIDNEYS/BLADDER: There is mild left-sided hydroureteronephrosis. Retroperitoneal inflammatory changes are seen adjacent to the distal left ureter (image 140, series 3). No obstructing renal stone is identified. The right kidney and ureter are normal in   size and caliber. The bladder is decompressed.     BOWEL: There is mural thickening of the stomach most pronounced in the distal body/antrum (image 31, series 3, corresponding with increased activity in this area on prior PET/CT.  There is mild dilatation of the proximal jejunum measuring up to 3.8 cm in   diameter with a transition point in the left upper quadrant (image 76, series 3). Remainder the small bowel is normal in size and caliber. The appendix is normal. Postsurgical changes from prior transverse colectomy are again noted the colon is normal   in size and caliber.     LYMPH NODES: Mesenteric stranding at the root of the mesentery with multiple subcentimeter lymph nodes are seen throughout the abdomen and retroperitoneum. It is more larger distended lymph nodes seen in the root of mesentery the largest of  which   measures 2.9 x 2.4 cm, adjacent to the proximal stomach (image 142, series 3)      VASCULATURE: No abdominal aortic aneurysm.     PELVIC ORGANS: No pelvic masses.     MUSCULOSKELETAL: Unremarkable.                                                                      IMPRESSION:   1.  Mural thickening of the stomach most pronounced in the distal body and antrum (image 31, series 3 which corresponds the area of increased activity on the prior PET/CT, which could reflect a neoplastic process, correlation with patient's oncology   notes   2.   dilatation of the proximal jejunum with an apparent transition point seen in the left upper quadrant, (image 76, series 3) may reflect a low to intermediate grade proximal small bowel obstruction, versus focal ileus. Correlation with patient's   clinical symptomatology is recommended.  3.  Mesenteric stranding and lymphadenopathy, concerning for metastatic disease, with the largest lymph node seen in the stomach measuring 2.9 x 2.4 cm (image 142, series 3)  4.  interval development of a left-sided hydroureteronephrosis, no obstructing calculi is identified there is inflammatory stranding surrounding the left ureter distally which could reflect neoplastic invasion and/or inflammatory scarring.     I have personally reviewed the imaging reports above.       Assessment/plan  Peri Irizarry is being seen by [facility] for left hydroureteronephrosis     - 58 yr old female with hx of breast and colon cancers, admitted with SBO s/p laparotomy/KOKI/reduction of internal hernia 10/9, with surgical findings of diffuse metastatic CA of unknown primary, closed loop obstruction of prox small bowel, gastric mass with evidence of near erosion into small bowel (Surgery concerned for metastatic gastric vs recurrent breast CA). Left HUN without stones and with inflammatory stranding noted surrounding left ureteral distally on CT 10/9).    - UA minimally suggestive of infection. UC  with multiple morphotypes, no predominant organism consistent with contamintion. Remains afebrile. WBC improving, 11.1. Receiving IV ceftriaxone.   - Patient is symptomatic with mild left CVA tenderness with palpation. No right-sided pain.    - Creatinine normalized, 0.83 today.   - Will continue to follow. If renal function remains normal, minimal flank pain, leukocytosis normalizes/remains afebrile and if prognosis is poor, may be in patient's best interest to avoid ureteral stent placement. Should she clinically worsen and/or plans to undergo chemotherapy, may need to consider ureteral stent placement   - [facility]    This case was discussed with:  Dr. Paredes.       Thank you for consulting [facility] regarding this patient's care. Please contact us with questions or concerns.     EUFEMIA Milian, CNP  [facility]  Office Phone: #[phone]

## 2023-10-12 NOTE — PROGRESS NOTES
RCAT Treatment Plan    Patient Score: 10  Patient Acuity: 4    Clinical Indication for Therapy: prevent atelectasis    Therapy Ordered: Flutter valve QID    Assessment Summary:   Patient is on RA, SPO2 low 90's. BS diminished, She has a fair non productive cough. Able to do IS of 750 mL and demonstrate great techniques use on FV. Patient still have abdominal pain. Patient also wear CPAP at night. Hospital unit CPAP on standby. Will change patient to FV PRN.     Katiuska Rader, RT  10/12/2023

## 2023-10-12 NOTE — PROGRESS NOTES
RESPIRATORY CARE NOTE   Patient is on 2L NC, BS diminished, gave Flutter QID, tolerated well.      Joe Atkins, RT

## 2023-10-12 NOTE — PROGRESS NOTES
Peri Irizarry is slowly improving, passing flatus but no bowel movements, tolerating clear liquids, pain is minimal        Vitals:    10/11/23 2005 10/11/23 2325 10/12/23 0559 10/12/23 0717   BP: 109/57 139/65  138/65   BP Location:  Left arm  Left arm   Pulse: 96 91  90   Resp: 18 17  18   Temp: 98.3  F (36.8  C) 98.6  F (37  C)  98.3  F (36.8  C)   TempSrc: Oral Oral  Oral   SpO2: 93% 97%     Weight:   68.8 kg (151 lb 11.2 oz)    Height:           PHYSICAL EXAM:  GEN: No acute distress, comfortable  ABD: Mildly distended, soft, nontender palpation  EXT:No cyanosis, edema or obvious abnormalities        Recent Labs   Lab 10/12/23  0546   WBC 11.1*   HGB 8.4*   HCT 25.8*   *       Recent Labs   Lab 10/12/23  0546 10/09/23  0608 10/08/23  2315      < > 133*   CO2 25   < > 23   BUN 11.1   < > 7.9*   ALBUMIN  --   --  4.5   ALKPHOS  --   --  118*   ALT  --   --  20   AST  --   --  23    < > = values in this interval not displayed.         A/P:  Peri Irizarry is s/p laparotomy lysis of adhesions and reduction of internal hernia  -Pathology results are still pending.  This is likely gastric etiology of disseminated recurrent breast cancer versus gastric malignancy  -Continue with CHAVA drain and clear liquids for now.  Encouraged patient to continue with ambulation as well    Matthew Ramey,   FACS  [phone]  [facility] Department of Surgery

## 2023-10-13 NOTE — PLAN OF CARE
Problem: Surgery Nonspecified  Goal: Nausea and Vomiting Relief  Outcome: Progressing     Problem: Surgery Nonspecified  Goal: Optimal Pain Control and Function  Outcome: Not Progressing  Intervention: Prevent or Manage Pain  Recent Flowsheet Documentation  Taken 10/12/2023 2006 by Sheba Rea RN  Pain Management Interventions: medication (see MAR)  Taken 10/12/2023 1626 by Sheba Rea RN  Pain Management Interventions: declines     Problem: Surgery Nonspecified  Goal: Effective Bowel Elimination  Outcome: Not Progressing     Goal Outcome Evaluation:        Pt is POD #3 s/p SBO with open lysis of adhesions and hernia reduction. Pathology is pending. Surgical findings concerning for metastatic disease. Pt is high risk for leak 2/2 tumor. Oncology and surgery consulting. WBC trending down today at 11.1. Creatinine and Na+ have normalized.Pt is A&O x4. A1 with ambulation. Pt is ambulating in the halls with encouragement. NG tube removed yesterday. Pt is tolerating a clear liquid diet. Appetite is poor. On K+ protocol. Recheck in AM. Abdomen is round and distended. Pt reports passing a small amount of flatus but is experiencing abdominal gas pains. Denies nausea. Dressings on abdomen are clean, dry and intact. CHAVA drain x2 to bulb suction with approximately 45 ml serosanguineous drainage from left drain and 15 ml from the right. Abdominal tenderness on palpation of the left and right upper quadrant. Received scheduled Tylenol and prn Oxycodone at 2005.. On K+ protocol. Pt was again weaned to RA during the day however, desats at hs with O2 applied at 2 liters via NC.  Wears CPAP at NOCs with oxygen bled in. Pt is on IV abx in the form of Rocephin for UTI. Urology consulting.  Renal ultrasound performed today which verified presence of mild left hydronephrosis.

## 2023-10-13 NOTE — PROGRESS NOTES
[facility]    Medicine Progress Note - Hospitalist Service    Date of Admission:  10/8/2023    Assessment & Plan    Peri Irizarry is a 58 year old female admitted on 10/8/2023. She came to the emergency department for evaluation of abdominal pain, nausea and vomiting; found to have small bowel obstruction requiring surgical management.     #Small bowel obstruction  --s/p exploratory laparotomy with lysis of adhesions and reduction of internal hernia done on 10/9  ---Surgical findings include; Diffuse metastatic cancer of unknown primary, Closed loop obstruction of proximal small bowel  Gastric mass with evidence of near erosion into small bowel  ---Concern for malignancy related, surgery feels metastatic gastric versus recurrent breast  ---Surgery following,  --- NG out 10/11  --- Pathology still pending  --- Diet and further orders per surgery.  Currently on clears agree with the same.  ---CHAVA management per surgery.  --- Patient encouraged to ambulate 3 times a day.     #Acute kidney injury  --- Resolved  --- Stopped IV fluids.     Acute blood loss anemia  ---stable overall but hemoglobin significantly down since admission.    ---Continue to monitor; going down small amounts each day.  --- Weight up about 10 pounds since admission if scale correct.  DC p.o. Lasix since it could worsen dehydration-     #Hyponatremia  -resolved  --secondary to volume depletion from decreased oral intake, furosemide, and hydrochlorothiazide  discontinue Lasix.  --stable off IV     #Hypokalemia  -mild  -Secondary to GI loss  -Replace     #Mural thickening of the stomach  #Mesenteric stranding and lymphadenopathy  #History of colon and breast cancer  -CT showed most pronounced in the distal body and antrum, corresponding to area of increased activity on PET/CT which could reflect a neoplastic process; mesenteric stranding and lymphadenopathy concerning for metastatic disease with the largest lymph node seen  in the stomach measuring 2.9 x 2.4 cm  -Status post biopsy at time of small bowel obstruction surgery.   --- Biopsy results pending  -[facility] consult appreciated - seen [facility] in past     #Left hydroureteronephrosis  -CT showed no obstructing calculi, there is inflammatory stranding surrounding the left ureter distally which could reflect neoplastic invasion and/or inflammatory scaring  -Urology consulted from ED. no intervention planned.  Patient is considered high risk for infection s/p ureteral stent  -- Gomez discontinued 10/10  --- PVRs have been negative     #Acute cystitis without hematuria, probable  -Urine culture polymicrobial, mainly gram-positive bacilli but all with low colony forming units  -Completed 4 days of IV Rocephin.  DC Rocephin.  -Follow-up urine culture  -Discontinued Gomez catheter     #COPD/asthma  -Quit smoking 2010   --No exacerbation  -DuoNebs as needed  -Continue home Incruse Ellipta     #Essential hypertension  ---Holding Norvasc and Lasix and HCTZ since she has been n.p.o.  ---Resumed Lasix 10/11.  Hold Lasix due to distended abdomen.  ---keep holding norvasc and hydrochlorothiazide as bp ok  -IV hydralazine as needed     #Chronic thrombocytosis  -improing with surgery  --stable     #JAMMIE  --Now that NG out can resume CPAP     #Chronic thoracic back pain  -10/11resumed Effexor, Cymbalta,   ---gabapentin resumed at half dosing      Dysthymia--resume BuSpar and Cymbalta and gabapentin and Topamax and effexor and trazodone   ---resume slowly, some at only half dosing.     Hyperlipidemia--resume statin          Diet: Clear Liquid Diet  Snacks/Supplements Adult: Ensure Clear; Between Meals    DVT Prophylaxis: Enoxaparin (Lovenox) SQ  Gomez Catheter: Not present  Lines: None     Cardiac Monitoring: None  Code Status: Full Code      Clinically Significant Risk Factors        # Hypokalemia: Lowest K = 3.3 mmol/L in last 2 days, will replace as needed           # Hypertension: Noted  "on problem list        # Obesity: Estimated body mass index is 30.64 kg/m  as calculated from the following:    Height as of this encounter: 1.499 m (4' 11\").    Weight as of this encounter: 68.8 kg (151 lb 11.2 oz).   # Severe Malnutrition: based on nutrition assessment    # COPD: noted on problem list        Disposition Plan      Expected Discharge Date: 10/13/2023    Discharge Delays: IV Medication - consider oral or Home Infusion  Other (Add Comment)  Voiding/Eating Trial needed  Specialist Consult (enter specialist & decision needed in comments)  Destination: home with help/services              Augustine Mckeon MD  Hospitalist Service  [facility] [place]  Securely message with [employer] (more info)  Text page via [employer] Paging/Directory   ______________________________________________________________________    Interval History   Patient new to me.  Chart reviewed.  No bowel movement yet.  Currently on clear liquids.  Patient encouraged to ambulate.  She is forgetful and does not remember exact date of her surgery.    Physical Exam   Vital Signs: Temp: 98.2  F (36.8  C) Temp src: Oral BP: 137/71 Pulse: 101   Resp: 18 SpO2: 91 % O2 Device: None (Room air) Oxygen Delivery: 2 LPM  Weight: 151 lbs 11.2 oz    Abdomen is distended with high-pitched bowel sounds  No gurgling heard  Lungs are clear to auscultation  No leg edema      Medical Decision Making       35 MINUTES SPENT BY ME on the date of service doing chart review, history, exam, documentation & further activities per the note.  MANAGEMENT DISCUSSED with the following over the past 24 hours: Patient   NOTE(S)/MEDICAL RECORDS REVIEWED over the past 24 hours: Urology and surgery       Data     I have personally reviewed the following data over the past 24 hrs:    N/A  \   9.3 (L)   / N/A     N/A N/A N/A /  N/A   3.7 N/A N/A \       Imaging results reviewed over the past 24 hrs:   Recent Results (from the past 24 hour(s))   US Renal Limited    " Narrative    EXAM: US RENAL LIMITED  LOCATION: [facility]  DATE: 10/12/2023    INDICATION: Left hydronephrosis noted on CT 10/09/2023. Creatinine normalized. Eval for residual hydro.  COMPARISON: CT abdomen pelvis 10/09/2023, PET/CT 09/28/2023 reviewed.  TECHNIQUE: Routine Bilateral Renal and Bladder Ultrasound.    FINDINGS:    LEFT KIDNEY: Measures 10.9 x 5.6 x 5.8 cm. Mild hydronephrosis. This persists postvoid.    BLADDER: Normal. No mass evident by ultrasound.      Impression    IMPRESSION:  Persistent mild left hydronephrosis.

## 2023-10-13 NOTE — PLAN OF CARE
Problem: Plan of Care - These are the overarching goals to be used throughout the patient stay.    Goal: Plan of Care Review  Description: The Plan of Care Review/Shift note should be completed every shift.  The Outcome Evaluation is a brief statement about your assessment that the patient is improving, declining, or no change.  This information will be displayed automatically on your shift note.  Outcome: Progressing     Problem: Surgery Nonspecified  Goal: Optimal Pain Control and Function  Outcome: Progressing  Intervention: Prevent or Manage Pain  Recent Flowsheet Documentation  Taken 10/13/2023 0010 by Renetta Landeros RN  Pain Management Interventions:   medication (see MAR)   cold applied     Problem: Surgery Nonspecified  Goal: Nausea and Vomiting Relief  Outcome: Progressing   Goal Outcome Evaluation:       Pt alert & oriented, able to make needs known. CHAVA drain x 2 to bulb suction with 30ml drain both sides. Prn oxycodone 5mg and prn tylenol and cold pack given for pain with relief. Utilizing CPAP with oxygen on at 2LPM. Dressing on abdomen clean,dry and intact. Still no bowel movement on this shift.  Vital signs stable, will continue to monitor.

## 2023-10-13 NOTE — PROGRESS NOTES
Place of Service:  [facility]     Reason for follow up: Hydronephrosis of left kidney    SUBJECTIVE:  Events: no acute events overnight    Patient reports she feels ok. She reports left lower back pain and says that she has low back pain at baseline. She admits to her stomach feeling like a pressure. She has not urinated today despite drinking her normal amount of fluids. She denies any blood in her urine and has some urgency with urination. She denies any n/v, fevers/chills, constipation and dysuria.    OBJECTIVE:  PHYSICAL EXAM:  Temp: 98.2  F (36.8  C) Temp src: Oral BP: 137/71 Pulse: 101   Resp: 18 SpO2: 91 % O2 Device: None (Room air) Oxygen Delivery: 2 LPM  General: NAD, alert, cooperative  Head: normocephalic, without abnormality / atraumatic  Abdomen: soft, RUQ, epigastric, LUQ, LLQ tender, mildly distended. No suprapubic fullness, no suprapubic tenderness. No bilateral CVA tenderness  Surgical incisions C/D/I. CHAVA drains in place with serosanguinous output  Skin: No rashes or lesions  Musculoskeletal: moves all four extremities equally; no calf edema or tenderness  Psychological: alert and oriented, answers questions appropriately    LABS:  Creatinine   Date Value Ref Range Status   10/12/2023 0.83 0.51 - 0.95 mg/dL Final     WBC Count   Date Value Ref Range Status   10/12/2023 11.1 (H) 4.0 - 11.0 10e3/uL Final     Hemoglobin   Date Value Ref Range Status   10/13/2023 9.3 (L) 11.7 - 15.7 g/dL Final   ]  Platelet Count   Date Value Ref Range Status   10/12/2023 463 (H) 150 - 450 10e3/uL Final       Cultures:  Urine 10/9: 10/9: Multiple morphotypes, no predominant organism,   <10,000 CFU/mL Gram positive bacilli, resembling diphtheroids Abnormal        <10,000 CFU/mL Gram positive bacilli Abnormal        <10,000 CFU/mL Gram positive bacilli Abnormal        <10,000 CFU/mL Gram positive bacilli, resembling diphtheroids Abnormal        <1,000 CFU/mL Gram positive cocci Abnormal    Multiple morphotypes  present with no predominant organism.  Growth consistent with probable contamination during collection.  Suggest repeat specimen if clinically indicated.      Lab Results: personally reviewed.     ASSESSMENT/PLAN:  Peri Irizarry is being seen by [facility] for left hydronephrosis without stones and with inflammatory stranding noted surrounding left ureteral distally on CT 10/9.      - UC with multiple morphotypes, no predominant organism consistent with contamintion. WBC yesterday of 11.1. Receiving IV ceftriaxone.   - Check PVRs, NCO order placed.   - Patient without left flank pain. Does have some mild lower left sided back pain.   -  Creatinine 0.83 yesterday.   - If renal function remains normal, minimal/no flank pain, leukocytosis continues to normalizes and she remains afebrile and if prognosis is poor, may be in patient's best interest to avoid ureteral stent placement.   - Should she clinically worsen and/or plans to undergo chemotherapy, may need to consider ureteral stent placement    - Supportive cares per medicine.     Will update Dr. Reggie Gimenez PA-C  [facility]   [phone]

## 2023-10-13 NOTE — PROGRESS NOTES
Peri Irizarry is doing well, feels as thought she has to pass flatus, ambulating.         Vitals:    10/12/23 2006 10/12/23 2343 10/13/23 0010 10/13/23 0723   BP: 133/64 (!) 146/84  137/71   BP Location: Right arm Right arm  Right arm   Patient Position: Semi-Horn's      Cuff Size: Adult Regular      Pulse: 98 104  101   Resp: 18 18  18   Temp: 98.1  F (36.7  C) 98.6  F (37  C)  98.2  F (36.8  C)   TempSrc: Oral Oral  Oral   SpO2: (!) 88% (!) 89% 92% 91%   Weight:       Height:           PHYSICAL EXAM:  GEN: No acute distress, comfortable  ABD: Soft, mild distention, midline incision intact with staples intact, CHAVA drain with serosanguineous output  EXT:No cyanosis, edema or obvious abnormalities        Recent Labs   Lab 10/13/23  0554 10/12/23  0546   WBC  --  11.1*   HGB 9.3* 8.4*   HCT  --  25.8*   PLT  --  463*       Recent Labs   Lab 10/12/23  0546 10/09/23  0608 10/08/23  2315      < > 133*   CO2 25   < > 23   BUN 11.1   < > 7.9*   ALBUMIN  --   --  4.5   ALKPHOS  --   --  118*   ALT  --   --  20   AST  --   --  23    < > = values in this interval not displayed.         A/P:  Peri Irizarry is s/p laparotomy with reduction of internal hernia  -Still awaiting pathology results.  I will have to track these down as they are apparently not in the computer system at present  -Continue with ambulation  -Rectal suppository to stimulate colonic movement  -Seen with clears for now    Matthew Ramey, DO  FACS  [phone]  [facility] Department of Surgery

## 2023-10-13 NOTE — PROGRESS NOTES
CHART CHECK     Primary Oncologist/Hematologist:  Dr. Mani Malcolm          Assessment and Plan:     1. Small bowel obstruction  -10/9/23 s/p diagnostic laparoscopy converted to laparotomy, hernia repair and lysis of adhesions. Multiple biopsies sent to pathology         2. On 9/11/23 CT CAP showed asymmetric nodularity along the posterior gastric wall superimposed on significant diffuse gastric and distal esophageal wall thickening, appearance concerning for neoplasm. Enlarged lymph nodes in the gastrohepatic ligament, highly suspicious for metastatic adenopathy. Asymmetric thickened appearance of the pancreatic tail without definable mass or obvious ductal dilatation.      -  10/9/23 biopsies of metastatic mass along with multiple nodes    3. History of colorectal cancer: S/p sigmoid colon resection back in 2012. Colonoscopy in December 2022 did not show any significant abnormality. Some polyps were found, and they were removed with cold biopsy forceps. The pathology was benign.         4. 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 To date, there has been no evidence for a local recurrence or more distant metastatic disease.          - Bilateral screening mammogram on 1/9/23 showed no evidence of malignancy. We will continue active surveillance.          Plan  1. Appreciate general surgery care   2. Continue best supportive care post-operatively   3.  Pathology results pending, will need to provide oncologic treatment plan. Patient can follow-up outpatient for results if clinically able to discharge prior to finalized.    The patient can follow-up outpatient for pathology results and treatment plan. Please reach out to physician on-call this weekend for any specific concerns.     Veronica Carmona  [facility]  Office: [phone]  Cell: [phone] Monday  "through Friday, 8AM-5PM. After hours and weekends, please use answering service.             Medications:   Scheduled Medications   [Held by provider] amLODIPine  2.5 mg Oral Daily    busPIRone  5 mg Oral TID    cefTRIAXone  1 g Intravenous Q24H    DULoxetine  30 mg Oral BID    enoxaparin ANTICOAGULANT  40 mg Subcutaneous Q24H    famotidine  20 mg Intravenous Q12H    [Held by provider] ferrous sulfate  325 mg Oral Q48H    fluticasone-vilanterol  1 puff Inhalation Daily    furosemide  40 mg Oral Daily    gabapentin  300 mg Oral BID    gabapentin  600 mg Oral At Bedtime    [Held by provider] hydrochlorothiazide  25 mg Oral Daily    montelukast  10 mg Oral At Bedtime    multivitamin w/minerals  1 tablet Oral Daily    polyethylene glycol  17 g Oral Daily    rosuvastatin  10 mg Oral Daily    senna-docusate  1 tablet Oral BID    sodium chloride (PF)  3 mL Intracatheter Q8H    thiamine  100 mg Oral Daily    topiramate  100 mg Oral At Bedtime    topiramate  50 mg Oral QAM    [Held by provider] traZODone  100 mg Oral At Bedtime    umeclidinium  1 puff Inhalation Daily    venlafaxine  37.5 mg Oral BID     PRN Medications  acetaminophen, acetaminophen, albuterol, bisacodyl, cyclobenzaprine, hydrALAZINE, HYDROmorphone, HYDROmorphone, hydrOXYzine, ipratropium - albuterol 0.5 mg/2.5 mg/3 mL, lidocaine 4%, lidocaine (buffered or not buffered), magnesium hydroxide, meclizine, morphine **OR** morphine, naloxone **OR** naloxone **OR** naloxone **OR** naloxone, ondansetron **OR** ondansetron, ondansetron **OR** ondansetron, oxyCODONE, phenol MT, prochlorperazine **OR** prochlorperazine, prochlorperazine, [DISCONTINUED] prochlorperazine **OR** prochlorperazine **OR** prochlorperazine, sodium chloride (PF), SUMAtriptan               Physical Exam:   Vitals were reviewed  Blood pressure 137/71, pulse 101, temperature 98.2  F (36.8  C), temperature source Oral, resp. rate 18, height 1.499 m (4' 11\"), weight 68.8 kg (151 lb 11.2 oz), SpO2 " 91%, not currently breastfeeding.  Wt Readings from Last 4 Encounters:   10/12/23 68.8 kg (151 lb 11.2 oz)   02/21/23 77.1 kg (170 lb)   12/07/22 77.1 kg (170 lb)   01/03/22 90.3 kg (199 lb)       I/O last 3 completed shifts:  In: 410 [P.O.:410]  Out: 420 [Urine:250; Drains:170]           Data:   All laboratory data and imaging studies reviewed.    CMP  Recent Labs   Lab 10/13/23  0554 10/12/23  1312 10/12/23  0546 10/11/23  1253 10/11/23  0612 10/10/23  0529 10/09/23  0608 10/08/23  2315   NA  --   --  135  --  137 137 133* 133*   POTASSIUM 3.7 3.6 3.3* 4.1 3.3* 3.6 3.7 3.1*   CHLORIDE  --   --  102  --  102 101 97* 92*   CO2  --   --  25  --  23 25 24 23   ANIONGAP  --   --  8  --  12 11 12 18*   GLC  --   --  97  --  93 95 103* 94   BUN  --   --  11.1  --  12.1 8.3 7.1* 7.9*   CR  --   --  0.83  --  0.87 1.01* 1.13* 1.02*   GFRESTIMATED  --   --  81  --  76 64 56* 63   CHERY  --   --  8.4*  --  8.3* 8.2* 8.9 10.1*   MAG  --   --   --   --   --  1.7  --  2.0   PROTTOTAL  --   --   --   --   --   --   --  7.9   ALBUMIN  --   --   --   --   --   --   --  4.5   BILITOTAL  --   --   --   --   --   --   --  0.6   ALKPHOS  --   --   --   --   --   --   --  118*   AST  --   --   --   --   --   --   --  23   ALT  --   --   --   --   --   --   --  20     CBC  Recent Labs   Lab 10/13/23  0554 10/12/23  0546 10/11/23  0612 10/10/23  0529 10/09/23  0608   WBC  --  11.1* 13.8* 12.6* 9.2   RBC  --  2.92* 3.29* 3.77* 3.84   HGB 9.3* 8.4* 9.6* 10.8* 11.1*   HCT  --  25.8* 28.8* 33.5* 33.3*   MCV  --  88 88 89 87   MCH  --  28.8 29.2 28.6 28.9   MCHC  --  32.6 33.3 32.2 33.3   RDW  --  15.8* 15.9* 15.9* 15.9*   PLT  --  463* 510* 580* 613*     INR  Recent Labs   Lab 10/09/23  0608   INR 1.07     Data   Results for orders placed or performed during the hospital encounter of 10/08/23 (from the past 24 hour(s))   US Renal Limited    Narrative    EXAM: US RENAL LIMITED  LOCATION: [facility]  DATE:  10/12/2023    INDICATION: Left hydronephrosis noted on CT 10/09/2023. Creatinine normalized. Eval for residual hydro.  COMPARISON: CT abdomen pelvis 10/09/2023, PET/CT 09/28/2023 reviewed.  TECHNIQUE: Routine Bilateral Renal and Bladder Ultrasound.    FINDINGS:    LEFT KIDNEY: Measures 10.9 x 5.6 x 5.8 cm. Mild hydronephrosis. This persists postvoid.    BLADDER: Normal. No mass evident by ultrasound.      Impression    IMPRESSION:  Persistent mild left hydronephrosis.   Hemoglobin   Result Value Ref Range    Hemoglobin 9.3 (L) 11.7 - 15.7 g/dL   Potassium   Result Value Ref Range    Potassium 3.7 3.4 - 5.3 mmol/L

## 2023-10-13 NOTE — PROGRESS NOTES
Care Management Follow Up    Length of Stay (days): 4    Expected Discharge Date: 10/14/2023     Concerns to be Addressed: discharge planning        Patient plan of care discussed at interdisciplinary rounds: Yes    Anticipated Discharge Disposition:       Anticipated Discharge Services:    Education Provided on the Discharge Plan:  Per Care Team   Patient/Family in Agreement with the Plan:  Yes    Referrals Placed by CM/SW:    Private pay costs discussed: Not applicable    Additional Information:  Chart reviewed.    Cm updates:  Pt being followed by hem/onc, no therapy consults placed at this time.      Surgery following pt.     Social Hx:  Pt lives alone on the first floor of an apartment. Pt uses walker/cane for mobility inside home, and a power scooter for longer distances. Pt has [employer] MA and states having a CM, did not have CMs contact info. Pt received 4-5 hours a day of PCA services, 7 days a week. They assist with ADLS, and IADLS. Pt uses [facility] for transport. Pt has an established PCP. Pt is on disability. Hem/Onc consult placed, and Urology Consult placed. Pt states children are supportive. One of the sons would be the one to transport at discharge.         [facility] #[phone]     Cm will continue to follow plan of care, review recommendations, and assist with any discharge needs anticipated.        Veronica Hernandez RN

## 2023-10-13 NOTE — PLAN OF CARE
Problem: Intestinal Obstruction  Goal: Optimal Bowel Function  Outcome: Progressing   Goal Outcome Evaluation:       Patient abdomen distended and round this morning. Only high pitched bowel tones heard in upper right quadrant. Patient denies passing any flatus this morning, administered suppository and patient had soft bowel movement. Patient up walking in hallway with assist of one. CHAVA drains draining serosang to serous fluid. Patient reports pain improved after bowel movement.

## 2023-10-14 NOTE — PROGRESS NOTES
Place of Service:  [facility]     Reason for follow up: Hydronephrosis of left kidney    SUBJECTIVE:  Events: no acute events overnight    Patient reports she feels okay. Denies flank or back pain. Feels like she is emptying okay but does not void often. PVRs range from  cc. Continuing to monitor her input and outputs.  Denies f/c/n/v. She is on liquid diet.     OBJECTIVE:  PHYSICAL EXAM:  Temp: 98.8  F (37.1  C) Temp src: Oral BP: 92/50 Pulse: 99   Resp: 18 SpO2: 99 % O2 Device: None (Room air) Oxygen Delivery: 2 LPM  General: NAD, alert, cooperative  Head: normocephalic, without abnormality / atraumatic  Abdomen: soft, RUQ, epigastric, LUQ, LLQ tender, mildly distended. No suprapubic fullness, no suprapubic tenderness. No bilateral CVA tenderness  Surgical incisions C/D/I. CHAVA drains in place with serosanguinous output  Skin: No rashes or lesions  Musculoskeletal: moves all four extremities equally; no calf edema or tenderness  Psychological: alert and oriented, answers questions appropriately    LABS:  Creatinine   Date Value Ref Range Status   10/14/2023 0.90 0.51 - 0.95 mg/dL Final     WBC Count   Date Value Ref Range Status   10/12/2023 11.1 (H) 4.0 - 11.0 10e3/uL Final     Hemoglobin   Date Value Ref Range Status   10/13/2023 9.3 (L) 11.7 - 15.7 g/dL Final   ]  Platelet Count   Date Value Ref Range Status   10/12/2023 463 (H) 150 - 450 10e3/uL Final       Cultures:  Urine 10/9: 10/9: Multiple morphotypes, no predominant organism,   <10,000 CFU/mL Gram positive bacilli, resembling diphtheroids Abnormal        <10,000 CFU/mL Gram positive bacilli Abnormal        <10,000 CFU/mL Gram positive bacilli Abnormal        <10,000 CFU/mL Gram positive bacilli, resembling diphtheroids Abnormal        <1,000 CFU/mL Gram positive cocci Abnormal    Multiple morphotypes present with no predominant organism.  Growth consistent with probable contamination during collection.  Suggest repeat specimen if clinically  indicated.      Lab Results: personally reviewed.     ASSESSMENT/PLAN:  Peri Irizarry is being seen by [facility] for left hydronephrosis without stones and with inflammatory stranding noted surrounding left ureteral distally on CT 10/9.      - UC with multiple morphotypes, no predominant organism consistent with contamintion. Completed rocephin.   - Check PVRs, NCO order placed.   - Patient without left flank pain.   - Creatinine 0.9 today. Check creatine daily.    - If renal function remains normal, no flank pain, leukocytosis continues to normalize (will check CBC today) and she remains afebrile and if prognosis is poor, may be in patient's best interest to avoid ureteral stent placement.   - Should she clinically worsen and/or plans to undergo chemotherapy, may need to consider ureteral stent placement    - Supportive cares per medicine.       Kimberly Gimenez PA-C  [facility]   [phone]

## 2023-10-14 NOTE — PLAN OF CARE
Problem: Plan of Care - These are the overarching goals to be used throughout the patient stay.    Goal: Optimal Comfort and Wellbeing  Intervention: Monitor Pain and Promote Comfort  Recent Flowsheet Documentation  Taken 10/14/2023 1001 by Vielka Lawrence RN  Pain Management Interventions: repositioned   Goal Outcome Evaluation:       Patient bowel sounds more active this shift but still hypoactive. Patient had soft bowel movement, and reports this helps relieve bloating. Patient needs a lot of encouragement to get up and ambulate. Patient intake poor, urine crystal, started on IV fluids. Patient upgraded to soft diet.

## 2023-10-14 NOTE — PROGRESS NOTES
[facility]    Medicine Progress Note - Hospitalist Service    Date of Admission:  10/8/2023    Assessment & Plan    Peri Irizarry is a 58 year old female admitted on 10/8/2023. She came to the emergency department for evaluation of abdominal pain, nausea and vomiting; found to have small bowel obstruction requiring surgical management.     #Small bowel obstruction  --s/p exploratory laparotomy with lysis of adhesions and reduction of internal hernia done on 10/9  ---Surgical findings include; Diffuse metastatic cancer of unknown primary, Closed loop obstruction of proximal small bowel  Gastric mass with evidence of near erosion into small bowel  ---Concern for malignancy related, surgery feels metastatic gastric versus recurrent breast  ---Surgery following,  --- NG out 10/11  --- Pathology suggestive of metastatic gastric carcinoma.  --- Diet and further orders per surgery.  Currently on clears agree with the same.  ---CHAVA management per surgery.  --- Patient encouraged to ambulate 3 times a day.     #Acute kidney injury  --- Resolved  --- Restarted IV fluids back on 10/14 due to low urine output.     Acute blood loss anemia  ---stable overall but hemoglobin significantly down since admission.    ---Continue to monitor; going down small amounts each day.  --- Weight up about 10 pounds since admission if scale correct.  DC p.o. Lasix since it could worsen dehydration-  --hemoglobin is stable     #Hyponatremia  -resolved  --secondary to volume depletion from decreased oral intake, furosemide, and hydrochlorothiazide  discontinue Lasix.  --stable off IV     #Hypokalemia  -mild  -Secondary to GI loss  -Replace     #Mural thickening of the stomach  #Mesenteric stranding and lymphadenopathy  #History of colon and breast cancer  -CT showed most pronounced in the distal body and antrum, corresponding to area of increased activity on PET/CT which could reflect a neoplastic process; mesenteric  stranding and lymphadenopathy concerning for metastatic disease with the largest lymph node seen in the stomach measuring 2.9 x 2.4 cm  -Status post biopsy at time of small bowel obstruction surgery.   --- Biopsy results--metastatic gastric carcinoma  -[facility] consult appreciated - seen [facility] in past     #Left hydroureteronephrosis  -CT showed no obstructing calculi, there is inflammatory stranding surrounding the left ureter distally which could reflect neoplastic invasion and/or inflammatory scaring  -Urology consulted from ED. no intervention planned.  Patient is considered high risk for infection s/p ureteral stent.  Urology may consider ureteral stents if patient worsens clinically or plans to undergo chemotherapy.  -- Gomez discontinued 10/10  --- PVRs have been negative     #Acute cystitis without hematuria, probable  -Urine culture polymicrobial, mainly gram-positive bacilli but all with low colony forming units  -Completed 4 days of IV Rocephin.  DC Rocephin on 10/13  -Urine culture shows multiple organisms with no predominant organism.  -Discontinued Gomez catheter     #COPD/asthma  -Quit smoking 2010   --No exacerbation  -DuoNebs as needed  -Continue home Incruse Ellipta     #Essential hypertension blood pressure now on the low side   --continue to hold Lasix, HCTZ and Norvasc  --Ordered normal saline at 75 mill per hour.     #Chronic thrombocytosis  -improing with surgery  --stable     #JAMMIE  --Now that NG out can resume CPAP     #Chronic thoracic back pain  -10/11 resumed Effexor, Cymbalta,   ---gabapentin resumed at half dosing      Dysthymia--resume BuSpar and Cymbalta and gabapentin and Topamax and effexor and trazodone   --Restarted trazodone     Hyperlipidemia--resume statin          Diet: Clear Liquid Diet  Snacks/Supplements Adult: Ensure Clear; Between Meals    DVT Prophylaxis: Enoxaparin (Lovenox) SQ  Gomez Catheter: Not present  Lines: None     Cardiac Monitoring: None  Code  "Status: Full Code      Clinically Significant Risk Factors                    # Hypertension: Noted on problem list        # Overweight: Estimated body mass index is 29.12 kg/m  as calculated from the following:    Height as of this encounter: 1.499 m (4' 11\").    Weight as of this encounter: 65.4 kg (144 lb 2.9 oz).     # Severe Malnutrition: based on nutrition assessment    # COPD: noted on problem list        Disposition Plan     Expected Discharge Date: 10/14/2023    Discharge Delays: IV Medication - consider oral or Home Infusion  Other (Add Comment)  Voiding/Eating Trial needed  Specialist Consult (enter specialist & decision needed in comments)  Destination: home with help/services              Augustine Mckeon MD  Hospitalist Service  [facility]  Securely message with [employer] (more info)  Text page via [employer] Paging/Directory   ______________________________________________________________________    Interval History   Patient had bowel movement yesterday.  Nurse reported low urine output of around 200 cc every shift.  Blood pressures on the lower side and therefore will order normal saline 75 mill per hour x12 hours.  Patient encouraged to walk.  Still on clear liquids.  She denies passing gas today    Physical Exam   Vital Signs: Temp: 98.8  F (37.1  C) Temp src: Oral BP: 92/50 Pulse: 99   Resp: 18 SpO2: 99 % O2 Device: None (Room air) Oxygen Delivery: 2 LPM  Weight: 144 lbs 2.89 oz    Awake makes good eye contact.  Abdomen is distended with high-pitched bowel sounds  No gurgling heard  Lungs are clear to auscultation  No leg edema      Medical Decision Making       35 MINUTES SPENT BY ME on the date of service doing chart review, history, exam, documentation & further activities per the note.  MANAGEMENT DISCUSSED with the following over the past 24 hours: Patient   NOTE(S)/MEDICAL RECORDS REVIEWED over the past 24 hours: Urology and surgery       Data     I have personally reviewed " the following data over the past 24 hrs:    11.3 (H)  \   9.0 (L)   / 539 (H)     N/A N/A N/A /  N/A   3.4 N/A 0.90 \       Imaging results reviewed over the past 24 hrs:   No results found for this or any previous visit (from the past 24 hour(s)).

## 2023-10-14 NOTE — PLAN OF CARE
Problem: Intestinal Obstruction  Goal: Optimal Bowel Function  Outcome: Progressing  Intervention: Promote Bowel Function  Recent Flowsheet Documentation  Taken 10/14/2023 0005 by Deja Jain RN  Body Position: position changed independently  Head of Bed (HOB) Positioning: HOB at 20-30 degrees  Positioning/Transfer Devices:   pillows   in use  Intervention: Promote Bowel Function  Recent Flowsheet Documentation  Taken 10/14/2023 0005 by Deja Jain RN  Body Position: position changed independently  Head of Bed (HOB) Positioning: HOB at 20-30 degrees  Positioning/Transfer Devices:   pillows   in use  Goal: Absence of Infection Signs and Symptoms  Outcome: Progressing  Goal: Optimal Pain Control and Function  Outcome: Progressing     Problem: Surgery Nonspecified  Goal: Absence of Bleeding  Outcome: Progressing  Goal: Anesthesia/Sedation Recovery  Intervention: Optimize Anesthesia Recovery  Recent Flowsheet Documentation  Taken 10/14/2023 0005 by Deja Jain RN  Administration (IS): instruction provided, follow-up  Level Incentive Spirometer (mL): 700  Number of Repetitions (IS): 4  Patient Tolerance (IS): fair  Goal: Effective Oxygenation and Ventilation  Intervention: Optimize Oxygenation and Ventilation  Recent Flowsheet Documentation  Taken 10/14/2023 0005 by Deja Jain RN  Head of Bed (HOB) Positioning: HOB at 20-30 degrees   Goal Outcome Evaluation:  Pt is alert and oriented. Sleeping between cares. VSS. Refused SCD. Pt on CPAP while sleeping but she unconsciously removing it while sleeping. Writer has to place it back x3, RT is aware, pt O2 sat ranges from 90%- 93% for not consistently wearing CPAP/BiPAP. CHAVA drains output, rt 30 ml and left 50 ml.  No urine occurrence this shift, bladder scan done  at 06AM.

## 2023-10-14 NOTE — PLAN OF CARE
The patient needed encouragement to get up out of bed and walk in the [place]. She walked 150ft. Educated on how moving will help the bowels. The patient has been passing gas, bowel sounds are present. Admits tenderness with palpation, and feels bloated, and cramping pain noted. Refused Tylenol and Oxycodone. Ice pack applied to incision. Wound open to air as ordered. CHAVA drains patent and draining. The patient remains on clear liquid fluids. Tolerating it well. Encouraged to increase water intake. Urine is dark crystal. Final bladder scan was completed. PVR was 10ml.     Goal Outcome Evaluation:         Problem: Intestinal Obstruction  Goal: Optimize Nutrition Status  Outcome: Progressing     Problem: Surgery Nonspecified  Goal: Effective Bowel Elimination  Outcome: Progressing  Goal: Optimal Pain Control and Function  Outcome: Progressing  Intervention: Prevent or Manage Pain  Recent Flowsheet Documentation  Taken 10/13/2023 1617 by Carmen An, RN  Pain Management Interventions: (refused mediaction, refused walk) environmental changes  Goal: Nausea and Vomiting Relief  Outcome: Progressing  Goal: Effective Urinary Elimination  Outcome: Progressing

## 2023-10-14 NOTE — PLAN OF CARE
Problem: Malnutrition  Goal: Improved Nutritional Intake  Outcome: Progressing   Goal Outcome Evaluation:    Diet advanced to mechanica soft    Changed am snack to Ensure Enlive per pt pref

## 2023-10-14 NOTE — PROGRESS NOTES
General Surgery Progress Note:    Hospital Day # 5    ASSESSMENT:  1. Mild dehydration    2. Hypokalemia    3. Upper abdominal pain    4. Generalized abdominal mass    5. SBO (small bowel obstruction) (H)    6. Acute UTI        Peri Irizarry is a 58 year old female who is s/p exploratory laparotomy with reduction of internal hernia on 10/9/23. Patient tolerating clear liquid diet without nausea and vomiting. Passing flatus and had a BM yesterday.     PLAN:  - Okay for soft solid diet   - Continue to increase activity and ambulation to continue to promote bowel function  - Monitor drain output with diet advancement  - General surgery will continue to follow    SUBJECTIVE:   She is doing okay. Pain controlled. Continues to endorse abdominal distention but feels slightly improved with passing gas. Tolerating a clear liquid diet without nausea, vomiting. Passing flatus and had a soft bowel movement yesterday after a suppository. Ambulated twice yesterday out in hallway to nurse station and back. Voiding.       Patient Vitals for the past 24 hrs:   BP Temp Temp src Pulse Resp SpO2 Weight   10/14/23 0753 92/50 98.8  F (37.1  C) Oral 99 18 99 % --   10/14/23 0625 -- -- -- -- -- -- 65.4 kg (144 lb 2.9 oz)   10/14/23 0201 121/72 99  F (37.2  C) Oral 105 18 -- --   10/14/23 0005 (!) 150/75 -- -- -- -- -- --   10/13/23 2337 127/70 99.7  F (37.6  C) Oral 111 17 93 % --   10/13/23 1530 131/77 98.9  F (37.2  C) Oral 101 18 94 % --         PHYSICAL EXAM:  General: patient seen resting in bed, no acute distress  Resp: no respiratory distress, breathing comfortably on room air  Abdomen: Softly distended, mildly tender to palpation over incision. Midline incision clean/dry/intact with staples.   Drains: Surgical CHAVA drains x2 with serosanguinous output.   Output by Drain (mL) 10/12/23 0700 - 10/12/23 1459 10/12/23 1500 - 10/12/23 2259 10/12/23 2300 - 10/13/23 0659 10/13/23 0700 - 10/13/23 1459 10/13/23 1500 - 10/13/23 2259 10/13/23  2300 - 10/14/23 0659 10/14/23 0700 - 10/14/23 1214   Closed/Suction Drain 1 Right;Anterior RLQ Bulb 19 [place] 5 15 30 20 (P)50 30 10   Closed/Suction Drain 2 Left;Anterior LLQ Bulb 19 [place] 45 45 30 30 (P)50 50 50   Extremities: warm and well perfused    10/13 0700 - 10/14 0659  In: 120 [P.O.:120]  Out: 780 [Urine:550; Drains:230]    No results displayed because visit has over 200 results.           Vielka Gordon PA-C  [facility] General Surgery  [address]  [address] [place] [id number]

## 2023-10-14 NOTE — PROGRESS NOTES
Pt on hospital owned cpap with 2 lpm bleed.  Pt pulled cpap off numerous times throughout night, RN placed back on pt.  RT will continue to monitor.

## 2023-10-15 NOTE — PLAN OF CARE
Problem: Plan of Care - These are the overarching goals to be used throughout the patient stay.    Goal: Plan of Care Review  Description: The Plan of Care Review/Shift note should be completed every shift.  The Outcome Evaluation is a brief statement about your assessment that the patient is improving, declining, or no change.  This information will be displayed automatically on your shift note.  Outcome: Progressing   Goal Outcome Evaluation:  Pt is alert and oriented. VSS. Denies pain. Ambulates to bathroom with A1. Stable with walking. Bowel sounds still hypoactive. Had a small BM and urinated x1. CHAVA drain output  from  rt abdomen is 25 ml and Left abdomen drainage output is 50 ml, pink tinged output. Abdominal wound in staples open to air is clean, dry and intact. Drinking enough fluids and slept good tonight.

## 2023-10-15 NOTE — PLAN OF CARE
Problem: Intestinal Obstruction  Goal: Optimal Bowel Function  Outcome: Progressing   Goal Outcome Evaluation:       Bowel sounds present but continue to be hypoactive, abdomen continues to be distended. Patient ate scrambled eggs for breakfast and some bites of oatmeal , and tolerated. Patient needs strong encouragement for ambulation. Patient reports low energy. Late morning patient reports some nausea and small amounts of emesis just bile. Zofran given with little relief so then compazine given with relief. Surgery and MD updated. Surgery states if patient has one more emesis to down grade to clear liquids.

## 2023-10-15 NOTE — PROGRESS NOTES
Place of Service:  [facility]     Reason for follow up: Hydronephrosis of left kidney    SUBJECTIVE:  Events: no acute events overnight    Patient reports she feels  better. Denies flank or back pain. Feels like she is emptying well.  Denies f/c/n/v. Tolerating diet.     OBJECTIVE:  PHYSICAL EXAM:  Temp: 99.1  F (37.3  C) Temp src: Oral BP: 114/64 Pulse: 98   Resp: 18 SpO2: 96 % O2 Device: None (Room air)    General: NAD, alert, cooperative  Head: normocephalic, without abnormality / atraumatic  Abdomen: soft, No suprapubic fullness, no suprapubic tenderness. No bilateral CVA tenderness  Surgical incisions C/D/I. CHAVA drains in place with serosanguinous output.  Skin: No rashes or lesions  Musculoskeletal: moves all four extremities equally; no calf edema or tenderness  Psychological: alert and oriented, answers questions appropriately    LABS:  Creatinine   Date Value Ref Range Status   10/15/2023 0.83 0.51 - 0.95 mg/dL Final     WBC Count   Date Value Ref Range Status   10/15/2023 10.1 4.0 - 11.0 10e3/uL Final     Hemoglobin   Date Value Ref Range Status   10/15/2023 8.1 (L) 11.7 - 15.7 g/dL Final   ]  Platelet Count   Date Value Ref Range Status   10/15/2023 529 (H) 150 - 450 10e3/uL Final       Cultures:  Urine 10/9: 10/9: Multiple morphotypes, no predominant organism,   <10,000 CFU/mL Gram positive bacilli, resembling diphtheroids Abnormal        <10,000 CFU/mL Gram positive bacilli Abnormal        <10,000 CFU/mL Gram positive bacilli Abnormal        <10,000 CFU/mL Gram positive bacilli, resembling diphtheroids Abnormal        <1,000 CFU/mL Gram positive cocci Abnormal    Multiple morphotypes present with no predominant organism.  Growth consistent with probable contamination during collection.  Suggest repeat specimen if clinically indicated.      Lab Results: personally reviewed.     ASSESSMENT/PLAN:  Peri Irizarry is being seen by [facility] for left hydronephrosis without stones and with  inflammatory stranding noted surrounding left ureteral distally on CT 10/9.      - UC with multiple morphotypes, no predominant organism consistent with contamintion. Completed rocephin.   - Check PVRs prn  - Patient without left flank pain.   - Creatinine 0.83 today. Check creatine daily.    - If renal function remains normal, no flank pain, leukocytosis continues to normalize and she remains afebrile and if prognosis is poor, may be in patient's best interest to avoid ureteral stent placement.   - Should she clinically worsen and/or plans to undergo chemotherapy, may need to consider ureteral stent placement.  - Supportive cares per medicine.   - urology will sign off. Please do not hesitate to call or re consult with urological concerns.       Kimberly Gimenez PA-C  [facility]   [phone]

## 2023-10-15 NOTE — PROGRESS NOTES
Care Management Follow Up    Length of Stay (days): 6    Expected Discharge Date: 10/14/2023     Concerns to be Addressed: discharge planning        Patient plan of care discussed at interdisciplinary rounds: Yes    Anticipated Discharge Disposition:  home with no needs from care management    Anticipated Discharge Services:  n/a  Education Provided on the Discharge Plan:  Per Care Team   Patient/Family in Agreement with the Plan:  Yes    Referrals Placed by CM/SW:  n/a  Private pay costs discussed: Not applicable    Additional Information:  No needs anticipated from CM at discharge per pt;receives 4-5 hours a day of PCA services, 7 days a week. They assist with ADLS, and IADLS. Pt uses [facility] for transport. Care management to follow for discharge recommendations and progression of care through hospitalization. Son to transport home.  11:18 AM    MARGO Francois  10/15/2023

## 2023-10-15 NOTE — PLAN OF CARE
Again the patient needed encouragement to get up out of bed, refused to walk in the [place]. Ambulated in the room to the bathroom. The patient has been passing gas, bowel sounds are hypoactive. Admits tenderness and feeling full. Cramping pain noted. Refused Tylenol, took Oxycodone. Wound open to air as ordered, clean, dry, and approximated. CHAVA drains are patent, draining, and to bulb suction. both had 30ml of pink serosanguinous drainage. The patient was advanced to a soft regular diet. She has a poor appetite, eating 25% of her meal. Urine color has improved. Unable to provide amount as the patient missed the hat when voiding. She slept most of this shift. CPAP is on.     Goal Outcome Evaluation:       Problem: Intestinal Obstruction  Goal: Fluid and Electrolyte Balance  Outcome: Not Progressing     Problem: Pain Acute  Goal: Optimal Pain Control and Function  Outcome: Progressing  Intervention: Develop Pain Management Plan  Recent Flowsheet Documentation  Taken 10/14/2023 1924 by Carmen An RN  Pain Management Interventions:   medication (see MAR)   repositioned   emotional support   cold applied   aromatherapy

## 2023-10-15 NOTE — PROGRESS NOTES
General Surgery Progress Note:    Hospital Day # 6    ASSESSMENT:  1. Mild dehydration    2. Hypokalemia    3. Upper abdominal pain    4. Generalized abdominal mass    5. SBO (small bowel obstruction) (H)    6. Acute UTI        Peri Irizarry is a 58 year old female who is s/p exploratory laparotomy with reduction of internal hernia on 10/9/23. Patient slowly progressing post-operatively. Tolerating a soft solid diet with continued bowel function. Appetite and PO intake poor. Encouraged PO intake and protein shake supplements. Leukocytosis resolved.     PLAN:  - Regular diet as tolerated and Ensure Enlive protein shakes  - Increase activity. Patient should be up to chair or ambulating if awake  - Monitor drain output with diet advancement  - General surgery will continue to follow    SUBJECTIVE:   She is doing okay. Continues to endorse abdominal distention and gas discomfort. Tolerating a soft solid diet without nausea, vomiting. Decreased appetite. Reports that she likes the Ensure shakes. Per RN, patient had two BM's yesterday and a small soft BM overnight. Has not been out of bed yet this AM.       Patient Vitals for the past 24 hrs:   BP Temp Temp src Pulse Resp SpO2   10/15/23 0808 114/64 99.1  F (37.3  C) Oral 98 18 96 %   10/14/23 1529 137/73 100  F (37.8  C) Oral 104 18 95 %   10/14/23 1219 127/72 99.4  F (37.4  C) Oral 101 18 99 %         PHYSICAL EXAM:  General: patient seen resting in bed, no acute distress  Resp: no respiratory distress, breathing comfortably on room air  Abdomen: Softly distended, mildly tender to palpation over incision. Midline incision clean/dry/intact with staples in place.   Drains: Surgical CHAVA drains x2 with serosanguinous output.   Output by Drain (mL) 10/13/23 0700 - 10/13/23 1459 10/13/23 1500 - 10/13/23 2259 10/13/23 2300 - 10/14/23 0659 10/14/23 0700 - 10/14/23 1459 10/14/23 1500 - 10/14/23 2259 10/14/23 2300 - 10/15/23 0659 10/15/23 0700 - 10/15/23 0931   Closed/Suction  Drain 1 Right;Anterior RLQ Bulb 19 Faroese 20 50 30 20 30 25    Closed/Suction Drain 2 Left;Anterior LLQ Bulb 19 Faroese 30 50 50 90 30 50    Extremities: warm and well perfused    10/14 0700 - 10/15 0659  In: 1077.5 [P.O.:460; I.V.:617.5]  Out: 595 [Urine:350; Drains:245]    No results displayed because visit has over 200 results.           Vielka Gordon PA-C  [facility]  [address] [place] [address]  [address] [place] [id number]

## 2023-10-15 NOTE — PROGRESS NOTES
[facility]    Medicine Progress Note - Hospitalist Service    Date of Admission:  10/8/2023    Assessment & Plan    Peri Irizarry is a 58 year old female admitted on 10/8/2023. She came to the emergency department for evaluation of abdominal pain, nausea and vomiting; found to have small bowel obstruction requiring surgical management.     #Small bowel obstruction  --s/p exploratory laparotomy with lysis of adhesions and reduction of internal hernia done on 10/9  ---Surgical findings include; Diffuse metastatic cancer of unknown primary, Closed loop obstruction of proximal small bowel  Gastric mass with evidence of near erosion into small bowel  ---Concern for malignancy related, surgery feels metastatic gastric versus recurrent breast  ---Surgery following,  --- NG out 10/11  --- Pathology suggestive of metastatic gastric carcinoma..  Follow-up with oncology  --- Diet and further orders per surgery.  Currently on soft diet  ---CAHVA management per surgery.  --- Patient encouraged to ambulate 3 times a day.     #Acute kidney injury  --- Resolved  DC IV fluids     Acute blood loss anemia  ---stable overall but hemoglobin significantly down since admission.    ---Continue to monitor; going down small amounts each day.  --- Weight up about 10 pounds since admission if scale correct.  DC p.o. Lasix since it could worsen dehydration-  --hemoglobin is stable     #Hyponatremia  -resolved  --secondary to volume depletion from decreased oral intake, furosemide, and hydrochlorothiazide  discontinue Lasix.  --stable off IV     #Hypokalemia  -mild  -Secondary to GI loss  -Replace     #Mural thickening of the stomach  #Mesenteric stranding and lymphadenopathy  #History of colon and breast cancer  -CT showed most pronounced in the distal body and antrum, corresponding to area of increased activity on PET/CT which could reflect a neoplastic process; mesenteric stranding and lymphadenopathy concerning for  metastatic disease with the largest lymph node seen in the stomach measuring 2.9 x 2.4 cm  -Status post biopsy at time of small bowel obstruction surgery.   --- Biopsy results--metastatic gastric carcinoma  -[place] [facility] consult appreciated - seen Baltazar in past     #Left hydroureteronephrosis  -CT showed no obstructing calculi, there is inflammatory stranding surrounding the left ureter distally which could reflect neoplastic invasion and/or inflammatory scaring  -Urology consulted from ED. no intervention planned.  Patient is considered high risk for infection s/p ureteral stent.  Urology may consider ureteral stents if patient worsens clinically or plans to undergo chemotherapy.  -- Gomez discontinued 10/10  --- PVRs have been negative     #Acute cystitis without hematuria, probable  -Urine culture polymicrobial, mainly gram-positive bacilli but all with low colony forming units  -Completed 4 days of IV Rocephin.  DC Rocephin on 10/13  -Urine culture shows multiple organisms with no predominant organism.  -Discontinued Gomez catheter     #COPD/asthma  -Quit smoking 2010   --No exacerbation  -DuoNebs as needed  -Continue home Incruse Ellipta     #Essential hypertension blood pressure now on the low side   --continue to hold Lasix, HCTZ and Norvasc  -DC IV fluids     #Chronic thrombocytosis  -improing with surgery  --stable     #JAMMIE  --Now that NG out can resume CPAP     #Chronic thoracic back pain  -10/11 resumed Effexor, Cymbalta,   ---gabapentin resumed at half dosing      Dysthymia--resume BuSpar and Cymbalta and gabapentin and Topamax and effexor and trazodone   --Restarted trazodone     Hyperlipidemia--resume statin          Diet: Mechanical/Dental Soft Diet  Snacks/Supplements Adult: Ensure Enlive; Between Meals    DVT Prophylaxis: Enoxaparin (Lovenox) SQ  Gomez Catheter: Not present  Lines: None     Cardiac Monitoring: None  Code Status: Full Code      Clinically Significant Risk Factors        #  "Hypokalemia: Lowest K = 3.3 mmol/L in last 2 days, will replace as needed             # Hypertension: Noted on problem list        # Overweight: Estimated body mass index is 29.12 kg/m  as calculated from the following:    Height as of this encounter: 1.499 m (4' 11\").    Weight as of this encounter: 65.4 kg (144 lb 2.9 oz).     # Severe Malnutrition: based on nutrition assessment    # COPD: noted on problem list        Disposition Plan      Expected Discharge Date: 10/16/2023    Discharge Delays: IV Medication - consider oral or Home Infusion  Other (Add Comment)  Voiding/Eating Trial needed  Specialist Consult (enter specialist & decision needed in comments)  Destination: home with help/services  Discharge Comments: As per surgery            Augustine Mckeon MD  Hospitalist Service  [facility]  Securely message with [employer] (more info)  Text page via [employer] Paging/Directory   ______________________________________________________________________    Interval History   Patient advance to soft diet.  Chart reviewed.  Patient in bathroom.  Nurse noted poor appetite  Physical Exam   Vital Signs: Temp: 99.1  F (37.3  C) Temp src: Oral BP: 114/64 Pulse: 98   Resp: 18 SpO2: 96 % O2 Device: None (Room air)    Weight: 144 lbs 2.89 oz    Did not examine      Medical Decision Making       25 MINUTES SPENT BY ME on the date of service doing chart review, history, exam, documentation & further activities per the note.  MANAGEMENT DISCUSSED with the following over the past 24 hours: Patient   NOTE(S)/MEDICAL RECORDS REVIEWED over the past 24 hours: Urology and surgery       Data     I have personally reviewed the following data over the past 24 hrs:    10.1  \   8.1 (L)   / 529 (H)     135 102 9.7 /  80   3.7 22 0.83 \       Imaging results reviewed over the past 24 hrs:   No results found for this or any previous visit (from the past 24 hour(s)).    "

## 2023-10-16 NOTE — PROGRESS NOTES
Hospital owned cpap remains on stand-by in room, pt declines to wear.  RT will continue to monitor.

## 2023-10-16 NOTE — PROGRESS NOTES
Progress Note     Primary Oncologist/Hematologist:  Mani Malcolm MD          Interval History:     Peri is seen today at bedside. She is trying to rest. She has been nauseous and vomited once. She shares that she is going to get any scope tomorrow. No fevers, chills, shortness of breath, and chest pain.    We discussed her pathology. I shared with her that she has cancer and one of the test is suggestive of metastatic gastric carcinoma.. She wishes to pursue treatment. Her sons live nearby and will check in on her.                 Assessment and Plan:      1. Small bowel obstruction  -10/9/23 s/p diagnostic laparoscopy converted to laparotomy, hernia repair and lysis of adhesions.  - Pathology shows   MESENTERIC IMPLANT, BIOPSY: METASTATIC POORLY DIFFERENTIATED CARCINOMA  NODULE, ABDOMINAL WALL, BIOPSY: BENIGN FIBROCOLLAGENOUS NODULE AND ADIPOSE TISSUE NEGATIVE FOR MALIGNANCY  MESENTERY, BIOPSY: BENIGN ADIPOSE TISSUE; NO TUMOR SEEN   MESENTERIC NODULE, BIOPSY: METASTATIC POORLY DIFFERENTIATED ADENOCARCINOMA   OMENTUM, BIOPSY: METASTATIC POORLY DIFFERENTIATED ADENOCARCINOMA  PERITONEAL FLUID: POSITIVE FOR MALIGNANT CELLS; CELL MORPHOLOGY AND IMMUNOCHEMICAL PROFILE ARE NOT SPECIFIC, BUT ARE MOST SUGGESTIVE OF METASTATIC GASTRIC CARCINOMA       2. On 9/11/23 CT CAP showed asymmetric nodularity along the posterior gastric wall superimposed on significant diffuse gastric and distal esophageal wall thickening, appearance concerning for neoplasm. Enlarged lymph nodes in the gastrohepatic ligament, highly suspicious for metastatic adenopathy. Asymmetric thickened appearance of the pancreatic tail without definable mass or obvious ductal dilatation.         3. History of colorectal cancer: S/p sigmoid colon resection back in 2012. Colonoscopy in December 2022 did not show any significant abnormality. Some polyps were found, and they were removed with cold biopsy forceps. The pathology was benign.         4. 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 To date, there has been no evidence for a local recurrence or more distant metastatic disease.          - Bilateral screening mammogram on 1/9/23 showed no evidence of malignancy. We will continue active surveillance.       5. Acute UTI         Plan  1. Appreciate general surgery care   2. She is scheduled for an EGD tomorrow.  3. Patient is interested in treatment. She will plan to follow-up with Dr. Malcolm to discuss treatment planning. She will likely be on chemotherapy; however, we will look at clinical trials as well.  4. Continue best supportive care post-operatively       Case discussed with Dr. Malcolm.    Brandi Barr PA-C  [facility]  [phone]             Review of Systems:     The 5 point Review of Systems is negative other than noted in the HPI             Medications:   Scheduled Medications   amLODIPine  2.5 mg Oral Daily    busPIRone  5 mg Oral TID    DULoxetine  30 mg Oral BID    [Held by provider] enoxaparin ANTICOAGULANT  40 mg Subcutaneous Q24H    famotidine  20 mg Intravenous Q12H    [Held by provider] ferrous sulfate  325 mg Oral Q48H    fluticasone-vilanterol  1 puff Inhalation Daily    [Held by provider] furosemide  40 mg Oral Daily    gabapentin  300 mg Oral BID    gabapentin  600 mg Oral At Bedtime    [Held by provider] hydrochlorothiazide  25 mg Oral Daily    montelukast  10 mg Oral At Bedtime    multivitamin w/minerals  1 tablet Oral Daily    polyethylene glycol  17 g Oral Daily    rosuvastatin  10 mg Oral Daily    senna-docusate  1 tablet Oral BID    sodium chloride (PF)  3 mL Intracatheter Q8H    thiamine  100 mg Oral Daily    topiramate  100 mg Oral At Bedtime    topiramate  50 mg Oral QAM    traZODone  100 mg Oral At Bedtime    umeclidinium  1 puff Inhalation Daily    venlafaxine  37.5 mg Oral BID  "    PRN Medications  acetaminophen, acetaminophen, albuterol, bisacodyl, cyclobenzaprine, hydrALAZINE, hydrOXYzine, ipratropium - albuterol 0.5 mg/2.5 mg/3 mL, lidocaine 4%, lidocaine (buffered or not buffered), magnesium hydroxide, meclizine, morphine **OR** morphine, naloxone **OR** naloxone **OR** naloxone **OR** naloxone, ondansetron **OR** ondansetron, oxyCODONE, phenol MT, prochlorperazine **OR** [DISCONTINUED] prochlorperazine, prochlorperazine, [DISCONTINUED] prochlorperazine **OR** prochlorperazine **OR** prochlorperazine, sodium chloride (PF), SUMAtriptan               Physical Exam:   Vitals were reviewed  Blood pressure (!) 161/75, pulse 103, temperature 98.4  F (36.9  C), temperature source Oral, resp. rate 16, height 1.499 m (4' 11\"), weight 65.4 kg (144 lb 2.9 oz), SpO2 97%, not currently breastfeeding.  Wt Readings from Last 4 Encounters:   10/14/23 65.4 kg (144 lb 2.9 oz)   02/21/23 77.1 kg (170 lb)   12/07/22 77.1 kg (170 lb)   01/03/22 90.3 kg (199 lb)       I/O last 3 completed shifts:  In: 470 [P.O.:470]  Out: 220 [Drains:220]    Constitutional: Awake, alert, cooperative, no apparent distress   Lungs: Breathing comfortably with speech.   Cardiovascular: Regular rate       Skin: No rashes, no cyanosis, no edema   Other:               Data:   All laboratory data and imaging studies reviewed.    CMP  Recent Labs   Lab 10/16/23  0530 10/15/23  1236 10/15/23  0523 10/14/23  0519 10/12/23  1312 10/12/23  0546 10/11/23  1253 10/11/23  0612 10/10/23  0529   NA  --   --  135  --   --  135  --  137 137   POTASSIUM 3.9 3.7 3.3* 3.4   < > 3.3*   < > 3.3* 3.6   CHLORIDE  --   --  102  --   --  102  --  102 101   CO2  --   --  22  --   --  25  --  23 25   ANIONGAP  --   --  11  --   --  8  --  12 11   GLC  --   --  80  --   --  97  --  93 95   BUN  --   --  9.7  --   --  11.1  --  12.1 8.3   CR 0.78  --  0.83 0.90  --  0.83  --  0.87 1.01*   GFRESTIMATED 87  --  81 73  --  81  --  76 64   CHERY  --   --  8.3*  " --   --  8.4*  --  8.3* 8.2*   MAG  --   --   --   --   --   --   --   --  1.7    < > = values in this interval not displayed.     CBC  Recent Labs   Lab 10/15/23  0523 10/14/23  1020 10/13/23  0554 10/12/23  0546 10/11/23  0612   WBC 10.1 11.3*  --  11.1* 13.8*   RBC 2.78* 3.09*  --  2.92* 3.29*   HGB 8.1* 9.0* 9.3* 8.4* 9.6*   HCT 24.3* 26.8*  --  25.8* 28.8*   MCV 87 87  --  88 88   MCH 29.1 29.1  --  28.8 29.2   MCHC 33.3 33.6  --  32.6 33.3   RDW 16.1* 15.9*  --  15.8* 15.9*   * 539*  --  463* 510*     INRNo lab results found in last 7 days.  Data   Results for orders placed or performed during the hospital encounter of 10/08/23 (from the past 24 hour(s))   Creatinine   Result Value Ref Range    Creatinine 0.78 0.51 - 0.95 mg/dL    GFR Estimate 87 >60 mL/min/1.73m2   Potassium   Result Value Ref Range    Potassium 3.9 3.4 - 5.3 mmol/L               Component  Ref Range & Units      Case Report     Surgical Pathology Report                         Case: [id number]                                     Authorizing Provider:  Matthew Ramey DO   Collected:           10/09/2023 03:54 PM             Ordering Location:     [facility]      Received:            10/09/2023 04:05 PM                                    [facility]                                                                 Pathologist:           Bandar Mendoza MD                                                           Intraop:               Galina Banks MD                                                             Specimens:   A) - Other, MESENTERIC IMPLANT                                                                        B) - Abdomen, ABDOMINAL WALL NODULE                                                                    C) - Mesentery, MESENTERY                                                                              D) - Mesentery, MESENTERY NODULE                                                                       E) - Omentum, OMENTUM                                                                          Final Diagnosis     A) SPECIMEN SUBMITTED AS MESENTERIC IMPLANT, BIOPSY:  -METASTATIC POORLY DIFFERENTIATED CARCINOMA  -PLEASE SEE COMMENT     B) NODULE, ABDOMINAL WALL, BIOPSY:  -BENIGN FIBROCOLLAGENOUS NODULE AND ADIPOSE TISSUE  -NEGATIVE FOR MALIGNANCY     C) MESENTERY, BIOPSY:  -BENIGN ADIPOSE TISSUE  -NO TUMOR SEEN     D) MESENTERIC NODULE, BIOPSY:  -METASTATIC POORLY DIFFERENTIATED ADENOCARCINOMA     E) OMENTUM, BIOPSY:  -METASTATIC POORLY DIFFERENTIATED ADENOCARCINOMA        Electronically signed by Bandar Mendoza MD on 10/15/2023 at  7:13 PM     Comment      Multiple biopsy samples show fibroadipose tissue that contains patchy infiltrates of malignant epithelial cells with increased nuclear-cytoplasmic ratios.  Some of the cells have signet ring features, compatible with a poorly differentiated adenocarcinoma.  Immunohistochemical stains are being performed on block A3 for further characterization.  The results will be reported in an addendum.        Clinical Information      Procedure:  LAPAROSCOPY, LYSIS OF ADHESIONS  LAPAROTOMY  Pre-op Diagnosis: SBO (small bowel obstruction) (H) [K56.609]  Post-op Diagnosis: K56.609 - SBO (small bowel obstruction) (H) [ICD-10-CM]     Intraoperative Consultation      A(2). Other, MESENTERIC IMPLANT:  AFS1:  Malignant cells present     Galina Banks MD on 10/9/2023 at 4:24 PM     Gross Description      A(2). Other, MESENTERIC IMPLANT:  Received unfixed for frozen section evaluation, labeled the patient's name and mesenteric implant, is an unoriented, yellow-pink to red, mottled, 3.7 x 2.2 x 1.5 cm portion of lobulated fat.  There is a smooth and glistening surface and an opposing, focally cauterized surgical margin.  The surgical margin is inked black, and sectioning displays a yellow-pink to gray, 0.4 x 0.4 x 0.3 cm area of dense granularity.  This ill-defined  area extends to 0.1 cm of the black-inked surgical margin.  Representative sections are submitted for frozen section evaluation and the remaining tissue submitted for permanent microscopy.  SS and TE-5C     SUMMARY OF CASSETTES: A1) frozen section residue; A2 -5) remaining tissue for permanent microscopy.  B(3). Abdomen, ABDOMINAL WALL NODULE:  Received in formalin, labeled with the patient's name and abdominal wall nodule, is an unoriented 1.3 x 1.1 x 0.7 cm, yellow-white to pink, dense mass of soft tissue.  The specimen is inked black.  SS and TE-1C  C(4). Mesentery, MESENTERY:  Received in formalin, labeled with the patient's name and mesentery, is an unoriented 4.3 x 2.5 x 1.3 cm, yellow, lobulated mass of fat and partially encapsulated white-pink, semitranslucent fibrous tissue.  Sectioning displays a homogeneous, yellow, lobulated fatty cut surface.  No discrete indurated lesions are identified.  RS-3C  D(5). Mesentery, MESENTERY NODULE:  Received in formalin, labeled with the patient's name and mesentery nodule, is a 1.4 x 1.2 x 0.9 cm, yellow-pink, lobulated and finely granular mass of soft tissue.  Sectioning displays an ill-defined 0.3 x 0.2 x 0.2 cm, yellow-pink, congested area of nodularity.  The specimen is inked black.  SS and TE-1C  E(6). Omentum, OMENTUM:  Received in formalin, labeled with the patient's name and omentum, is an unoriented 14.7 x 11.3 x 3.1 cm portion of focally congested and finely lobulated omental fat.  Sectioning displays several, ill-defined, yellow-gray, granular and eccentrically ill-defined areas of palpable nodularity ranging from 0.3 cm to 1.4 cm.  Representative of these areas are submitted for evaluation.  The remaining cut surface displays unremarkable yellow lobulated fat with intervening vasculature.  RS-8C ELIE Estrella:vinny      Microscopic Description      Microscopic examination performed, substantiating the above diagnosis.      MCRS  N/A Yes Abnormal       Performing Labs      The technical component of this testing was completed at [facility]        Resulting Agency [facility]                Specimen Collected: 10/09/23  3:54 PM Last Resulted: 10/15/23  7:13 PM                    Component  Ref Range & Units  Resulting Agency   Final Diagnosis   Specimen A                 Interpretation:                   Positive for malignancy      PERITONEAL FLUID:            -  POSITIVE FOR MALIGNANT CELLS            -  CELL MORPHOLOGY AND IMMUNOCHEMICAL PROFILE (SEE MICROSCOPIC DESCRIPTION BELOW)                       ARE NOT SPECIFIC, BUT ARE MOST SUGGESTIVE OF METASTATIC GASTRIC CARCINOMA                 Adequacy:                 Satisfactory for evaluation         Electronically signed by Volodymyr Gutierrez MD on 10/13/2023 at 11:29 AM   Comment  [facility]   The atypical cells in the peritoneal fluid demonstrate malignant cytologic characteristics, and are intensely positive for CK7 immunostain.  This is consistent with, though not diagnostic of, diffuse-type gastric adenocarcinoma, poorly differentiated.  The atypical cells are negative for both calretinin and WT1 immunostains, excluding mesothelial origin.  The cells are negative for LCA immunostain, essentially excluding large undifferentiated lymphoid cells.  The negative GATA3 immunostain argues strongly against breast carcinoma.     The CDX2 immunostain is negative, and this stain is usually positive in gastrointestinal carcinomas, but the more poorly differentiated foregut carcinomas may fail to express this antigen.   Clinical Information  [facility]   SBO (small bowel obstruction) (H) [T96.914]    Gross Description  [facility]   A(1). Peritoneum, PERITONEUM FLUID, Peritoneal Fluid:  Received 4 ml of hazy, red fluid, processed as one hematoxylin and eosin stained cell block.   Microscopic Description  [facility]   Material examined consists of cell block sections  stained with H&E.  These demonstrate small to moderate numbers of lymphocytes, small numbers of macrophages, and rare reactive mesothelial cells..  The macrophages are somewhat irregular in appearance, and some contain phagocytosed material.  Also present are large and markedly atypical cells with very high nucleus to cytoplasm ratios, very irregular nuclear outlines, and nuclear chromatin patterns that vary from granular to vesicular.  Two abnormal mitotic figures are found among these cells, which always appear as single cells.     Immunostaining of cell block sections reveals the following immunochemical properties of the large atypical cells:  CK7               strongly and uniformly positive  LCA (CD45)   negative  WT1              negative  GATA3           negative  Calretinin      negative  CDX2             negative   Abnormal Result?  No Yes Abnormal  [facility]   Performing Labs  [facility]   The technical component of this testing was completed at [facility]              Specimen Collected: 10/09/23  3:50 PM Last Resulted: 10/13/23 11:29 AM

## 2023-10-16 NOTE — PROGRESS NOTES
General Surgery Progress Note:    Hospital Day # 7    ASSESSMENT:  1. Mild dehydration    2. Hypokalemia    3. Upper abdominal pain    4. Generalized abdominal mass    5. SBO (small bowel obstruction) (H)    6. Acute UTI        Peri Irizarry is a 58 year old female who is s/p exploratory laparotomy with reduction of internal hernia on 10/9/23. 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 Patient with abdominal distention and episode of emesis yesterday. Diet backed down to clear liquids. Needs EGD in next day or so for gastric tissue biopsy.     PLAN:  - Clear liquid diet  - Encourage ambulation to promote bowel function. Patient should be up to chair or ambulating if awake  - Monitor drain output with diet advancement  - General surgery will continue to follow    SUBJECTIVE:   She is doing okay. Pain controlled. Endorses abdominal distention. Tolerating clear liquids without nausea or vomiting. Decreased appetite. Has not been out of bed yet today. Had an episode of nausea and small volume bilious emesis after eating lunch yesterday and then felt better. Continues to pass flatus and had a BM yesterday.      Patient Vitals for the past 24 hrs:   BP Temp Temp src Pulse Resp SpO2   10/16/23 0802 (!) 161/75 98.4  F (36.9  C) Oral 103 16 97 %   10/15/23 2351 136/63 98.3  F (36.8  C) Oral 104 17 93 %   10/15/23 1534 137/69 98.6  F (37  C) Oral 106 18 95 %         PHYSICAL EXAM:  General: patient seen resting in bed, no acute distress  Resp: no respiratory distress, breathing comfortably on room air  Abdomen: Softly distended, mildly tender to palpation over incision. Midline incision clean/dry/intact with staples.  Drains: Surgical CHAVA drains x2 with serosanguinous output.   Output by Drain (mL) 10/14/23 0700 - 10/14/23 1459 10/14/23 1500 - 10/14/23 2259 10/14/23 2300 - 10/15/23 0659 10/15/23 0700 - 10/15/23 1459 10/15/23 1500 - 10/15/23 2259 10/15/23 2300 -  10/16/23 0659 10/16/23 0700 - 10/16/23 1257   Closed/Suction Drain 1 Right;Anterior RLQ Bulb 19 [place] 20 30 25 10 30 30 10   Closed/Suction Drain 2 Left;Anterior LLQ Bulb 19 [place] 90 30 50 40 80 30 40   Extremities: warm and well perfused    10/15 0700 - 10/16 0659  In: 630 [P.O.:630]  Out: 220 [Drains:220]    No results displayed because visit has over 200 results.           Vielka Gordon PA-C  [facility] General Surgery  [address] [place] [id number]

## 2023-10-16 NOTE — PROGRESS NOTES
"SPIRITUAL HEALTH SERVICES ([facility]) Progress Note  [facility].  Unit [address]     Saw pt Peri Irizarry per LOS.     Patient/Family Understanding of Illness and Goals of Care - Peri is very saddened by the prospect that she may have stomach cancer. She will need to wait until after her surgery Tuesday to determine her goals of care.      Distress and Loss - Peri's two sisters have  of cancer and Peri is a cancer survivor, colon and breast. She denies feeling angry or frustrated, emphasising that her primary feeling is sadness. \"I ate healthy\" she said repeatedly. Her previous cancer diagnoses turned her life around so she's very sad as she faces her surgery.      Strengths, Coping, and Resources - Given that Peri has survived two bouts with cancer give her hope with this potential diagnosis.      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 She is open to prayer, receiving a bible and other devotional materials.      Plan of Care - Will refer her to the staff chaplains for a pre-surgery prayer and followup support.        Vernell Snider, Ph.D.,[facility]  , Spiritual Health Services      [facility] available  for emergency requests/referrals, either by having the on-call  paged or by entering an ASAP/STAT consult in Epic (this will also page the on-call ).       "

## 2023-10-16 NOTE — PLAN OF CARE
Problem: Intestinal Obstruction  Goal: Optimal Bowel Function  Outcome: Progressing   Goal Outcome Evaluation:       Bowel sounds are hypoactive, less than yesterday. Patient continues to have bowel movements and pass gas. Abd. X-ray ordered. Patient to have EGD tomorrow will be NPO at midnight. Patient had emesis X1 this shift. Patient did ambulate approx. 200 feet with stand by assist. Patient has poor oral intake and IV fluids initiated.

## 2023-10-16 NOTE — PLAN OF CARE
Per report the patient was nauseated and vomited at approximately 12:00. At that time Compazine and Zofran were given. At the start of this shift, this nurse asked if she was nauseated, she denied. Moments later she got up to the bathroom and back to bed. Took an oxycodome and within minutes she vomited. 90ml of green/yellow bile. She instantly felt better, no additional complaints. This was the 2nd time she felt nauseated and vomited per report from the previous nurse. No oral medications were given before the 1st incident. It is unclear if the oxycodone caused the nausea and vomiting this time. Regardless, the provider was notified and the diet was changed to clear liquids. The patient was less tired today. She asked to go for a walk in the [place], which was a pleasant change. She walked 150ft in the hallway. CHAVA drains are draining pink tinged serous fluid. The left drain has a very slow leak. Within 8 hours the is half compressed.       Goal Outcome Evaluation:         Problem: Intestinal Obstruction  Goal: Optimize Nutrition Status  Outcome: Not Progressing       Problem: Pain Acute  Goal: Optimal Pain Control and Function  Outcome: Progressing  Intervention: Develop Pain Management Plan  Recent Flowsheet Documentation  Taken 10/15/2023 1656 by Carmen An, RN  Pain Management Interventions:   medication (see MAR)   environmental changes   emotional support   repositioned     Problem: Surgery Nonspecified  Goal: Nausea and Vomiting Relief  Outcome: Progressing  Intervention: Prevent or Manage Nausea and Vomiting  Recent Flowsheet Documentation  Taken 10/15/2023 1703 by Carmen An, RN  Nausea/Vomiting Interventions: (contacted the provider, switched to clear liquid) other (see comments)

## 2023-10-16 NOTE — PROGRESS NOTES
[facility]    Medicine Progress Note - Hospitalist Service    Date of Admission:  10/8/2023    Assessment & Plan    Peri Irizarry is a 58 year old female admitted on 10/8/2023. She came to the emergency department for evaluation of abdominal pain, nausea and vomiting; found to have small bowel obstruction requiring surgical management.     #Small bowel obstruction  --s/p exploratory laparotomy with lysis of adhesions and reduction of internal hernia done on 10/9  ---Surgical findings include; Diffuse metastatic cancer of unknown primary, Closed loop obstruction of proximal small bowel  Gastric mass with evidence of near erosion into small bowel  ---Concern for malignancy related, surgery feels metastatic gastric versus recurrent breast  ---Surgery following,  --- NG out 10/11  --- Pathology suggestive of metastatic gastric carcinoma..  Follow-up with oncology  --- Diet and further orders per surgery.  Initially advance to soft diet but vomited twice on 10/15 and therefore diet was changed to clear liquid diet.  Will order x-ray of the abdomen today.  May need consideration for TPN if prolonged postoperative ileus.  Moreover's restart IV fluids for 18 hours to prevent dehydration.  ---CHAVA management per surgery.  --- Patient encouraged to ambulate 3 times a day.     #Acute kidney injury  --- Resolved  DC IV fluids     Acute blood loss anemia  ---stable overall but hemoglobin significantly down since admission.    ---Continue to monitor; going down small amounts each day.  --- Weight up about 10 pounds since admission if scale correct.  DC p.o. Lasix since it could worsen dehydration-  --hemoglobin is stable     #Hyponatremia  -resolved  --secondary to volume depletion from decreased oral intake, furosemide, and hydrochlorothiazide  discontinue Lasix.  --stable off IV     #Hypokalemia  -mild  -Secondary to GI loss  -Replace     #Mural thickening of the stomach  #Mesenteric stranding and  lymphadenopathy  #History of colon and breast cancer  -CT showed most pronounced in the distal body and antrum, corresponding to area of increased activity on PET/CT which could reflect a neoplastic process; mesenteric stranding and lymphadenopathy concerning for metastatic disease with the largest lymph node seen in the stomach measuring 2.9 x 2.4 cm  -Status post biopsy at time of small bowel obstruction surgery.   --- Biopsy results--metastatic gastric carcinoma  -[place] oncology consult appreciated - seen Baltazar in past.  Follow-up with oncology as outpatient     #Left hydroureteronephrosis  -CT showed no obstructing calculi, there is inflammatory stranding surrounding the left ureter distally which could reflect neoplastic invasion and/or inflammatory scaring  -Urology consulted from ED. no intervention planned.  Patient is considered high risk for infection s/p ureteral stent.  Urology may consider ureteral stents if patient worsens clinically or plans to undergo chemotherapy.  -- Gomez discontinued 10/10  --- PVRs have been negative     #Acute cystitis without hematuria, probable  -Urine culture polymicrobial, mainly gram-positive bacilli but all with low colony forming units  -Completed 4 days of IV Rocephin.  DC Rocephin on 10/13  -Urine culture shows multiple organisms with no predominant organism.  -Discontinued Gomez catheter     #COPD/asthma  -Quit smoking 2010   --No exacerbation  -DuoNebs as needed  -Continue home Incruse Ellipta     #Essential hypertension blood pressure now on the low side   --continue to hold Lasix, hydrochlorothiazide.  Restart Norvasc  -DC IV fluids     #Chronic thrombocytosis  -improing with surgery  --stable     #JAMMIE  --Now that NG out can resume CPAP     #Chronic thoracic back pain  -10/11 resumed Effexor, Cymbalta,   ---gabapentin resumed at half dosing      Dysthymia--resume BuSpar and Cymbalta and gabapentin and Topamax and effexor and trazodone   --Restarted  "trazodone     Hyperlipidemia--resume statin          Diet: Snacks/Supplements Adult: Ensure Enlive; Between Meals  Clear Liquid Diet  NPO per Anesthesia Guidelines for Procedure/Surgery Except for: Meds    DVT Prophylaxis: Enoxaparin (Lovenox) SQ  Gomez Catheter: Not present  Lines: None     Cardiac Monitoring: None  Code Status: Full Code      Clinically Significant Risk Factors        # Hypokalemia: Lowest K = 3.3 mmol/L in last 2 days, will replace as needed             # Hypertension: Noted on problem list        # Overweight: Estimated body mass index is 29.12 kg/m  as calculated from the following:    Height as of this encounter: 1.499 m (4' 11\").    Weight as of this encounter: 65.4 kg (144 lb 2.9 oz).     # Severe Malnutrition: based on nutrition assessment    # COPD: noted on problem list        Disposition Plan      Expected Discharge Date: 10/17/2023    Discharge Delays: IV Medication - consider oral or Home Infusion  Other (Add Comment)  Voiding/Eating Trial needed  Specialist Consult (enter specialist & decision needed in comments)  Destination: home with help/services  Discharge Comments: As per surgery            Augustine Mckeon MD  Hospitalist Service  [facility] [place]  Securely message with [facility] (more info)  Text page via [employer] Paging/Directory   ______________________________________________________________________    Interval History   Patient was advanced to soft diet yesterday but vomited twice yesterday, therefore diet was changed to clear liquids.  Patient is passing gas.  Will order x-ray of the abdomen.        Physical Exam   Vital Signs: Temp: 98.4  F (36.9  C) Temp src: Oral BP: (!) 161/75 Pulse: 103   Resp: 16 SpO2: 97 % O2 Device: None (Room air)    Weight: 144 lbs 2.89 oz    Awake in no apparent distress  Hypoactive bowel sounds  No leg edema  Moves all 4 extremity  Normal mood and affect      Medical Decision Making       35 MINUTES SPENT BY ME on the date of service " doing chart review, history, exam, documentation & further activities per the note.  MANAGEMENT DISCUSSED with the following over the past 24 hours: Patient   NOTE(S)/MEDICAL RECORDS REVIEWED over the past 24 hours: Urology and surgery       Data     I have personally reviewed the following data over the past 24 hrs:    N/A  \   N/A   / N/A     N/A N/A N/A /  N/A   3.9 N/A 0.78 \       Imaging results reviewed over the past 24 hrs:   No results found for this or any previous visit (from the past 24 hour(s)).

## 2023-10-16 NOTE — PLAN OF CARE
Problem: Plan of Care - These are the overarching goals to be used throughout the patient stay.    Goal: Absence of Hospital-Acquired Illness or Injury  Intervention: Prevent Skin Injury  Recent Flowsheet Documentation  Taken 10/16/2023 0004 by Deja Jain RN  Body Position: position changed independently     Problem: Intestinal Obstruction  Goal: Optimal Bowel Function  Outcome: Progressing  Intervention: Promote Bowel Function  Recent Flowsheet Documentation  Taken 10/16/2023 0004 by Deja Jain RN  Body Position: position changed independently  Head of Bed (HOB) Positioning: HOB at 20 degrees  Positioning/Transfer Devices:   pillows   in use  Intervention: Promote Bowel Function  Recent Flowsheet Documentation  Taken 10/16/2023 0004 by Deja Jain RN  Body Position: position changed independently  Head of Bed (HOB) Positioning: HOB at 20 degrees  Positioning/Transfer Devices:   pillows   in use  Goal: Optimal Pain Control and Function  Outcome: Progressing     Problem: Surgery Nonspecified  Goal: Nausea and Vomiting Relief  Outcome: Progressing   Goal Outcome Evaluation:      Pt is alert and calm. VS stable. Denies pain. Bowel sounds hypoactive to 4 quadrants. CHAVA drain patent and draining. Drinking clear liquid adequately. Slept between cares.

## 2023-10-17 NOTE — PROGRESS NOTES
General Surgery Progress Note:    Hospital Day # 8    ASSESSMENT:   1. Malignant neoplasm of stomach, unspecified location (H)    2. Mild dehydration    3. Hypokalemia    4. Upper abdominal pain    5. Generalized abdominal mass    6. SBO (small bowel obstruction) (H)    7. Acute UTI        Peri Irizarry is a 58 year old female status post exploratory laparotomy with reduction of internal hernia on 10/9/2023.  For the most part, the patient has return of bowel function.  The pathology showed differentiated carcinoma with metastasis likely gastric as a primary.    PLAN:   -General surgery taken the patient for an EGD today to further evaluate for primary metastatic cancer.    -Please keep patient n.p.o. for now    -Continue medical management per primary team.      SUBJECTIVE:   Peri Irizarry was seen on rounds.  Patient states she is doing well.  Passing flatus having bowel movements.  Patient tolerated liquid diet.  All of her questions and concerns regarding the EGD were answered during this morning's rounds.    Patient Vitals for the past 24 hrs:   BP Temp Temp src Pulse Resp SpO2   10/17/23 0759 (!) 140/88 98.7  F (37.1  C) Oral 119 20 98 %   10/16/23 2335 (!) 150/77 98.1  F (36.7  C) Oral 87 16 97 %   10/16/23 1555 (!) 167/83 98.1  F (36.7  C) Oral 97 18 97 %       Physical Exam:  General: NAD, pleasant  CV:RRR  LUNGS:Normal respiratory effort, no accessory muscle use  ABD: Abdominal incisions well approximated.  Abdominal drains x2 with serosanguineous output.  EXT:no CCE    No results displayed because visit has over 200 results.           DO Evin Mazariegos DO  General Surgeon  [facility]  Surgery [address]  [address]  [place] [address]?  Office: [phone]  Employed by - [facility]  Pager: [phone]

## 2023-10-17 NOTE — PROGRESS NOTES
SPIRITUAL HEALTH SERVICES NOTE  [facility]; RUTH    Reason for Visit: Pre-surgical prayer  Plan of Care: Will follow-up with Peri after her procedure    FULL SPIRITUAL CARE NOTE:   Saw Peri due to pre-surgical prayer request. At the time of my visit, her nurse Jasmin notified me that Peri had reported 10/10 chest pain. Peri didn't talk much and mostly kept her eyes closed but she noted that she is feeling anxious. I empathized with and validated her feelings. Sanjeev welcomed a prayer, and some lavender oil aromatherapy. She also said she would appreciate a follow-up visit after her procedure.     Time Spent with Patient/Family: 10 minutes    Vielka Metzger M.Div.    Office: [phone] (for non-urgent requests)  Please Vocera or page through [facility] for time-sensitive requests

## 2023-10-17 NOTE — ANESTHESIA PREPROCEDURE EVALUATION
"Anesthesia Pre-Procedure Evaluation    Patient: Peri Irizarry   MRN: [id number] : 1965        Procedure : Procedure(s):  ESOPHAGOGASTRODUODENOSCOPY          Past Medical History:   Diagnosis Date     Anemia      Anxiety      Arthritis      Asthma      Breast cancer (H) 2010    left      Cancer (H)      Colon cancer (H) 2011     COPD (chronic obstructive pulmonary disease) (H)      Depression      Difficulty walking      Dyspnea on exertion      History of migraine headaches      Hx of radiation therapy 2010     Hypertension      Migraines      Motion sickness      Orthopnea      Other chronic pain      Shortness of breath      Sleep apnea     CPAP     Stroke (H) 2010    \"I HAD A SLIGHT STROKE BACK IN \"     Walking troubles       Past Surgical History:   Procedure Laterality Date     BIOPSY BREAST Left 2010     BREAST SURGERY       COLON SURGERY      20% OF COLON REMOVED-COLON CANCER     COLONOSCOPY N/A 2016    Procedure: COLONOSCOPY WITH MAC SEDATION;  Surgeon: Kumar Horowitz MD;  [facility];  Service:      COLONOSCOPY N/A 2019    Procedure: COLONOSCOPY;  Surgeon: Chintan Peter MD;  Location: [facility] OR;  Service: General     COLONOSCOPY N/A 2022    Procedure: COLONOSCOPY WITH POLYPECTOMY;  Surgeon: Evin Wright DO;  Location: [facility] OR     HYSTERECTOMY       LAPAROSCOPY DIAGNOSTIC (GYN) N/A 10/9/2023    Procedure: DIAGNOSTIC LAPAROSCOPY, LYSIS OF ADHESIONS;  Surgeon: Matthew Ramey DO;  Location: [facility] OR     LAPAROTOMY EXPLORATORY N/A 10/9/2023    Procedure: OPEN LYSIS OF ADHESIONS; REDUCTION INTERNAL HERNIA, PARTIAL OMENTECTOMY;  Surgeon: Matthew Ramey DO;  Location: [facility] OR     LUMPECTOMY BREAST Left 2010     OOPHORECTOMY Bilateral       Allergies   Allergen Reactions     Hydrocodone-Acetaminophen Unknown     Pt takes this med 3 times daily, Brand Name Hydrocodone without the " sparkles pt can use  -Monitor hydrocodone/apap supply (ok to take as long as no blue sparkles like brand lortab)         Lisinopril      Penicillin G       Social History     Tobacco Use     Smoking status: Former     Packs/day: 2.00     Years: 20.00     Additional pack years: 0.00     Total pack years: 40.00     Types: Cigarettes     Quit date:      Years since quittin.8     Smokeless tobacco: Never   Substance Use Topics     Alcohol use: Not Currently      Wt Readings from Last 1 Encounters:   10/14/23 65.4 kg (144 lb 2.9 oz)        Anesthesia Evaluation   Pt has had prior anesthetic.     No history of anesthetic complications       ROS/MED HX  ENT/Pulmonary:     (+) sleep apnea, uses CPAP,              tobacco use, Past use,    asthma    COPD,              Neurologic:     (+)      migraines,    CVA,    TIA,                  Cardiovascular: Comment: Pt c/o chest pressure for few minutes during preop visit with RN.  Described as a pressure that has completely resolved.  12 lead EKG taken during her CP showed ST with nonspecific ST changes.  No acute changes.  Pt has not been having CP prior to this episode.    (+)  hypertension- -   -  - -                                      METS/Exercise Tolerance: >4 METS    Hematologic:     (+)      anemia,          Musculoskeletal:   (+)  arthritis,             GI/Hepatic: Comment: s/p exploratory laparotomy with lysis of adhesions and reduction of internal hernia done on 10/9, metastatic Ca, gastric mass with evidence of near erosion into small bowel      Renal/Genitourinary:  - neg Renal ROS     Endo:     (+)               Obesity,       Psychiatric/Substance Use:       Infectious Disease:       Malignancy:       Other:            Physical Exam    Airway        Mallampati: II   TM distance: > 3 FB   Neck ROM: full   Mouth opening: > 3 cm    Respiratory Devices and Support         Dental       (+) Edentulous      Cardiovascular          Rhythm and rate: regular and  "tachycardia     Pulmonary   pulmonary exam normal            OUTSIDE LABS:  CBC:   Lab Results   Component Value Date    WBC 10.1 10/15/2023    WBC 11.3 (H) 10/14/2023    HGB 8.1 (L) 10/15/2023    HGB 9.0 (L) 10/14/2023    HCT 24.3 (L) 10/15/2023    HCT 26.8 (L) 10/14/2023     (H) 10/15/2023     (H) 10/14/2023     BMP:   Lab Results   Component Value Date     10/15/2023     10/12/2023    POTASSIUM 3.9 10/17/2023    POTASSIUM 3.9 10/16/2023    CHLORIDE 102 10/15/2023    CHLORIDE 102 10/12/2023    CO2 22 10/15/2023    CO2 25 10/12/2023    BUN 9.7 10/15/2023    BUN 11.1 10/12/2023    CR 0.67 10/17/2023    CR 0.78 10/16/2023    GLC 80 10/15/2023    GLC 97 10/12/2023     COAGS:   Lab Results   Component Value Date    INR 1.07 10/09/2023     POC: No results found for: \"BGM\", \"HCG\", \"HCGS\"  HEPATIC:   Lab Results   Component Value Date    ALBUMIN 4.5 10/08/2023    PROTTOTAL 7.9 10/08/2023    ALT 20 10/08/2023    AST 23 10/08/2023    ALKPHOS 118 (H) 10/08/2023    BILITOTAL 0.6 10/08/2023     OTHER:   Lab Results   Component Value Date    PH 7.37 10/09/2023    LACT 1.2 10/08/2023    CHERY 8.3 (L) 10/15/2023    MAG 1.7 10/10/2023    LIPASE 21 10/08/2023    TSH 2.71 04/05/2023       Anesthesia Plan    ASA Status:  3    NPO Status:  NPO Appropriate    Anesthesia Type: MAC.              Consents    Anesthesia Plan(s) and associated risks, benefits, and realistic alternatives discussed. Questions answered and patient/representative(s) expressed understanding.     - Discussed: Risks, Benefits and Alternatives for BOTH SEDATION and the PROCEDURE were discussed     - Discussed with:  Patient      - Extended Intubation/Ventilatory Support Discussed: No.      - Patient is DNR/DNI Status: No     Use of blood products discussed: Yes.     - Discussed with: Patient.     - Consented: consented to blood products            Reason for refusal: other.     Postoperative Care    Pain management: Multi-modal analgesia.    "     Comments:    Other Comments: Propofol sedation          Pelrita Platt MD

## 2023-10-17 NOTE — PROGRESS NOTES
"CLINICAL NUTRITION SERVICES - ASSESSMENT NOTE     Nutrition Prescription    RECOMMENDATIONS FOR MDs/PROVIDERS TO ORDER:  Recommend enteral nutrition support with severe malnutrition, ongoing very poor nutrition intake and pt plan to pursue CA tx    Malnutrition Status:    Severe in chronic illness    Recommendations already ordered by Registered Dietitian (RD):  None    Future/Additional Recommendations:  -Resume daily ensure enlive when diet advance to full liquids or greater  -Follow up with GI results and feeding plan     EVALUATION OF PROGRESS TOWARDS GOALS      CURRENT NUTRITION ORDERS  Diet: NPO for EGD  NPO/CL x 8 days total  Mechanical soft diet 1 day only, on 10/14    Receiving LR IVF at 100 lm/hr    Intake has been <50% x 9 days in hospital + 3 months PTA    LABS  Labs reviewed    MEDICATIONS  Medications reviewed  pepcid iv q 12 hr, miralax daily, pericolace bid. Feso4, lasix daily, hydrodiuril, mvi with minerals, crestor, thiamine 100 mg x 10 days    ANTHROPOMETRICS  Height: 149.9 cm (4' 11\")  Most Recent Weight: 65.4 kg (144 lb 2.9 oz) 10/14- up 4 lb x 1 week  IBW: 44.3 kg  BMI: Overweight BMI 25-29.9  Weight History:     Wt Readings from Last 10 Encounters:   10/08/23  10/06/23  09/08/23  04/20/23 63 kg (139 lb)  140 lb - office  154 lb - office  79.4 kg (175 lb )    02/21/23 77.1 kg (170 lb)   12/07/22 77.1 kg (170 lb)   01/03/22 90.3 kg (199 lb)   12/23/21 90.3 kg (199 lb)   01/11/21 85.7 kg (189 lb)   07/10/20 88.9 kg (196 lb)   07/31/19 96.6 kg (213 lb)   06/24/19 99.8 kg (220 lb)   06/23/16 95.3 kg (210 lb)   20% weight loss x 6 months or less    Dosing Weight: 67.6 kg adjusted  weight    ASSESSED NUTRITION NEEDS  Estimated Energy Needs: 0357-7850 kcals/day (25 - 30 kcals/kg)  Justification: Maintenance  Estimated Protein Needs:  grams protein/day (1.2 - 1.5 grams of pro/kg)  Justification: Repletion  Estimated Fluid Needs: 9516-4522 mL/day (25 - 30 mL/kg)   Justification: " Maintenance    PHYSICAL FINDINGS  See malnutrition section below.  GI - hypoactive bowel sounds yesterday passing gas  Emesis 1-2/day  Drain x 2 - 165 ml OP  Last BM 10/16    MALNUTRITION:  % Weight Loss:  > 10% in 6 months (severe malnutrition)  % Intake:  </= 50% for >/= 1 month (severe malnutrition)  Subcutaneous Fat Loss:  Thoracic region mild depletion  Muscle Loss:  Temporal region moderate depletion, Acromion bone region moderate depletion, and Posterior calf region moderate depletion  Fluid Retention:  None noted    Malnutrition Diagnosis: Severe malnutrition  In Context of:  Chronic illness or disease    NUTRITION DIAGNOSIS  Malnutrition related to possible metastatic CA, altered GI function, poor appetite as evidenced by intake <50% x 3.5 months, 20% weight loss x 6 months or less     INTERVENTIONS  Implementation  -Recommend enteral nutrition support with severe malnutrition, ongoing very poor nutrition intake and pt plan to pursue CA tx  -Resume daily ensure enlive when diet advance to full liquids or greater    Goals  Diet advancement vs nutrition support within 2-3 days.- not progressing-ongoing GI sx  Normalize stooling- progressing  Maintain weight- progressing  Meet > 75% of estimated nutrition needs - not progressing     Monitoring/Evaluation  Progress toward goals will be monitored and evaluated per protocol.

## 2023-10-17 NOTE — ANESTHESIA CARE TRANSFER NOTE
Patient: Peri Irizarry    Procedure: Procedure(s):  ESOPHAGOGASTRODUODENOSCOPY WITH BIOPSIES       Diagnosis: Malignant neoplasm of stomach, unspecified location (H) [C16.9]  Diagnosis Additional Information: No value filed.    Anesthesia Type:   MAC     Note:    Oropharynx: oropharynx clear of all foreign objects and spontaneously breathing  Level of Consciousness: awake  Oxygen Supplementation: room air    Independent Airway: airway patency satisfactory and stable  Dentition: dentition unchanged  Vital Signs Stable: post-procedure vital signs reviewed and stable  Report to RN Given: handoff report given  Patient transferred to: PACU  Comments: .  .  Patient will be monitored in PACU briefly. Patient presented with apparent fluid deficit. Albumin and fluids being administered.  .  .    Handoff Report: Identifed the Patient, Identified the Reponsible Provider, Reviewed the pertinent medical history, Discussed the surgical course, Reviewed Intra-OP anesthesia mangement and issues during anesthesia, Set expectations for post-procedure period and Allowed opportunity for questions and acknowledgement of understanding      Vitals:  Vitals Value Taken Time   /73 10/17/23 1237   Temp 98.8    Pulse 112 10/17/23 1237   Resp 32 10/17/23 1237   SpO2 96 % 10/17/23 1237   Vitals shown include unfiled device data.    Electronically Signed By: EUFEMIA Donovan CRNA  October 17, 2023  12:39 PM

## 2023-10-17 NOTE — PLAN OF CARE
Goal Outcome Evaluation:       Patient has been able to sleep.  Passing gas and did have a small loose BM.  Denies need for pain medication.  Bed alarm on for safety and will forget to ask to for help and alarm goes off.

## 2023-10-17 NOTE — PLAN OF CARE
Problem: Malnutrition  Goal: Improved Nutritional Intake  Outcome: Not Progressing     Problem: Malnutrition  Goal: Improved Nutritional Intake  Outcome: Not Progressing   Goal Outcome Evaluation:         Problem: Pain Acute  Goal: Optimal Pain Control and Function  Outcome: Progressing  Intervention: Prevent or Manage Pain  Recent Flowsheet Documentation  Taken 10/17/2023 0900 by Elizabeth Quiñones RN  Medication Review/Management: medications reviewed     Problem: Pain Acute  Goal: Optimal Pain Control and Function  Outcome: Progressing  Intervention: Prevent or Manage Pain  Recent Flowsheet Documentation  Taken 10/17/2023 0900 by Elizabeth Quiñones RN  Medication Review/Management: medications reviewed     Problem: Pain Acute  Goal: Optimal Pain Control and Function  Intervention: Prevent or Manage Pain  Recent Flowsheet Documentation  Taken 10/17/2023 0900 by Elizabeth Quiñones RN  Medication Review/Management: medications reviewed   Patient was NPO for most of day for EGD. Back from EGD 1300. Patient was drowsy and heart rate 112. Required o2 at 2L for o2 saturation 87%. Weaned off o2 and patients 02 saturation 94% on room air. Started on clear liquids and patient only wanted sips of water. Patient refused all scheduled medications today except b/p medication. Patient passed gas in am while in bathroom. Incision clean and dry with emilie intact. CHAVA drains times 2. Minimal output.

## 2023-10-17 NOTE — ANESTHESIA POSTPROCEDURE EVALUATION
Patient: Peri Irizarry    Procedure: Procedure(s):  ESOPHAGOGASTRODUODENOSCOPY WITH BIOPSIES       Anesthesia Type:  MAC    Note:  Disposition: Inpatient   Postop Pain Control: Uneventful            Sign Out: Well controlled pain   PONV: No   Neuro/Psych: Uneventful            Sign Out: Acceptable/Baseline neuro status   Airway/Respiratory: Uneventful            Sign Out: Acceptable/Baseline resp. status   CV/Hemodynamics: Uneventful            Sign Out: Acceptable CV status; No obvious hypovolemia; No obvious fluid overload   Other NRE: NONE   DID A NON-ROUTINE EVENT OCCUR? No           Last vitals:  Vitals Value Taken Time   /76 10/17/23 1300   Temp 37.1  C (98.8  F) 10/17/23 1235   Pulse 111 10/17/23 1304   Resp 24 10/17/23 1303   SpO2 97 % 10/17/23 1304   Vitals shown include unfiled device data.    Electronically Signed By: Perlita Platt MD  October 17, 2023  1:54 PM

## 2023-10-17 NOTE — OP NOTE
Please see op note for EGD with biopsy through provation.     Evin Wright DO  General Surgeon  [facility]  [facility]gery [facility] - [address]  [place] [address]?  Office: [phone]  Employed by - [facility]  Pager: [phone]

## 2023-10-17 NOTE — PROGRESS NOTES
[facility]    Medicine Progress Note - Hospitalist Service    Date of Admission:  10/8/2023    Assessment & Plan   Peri Irizarry is a 58 year old female admitted on 10/8/2023. She came to the emergency department for evaluation of abdominal pain, nausea and vomiting; found to have small bowel obstruction requiring surgical management.     #Small bowel obstruction  --s/p exploratory laparotomy with lysis of adhesions and reduction of internal hernia done on 10/9  ---Surgical findings include; Diffuse metastatic cancer of unknown primary, Closed loop obstruction of proximal small bowel  Gastric mass with evidence of near erosion into small bowel  ---Concern for malignancy related, surgery feels metastatic gastric versus recurrent breast  ---Surgery following,  --- NG out 10/11  --- Pathology suggestive of metastatic gastric carcinoma..  Follow-up with oncology  --- Diet and further orders per surgery.  Initially advance to soft diet but vomited twice on 10/15 and therefore diet was changed to clear liquid diet.  Will order x-ray of the abdomen today.  May need consideration for TPN if prolonged postoperative ileus.  Moreover's restart IV fluids for 18 hours to prevent dehydration  --- Underwent EGD with biopsies on 10/17/23, pending pathology  ---CHAVA management per surgery.  --- Patient encouraged to ambulate 3 times a day.    # Tachycardia  HR ranging from 115 - 121, regular  Patient was npo and had very minimal oral intake  Supect likely 2/2 dehydration, LR bolus 500ml given  Will monitor     #Acute kidney injury  --- Resolved  DC IV fluids     Acute blood loss anemia  ---stable overall but hemoglobin significantly down since admission.    ---Continue to monitor; going down small amounts each day.  --- Weight up about 10 pounds since admission if scale correct.  DC p.o. Lasix since it could worsen dehydration-  --hemoglobin is stable     #Hyponatremia  -resolved  --secondary to volume  depletion from decreased oral intake, furosemide, and hydrochlorothiazide  discontinue Lasix.  --stable off IV     #Hypokalemia  -mild  -Secondary to GI loss  -Replace     #Mural thickening of the stomach  #Mesenteric stranding and lymphadenopathy  #History of colon and breast cancer  -CT showed most pronounced in the distal body and antrum, corresponding to area of increased activity on PET/CT which could reflect a neoplastic process; mesenteric stranding and lymphadenopathy concerning for metastatic disease with the largest lymph node seen in the stomach measuring 2.9 x 2.4 cm  -Status post biopsy at time of small bowel obstruction surgery.   --- Biopsy results--metastatic gastric carcinoma  -[place] oncology consult appreciated - seen [facility] in past.  Follow-up with oncology as outpatient     #Left hydroureteronephrosis  -CT showed no obstructing calculi, there is inflammatory stranding surrounding the left ureter distally which could reflect neoplastic invasion and/or inflammatory scaring  -Urology consulted from ED. no intervention planned.  Patient is considered high risk for infection s/p ureteral stent.  Urology may consider ureteral stents if patient worsens clinically or plans to undergo chemotherapy.  -- Gomez discontinued 10/10  --- PVRs have been negative     #Acute cystitis without hematuria, probable  -Urine culture polymicrobial, mainly gram-positive bacilli but all with low colony forming units  -Completed 4 days of IV Rocephin.  DC Rocephin on 10/13  -Urine culture shows multiple organisms with no predominant organism.  -Discontinued Gomez catheter     #COPD/asthma  -Quit smoking 2010   --No exacerbation  -DuoNebs as needed  -Continue home Incruse Ellipta     #Essential hypertension blood pressure now on the low side   --continue to hold Lasix, hydrochlorothiazide.  Restart Norvasc  -DC IV fluids     #Chronic thrombocytosis  -improing with surgery  --stable     #JAMMIE  --Now that NG out can resume  "CPAP     #Chronic thoracic back pain  -10/11 resumed Effexor, Cymbalta,   ---gabapentin resumed at half dosing      Dysthymia--resume BuSpar and Cymbalta and gabapentin and Topamax and effexor and trazodone   --Restarted trazodone     Hyperlipidemia--resume statin          Diet: Snacks/Supplements Adult: Ensure Enlive; Between Meals  Advance Diet as Tolerated: Clear Liquid Diet; Regular Diet Adult    DVT Prophylaxis: Pneumatic Compression Devices  Gomez Catheter: Not present  Lines: None     Cardiac Monitoring: None  Code Status: Full Code      Clinically Significant Risk Factors                  # Hypertension: Noted on problem list        # Overweight: Estimated body mass index is 29.12 kg/m  as calculated from the following:    Height as of this encounter: 1.499 m (4' 11\").    Weight as of this encounter: 65.4 kg (144 lb 2.9 oz).   # Severe Malnutrition: based on nutrition assessment    # COPD: noted on problem list        Disposition Plan      Expected Discharge Date: 10/19/2023    Discharge Delays: IV Medication - consider oral or Home Infusion  Voiding/Eating Trial needed  Procedure Pending (enter procedure & time in comments)  Destination: home with help/services  Discharge Comments: egd today            Melissa Bauer MD  Hospitalist Service  [facility]  Securely message with [employer] (more info)  Text page via [employer] Paging/Directory   ______________________________________________________________________    Interval History   No Significant events overnight.  Patient went for EGD today, is taking minimal oral intake, has been tachycardic, receiving 500 mL LR bolus.    Physical Exam   Vital Signs: Temp: 98.7  F (37.1  C) Temp src: Oral BP: 117/73 Pulse: 115   Resp: 24 SpO2: 94 % O2 Device: None (Room air) Oxygen Delivery: 2 LPM  Weight: 144 lbs 2.89 oz    General: Awake alert no acute distress  Lungs: Clear to auscultation bilaterally  Cardio: RRR, tachycardic  Abdomen: Staples over " the abdominal incision, drains x 2 with output  Ext: no edema  Neuro: No gross focal deficits    Medical Decision Making       35 MINUTES SPENT BY ME on the date of service doing chart review, history, exam, documentation & further activities per the note.      Data     I have personally reviewed the following data over the past 24 hrs:    N/A  \   8.7 (L)   / N/A     N/A N/A N/A /  N/A   3.9 N/A 0.67 \       Imaging results reviewed over the past 24 hrs:   No results found for this or any previous visit (from the past 24 hour(s)).

## 2023-10-17 NOTE — PLAN OF CARE
"  Problem: Plan of Care - These are the overarching goals to be used throughout the patient stay.    Description: The Care Plan Review/Shift Note, Individualized Goals, Hospital-Acquired Illness or Injury, Comfort and Wellbeing, and Transition Planning are the \"Overarching Goals\" and should be updated throughout the hospitalization.  Please hover over the (i) for specific information on each goal topic.  Goal: Plan of Care Review  Description: The Plan of Care Review/Shift note should be completed every shift.  The Outcome Evaluation is a brief statement about your assessment that the patient is improving, declining, or no change.  This information will be displayed automatically on your shift note.  Outcome: Not Progressing  Flowsheets (Taken 10/17/2023 1220)  Outcome Evaluation: Pt with ongoing GI issues- EGD and CA eval today. NPO/CL x 8 days in hospital + p.o <50% x 3 months prior with 20% weight loss. Recommend enteral nutrition support with severe malnutrition/ongoing poor intake and pt plan to persue CA tx  Overall Patient Progress: no change     Problem: Malnutrition  Goal: Improved Nutritional Intake  Outcome: Not Progressing   Goal Outcome Evaluation:           Overall Patient Progress: no changeOverall Patient Progress: no change    Outcome Evaluation: Pt with ongoing GI issues- EGD and CA eval today. NPO/CL x 8 days in hospital + p.o <50% x 3 months prior with 20% weight loss. Wt up 4 lb.  Recommend enteral nutrition support with severe malnutrition/ongoing poor intake and pt plan to persue CA tx      "

## 2023-10-18 NOTE — PLAN OF CARE
Goal Outcome Evaluation:  Patients mood is sad, more than previous night.  Refused trazadone and neurontin at bedtime.  Called at 0430 and wanted me to stop her IV and she wanted to go outside to have a cigarette.  Offered her the patch or nicorette gum.  Compazine given once for stomach discomfort, will not take offered pain medications.

## 2023-10-18 NOTE — PROGRESS NOTES
CHART CHECK     Primary Oncologist/Hematologist:  Dr. Mani Malcolm MD      INTERVAL HISTORY:  Patient not in room as she is currently getting a CT abdomen/pelvis.  Chart reviewed. S/p EGD on 10/17/23, no complications.             Assessment and Plan:       1. Small bowel obstruction  -10/9/23 s/p diagnostic laparoscopy converted to laparotomy, hernia repair and lysis of adhesions.  - Pathology shows   MESENTERIC IMPLANT, BIOPSY: METASTATIC POORLY DIFFERENTIATED CARCINOMA  NODULE, ABDOMINAL WALL, BIOPSY: BENIGN FIBROCOLLAGENOUS NODULE AND ADIPOSE TISSUE NEGATIVE FOR MALIGNANCY  MESENTERY, BIOPSY: BENIGN ADIPOSE TISSUE; NO TUMOR SEEN   MESENTERIC NODULE, BIOPSY: METASTATIC POORLY DIFFERENTIATED ADENOCARCINOMA   OMENTUM, BIOPSY: METASTATIC POORLY DIFFERENTIATED ADENOCARCINOMA  PERITONEAL FLUID: POSITIVE FOR MALIGNANT CELLS; CELL MORPHOLOGY AND IMMUNOCHEMICAL PROFILE ARE NOT SPECIFIC, BUT ARE MOST SUGGESTIVE OF METASTATIC GASTRIC CARCINOMA        2. On 9/11/23 CT CAP showed asymmetric nodularity along the posterior gastric wall superimposed on significant diffuse gastric and distal esophageal wall thickening, appearance concerning for neoplasm. Enlarged lymph nodes in the gastrohepatic ligament, highly suspicious for metastatic adenopathy. Asymmetric thickened appearance of the pancreatic tail without definable mass or obvious ductal dilatation.         3. History of colorectal cancer: S/p sigmoid colon resection back in 2012. Colonoscopy in December 2022 did not show any significant abnormality. Some polyps were found, and they were removed with cold biopsy forceps. The pathology was benign.         4. 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 To date, there has been no evidence for a local recurrence or more distant metastatic disease.          - Bilateral screening  mammogram on 1/9/23 showed no evidence of malignancy. We will continue active surveillance.         5. Acute UTI    6. Tachycardia, etiology unclear    7. Leukocytosis         Plan  1. Appreciate general surgery care   2. S/p EGD 10/17/23, await pathology.  3. Scheduled to get CT A/B due to concerns of a leak, which could contribute to tachycardia.   4. Check CBC with diff, CMP. Added CEA to get baseline. CT abdomen/pelvis underway. Check UA and blood culture.  5. Will likely need a PORT but will hold. Await pathology and for stability.  6. Patient is interested in treatment. She will follow-up with Dr. Malcolm at our clinic to discuss her pathology and for treatment planning. She will likely be on chemotherapy, but we will also look to see if she is eligible for clinical trials.  7. Continue best supportive care post-operatively.           Brandi Barr PA-C  [facility]  [phone] Office  [phone] Cell, available 8AM - 4 PM      OBJECTIVE:          Medications:   Scheduled Medications   [Held by provider] amLODIPine  2.5 mg Oral Daily    busPIRone  5 mg Oral TID    DULoxetine  30 mg Oral BID    enoxaparin ANTICOAGULANT  40 mg Subcutaneous Q24H    famotidine  20 mg Intravenous Q12H    ferrous sulfate  325 mg Oral Q48H    fluticasone-vilanterol  1 puff Inhalation Daily    [Held by provider] furosemide  40 mg Oral Daily    gabapentin  300 mg Oral BID    gabapentin  600 mg Oral At Bedtime    [Held by provider] hydrochlorothiazide  25 mg Oral Daily    montelukast  10 mg Oral At Bedtime    multivitamin w/minerals  1 tablet Oral Daily    polyethylene glycol  17 g Oral Daily    rosuvastatin  10 mg Oral Daily    senna-docusate  1 tablet Oral BID    sodium chloride (PF)  3 mL Intracatheter Q8H    thiamine  100 mg Oral Daily    topiramate  100 mg Oral At Bedtime    topiramate  50 mg Oral QAM    traZODone  100 mg Oral At Bedtime    umeclidinium  1 puff Inhalation Daily    venlafaxine  37.5 mg Oral BID     PRN  "Medications  acetaminophen, acetaminophen, albuterol, bisacodyl, cyclobenzaprine, hydrALAZINE, hydrOXYzine, ipratropium - albuterol 0.5 mg/2.5 mg/3 mL, lidocaine 4%, lidocaine (buffered or not buffered), magnesium hydroxide, meclizine, morphine **OR** morphine, naloxone **OR** naloxone **OR** naloxone **OR** naloxone, ondansetron **OR** ondansetron, oxyCODONE, phenol MT, prochlorperazine **OR** [DISCONTINUED] prochlorperazine, prochlorperazine, [DISCONTINUED] prochlorperazine **OR** prochlorperazine **OR** prochlorperazine, sodium chloride (PF), SUMAtriptan               Physical Exam:   Vitals were reviewed  Blood pressure 94/57, pulse 118, temperature 98.2  F (36.8  C), temperature source Oral, resp. rate 17, height 1.499 m (4' 11\"), weight 65.4 kg (144 lb 2.9 oz), SpO2 97%, not currently breastfeeding.  Wt Readings from Last 4 Encounters:   10/14/23 65.4 kg (144 lb 2.9 oz)   02/21/23 77.1 kg (170 lb)   12/07/22 77.1 kg (170 lb)   01/03/22 90.3 kg (199 lb)       I/O last 3 completed shifts:  In: 1490 [P.O.:240; I.V.:1000]  Out: 93 [Drains:93]             Data:   All laboratory data and imaging studies reviewed.    CMP  Recent Labs   Lab 10/18/23  0513 10/17/23  1921 10/17/23  0547 10/16/23  0530 10/15/23  1236 10/15/23  0523 10/12/23  1312 10/12/23  0546   NA  --   --   --   --   --  135  --  135   POTASSIUM 3.9  --  3.9 3.9 3.7 3.3*   < > 3.3*   CHLORIDE  --   --   --   --   --  102  --  102   CO2  --   --   --   --   --  22  --  25   ANIONGAP  --   --   --   --   --  11  --  8   GLC  --  116*  --   --   --  80  --  97   BUN  --   --   --   --   --  9.7  --  11.1   CR 0.63  --  0.67 0.78  --  0.83   < > 0.83   GFRESTIMATED >90  --  >90 87  --  81   < > 81   CHERY  --   --   --   --   --  8.3*  --  8.4*    < > = values in this interval not displayed.     CBC  Recent Labs   Lab 10/18/23  0513 10/17/23  0547 10/15/23  0523 10/14/23  1020 10/13/23  0554 10/12/23  0546   WBC  --   --  10.1 11.3*  --  11.1*   RBC  --   " --  2.78* 3.09*  --  2.92*   HGB  --  8.7* 8.1* 9.0* 9.3* 8.4*   HCT  --   --  24.3* 26.8*  --  25.8*   MCV  --   --  87 87  --  88   MCH  --   --  29.1 29.1  --  28.8   MCHC  --   --  33.3 33.6  --  32.6   RDW  --   --  16.1* 15.9*  --  15.8*   *  --  529* 539*  --  463*     INRNo lab results found in last 7 days.  Data   Results for orders placed or performed during the hospital encounter of 10/08/23 (from the past 24 hour(s))   ECG 12-LEAD WITH MUSE (LHE)   Result Value Ref Range    Systolic Blood Pressure  mmHg    Diastolic Blood Pressure  mmHg    Ventricular Rate 119 BPM    Atrial Rate 119 BPM    NH Interval 158 ms    QRS Duration 82 ms     ms    QTc 450 ms    P Axis 58 degrees    R AXIS 45 degrees    T Axis -7 degrees    Interpretation ECG       Sinus tachycardia  Nonspecific ST and T wave abnormality  Abnormal ECG  When compared with ECG of 08-OCT-2023 22:03,  Premature supraventricular complexes are no longer Present    Confirmed by LILA PILLAI, JARETH LOC:WW ([id number]) on 10/17/2023 5:24:56 PM     UPPER GI ENDOSCOPY   Result Value Ref Range    Upper GI Endoscopy       [facility]  [address], [place] [address]  _______________________________________________________________________________  Patient Name: Peri Irizarry          Procedure Date: 10/17/2023 11:48 AM  MRN: [id number]                       Account Number: [id number]  YOB: 1965               Admit Type: Inpatient  Age: 58                               Room: [place] [address]  Note Status: Finalized                Attending MD: HIREN RICHARDS , ,   Instrument Name: EGD Scope 2066         _______________________________________________________________________________     Procedure:             Upper GI endoscopy  Indications:           Tumor of the GI tract  Providers:             HIREN Luz MD:            Medicines:             Monitored Anesthesia Care  Complications:         No immediate  complications.  _______________________________________________________________________________  Procedure:             Pre-Anesthesia Assessment:                          - Prior to the procedure, a History and Physical was                          performed, and patient medications and allergies were                          reviewed. The patient is competent. The risks and                          benefits of the procedure and the sedation options and                          risks were discussed with the patient. All questions                          were answered and informed consent was obtained.                          Patient identification and proposed procedure were                          verified. Mental Status Examination: alert and                          oriented. Airway Examination: normal oropharyngeal                          airway and neck mobility. After reviewing the risks                          and benefits, the patient was deemed in satisfactory                          condition to undergo the procedure. The anesthesia                          plan was to use monitored anesthesia care (MAC).                           Immediately prior to administration of medications,                          the patient was re-assessed for adequacy to receive                          sedatives. The heart rate, respiratory rate, oxygen                          saturations, blood pressure, adequacy of pulmonary                          ventilation, and response to care were monitored                          throughout the procedure. The physical status of the                          patient was re-assessed after the procedure.                         After obtaining informed consent, the endoscope was                          passed under direct vision. Throughout the procedure,                          the patient's blood pressure, pulse, and oxygen                          saturations were monitored  continuously. The endoscope                          was introduced through the mouth, and advanced to the                          second part of duodenum. The upper GI endoscopy was                          acco mplished without difficulty. The patient tolerated                          the procedure well.                                                                                   Findings:       The esophagus was normal. Gastric tumor at the GE junction.       The examined duodenum and second portion of the duodenum were normal.       Diffuse severe mucosal changes characterized by friability (with contact        bleeding) and sloughing were found on the lesser curvature of the        stomach. This was biopsied with a hot snare for histology. Significant        tumor throughout the stomach and along the lesser curvature. Roughly 80        percent of the gastric lumen circumferentially has the appearance of        tumor burden. The remaining 15 to 20 percent of the gastric atrum        appears to be tumor free.                                                                                   Moderate Sedation:       see anesthesia  Impression:            - Normal esophagus.                          - Normal examined duodenum and second portion of the                          duodenum.                         - Friability (with contact bleeding) and sloughing                          mucosa in the lesser curvature. Biopsied.                         - Gastric findings showing tumor, which likely                          corresponds to the surgical pathology indicating                          gastric tumor primary.  Recommendation:        - awaiting final pathology from biopsy                         - can start on CLD and advance as tolerated                         - further recommendations to follow                                                                                     Electronically signed by:  Evin Richards MD  ____________  EVIN RICHARDS,   10/17/2023 12:46:28 PM  I was physically present for the entire viewing portion of the exam.  __________________________  Signature of teaching physician  Cheli/Phyllis RICHARDS  Number of Addenda: 0    Note Initiated On: 10/1 7/2023 11:48 AM  Scope In: 12:02:58 PM  Scope Out: 12:23:15 PM     Glucose by meter   Result Value Ref Range    GLUCOSE BY METER POCT 116 (H) 70 - 99 mg/dL   Platelet count   Result Value Ref Range    Platelet Count 935 (H) 150 - 450 10e3/uL   Creatinine   Result Value Ref Range    Creatinine 0.63 0.51 - 0.95 mg/dL    GFR Estimate >90 >60 mL/min/1.73m2   Potassium   Result Value Ref Range    Potassium 3.9 3.4 - 5.3 mmol/L

## 2023-10-18 NOTE — PROGRESS NOTES
SPIRITUAL HEALTH SERVICES NOTE  [facility]; P4    Reason for Visit: Follow-up  Plan of Care: Will remain available for further support as requested by patient/family/staff    SPIRITUAL ASSESSMENT  Not engaging in conversation  Declined prayer    FULL SPIRITUAL CARE NOTE:   Follow-up visit with Peri. She was having a blood draw at the time of my visit. Peri was markedly less engaged today compared to yesterday before surgery. She kept her eyes closed during my visit and did not answer questions. When asked if she would like a prayer, she shook her head no. I encouraged her to let her care team know if there is anything that we can do to help support her today.     Time Spent with Patient/Family: 5 minutes    Vielka Metzger M.Div.    Office: [phone] (for non-urgent requests)  Please Vocera or page through [facility] for time-sensitive requests

## 2023-10-18 NOTE — PROGRESS NOTES
[facility] Progress Note - Hospitalist Service    Date of Admission:  10/8/2023    Assessment & Plan   Peri Irizarry is a 58 year old female admitted on 10/8/2023. She came to the emergency department for evaluation of abdominal pain, nausea and vomiting; found to have small bowel obstruction requiring surgical management.    # Sepsis with leukocytosis (3/4 criterion), unclear source  # Toxic vs metabolic Encephalopathy likely 2/2 sepsis vs drug intoxication  # High anion gap metabolic acidosis with compensatory respiratory alkalosis likely 2/2 lactic acidosis likely 2/2 sepsis  Elevated troponin likely 2/2 demand ischemia in the setting of sepsis  CT A/p with no abscess  - Start IV Vancomycin, IV Meropenem  Continue IV fluids (Received bolus 1L yesterday and today)  Blood culture x 2  UA, Ucx, CXR  CRP, procalcitonin  Trend Lactic acid  Trend WBC  Repeat Troponin after 6 hours  Hold opioids  Will hold Buspar, Cymbalta, Effexor, Trazodone, Gabapentin, Topamax until encephalopathy improves  Will consider CT Head if symptoms do not improve       #Small bowel obstruction  --s/p exploratory laparotomy with lysis of adhesions and reduction of internal hernia done on 10/9  ---Surgical findings include; Diffuse metastatic cancer of unknown primary, Closed loop obstruction of proximal small bowel  Gastric mass with evidence of near erosion into small bowel  ---Concern for malignancy related, surgery feels metastatic gastric versus recurrent breast  ---Surgery following,  --- NG out 10/11  --- Pathology suggestive of metastatic gastric carcinoma..  Follow-up with oncology  --- Diet and further orders per surgery.  Initially advance to soft diet but vomited twice on 10/15 and therefore diet was changed to clear liquid diet.  Will order x-ray of the abdomen today.  May need consideration for TPN if prolonged postoperative ileus.  Moreover's restart IV fluids for 18 hours to prevent  dehydration  --- Underwent EGD showed friability and  sloughing mucosa in the lesser curvature. with biopsies on 10/17/23, pending pathology  --- CT A/P with contrast:  Postoperative changes from recent exploratory laparotomy   ---CHAVA management per surgery.  --- Patient encouraged to ambulate 3 times a day.         #Acute kidney injury  --- Resolved     Acute blood loss anemia  ---stable overall but hemoglobin significantly down since admission.    ---Continue to monitor; going down small amounts each day.  --- Weight up about 10 pounds since admission if scale correct.  DC p.o. Lasix since it could worsen dehydration-  --hemoglobin is stable     #Hyponatremia  -resolved  --secondary to volume depletion from decreased oral intake, furosemide, and hydrochlorothiazide  discontinue Lasix.     #Hypokalemia  -mild  -Secondary to GI loss  -Replace     #Mural thickening of the stomach  #Mesenteric stranding and lymphadenopathy  #History of colon and breast cancer  -CT showed most pronounced in the distal body and antrum, corresponding to area of increased activity on PET/CT which could reflect a neoplastic process; mesenteric stranding and lymphadenopathy concerning for metastatic disease with the largest lymph node seen in the stomach measuring 2.9 x 2.4 cm  -Status post biopsy at time of small bowel obstruction surgery.   --- Biopsy results--metastatic gastric carcinoma  -- Pending CEA, Will likely in [facility] oncology consult appreciated - seen Baltazar in past.  Follow-up with oncology as outpatient     #Left hydroureteronephrosis  -CT showed no obstructing calculi, there is inflammatory stranding surrounding the left ureter distally which could reflect neoplastic invasion and/or inflammatory scaring  -Urology consulted from ED. no intervention planned.  Patient is considered high risk for infection s/p ureteral stent.  Urology may consider ureteral stents if patient worsens clinically or plans to undergo  "chemotherapy.  -- Gomez discontinued 10/10  --- PVRs have been negative     #Acute cystitis without hematuria, probable  -Urine culture polymicrobial, mainly gram-positive bacilli but all with low colony forming units  -Completed 4 days of IV Rocephin.  DC Rocephin on 10/13  -Urine culture shows multiple organisms with no predominant organism.  -Discontinued Gomez catheter     #COPD/asthma  -Quit smoking 2010   --No exacerbation  -DuoNebs as needed  -Continue home Incruse Ellipta     #Essential hypertension blood pressure now on the low side   --continue to hold Lasix, hydrochlorothiazide, norvasc  -DC IV fluids     #Chronic thrombocytosis  -improing with surgery  --stable     #JAMMIE  --Now that NG out can resume CPAP     #Chronic thoracic back pain  -10/11 resumed Effexor, Cymbalta, gabapentin - held due to encephalopathy     Dysthymia--resume BuSpar and Cymbalta and gabapentin and Topamax and effexor and trazodone - held due to encephalopathy     Hyperlipidemia--resume statin          Diet: NPO  DVT Prophylaxis: Enoxaparin (Lovenox) SQ  Gomez Catheter: Not present  Lines: None     Cardiac Monitoring: None  Code Status: Full Code      Clinically Significant Risk Factors             # Anion Gap Metabolic Acidosis: Highest Anion Gap = 22 mmol/L in last 2 days, will monitor and treat as appropriate  # Hypoalbuminemia: Lowest albumin = 2.7 g/dL at 10/18/2023  5:13 AM, will monitor as appropriate     # Hypertension: Noted on problem list        # Overweight: Estimated body mass index is 29.12 kg/m  as calculated from the following:    Height as of this encounter: 1.499 m (4' 11\").    Weight as of this encounter: 65.4 kg (144 lb 2.9 oz).   # Severe Malnutrition: based on nutrition assessment    # COPD: noted on problem list        Disposition Plan      Expected Discharge Date: 10/20/2023    Discharge Delays: IV Medication - consider oral or Home Infusion  Voiding/Eating Trial needed  Destination: home with " help/services  Discharge Comments: egd today            Melissa Bauer MD  Hospitalist Service  [facility]  Securely message with [employer] (more info)  Text page via [employer] Paging/Directory   ______________________________________________________________________    Interval History   Am rounding: Patient was drowsy, spoke few words and said she was sleepy and would like to sleep.    12.58pm: Rapid response was called, for change in mental status  Lab work-up and vitals likely show encephalopathy is likely due to sepsis with unclear source.  Broad-spectrum antibiotics, will follow    Physical Exam   Vital Signs: Temp: 97.8  F (36.6  C) Temp src: Oral BP: 106/53 Pulse: 117   Resp: 20 SpO2: 94 % O2 Device: None (Room air)    Weight: 144 lbs 2.89 oz    General: Lethargic, responds to painful stimuli  Lungs: Clear to auscultation bilaterally  Cardio: RRR, tachycardic  Abdomen: Staples over the abdominal incision, drains x 2 with output  Ext: no edema  Neuro: No gross focal deficits    Medical Decision Making       75 MINUTES SPENT BY ME on the date of service doing chart review, history, exam, documentation & further activities per the note.      Data     I have personally reviewed the following data over the past 24 hrs:    18.8 (H)  \   10.1 (L)   / 988 (H)     140 109 (H) 12.6 /  140 (H)   4.1 15 (L) 0.77 \     ALT: 21 AST: 25 AP: 232 (H) TBILI: 0.4   ALB: 2.7 (L) TOT PROTEIN: 6.0 (L) LIPASE: N/A     Trop: 189 (HH) BNP: N/A     Procal: N/A CRP: N/A Lactic Acid: 2.2 (H)         Imaging results reviewed over the past 24 hrs:   Recent Results (from the past 24 hour(s))   CT Abdomen Pelvis w Contrast    Narrative    EXAM: CT ABDOMEN PELVIS W CONTRAST  LOCATION: [facility]  DATE: 10/18/2023    INDICATION: 58F a w SBO secondary to metastatic gastric adenocarcinoma, now POD#9 reduction of internal hernia of small bowel. Diffuse carcinomatosis noted with patchy reviewing poorly  differentiated adenocarcinoma.  COMPARISON: CT AP 10/09/2023  TECHNIQUE: CT scan of the abdomen and pelvis was performed following injection of IV contrast. Multiplanar reformats were obtained. Dose reduction techniques were used.  CONTRAST: 70ml isovue 370    FINDINGS:   LOWER CHEST: There are new, small layering bilateral pleural effusions with compressive atelectasis, right greater than left. Atelectatic lung enhances normally.. Less than 1 cm right pericardiophrenic node again noted. There is a trace pericardial   effusion.    HEPATOBILIARY: No suspicious liver lesions.    PANCREAS: Normal.    SPLEEN: Normal.    ADRENAL GLANDS: Normal.    KIDNEYS/BLADDER: Normal.    BOWEL: Postoperative changes are seen with 2 abdominal drains in the upper abdomen.  Distal descending colonic staple line again noted. There is a group of matted and distended small bowel in the left midabdomen loops in the left midabdomen measuring up to 5.7 cm. Point of transition is in this region with multiple collapsed distal small   bowel loops. No pneumatosis or abnormal wall enhancement. Marked stranding of the small bowel mesentery again noted with multiple less than 1 cm mesenteric nodes and new, minimal fluid within the mesentery. No abscess.    Distal gastric wall thickening along the greater curve and bulky gastrohepatic adenopathy again noted. Marked Most of the colon is collapsed with liquid fecal contents in the rectosigmoid.    LYMPH NODES: No additional retroperitoneal adenopathy.    VASCULATURE: Mild arterial calcifications. No aneurysm. Vessels are patent. No IVC or pelvic vein thrombus.    PELVIC ORGANS: Prior hysterectomy.    MUSCULOSKELETAL: New dependent flank edema. No subcutaneous postoperative fluid collections in the midabdomen. No suspicious lesions.      Impression    IMPRESSION:   1.  Postoperative changes from recent exploratory laparotomy, and reduction of internal, small bowel hernia.  2.  There are focally matted  loops of small bowel in the left midabdomen distended up to 5.7 cm with abrupt transition in this area indicative of a developing small bowel obstruction. Most of the small bowel loops which are distal to this are completely   collapsed.  3.  The duodenum and stomach are not distended.  4.  No abdominal abscess.  5.  Two, large upper abdominal drains in place.  6.  New, small layering bilateral pleural effusions with notable compressive atelectasis especially in the right lower lobe..  7.  The distal gastric mass, with adjacent bulky gastrohepatic lymphadenopathy and extensive stranding of the root of the small bowel mesentery again noted. Recent EGD with biopsy of a gastric lesion. Pathology pending.  8.  Other noncritical findings as noted above.

## 2023-10-18 NOTE — PROGRESS NOTES
Rapid Response. No family present. Pt ultimately was able to talk and medical staff continued monitoring.    Spiritual Care is available as needed or requested.    NUVIA Tavares.

## 2023-10-18 NOTE — PHARMACY-VANCOMYCIN DOSING SERVICE
Pharmacy Vancomycin Initial Note  Date of Service 2023  Patient's  1965  58 year old, female    Indication: Sepsis    Current estimated CrCl = Estimated Creatinine Clearance: 81.2 mL/min (based on SCr of 0.77 mg/dL).    Creatinine for last 3 days  10/16/2023:  5:30 AM Creatinine 0.78 mg/dL  10/17/2023:  5:47 AM Creatinine 0.67 mg/dL  10/18/2023:  5:13 AM Creatinine 0.63 mg/dL;  5:13 AM Creatinine 0.67 mg/dL;  1:18 PM Creatinine 0.77 mg/dL    Recent Vancomycin Level(s) for last 3 days  No results found for requested labs within last 3 days.      Vancomycin IV Administrations (past 72 hours)        No vancomycin orders with administrations in past 72 hours.                    Nephrotoxins and other renal medications (From now, onward)      Start     Dose/Rate Route Frequency Ordered Stop    10/19/23 0400  vancomycin (VANCOCIN) 750 mg in sodium chloride 0.9 % 250 mL intermittent infusion        See Hyperspace for full Linked Orders Report.    750 mg  over 60 Minutes Intravenous EVERY 12 HOURS 10/18/23 1556      10/18/23 1600  vancomycin (VANCOCIN) 1,250 mg in sodium chloride 0.9 % 250 mL intermittent infusion        See Hyperspace for full Linked Orders Report.    1,250 mg  over 90 Minutes Intravenous ONCE 10/18/23 1556      10/09/23 0800  [Held by provider]  furosemide (LASIX) tablet 40 mg        (On hold since Fri 10/13/2023 at 1351 until manually unheld; held by Augustine Mckeon MDHold Reason: Other)   Note to Pharmacy: PTA Sig:Take 40 mg by mouth      40 mg Oral DAILY 10/09/23 0630              Contrast Orders - past 72 hours (72h ago, onward)      Start     Dose/Rate Route Frequency Stop    10/18/23 1030  iopamidol (ISOVUE-370) solution 70 mL         70 mL Intravenous ONCE 10/18/23 1029            [employer] Prediction of Planned Initial Vancomycin Regimen  Loading dose: 1250 mg at 16:00 10/18/2023.  Regimen: 750 mg IV every 12 hours.  Start time: 15:53 on 10/18/2023  Exposure target: AUC24  (range)400-600 mg/L.hr   AUC24,ss: 448 mg/L.hr  Probability of AUC24 > 400: 62 %  Ctrough,ss: 14.2 mg/L  Probability of Ctrough,ss > 20: 22 %  Probability of nephrotoxicity (Lodise BI 2009): 9 %          Plan:  Start vancomycin  1250 mg IV once followed by 750 mg IV q12h.   Vancomycin monitoring method: AUC  Vancomycin therapeutic monitoring goal: 400-600 mg*h/L  Pharmacy will check vancomycin levels as appropriate in 1-3 Days.    Serum creatinine levels will be ordered daily for the first week of therapy and at least twice weekly for subsequent weeks.      SAMPSON HOWELL [facility]

## 2023-10-18 NOTE — PROGRESS NOTES
General Surgery Progress Note:    Hospital Day # 9    ASSESSMENT:  1. Malignant neoplasm of stomach, unspecified location (H)    2. Mild dehydration    3. Hypokalemia    4. Upper abdominal pain    5. Generalized abdominal mass    6. SBO (small bowel obstruction) (H)    7. Acute UTI        Peri Irizarry is a 58 year old female who is s/p exploratory laparotomy with reduction of internal hernia on 10/9/23. 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 S/p EGD with biopsies on 10/17. Tolerating clear liquids with intermittent nausea and vomiting. Patient afebrile but tachycardic (100-120). Continue clear liquids for now.    PLAN:  - CT abdomen/pelvis with IV contrast  - Diet as tolerated  - Will touch base with oncology team regarding next steps  - Monitor drain output  - General surgery will continue to follow    SUBJECTIVE:   She is doing okay. Endorses more abdominal discomfort this morning. Tolerating clear liquids with intermittent nausea and vomiting. Denies vomiting this morning. Fatigued after ambulating to the bathroom.       Patient Vitals for the past 24 hrs:   BP Temp Temp src Pulse Resp SpO2   10/18/23 0739 94/57 98.2  F (36.8  C) Oral 118 17 97 %   10/17/23 2348 129/77 98.1  F (36.7  C) Oral 120 16 95 %   10/17/23 1917 122/80 98.9  F (37.2  C) -- 120 20 96 %   10/17/23 1526 117/73 98.7  F (37.1  C) Oral 115 24 94 %   10/17/23 1515 123/81 98.3  F (36.8  C) Oral 112 24 92 %   10/17/23 1400 123/81 98.7  F (37.1  C) Oral 112 26 96 %   10/17/23 1342 -- -- -- -- -- 96 %   10/17/23 1319 111/72 98.9  F (37.2  C) Oral 116 28 91 %   10/17/23 1300 -- -- -- 112 26 97 %   10/17/23 1240 119/77 -- -- 111 28 96 %   10/17/23 1235 115/73 98.8  F (37.1  C) Temporal 114 22 96 %   10/17/23 1130 128/85 -- -- (!) 121 18 98 %   10/17/23 1115 127/81 -- -- 120 20 98 %   10/17/23 1100 132/85 -- -- 120 20 98 %   10/17/23 1053 131/79 98.3  F (36.8  C) Oral (!) 121 18 96 %          PHYSICAL EXAM:  General: patient seen resting in bed, no acute distress  Resp: no respiratory distress, breathing comfortably on room air  Abdomen: Softly distended, mildly tender to palpation. Midline incisions clean/dry/intact with staples.   Drains: Surgical CHAVA drains x2. LLQ drain with serous output. RLQ drain with serosanguinous output.   Output by Drain (mL) 10/16/23 0700 - 10/16/23 1459 10/16/23 1500 - 10/16/23 2259 10/16/23 2300 - 10/17/23 0659 10/17/23 0700 - 10/17/23 1459 10/17/23 1500 - 10/17/23 2259 10/17/23 2300 - 10/18/23 0659 10/18/23 0700 - 10/18/23 0904   Closed/Suction Drain 1 Right;Anterior RLQ Bulb 19 Maltese 10 20 35 20 18 10    Closed/Suction Drain 2 Left;Anterior LLQ Bulb 19 Maltese 40 30 30 15 20 10    Extremities: warm and well perfused    10/17 0700 - 10/18 0659  In: 1490 [P.O.:240; I.V.:1000]  Out: 93 [Drains:93]    No results displayed because visit has over 200 results.           Vielka Gordon PA-C  [facility]  [address] [address]

## 2023-10-18 NOTE — PLAN OF CARE
Problem: Malnutrition  Goal: Improved Nutritional Intake  Outcome: Not Progressing     Problem: Plan of Care - These are the overarching goals to be used throughout the patient stay.    Goal: Optimal Comfort and Wellbeing  Outcome: Progressing     Problem: Intestinal Obstruction  Goal: Fluid and Electrolyte Balance  Outcome: Progressing  Goal: Absence of Infection Signs and Symptoms  Outcome: Progressing     Problem: Pain Chronic (Persistent)  Goal: Optimal Pain Control and Function  Outcome: Progressing     Problem: Surgery Nonspecified  Goal: Optimal Pain Control and Function  Outcome: Progressing   Goal Outcome Evaluation:       Patient put on call light this morning requesting pain medication. Upon arrival to room patient sleeping but awoken to voice. Would continue to fall back asleep. Provided medications to patient took a couple scheduled and PRN pain medication but refused the rest see MAR. Patient had been pretty somnolent and answering some questions but later in AM PA with surgery was in with patient and patient not responding to some questions rapid was called and several tests completed. Her trop was elevated and MD informed. Lactic acid was 2.2. MD ordered Normal saline IV, Patient was already on NS with 20 meq of potasium. Spoke with Dr. Ramey prior to rapid and informed of current IV solution and he stated to continue with current IV. This writer noted that another nurse has hung the NS now and the other stopped. Continued to ask patient throughout day if she would like something to eat she say yes and then no NA ordered something for her.   WBC 20. Continue to monitor. Labs continue throughout.

## 2023-10-18 NOTE — SIGNIFICANT EVENT
Significant Event Note    Time of event: 12.58pm    Description of event:  Rapid response was called overhead  Upon arrival, was informed that the patient has change in mental status.  On exam patient was found to be lethargic, responding to stimuli, and going back to sleep.  At baseline patient is AAO x 3, more awake and alert. No known seizure activity, no incontinence / tongue bite  Patient received one dose of oxycodone 5 mg at 9:23 AM today    Vitals Stable, CBG > 143  Neuro: lethargic, responding to painful stimuli with one word answers  Lungs: CTAB  Cardio: Tachycardic, regular  Abd: soft, staples over the abdominal incision, 2 drains with serosanguineous output    Narcan given, patient responded, with some improvement in mental status, not back to baseline    Pertinent findings:  BMP: Bicarb 11 -> 15, AG 22 -> 16  CBC: Leukocytosis with left shift, Thrombocytosis  Troponin: 189, Elevated  ABG: pH 7.39, pCO2 25, Bicarb 15  Ammonia: wnl  Lactic acid: elevated 2.2  EKG: Sinus tachycardia with no acute ischemic changes  CT A/P with contrast:  Postoperative changes from recent exploratory laparotomy, and reduction of internal, small bowel hernia. No abdominal abscess  focally matted loops of small bowel in the left midabdomen distended up to 5.7 cm with abrupt transition in this area indicative of a developing SBO     Assessment:  Sepsis with leukocytosis (3/4 criterion), unclear source  Toxic vs metabolic Encephalopathy likely 2/2 sepsis vs drug intoxication  High anion gap metabolic acidosis with compensatory respiratory alkalosis likely 2/2 lactic acidosis likely 2/2 sepsis  Elevated troponin likely 2/2 demand ischemia      Plan:  Start IV Vancomycin, IV Meropenem  Continue IV fluids (Received bolus 1L yesterday and today)  Blood culture x 2  UA, Ucx, CXR  CRP, procalcitonin  Trend Lactic acid  Trend WBC  Repeat Troponin after 6 hours  Hold opioids  Will hold Buspar, Cymbalta, Effexor, Trazodone, Gabapentin,  Topamax until encephalopathy improves      Melissa Bauer MD      60 minutes spent by me on the date of service doing chart review, history, exam,   documentation & further activities per the note.

## 2023-10-18 NOTE — PLAN OF CARE
"  Problem: Plan of Care - These are the overarching goals to be used throughout the patient stay.    Goal: Plan of Care Review  Description: The Plan of Care Review/Shift note should be completed every shift.  The Outcome Evaluation is a brief statement about your assessment that the patient is improving, declining, or no change.  This information will be displayed automatically on your shift note.  Outcome: Progressing  Goal: Patient-Specific Goal (Individualized)  Description: You can add care plan individualizations to a care plan. Examples of Individualization might be:  \"Parent requests to be called daily at 9am for status\", \"I have a hard time hearing out of my right ear\", or \"Do not touch me to wake me up as it startles me\".  Outcome: Progressing  Goal: Absence of Hospital-Acquired Illness or Injury  Intervention: Identify and Manage Fall Risk  Recent Flowsheet Documentation  Taken 10/17/2023 1606 by Opal Lobo, RN  Safety Promotion/Fall Prevention:   activity supervised   assistive device/personal items within reach   clutter free environment maintained   lighting adjusted   nonskid shoes/slippers when out of bed   supervised activity  Intervention: Prevent and Manage VTE (Venous Thromboembolism) Risk  Recent Flowsheet Documentation  Taken 10/17/2023 1652 by Opal Lobo, RN  VTE Prevention/Management: SCDs (sequential compression devices) off  Goal: Optimal Comfort and Wellbeing  Intervention: Monitor Pain and Promote Comfort  Recent Flowsheet Documentation  Taken 10/17/2023 1917 by Opal Lobo, RN  Pain Management Interventions:   medication offered but refused   essential oils   repositioned   relaxation techniques promoted   distraction  Taken 10/17/2023 1526 by Opal Lobo, RN  Pain Management Interventions:   relaxation techniques promoted   repositioned     Goal Outcome Evaluation:  Patient is alert and oriented x 4 with intermittent confusion. Patient states she does not " understand why her throat hurts, gave education about EGD patient stated that she understood. Gave Chloraseptic spray for sore throat, effective per patient. Patient has to CHAVA drains x 2. Incision is open to air. Up with assist of 1 and the walker. Patient has been tachycardiac. Started on fluids, NS with KCL 20 at 125mL/hr. Patient remains on clear diet, minimal intake.

## 2023-10-19 NOTE — PROGRESS NOTES
Care Management Follow Up    Length of Stay (days): 10    Expected Discharge Date: 10/23/2023     Concerns to be Addressed:     Discharge planning  Patient plan of care discussed at interdisciplinary rounds: Yes    Anticipated Discharge Disposition:  pending progression of care     Anticipated Discharge Services:  open to home RN with [facility]  Anticipated Discharge DME:  pending    Patient/family educated on Medicare website which has current facility and service quality ratings:    Education Provided on the Discharge Plan:    Patient/Family in Agreement with the Plan:      Referrals Placed by CM/SW:    Private pay costs discussed: Not applicable    Additional Information:  Chart reviewed. Care management is following progression of care to assist with discharge planning needs.  CHAVA drains in place and surgery now recommending TPN.    History:  Pt receives 4-5 hours a day of PCA services, 7 days a week. They assist with ADLS, and IADLS. Pt uses [facility] for transport. Pt has an established PCP. Pt is on disability. Patient is open to [facility].       DIDI Castillo

## 2023-10-19 NOTE — PLAN OF CARE
Problem: Plan of Care - These are the overarching goals to be used throughout the patient stay.    Goal: Plan of Care Review  Description: The Plan of Care Review/Shift note should be completed every shift.  The Outcome Evaluation is a brief statement about your assessment that the patient is improving, declining, or no change.  This information will be displayed automatically on your shift note.  Outcome: Progressing   Goal Outcome Evaluation:       Pt refused NG placement. Pt continues to have some abd discomfort, with no nausea. Palliative consult. Pt has been up to bathroom voiding. Pt on 3L O2. CHAVA x2 in place

## 2023-10-19 NOTE — PROGRESS NOTES
Peri Irizarry is still having worsening abdominal pain, able to verbalize her abdomen hurts but otherwise seems more somnolent.          Vitals:    10/18/23 1215 10/18/23 1522 10/18/23 2306 10/19/23 0500   BP: 107/69 106/53 92/56 99/54   BP Location: Right arm Right arm Right arm Right arm   Pulse: 118 117 117 115   Resp: 17 20 20 20   Temp: 98  F (36.7  C) 97.8  F (36.6  C) 98.7  F (37.1  C) 99.3  F (37.4  C)   TempSrc: Oral Oral Oral Oral   SpO2: 92% 94% 94%    Weight:       Height:           PHYSICAL EXAM:  GEN: No acute distress, comfortable  ABD:soft, mildly distended, emilie intact, joseph drains with serous output, ttp in all four quadrants  EXT:No cyanosis, edema or obvious abnormalities        Recent Labs   Lab 10/19/23  0535   WBC 15.2*   HGB 8.6*   HCT 27.4*   *       Recent Labs   Lab 10/19/23  0535 10/18/23  1318 10/18/23  0513   *   < > 140   CO2 16*   < > 11*   BUN 15.1   < > 10.3   ALBUMIN  --   --  2.7*   ALKPHOS  --   --  232*   ALT  --   --  21   AST  --   --  25    < > = values in this interval not displayed.         A/P:  Peri Irizarry is s/p ex lap and reduction of internal hernia  -metastatic Gastric CA, unclear on the prognosis at this point, Oncology input noted.   -CT relatively unremarkable with no evidence of perforated bowel. Given abdominal exam, will place NG tube and recommend starting TPN  -wbc slightly down, but remains tachycardic and hypotensive.  Concerning for possible necrotic bowel versus other infectious cause.    -elevated Troponins- hospitalist to manage  -consider palliative care if not already done.     Matthew Ramey, DO GARCIA  [phone]  [facility] Department of Surgery

## 2023-10-19 NOTE — PROGRESS NOTES
CLINICAL NUTRITION SERVICES - SHORT NOTE     Nutrition Prescription    RECOMMENDATIONS FOR MDs/PROVIDERS TO ORDER:  Please consult Nutrition/Pharmacy for initiation of TPN      Malnutrition Status:    Severe malnutrition In Context of: Chronic illness or disease    Recommendations already ordered by Registered Dietitian (RD):  Recommend initiation of nutrition support with ongoing poor po and CA    Future/Additional Recommendations:  Monitor poc, diet adv/braxton., wt, labs, I/Os.      EVALUATION OF THE PROGRESS TOWARD GOALS   Diet: NPO  Nutrition Support: None     NEW FINDINGS   Pt NPO since 10/18, previous diets- CL adv to regular diet with ONS Ensure Enlive.    Per surgery- pt with worsening abdominal pain, plan to place NG, recommending TPN. Per nursing NG placed and pt pulled line, refusing placement of NG.    Weight: Wt up from admit   GI: rounded, obese abdomen, abdominal discomfort, nausea  I/Os: x2 BM 10/18, 600 ml + x1 unmeasured UOP this AM 10/19   Edema: none noted  Skin: incision/surgical sites- anterior abdomen, mouth   Labs: Na 146(H), Glucose 100, 112   Meds: Continuous  ml/hr, Scheduled pepcid, merrem, MVI/M, crestor, miralax, senna, thiamin vancomycin     Dosing Weight: 67.6 kg adjusted  weight     ASSESSED NUTRITION NEEDS  Estimated Energy Needs: 5794-2802 kcals/day (25 - 30 kcals/kg)  Justification: Maintenance  Estimated Protein Needs:  grams protein/day (1.2 - 1.5 grams of pro/kg)  Justification: Repletion  Estimated Fluid Needs: 3137-3460 mL/day (25 - 30 mL/kg)   Justification: Maintenance    MALNUTRITION:  % Weight Loss:  > 10% in 6 months (severe malnutrition)  % Intake:  </= 50% for >/= 1 month (severe malnutrition)  Subcutaneous Fat Loss:  Thoracic region mild depletion  Muscle Loss:  Temporal region moderate depletion, Acromion bone region moderate depletion, and Posterior calf region moderate depletion  Fluid Retention:  None noted    Malnutrition Diagnosis: Severe  malnutrition  In Context of:  Chronic illness or disease    INTERVENTIONS  Implementation  Consult nutrition/pharmacy for initiation of TPN   Recommend initiation of nutrition support with ongoing poor po and CA    Monitoring/Evaluation  Progress toward goals will be monitored and evaluated per protocol.

## 2023-10-19 NOTE — CONSULTS
Palliative Care Consultation Note  [facility]    Patient: Peri Irizarry  Date of Admission:  10/8/2023    Requesting Clinician / Team: Dr. Bauer  Reason for consult: Goals of care     Recommendations & Counseling     GOALS OF CARE:   Life-prolonging without limits   In my clinical opinion, patient does not have medical decision making capacity at this time.  Will plan to discuss goals of care as able, as her condition evolves.  Involving raegan Murphy and AYSHA Louis will be important.    Would be helpful to have prognostic information from oncology for patient to make informed decision.  Also input from surgery team.  Discussed with Dr. Bauer that we will plan to follow up with family as patient does not seem to have decision making capacity at this time.   Plan to meet with patient, son Jeffrey and AYSHA Louis at 2 pm tomorrow.     ADVANCE CARE PLANNING:  No health care directive on file. Per  informed consent policy, next of kin should be involved if patient becomes unable.  There is no POLST form on file, recommend to complete prior to DC.  Code status: Full Code    MEDICAL MANAGEMENT:   We are not actively managing symptoms at this time.    PSYCHOSOCIAL/SPIRITUAL SUPPORT:  Watches TV  Spiritual but not Yarsanism  2 sons,   Was a homemaker    Palliative Care will continue to follow. Thank you for the consult and allowing us to aid in the care of Peri Irizarry.    These recommendations have been discussed with Jose Ambriz RN.    DARNELL Cee  Securely message with [employer] (more info)  Text page via [facility] Paging/Directory       Assessment      Peri Irizarry is a 58 year old female with a past medical history of breast cancer, colon cancer, COPD, JAMMIE on CPAP, chronic headache, chronic back pain and HTN who presented on 10/6/23 with abdominal pain.  Patient admitted with sepsis, SBO, encephalopathy, s/p exploratory lap, KOKI and reduction of hernia on  "10/9.  Pathology results show metastatic gastric cancer.  Patient having increased abdominal pain and surgery recommend NG tube placement and TPN today.  NG tube pulled out per patient.  She is hypotensive and tachycardic with elevated WBC-concern for sepsis. CT of head, CT abdomen pending.      Today, the patient was seen for:  Goals of care    Palliative Care Summary:     Introduced the role of palliative care as an interdisciplinary team that cares for patients with serious illness to help support symptom management, communication, coping for patients and their families as well as support with medical decision making.    Met in room with patient and Amaya CONTRERAS.  She is lying in bed moaning, stating she has abdominal pain.  Denies nausea or vomiting, passing gas.  Normally takes vicodin at home for pain-followed by pain clinic.  She states she came to the hospital with abdominal pain but unable to report any details about cause of pain or what she is hearing from doctors.  Unable to state her son's names without significant prompting.  Agreed to have us call her family.    Phone call to son Jeffrey and DIL Missy:   State they got update from MD today, aware she has stomach cancer, this morning was told possible sepsis or necrotic bowel, more confused and plan to wanted to do CT of head and chest.      Patient has no cognitive changes or memory loss at baseline.  Has some paranoid behaviors at times,  sometimes believes in \"conspiracy theories.\"  No formal diagnosis of mental health condition but is very private about her health conditions.     Mostly watches TV, talks to sisters. Lives with her son with in an apartment.  Has PCA-helps daily things like cleaning, shopping, cooking.  Independent with dressing and bathing.      80 lbs weight loss since July.  3 weeks prior, more weight loss, \"unable to keep things down\", green bile emesis.  Had talked to oncologist and had planned scheduled bx on " 10/18.    They verbalized understanding of the role of palliative care and that will plan to have more discussions after more information available about her acute medical issues and treatment options, as well as cancer prognosis.     Prognosis, Goals, & Planning:    Functional Status just prior to this current hospitalization:  ECOG2 (Ambulatory and capable of all selfcare but unable to carry out any work activities; may need help with IADLs up and about > 50% of waking hours)    Code Status was addressed today:   Discussed briefly with family.  Do not want to make any changes at this time.  Patient remains full code.     Patient's decision making preferences: unable to assess. Agreed to have us call Abigail (DIL and son)        Patient has decision-making capacity today for complex decisions:Unreliable    Social:   Living situation:lives alone  Important relationships/caregivers:, 2 sons  Occupation: homemaker    Medications:  I have reviewed this patient's medication profile and medications from this hospitalization.    ROS:  Comprehensive ROS is reviewed and is negative except as here & per HPI: Patient complaining of abdominal pain, moaning.  No nausea, vomiting or diarrhea.  Difficulty completing full ROS due to cognitive status    Physical Exam   Vital Signs with Ranges  Temp:  [97.8  F (36.6  C)-99.3  F (37.4  C)] 99.1  F (37.3  C)  Pulse:  [115-118] 118  Resp:  [20] 20  BP: ()/(52-56) 105/52  FiO2 (%):  [94 %] 94 %  SpO2:  [94 %-95 %] 94 %  144 lbs 2.89 oz    PHYSICAL EXAM:  Constitutional: Fatigued, moaning  Respiratory: breathing nonlabored at rest  Abdomen:Abdomen soft, hypoactive BS  NEURO: Oriented to self, hospital, disoriented to situation.    Data reviewed:  Recent Labs   Lab 10/19/23  1126 10/19/23  0831 10/19/23  0535 10/18/23  2118 10/18/23  1853 10/18/23  1318 10/18/23  1304 10/18/23  0513   WBC  --   --  15.2*  --   --  18.8*  --  20.0*   HGB  --   --  8.6*  --   --   10.1*  --  10.0*   MCV  --   --  90  --   --  89  --  92   PLT  --   --  767*  --   --  988*  --  935*   NA  --   --  146*  --  144 140  --  140   POTASSIUM  --   --  4.0  --  4.1 4.1  --  3.9  3.9   CHLORIDE  --   --  114*  --  112* 109*  --  107   CO2  --   --  16*  --  15* 15*  --  11*   BUN  --   --  15.1  --  13.8 12.6  --  10.3   CR  --   --  0.89  --  0.83 0.77  --  0.67  0.63   ANIONGAP  --   --  16*  --  17* 16*  --  22*   CHERY  --   --  8.2*  --  8.5* 8.4*  --  8.6   * 112* 104*   < > 123* 140*   < > 141*   ALBUMIN  --   --   --   --   --   --   --  2.7*   PROTTOTAL  --   --   --   --   --   --   --  6.0*   BILITOTAL  --   --   --   --   --   --   --  0.4   ALKPHOS  --   --   --   --   --   --   --  232*   ALT  --   --   --   --   --   --   --  21   AST  --   --   --   --   --   --   --  25    < > = values in this interval not displayed.         90 MINUTES SPENT BY ME on the date of service doing chart review, history, exam, documentation & further activities per the note.

## 2023-10-19 NOTE — PLAN OF CARE
"  Problem: Plan of Care - These are the overarching goals to be used throughout the patient stay.    Goal: Plan of Care Review  Description: The Plan of Care Review/Shift note should be completed every shift.  The Outcome Evaluation is a brief statement about your assessment that the patient is improving, declining, or no change.  This information will be displayed automatically on your shift note.  Outcome: Progressing  Goal: Patient-Specific Goal (Individualized)  Description: You can add care plan individualizations to a care plan. Examples of Individualization might be:  \"Parent requests to be called daily at 9am for status\", \"I have a hard time hearing out of my right ear\", or \"Do not touch me to wake me up as it startles me\".  Outcome: Progressing  Goal: Absence of Hospital-Acquired Illness or Injury  Outcome: Progressing  Intervention: Identify and Manage Fall Risk  Recent Flowsheet Documentation  Taken 10/19/2023 0124 by Yanique Romo RN  Safety Promotion/Fall Prevention: activity supervised  Intervention: Prevent Skin Injury  Recent Flowsheet Documentation  Taken 10/19/2023 0338 by Yanique Romo RN  Body Position:   turned   left   heels elevated  Taken 10/19/2023 0124 by Yanique Romo RN  Body Position:   turned   right   heels elevated  Goal: Optimal Comfort and Wellbeing  Outcome: Progressing  Intervention: Monitor Pain and Promote Comfort  Recent Flowsheet Documentation  Taken 10/19/2023 0124 by Yanique Romo RN  Pain Management Interventions: (zofran given for nausea) emotional support  Goal: Readiness for Transition of Care  Outcome: Progressing   Goal Outcome Evaluation:         Pt lethargic and withdrawn. Pt with very hypo bowel sounds and c/o nausea. Pt given prn zofran and compazine. Pt remains NPO. Need UA. Will monitor.                "

## 2023-10-19 NOTE — PROGRESS NOTES
Progress Note     Primary Oncologist/Hematologist:            Assessment and Plan:    1. Small bowel obstruction  -10/9/23 s/p diagnostic laparoscopy converted to laparotomy, hernia repair and lysis of adhesions. The Mesenteric implant, mesenteric nodule, and omentum shows metastatic poorly differentiated carcinoma Peritoneal Fluid is positive for malignant cells; cell morphology and immunochemical profile are not specific but are most suggestive of metastatic gastric carcinoma.     On 9/11/23 CT CAP showed asymmetric nodularity along the posterior gastric wall superimposed on significant diffuse gastric and distal esophageal wall thickening, appearance concerning for neoplasm. Enlarged lymph nodes in the gastrohepatic ligament, highly suspicious for metastatic adenopathy. Asymmetric thickened appearance of the pancreatic tail without definable mass or obvious ductal dilatation.         3. History of colorectal cancer: S/p sigmoid colon resection back in 2012. Colonoscopy in December 2022 did not show any significant abnormality. Some polyps were found, and they were removed with cold biopsy forceps. The pathology was benign.         4. 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 To date, there has been no evidence for a local recurrence or more distant metastatic disease.  Bilateral screening mammogram on 1/9/23 showed no evidence of malignancy. We will continue active surveillance.      5. Acute UTI  6. Tachycardia, etiology unclear  7. Leukocytosis  8. Anemia  9. Thrombocytosis         Plan  1. Appreciate general surgery care   2. S/p EGD 10/17/23, pathology still pending.  3. CT A/B negative for perforated bowels or leak. Patient refused NG tube; will likely get TPN.  4. Trend CBC with diff.   5. Agree with Antiobiotics.  6. UA abnormal; UC pending. Blood culture from  10/18/23 NGTD.  7. Hgb down at 8.6, it was 10.1. Will check haptoglobin, LDH, SO, iron studies, and retic.  8. Will likely need a PORT but will hold. Await pathology and for stability.  9. Patient is interested in treatment. She will follow-up with Dr. Malcolm at our clinic to discuss her pathology and for treatment planning. She will likely be on chemotherapy, but we will also look to see if she is eligible for clinical trials.  10. Continue best supportive care           Brandi Barr PA-C  [facility]  [phone] Office  [phone] Cell, available 8AM - 4 PM             Interval History:     Patient is very tired. Able to state her full name but it was hard to hear. She has pain in her abdomen. Slight movements caused her pain. She remains tachycardia. Temp is 99F.              Review of Systems:     The 5 point Review of Systems is negative other than noted in the HPI             Medications:   Scheduled Medications   [Held by provider] amLODIPine  2.5 mg Oral Daily    [Held by provider] busPIRone  5 mg Oral TID    [Held by provider] DULoxetine  30 mg Oral BID    [Held by provider] enoxaparin ANTICOAGULANT  40 mg Subcutaneous Q24H    famotidine  20 mg Intravenous Q12H    [Held by provider] ferrous sulfate  325 mg Oral Q48H    fluticasone-vilanterol  1 puff Inhalation Daily    [Held by provider] furosemide  40 mg Oral Daily    [Held by provider] gabapentin  300 mg Oral BID    [Held by provider] gabapentin  600 mg Oral At Bedtime    [Held by provider] hydrochlorothiazide  25 mg Oral Daily    meropenem  1 g Intravenous Q8H    montelukast  10 mg Oral At Bedtime    multivitamin w/minerals  1 tablet Oral Daily    polyethylene glycol  17 g Oral Daily    rosuvastatin  10 mg Oral Daily    senna-docusate  1 tablet Oral BID    sodium chloride (PF)  3 mL Intracatheter Q8H    thiamine  100 mg Oral Daily    [Held by provider] topiramate  100 mg Oral At Bedtime    [Held by provider] topiramate  50 mg Oral QAM    [Held  "by provider] traZODone  100 mg Oral At Bedtime    umeclidinium  1 puff Inhalation Daily    vancomycin  750 mg Intravenous Q12H    [Held by provider] venlafaxine  37.5 mg Oral BID     PRN Medications  acetaminophen, acetaminophen, albuterol, bisacodyl, cyclobenzaprine, hydrALAZINE, hydrOXYzine, ipratropium - albuterol 0.5 mg/2.5 mg/3 mL, lidocaine 4%, lidocaine (buffered or not buffered), magnesium hydroxide, meclizine, [Held by provider] morphine **OR** [Held by provider] morphine, naloxone **OR** naloxone **OR** naloxone **OR** naloxone, ondansetron **OR** ondansetron, [Held by provider] oxyCODONE, phenol MT, prochlorperazine **OR** [DISCONTINUED] prochlorperazine, prochlorperazine, [DISCONTINUED] prochlorperazine **OR** prochlorperazine **OR** prochlorperazine, sodium chloride (PF), SUMAtriptan               Physical Exam:   Vitals were reviewed  Blood pressure 105/52, pulse 118, temperature 99.1  F (37.3  C), temperature source Oral, resp. rate 20, height 1.499 m (4' 11\"), weight 65.4 kg (144 lb 2.9 oz), SpO2 94%, not currently breastfeeding.  Wt Readings from Last 4 Encounters:   10/14/23 65.4 kg (144 lb 2.9 oz)   02/21/23 77.1 kg (170 lb)   12/07/22 77.1 kg (170 lb)   01/03/22 90.3 kg (199 lb)       I/O last 3 completed shifts:  In: 1151 [P.O.:150; I.V.:1001]  Out: 140 [Drains:140]    Constitutional: Awake, alert, cooperative, no apparent distress   Lungs: Noisy breathing. No wheezing bilaterally.   Cardiovascular: Tachycardia, No murmurs.   Abdomen: TTP throughout.   Skin: No rashes, no cyanosis, no edema   Other:               Data:   All laboratory data and imaging studies reviewed.    CMP  Recent Labs   Lab 10/19/23  1126 10/19/23  0831 10/19/23  0535 10/18/23  2118 10/18/23  1853 10/18/23  1318 10/18/23  1304 10/18/23  0513   NA  --   --  146*  --  144 140  --  140   POTASSIUM  --   --  4.0  --  4.1 4.1  --  3.9  3.9   CHLORIDE  --   --  114*  --  112* 109*  --  107   CO2  --   --  16*  --  15* 15*  --  " 11*   ANIONGAP  --   --  16*  --  17* 16*  --  22*   * 112* 104* 119* 123* 140*   < > 141*   BUN  --   --  15.1  --  13.8 12.6  --  10.3   CR  --   --  0.89  --  0.83 0.77  --  0.67  0.63   GFRESTIMATED  --   --  74  --  81 88  --  >90  >90   CHERY  --   --  8.2*  --  8.5* 8.4*  --  8.6   PROTTOTAL  --   --   --   --   --   --   --  6.0*   ALBUMIN  --   --   --   --   --   --   --  2.7*   BILITOTAL  --   --   --   --   --   --   --  0.4   ALKPHOS  --   --   --   --   --   --   --  232*   AST  --   --   --   --   --   --   --  25   ALT  --   --   --   --   --   --   --  21    < > = values in this interval not displayed.     CBC  Recent Labs   Lab 10/19/23  0535 10/18/23  1318 10/18/23  0513 10/17/23  0547 10/15/23  0523   WBC 15.2* 18.8* 20.0*  --  10.1   RBC 3.03* 3.55* 3.43*  --  2.78*   HGB 8.6* 10.1* 10.0* 8.7* 8.1*   HCT 27.4* 31.4* 31.5*  --  24.3*   MCV 90 89 92  --  87   MCH 28.4 28.5 29.2  --  29.1   MCHC 31.4* 32.2 31.7  --  33.3   RDW 17.4* 16.8* 17.2*  --  16.1*   * 988* 935*  --  529*     INRNo lab results found in last 7 days.  Data   Results for orders placed or performed during the hospital encounter of 10/08/23 (from the past 24 hour(s))   XR Chest Port 1 View    Narrative    EXAM: XR CHEST PORT 1 VIEW  LOCATION: [facility]  DATE: 10/18/2023    INDICATION: new onset sepsis, unclear source  COMPARISON: CT abdomen/pelvis earlier the same day.      Impression    IMPRESSION: Heart size and pulmonary vasculature are normal. Small bilateral pleural effusions and adjacent bibasilar opacities. No pneumothorax. Partially visualized tubing overlying the left upper quadrant abdomen.   Troponin T, High Sensitivity   Result Value Ref Range    Troponin T, High Sensitivity 154 (HH) <=14 ng/L   Basic metabolic panel   Result Value Ref Range    Sodium 144 135 - 145 mmol/L    Potassium 4.1 3.4 - 5.3 mmol/L    Chloride 112 (H) 98 - 107 mmol/L    Carbon Dioxide (CO2) 15 (L) 22 - 29  mmol/L    Anion Gap 17 (H) 7 - 15 mmol/L    Urea Nitrogen 13.8 8.0 - 23.0 mg/dL    Creatinine 0.83 0.51 - 0.95 mg/dL    GFR Estimate 81 >60 mL/min/1.73m2    Calcium 8.5 (L) 8.6 - 10.0 mg/dL    Glucose 123 (H) 70 - 99 mg/dL   Glucose by meter   Result Value Ref Range    GLUCOSE BY METER POCT 119 (H) 70 - 99 mg/dL   CBC with platelets   Result Value Ref Range    WBC Count 15.2 (H) 4.0 - 11.0 10e3/uL    RBC Count 3.03 (L) 3.80 - 5.20 10e6/uL    Hemoglobin 8.6 (L) 11.7 - 15.7 g/dL    Hematocrit 27.4 (L) 35.0 - 47.0 %    MCV 90 78 - 100 fL    MCH 28.4 26.5 - 33.0 pg    MCHC 31.4 (L) 31.5 - 36.5 g/dL    RDW 17.4 (H) 10.0 - 15.0 %    Platelet Count 767 (H) 150 - 450 10e3/uL   Basic metabolic panel   Result Value Ref Range    Sodium 146 (H) 135 - 145 mmol/L    Potassium 4.0 3.4 - 5.3 mmol/L    Chloride 114 (H) 98 - 107 mmol/L    Carbon Dioxide (CO2) 16 (L) 22 - 29 mmol/L    Anion Gap 16 (H) 7 - 15 mmol/L    Urea Nitrogen 15.1 8.0 - 23.0 mg/dL    Creatinine 0.89 0.51 - 0.95 mg/dL    GFR Estimate 74 >60 mL/min/1.73m2    Calcium 8.2 (L) 8.6 - 10.0 mg/dL    Glucose 104 (H) 70 - 99 mg/dL   Lactic acid whole blood   Result Value Ref Range    Lactic Acid 1.2 0.7 - 2.0 mmol/L   TSH   Result Value Ref Range    TSH 4.67 (H) 0.30 - 4.20 uIU/mL   Glucose by meter   Result Value Ref Range    GLUCOSE BY METER POCT 112 (H) 70 - 99 mg/dL   Glucose by meter   Result Value Ref Range    GLUCOSE BY METER POCT 100 (H) 70 - 99 mg/dL   UA with Microscopic reflex to Culture    Specimen: Urine, NOS   Result Value Ref Range    Color Urine Yellow Colorless, Straw, Light Yellow, Yellow    Appearance Urine Turbid (A) Clear    Glucose Urine Negative Negative mg/dL    Bilirubin Urine Negative Negative    Ketones Urine 20 (A) Negative mg/dL    Specific Gravity Urine 1.025 1.003 - 1.035    Blood Urine Negative Negative    pH Urine 5.5 5.0 - 7.0    Protein Albumin Urine 50 (A) Negative mg/dL    Urobilinogen Urine <2.0 <2.0 mg/dL    Nitrite Urine Negative  Negative    Leukocyte Esterase Urine 250 Tea/uL (A) Negative    Bacteria Urine Few (A) None Seen /HPF    Mucus Urine Present (A) None Seen /LPF    RBC Urine 20 (H) <=2 /HPF    WBC Urine 28 (H) <=5 /HPF    Squamous Epithelials Urine 1 <=1 /HPF    Narrative    Urine Culture ordered based on laboratory criteria       Narrative & Impression   EXAM: XR CHEST PORT 1 VIEW  LOCATION: [facility]  DATE: 10/18/2023     INDICATION: new onset sepsis, unclear source  COMPARISON: CT abdomen/pelvis earlier the same day.                                                                      IMPRESSION: Heart size and pulmonary vasculature are normal. Small bilateral pleural effusions and adjacent bibasilar opacities. No pneumothorax. Partially visualized tubing overlying the left upper quadrant abdomen.           EXAM: CT ABDOMEN PELVIS W CONTRAST  LOCATION: [facility]  DATE: 10/18/2023     INDICATION: 58F a w SBO secondary to metastatic gastric adenocarcinoma, now POD#9 reduction of internal hernia of small bowel. Diffuse carcinomatosis noted with patchy reviewing poorly differentiated adenocarcinoma.  COMPARISON: CT AP 10/09/2023  TECHNIQUE: CT scan of the abdomen and pelvis was performed following injection of IV contrast. Multiplanar reformats were obtained. Dose reduction techniques were used.  CONTRAST: 70ml isovue 370     FINDINGS:   LOWER CHEST: There are new, small layering bilateral pleural effusions with compressive atelectasis, right greater than left. Atelectatic lung enhances normally.. Less than 1 cm right pericardiophrenic node again noted. There is a trace pericardial   effusion.     HEPATOBILIARY: No suspicious liver lesions.     PANCREAS: Normal.     SPLEEN: Normal.     ADRENAL GLANDS: Normal.     KIDNEYS/BLADDER: Normal.     BOWEL: Postoperative changes are seen with 2 abdominal drains in the upper abdomen.  Distal descending colonic staple line again noted. There  is a group of matted and distended small bowel in the left midabdomen loops in the left midabdomen measuring up to 5.7 cm. Point of transition is in this region with multiple collapsed distal small   bowel loops. No pneumatosis or abnormal wall enhancement. Marked stranding of the small bowel mesentery again noted with multiple less than 1 cm mesenteric nodes and new, minimal fluid within the mesentery. No abscess.     Distal gastric wall thickening along the greater curve and bulky gastrohepatic adenopathy again noted. Marked Most of the colon is collapsed with liquid fecal contents in the rectosigmoid.     LYMPH NODES: No additional retroperitoneal adenopathy.     VASCULATURE: Mild arterial calcifications. No aneurysm. Vessels are patent. No IVC or pelvic vein thrombus.     PELVIC ORGANS: Prior hysterectomy.     MUSCULOSKELETAL: New dependent flank edema. No subcutaneous postoperative fluid collections in the midabdomen. No suspicious lesions.                                                                      IMPRESSION:   1.  Postoperative changes from recent exploratory laparotomy, and reduction of internal, small bowel hernia.  2.  There are focally matted loops of small bowel in the left midabdomen distended up to 5.7 cm with abrupt transition in this area indicative of a developing small bowel obstruction. Most of the small bowel loops which are distal to this are completely   collapsed.  3.  The duodenum and stomach are not distended.  4.  No abdominal abscess.  5.  Two, large upper abdominal drains in place.  6.  New, small layering bilateral pleural effusions with notable compressive atelectasis especially in the right lower lobe..  7.  The distal gastric mass, with adjacent bulky gastrohepatic lymphadenopathy and extensive stranding of the root of the small bowel mesentery again noted. Recent EGD with biopsy of a gastric lesion. Pathology pending.  8.  Other noncritical findings as noted above.      Order-Level Documents:    There are no order-level documents.  Lab and Collection    CT Abdomen Pelvis w Contrast (Order: [id number]) - 10/18/2023  Result History    CT Abdomen Pelvis w Contrast (Order #[id number]) on 10/18/2023 - Order Result History Report  Result Information    Status: Final result (Exam End: 10/18/2023 10:30 AM) Provider Status: Open     Reading Providers     Reading Role Read Date   Yessenia Isbell MD Radiology 10/18/2023     Signed by    Signed Date/Time  Phone Pager   YESSENIA ISBELL 10/18/2023 11:04 [phone]

## 2023-10-19 NOTE — PROGRESS NOTES
[facility] Progress Note - Hospitalist Service    Date of Admission:  10/8/2023    Assessment & Plan   Peri Irizarry is a 58 year old female admitted on 10/8/2023. She came to the emergency department for evaluation of abdominal pain, nausea and vomiting; found to have small bowel obstruction requiring surgical management.    # Toxic vs metabolic Encephalopathy likely 2/2 sepsis vs drug intoxication, unclear etiology  GCS 12, patient is lethargic, awakens to verbal commands and speaking short sentences, following commands  CT Head pending, Lovenox held  Hold opioids  Will hold Buspar, Cymbalta, Effexor, Trazodone, Gabapentin, Topamax until encephalopathy improves      # Acute hypoxemic respiratory failure, unclear etiology, infectious vs rule out PE  # Sepsis with leukocytosis (3/4 criterion) secondary to UTI vs unknown source  # High anion gap metabolic acidosis with compensatory respiratory alkalosis likely 2/2 lactic acidosis likely 2/2 sepsis vs bowel necrosis  UA: wbc +, rbc +, LE + (received Rocephin for 4 days until 10/13, previous culture polymicrobial)  CXR: Small bilateral pleural effusions and adjacent bibasilar opacities  CT A/p with no abscess, no free air  Elevated Procalcitonin and CRP  Lactic acidosis resolved  Continue IV Vancomycin, IV Meropenem D2  Continue IV fluids  Blood culture x 2: NGTD, Ucx pending  Trend WBC  CTA Chest to r/o PE as patient is at high risk of VTE 2/2 diffuse metastatic cancer      # Elevated troponin likely 2/2 demand ischemia in the setting of sepsis  Troponin downtrended, patient denies chest pain  EKG sinus tachycardia with no ischemic changes       #Small bowel obstruction  --s/p exploratory laparotomy with lysis of adhesions and reduction of internal hernia done on 10/9  ---Surgical findings include; Diffuse metastatic cancer of unknown primary, Closed loop obstruction of proximal small bowel  Gastric mass with evidence of near  erosion into small bowel  ---Concern for malignancy related, surgery feels metastatic gastric versus recurrent breast  --- Underwent EGD showed friability and  sloughing mucosa in the lesser curvature. with biopsies on 10/17/23, pending pathology  --- CT A/P with contrast:  Postoperative changes from recent exploratory laparotomy   ---CHAVA management per surgery  --- recommend NGT tube and may need TPN  --- NGT pulled out, patient refused  --- Dietary orders per surgery      #Mural thickening of the stomach  #Mesenteric stranding and lymphadenopathy  #History of colon and breast cancer  -CT showed most pronounced in the distal body and antrum, corresponding to area of increased activity on PET/CT which could reflect a neoplastic process; mesenteric stranding and lymphadenopathy concerning for metastatic disease with the largest lymph node seen in the stomach measuring 2.9 x 2.4 cm  - Status post biopsy at time of small bowel obstruction surgery.   --Biopsy results--metastatic gastric carcinoma  -- Pending CEA, Will likely need port in the future  -- EGD showed friability and  sloughing mucosa in the lesser curvature. with biopsies on 10/17/23, pending pathology  - [place] oncology consult appreciated - seen Baltazar in past.  Follow-up with oncology as outpatient      Acute blood loss anemia  --- stable overall but hemoglobin significantly down since admission.    --- Continue to monitor; going down small amounts each day.  --- Weight up about 10 pounds since admission if scale correct.  DC p.o. Lasix since it could worsen dehydration-  --- Drop in Hb from 10.1 to 8.6, which is baseline 10/17. Prior Hemoglobin 10.1, likely from hemoconcentration, as all cell lines have dropped  --- Monitor Hb    #Acute kidney injury  Resolved     #Hypokalemia  resolved    #Left hydroureteronephrosis  -CT showed no obstructing calculi, there is inflammatory stranding surrounding the left ureter distally which could reflect neoplastic  "invasion and/or inflammatory scaring  -Urology consulted from ED. no intervention planned.    Patient is considered high risk for infection s/p ureteral stent.    Urology may consider ureteral stents if patient worsens clinically or plans to undergo chemotherapy.  -- Gomez discontinued 10/10       #COPD/asthma  -Quit smoking 2010   --No exacerbation  -DuoNebs as needed  -Continue home Incruse Ellipta     #Essential hypertension blood pressure now on the low side   --continue to hold Lasix, hydrochlorothiazide, norvasc     #Chronic thrombocytosis  - Monitor PC     #JAMMIE  - was on CPAP     #Chronic thoracic back pain  -10/11 resumed Effexor, Cymbalta, gabapentin - held due to encephalopathy     Dysthymia--resume BuSpar and Cymbalta and gabapentin and Topamax and effexor and trazodone - held due to encephalopathy     Hyperlipidemia--resume statin     --- Palliative eval for further GOC discussion       Diet: NPO for Medical/Clinical Reasons Except for: Meds    DVT Prophylaxis: Enoxaparin (Lovenox) subcutaneous - held for CT Head  Gomez Catheter: Not present  Lines: None     Cardiac Monitoring: None  Code Status: Full Code      Clinically Significant Risk Factors         # Hypernatremia: Highest Na = 146 mmol/L in last 2 days, will monitor as appropriate     # Anion Gap Metabolic Acidosis: Highest Anion Gap = 22 mmol/L in last 2 days, will monitor and treat as appropriate  # Hypoalbuminemia: Lowest albumin = 2.7 g/dL at 10/18/2023  5:13 AM, will monitor as appropriate     # Hypertension: Noted on problem list        # Overweight: Estimated body mass index is 29.12 kg/m  as calculated from the following:    Height as of this encounter: 1.499 m (4' 11\").    Weight as of this encounter: 65.4 kg (144 lb 2.9 oz).   # Severe Malnutrition: based on nutrition assessment    # COPD: noted on problem list        Disposition Plan      Expected Discharge Date: 10/23/2023    Discharge Delays: IV Medication - consider oral or Home " Infusion  Voiding/Eating Trial needed  Destination: home with help/services  Discharge Comments: egd today            Melissa Bauer MD  Hospitalist Service  [facility]  Securely message with [employer] (more info)  Text page via [employer] Paging/Directory   ______________________________________________________________________    Interval History   Patient was examined at the bedside, lethargic.  Awakens to verbal commands, GCS of 12, spoke to me in short sentences.  She asked me to talk to her son/family.  Further discussion she did mention that I could talk to either of her son's/Daughter in law about her medical decision making.  Spoke to daughter in law Missy, discussed about the medical care and answered all her questions and provided support.  Palliative consulted    Addendum: Ex  was at the bedside, insisted we dont share information with Missy. Patient stated we shall discuss about this later      Physical Exam   Vital Signs: Temp: 99.1  F (37.3  C) Temp src: Oral BP: 105/52 Pulse: 118   Resp: 20 SpO2: 94 % O2 Device: Nasal cannula Oxygen Delivery: 3 LPM  Weight: 144 lbs 2.89 oz    General: Lethargic, responds to verbal stimuli, on 3L NC sat 95%  Lungs: Clear to auscultation bilaterally  Cardio: RRR, tachycardic  Abdomen: generalized tenderness+, Staples over the abdominal incision, drains x 2 with output  Ext: no edema  Neuro: following commands, moving all extremities      Medical Decision Making       45 MINUTES SPENT BY ME on the date of service doing chart review, history, exam, documentation & further activities per the note.      Data     I have personally reviewed the following data over the past 24 hrs:    15.2 (H)  \   8.6 (L)   / 767 (H)     146 (H) 114 (H) 15.1 /  100 (H)   4.0 16 (L) 0.89 \     Trop: 154 (HH) BNP: N/A     TSH: 4.67 (H) T4: N/A A1C: N/A     Procal: N/A CRP: N/A Lactic Acid: 1.2         Imaging results reviewed over the past 24 hrs:   Recent Results  (from the past 24 hour(s))   XR Chest Port 1 View    Narrative    EXAM: XR CHEST PORT 1 VIEW  LOCATION: [facility]  DATE: 10/18/2023    INDICATION: new onset sepsis, unclear source  COMPARISON: CT abdomen/pelvis earlier the same day.      Impression    IMPRESSION: Heart size and pulmonary vasculature are normal. Small bilateral pleural effusions and adjacent bibasilar opacities. No pneumothorax. Partially visualized tubing overlying the left upper quadrant abdomen.

## 2023-10-19 NOTE — PLAN OF CARE
Problem: Plan of Care - These are the overarching goals to be used throughout the patient stay.    Goal: Plan of Care Review  Description: The Plan of Care Review/Shift note should be completed every shift.  The Outcome Evaluation is a brief statement about your assessment that the patient is improving, declining, or no change.  This information will be displayed automatically on your shift note.  10/18/2023 2245 by Shira Staley RN  Outcome: Progressing  10/18/2023 2241 by Shira Staley RN  Outcome: Unable to Meet  10/18/2023 2240 by Shira Satley RN  Outcome: Not Progressing  10/18/2023 2236 by Shira Staley RN  Outcome: Progressing   Goal Outcome Evaluation:       Pt is alert, sleep between cares, awake with soft touch and full asleep. NPO expect meds, oral meds not given at bed time due to Pt not fully awake. IVF infusing at 100ml/hr. Pt is incont of bladder, had minimal urine out, bladder scan 137ml. Was reposition and pressure point supported with pillows. CHAVA drains in placed output recorded. Incisional site open to air with staples in place

## 2023-10-20 NOTE — PLAN OF CARE
"  Problem: Plan of Care - These are the overarching goals to be used throughout the patient stay.    Goal: Plan of Care Review  Description: The Plan of Care Review/Shift note should be completed every shift.  The Outcome Evaluation is a brief statement about your assessment that the patient is improving, declining, or no change.  This information will be displayed automatically on your shift note.  10/20/2023 1838 by Ashly Jolley RN  Outcome: Progressing  10/20/2023 1449 by Ashly Jolley RN  Outcome: Progressing  Goal: Patient-Specific Goal (Individualized)  Description: You can add care plan individualizations to a care plan. Examples of Individualization might be:  \"Parent requests to be called daily at 9am for status\", \"I have a hard time hearing out of my right ear\", or \"Do not touch me to wake me up as it startles me\".  10/20/2023 1838 by Ashly Jolley RN  Outcome: Progressing  10/20/2023 1449 by Ashly Jolley RN  Outcome: Progressing  Goal: Absence of Hospital-Acquired Illness or Injury  10/20/2023 1838 by Ashly Jolley RN  Outcome: Progressing  10/20/2023 1449 by Ashly Jolley RN  Outcome: Progressing  Goal: Optimal Comfort and Wellbeing  10/20/2023 1838 by Ashly Jolley RN  Outcome: Progressing  10/20/2023 1449 by Ashly Jolley RN  Outcome: Progressing  Goal: Readiness for Transition of Care  10/20/2023 1838 by Ashly Jolley RN  Outcome: Progressing  10/20/2023 1449 by Ashly Jolley RN  Outcome: Progressing   Goal Outcome Evaluation:         Pt alert and oriented with periods of confused conversations. Diet increased from NPO to clears. Had water, few bites jello and fruit ice so far and tolerating well. No nausea or pain. IV antibiotics given per order. CHAVA drains in place. BS hypoactive but is passing gas.   Family in room with Pt.   "

## 2023-10-20 NOTE — PROGRESS NOTES
PALLIATIVE CARE SOCIAL WORK Progress Note   Location: [facility]      Joint visit with Palliative DANNA Nurys Sheth and Dr. Garcia. Medical update provided. Pt is aware of her new cancer diagnosis and that we are waiting on pathology results and next steps. Pt is agreeable to advancing diet and aware of risks and potential need for TPN. Discussed role of Palliative team to provide support and anticipatory guidance as she is making decisions about her care. Pt and family feel they are coping well, no other immediate needs.   Provided HCD and Pt will plan to complete this weekend. She wants son Jeffrey to be primary HCA.      Updated Care management.     Plan: PCSW continues to follow.     Clinical Social Work Interventions:   Assessment of palliative specific issues    Introduction of Palliative clinical social work interventions  Other: general support      DIANE Benavides  [facility], Palliative Care  Securely message with the [employer] Web Console (learn more here) or  Text page via [facility] Paging/Directory

## 2023-10-20 NOTE — PROGRESS NOTES
Care Management Follow Up    Length of Stay (days): 11    Expected Discharge Date: 10/23/2023     Concerns to be Addressed:     Discharge planning.   Patient plan of care discussed at interdisciplinary rounds: Yes    Anticipated Discharge Disposition:  pending  Anticipated Discharge Services:  To be determined  Anticipated Discharge DME:  [facility]    Patient/family educated on Medicare website which has current facility and service quality ratings:    Education Provided on the Discharge Plan:    Patient/Family in Agreement with the Plan:      Referrals Placed by CM/SW:    Private pay costs discussed: Not applicable    Additional Information:  Care management has been following patient's progression of care. Discussed updates with MD and palliative care SW.   Disposition uncertain-pt from home with PCA hours.   PT and OT have not yet been consulted.  Palliative care to meet with patient and family.   Care management will await for further treatment and post hospital recommendations from medical team and follow up with patient and her family.    Marianna Gibson, LAURESW

## 2023-10-20 NOTE — PROGRESS NOTES
[facility]  Palliative Care Daily Progress Note       Recommendations & Counseling     GOALS OF CARE:   Life-prolonging without limits   Recommend further discussion about overall goals, once prognostic information and potential treatment options are available from oncology.      ADVANCE CARE PLANNING:  No health care directive on file. Patient provided with  advance directive document today and plans to complete prior to discharge  There is no POLST form on file, recommend to complete prior to DC.  Code status: Full Code-discussed briefly today.  Recommend further discussion once prognostic information is more clear     MEDICAL MANAGEMENT:   We are not actively managing symptoms at this time.  Agree with hospitalist reviewing psychiatric medications and slowly adding back.      PSYCHOSOCIAL/SPIRITUAL SUPPORT:  Watches TV  Spiritual but not [employer]  2 sons,   Was a homemaker     Palliative Care will continue to follow. Thank you for the consult and allowing us to aid in the care of Peri Irizarry.     These recommendations have been discussed with Dr. Garcia      Assessments          Peri Irizarry is a 58 year old female with a past medical history of breast cancer, colon cancer, COPD, JAMMIE on CPAP, chronic headache, chronic back pain and HTN who presented on 10/6/23 with abdominal pain.  Patient admitted with sepsis, SBO, encephalopathy, s/p exploratory lap, KOKI and reduction of hernia on 10/9.  Pathology results show metastatic gastric cancer.  Patient having increased abdominal pain and surgery recommend NG tube placement and TPN today.  NG tube pulled out per patient.  She is hypotensive and tachycardic with elevated WBC-concern for sepsis. CT of head, CT abdomen pending.      Today, the patient was seen for:  Goals of care    Prognosis, Goals, or Advance Care Planning was addressed today with: Yes.  Mood, coping, and/or meaning in the context of serious illness were  "addressed today: Yes.              Interval History:     Chart review/discussion with unit or clinical team members:   Reviewed surgery notes, plan to advance diet, discussed that she has metastatic cancer.   WBC has normalized.  Vitally stable.     Per patient or family/caregivers today:  Met in room with patient, son Jeffrey, daughter in [place] DIANE with palliative care and Dr. Garcia.    Patient alert, oriented X 4.  Very interactive today and able to make her needs and wishes known.   Dr. Garcia provided medical update, including that patient has metastatic cancer, and still waiting for final pathology.  Discussed concerns about nutrition and that patient will be allowed to slowly advance diet, may need TPN.  Patient states she was aware of all this information and that \"I've been through breast and colon cancer before, so I understand but I know this is different.\"  Patient and family felt that they have no further questions at this time.   We reviewed the importance of completing advance directive.  Peri is willing to do this and [place] plan to help.      Key Palliative Symptoms:  # Pain severity the last 12 hours: none  # Dyspnea severity the last 12 hours: none  # Nausea severity the last 12 hours: none  # Anxiety severity the last 12 hours: none             Review of Systems:     Besides above, an additional 4 point system ROS was reviewed and is unremarkable          Medications:     I have reviewed this patient's medication profile and medications during this hospitalization.           Physical Exam:   Vital Signs: Blood pressure 116/56, pulse 107, temperature 98.5  F (36.9  C), temperature source Oral, resp. rate 16, height 1.499 m (4' 11\"), weight 71.4 kg (157 lb 8 oz), SpO2 95%, not currently breastfeeding.   GENERAL: Alert, sitting up in bed, NAD    SKIN: Warm and dry   MUSKL: no gross joint deformities   EXTREMITIES: No edema or cyanosis, pulses 2+ and " symmetrical  NEUROLOGIC: Alert and oriented X 4             Data Reviewed:       Results for orders placed or performed during the hospital encounter of 10/08/23   CT Abdomen Pelvis w Contrast    Impression    IMPRESSION:   1.  Mural thickening of the stomach most pronounced in the distal body and antrum (image 31, series 3 which corresponds the area of increased activity on the prior PET/CT, which could reflect a neoplastic process, correlation with patient's oncology   notes   2.   dilatation of the proximal jejunum with an apparent transition point seen in the left upper quadrant, (image 76, series 3) may reflect a low to intermediate grade proximal small bowel obstruction, versus focal ileus. Correlation with patient's   clinical symptomatology is recommended.  3.  Mesenteric stranding and lymphadenopathy, concerning for metastatic disease, with the largest lymph node seen in the stomach measuring 2.9 x 2.4 cm (image 142, series 3)  4.  interval development of a left-sided hydroureteronephrosis, no obstructing calculi is identified there is inflammatory stranding surrounding the left ureter distally which could reflect neoplastic invasion and/or inflammatory scarring.    XR Abdomen Port 1 View    Impression    IMPRESSION: NG tube seen with the tip and side-port in the stomach. Contrast is seen within the left and less so right renal collecting system with a bladder. The bowel gas pattern is nonobstructive.   US Renal Limited    Impression    IMPRESSION:  Persistent mild left hydronephrosis.   XR Abdomen 2 Views    Impression    IMPRESSION: Two surgical drains project over the left abdomen. Prominent gas-filled loops of small bowel and colon throughout the abdomen are noted, consistent with postoperative ileus. No pneumatosis or large volume pneumoperitoneum. Midline surgical   staples. Lung bases are clear.   CT Abdomen Pelvis w Contrast    Impression    IMPRESSION:   1.  Postoperative changes from recent  exploratory laparotomy, and reduction of internal, small bowel hernia.  2.  There are focally matted loops of small bowel in the left midabdomen distended up to 5.7 cm with abrupt transition in this area indicative of a developing small bowel obstruction. Most of the small bowel loops which are distal to this are completely   collapsed.  3.  The duodenum and stomach are not distended.  4.  No abdominal abscess.  5.  Two, large upper abdominal drains in place.  6.  New, small layering bilateral pleural effusions with notable compressive atelectasis especially in the right lower lobe..  7.  The distal gastric mass, with adjacent bulky gastrohepatic lymphadenopathy and extensive stranding of the root of the small bowel mesentery again noted. Recent EGD with biopsy of a gastric lesion. Pathology pending.  8.  Other noncritical findings as noted above.   XR Chest Port 1 View    Impression    IMPRESSION: Heart size and pulmonary vasculature are normal. Small bilateral pleural effusions and adjacent bibasilar opacities. No pneumothorax. Partially visualized tubing overlying the left upper quadrant abdomen.   CT Head w/o Contrast    Impression    IMPRESSION:  1.  No CT evidence for acute intracranial process.  2.  Brain atrophy and presumed chronic microvascular ischemic changes as above.  3.  Enlarged, partially empty sella turcica. This finding is nonspecific, but can be seen in setting of idiopathic intracranial hypertension.   CT Chest Pulmonary Embolism w Contrast    Impression    IMPRESSION:  1.  There are a few findings of concern for possible aspiration. Mid esophagus is distended with air and fluid, there are secretions in the right lower lobe and areas of pneumonitis predominantly involving the right upper  and right middle lobes.  2.  There are again small to moderate bilateral pleural effusions, right greater than left with adjacent compressive atelectasis. Atelectatic lung enhances normally.  3.  Underlying  emphysema.  4.  No evidence of pulmonary emboli.  5.  Gastric tumor again noted with partially visible postsurgical changes in the abdomen.  6.  Aberrant origin of the right subclavian artery.         Recent Labs   Lab 10/20/23  1510 10/20/23  0623 10/19/23  1126 10/19/23  0831 10/19/23  0535 10/18/23  2118 10/18/23  1853 10/18/23  1318 10/18/23  1304 10/18/23  0513   WBC  --  10.6  --   --  15.2*  --   --  18.8*  --  20.0*   HGB  --  8.3*  --   --  8.6*  --   --  10.1*  --  10.0*   MCV  --  91  --   --  90  --   --  89  --  92   PLT  --  665*  --   --  767*  --   --  988*  --  935*   NA  --  147*  --   --  146*  --  144 140  --  140   POTASSIUM 3.6 3.4  --   --  4.0  --  4.1 4.1  --  3.9  3.9   CHLORIDE  --  114*  --   --  114*  --  112* 109*  --  107   CO2  --  19*  --   --  16*  --  15* 15*  --  11*   BUN  --  13.2  --   --  15.1  --  13.8 12.6  --  10.3   CR  --  0.66  --   --  0.89  --  0.83 0.77  --  0.67  0.63   ANIONGAP  --  14  --   --  16*  --  17* 16*  --  22*   CHERY  --  8.0*  --   --  8.2*  --  8.5* 8.4*  --  8.6   GLC  --  84 100* 112* 104*   < > 123* 140*   < > 141*   ALBUMIN  --   --   --   --   --   --   --   --   --  2.7*   PROTTOTAL  --   --   --   --   --   --   --   --   --  6.0*   BILITOTAL  --   --   --   --   --   --   --   --   --  0.4   ALKPHOS  --   --   --   --   --   --   --   --   --  232*   ALT  --   --   --   --   --   --   --   --   --  21   AST  --   --   --   --   --   --   --   --   --  25    < > = values in this interval not displayed.      Lab Results   Component Value Date    WBC 10.6 10/20/2023    HGB 8.3 (L) 10/20/2023    HCT 26.6 (L) 10/20/2023     (H) 10/20/2023     (H) 10/20/2023    POTASSIUM 3.6 10/20/2023    CHLORIDE 114 (H) 10/20/2023    CO2 19 (L) 10/20/2023    BUN 13.2 10/20/2023    CR 0.66 10/20/2023    GLC 84 10/20/2023    AST 25 10/18/2023    ALT 21 10/18/2023    ALKPHOS 232 (H) 10/18/2023    BILITOTAL 0.4 10/18/2023    KAIDEN 27 10/18/2023    INR 1.07  10/09/2023      Lab Results   Component Value Date    ALBUMIN 2.7 10/18/2023    ALBUMIN 3.9 04/13/2022          Recent Labs   Lab 10/18/23  1322   PH 7.39   PCO2 25*   PO2 72*   HCO3 15*                 ====================================================  TT: I have personally spent a total of 50 minutes on the day of the encounter on the unit in review of medical record, consultation with the medical providers and assessment of patient today, with time spent in counseling, coordination of care, and discussion with patient and family re: diagnostic results, prognosis, risks and benefits of management options, and development of plan of care as noted above.      ====================================================    Irasema Sheth, CNS  [facility], Palliative Care  Securely message with the [employer] Web Console (learn more here) or  Text page via [employer] Paging/Directory

## 2023-10-20 NOTE — PROGRESS NOTES
CLINICAL NUTRITION SERVICES NOTE    Diet: Clear liquids, pt sipping water  (diet advanced by Surgery today)  Visited with patient and family members.  Son requested cranberry juice for pt.  Pt will try Gelatein plus - pineapple and Ensure clear - she prefers Ensure Enlive vanilla (it helps her move her bowels) she drinks this at home.  Hospitalist and Palliative Care seeing pt after RD visit to discuss TPN.  Hospitalist noted pt is at risk for refeeding syndrome.  Pt receives thiamine daily, and has potassium replacement protocol in place. Recommend monitoring and replacing phosphorus and magnesium as needed.  Will add supplements for patient. Gelatein plus - pineapple - dinner, and Ensure clear - tomorrow lunch. Pt has an Ensure Clear in her room.  Will continue to monitor for feeding plan - diet advancement/TPN?.  Pt with very limited intake during her 12 day admission.

## 2023-10-20 NOTE — PLAN OF CARE
Problem: Plan of Care - These are the overarching goals to be used throughout the patient stay.    Goal: Plan of Care Review  Description: The Plan of Care Review/Shift note should be completed every shift.  The Outcome Evaluation is a brief statement about your assessment that the patient is improving, declining, or no change.  This information will be displayed automatically on your shift note.  Outcome: Progressing     Problem: Intestinal Obstruction  Goal: Optimal Bowel Function  Outcome: Progressing  Intervention: Promote Bowel Function  Recent Flowsheet Documentation  Taken 10/20/2023 0124 by Adore Gregory RN  Body Position: heels elevated  Head of Bed (HOB) Positioning: HOB at 30 degrees  Taken 10/19/2023 2226 by Adore Gregory RN  Body Position: heels elevated  Head of Bed (HOB) Positioning: HOB at 30 degrees  Intervention: Promote Bowel Function  Recent Flowsheet Documentation  Taken 10/20/2023 0124 by Adore Gregory RN  Body Position: heels elevated  Head of Bed (HOB) Positioning: HOB at 30 degrees  Taken 10/19/2023 2226 by Adore Gregory RN  Body Position: heels elevated  Head of Bed (HOB) Positioning: HOB at 30 degrees   Goal Outcome Evaluation:                  Pt alert and forgetful,has abdominal discomfort but declined pain medication,abdomen distended and BS hypoactive,NPO status maintained,IV LR infusing at 100 ml / hr, c/o heartburn,famotidine scheduled given early,oral care provided,no c/o N/V this shift,slept between cares.

## 2023-10-20 NOTE — PROGRESS NOTES
Peri Irizarry is remarkably improved today.  More lucid and conversant.  Denies any abdominal discomfort.  Her main complaint over the past several days is apparently bilateral hand pain.  She is a lot more lucid today and having full conversations        Vitals:    10/19/23 2339 10/20/23 0300 10/20/23 0744 10/20/23 1119   BP: (!) 140/65  116/56 119/62   BP Location: Right arm  Right arm Left arm   Pulse: 105  107 93   Resp: 16  16 16   Temp: 98.5  F (36.9  C)  98.5  F (36.9  C) 98  F (36.7  C)   TempSrc: Oral  Oral Oral   SpO2: 97%  95% 96%   Weight:  71.4 kg (157 lb 8 oz)     Height:           PHYSICAL EXAM:  GEN: No acute distress, comfortable  ABD: Soft, midline incision intact with staples, CHAVA drain with scant serosanguineous output  EXT:No cyanosis, edema or obvious abnormalities        Recent Labs   Lab 10/20/23  0623   WBC 10.6   HGB 8.3*   HCT 26.6*   *       Recent Labs   Lab 10/20/23  0623 10/18/23  1318 10/18/23  0513   *   < > 140   CO2 19*   < > 11*   BUN 13.2   < > 10.3   ALBUMIN  --   --  2.7*   ALKPHOS  --   --  232*   ALT  --   --  21   AST  --   --  25    < > = values in this interval not displayed.         A/P:  Peri Irizarry is s/p laparotomy with reduction of internal hernia  -I explained to the patient the findings during surgery now that she is more lucid.  I explained to her daughter-in-law and her son as well.  We did discuss the fact she is got metastatic gastric adenocarcinoma.  She does not recall much from her oncology evaluation but does know that she is to follow-up with them as an outpatient.  -From a surgical standpoint, she is doing relatively well and I will advance her diet to clear liquids.  For now we will Hep-Lock her and encouraged that she ambulate.  -White blood cell count completely normal at this point, watch hgb    Matthew Ramey, DO  FACS  [phone]  [facility] Department of Surgery

## 2023-10-20 NOTE — PLAN OF CARE
"  Problem: Plan of Care - These are the overarching goals to be used throughout the patient stay.    Goal: Plan of Care Review  Description: The Plan of Care Review/Shift note should be completed every shift.  The Outcome Evaluation is a brief statement about your assessment that the patient is improving, declining, or no change.  This information will be displayed automatically on your shift note.  Outcome: Progressing  Goal: Patient-Specific Goal (Individualized)  Description: You can add care plan individualizations to a care plan. Examples of Individualization might be:  \"Parent requests to be called daily at 9am for status\", \"I have a hard time hearing out of my right ear\", or \"Do not touch me to wake me up as it startles me\".  Outcome: Progressing  Goal: Absence of Hospital-Acquired Illness or Injury  Outcome: Progressing  Goal: Optimal Comfort and Wellbeing  Outcome: Progressing  Goal: Readiness for Transition of Care  Outcome: Progressing     Problem: Surgery Nonspecified  Goal: Absence of Bleeding  Outcome: Progressing  Goal: Effective Bowel Elimination  Outcome: Progressing  Goal: Fluid and Electrolyte Balance  Outcome: Progressing  Goal: Blood Glucose Level Within Targeted Range  Outcome: Progressing  Goal: Absence of Infection Signs and Symptoms  Outcome: Progressing  Goal: Anesthesia/Sedation Recovery  Outcome: Progressing  Intervention: Optimize Anesthesia Recovery  Recent Flowsheet Documentation  Taken 10/20/2023 0900 by Ashly Jolley RN  Reorientation Measures:   clock in view   reorientation provided  Goal: Optimal Pain Control and Function  Outcome: Progressing  Goal: Nausea and Vomiting Relief  Outcome: Progressing  Goal: Effective Urinary Elimination  Outcome: Progressing  Intervention: Monitor and Manage Urinary Retention  Recent Flowsheet Documentation  Taken 10/20/2023 0900 by Ashly Jolley RN  Urinary Elimination Promotion: toileting offered     PT alert and oriented with intermittent " confusion. Denies pain, abd incision c/d/I CAS. CHAVA 1 & 2 in place, milky pink ting output. Was NPO most of shift. No increased to clear liquid diet. Fluids stopped. Tolerating sips of water so far. Denies increased pain or nausea. BS hypoactive. Encouraged Pt to walk. Assist of 1 to bathroom with walker. Placed on tele this afternoon. NSR to sinus tachy.   K  3.4, per protocol gave 4 K bumps. Recheck K at 1530.   Received meropenem IV per order.   Family at bed side. Plan for meeting with palliative care at 1400 today.

## 2023-10-20 NOTE — PROGRESS NOTES
[facility] Progress Note - Hospitalist Service    Date of Admission:  10/8/2023    Assessment & Plan   Peri Irizarry is a 58 year old female admitted on 10/8/2023. She came to the emergency department for evaluation of abdominal pain, nausea and vomiting; found to have small bowel obstruction requiring surgical management.    # Toxic vs metabolic Encephalopathy likely 2/2 sepsis vs drug intoxication, unclear etiology  GCS 12, patient is lethargic, awakens to verbal commands and speaking short sentences, following commands  CT Head pending, Lovenox held  Hold opioids  Will hold Buspar, Cymbalta, Effexor, Trazodone, Gabapentin, Topamax until encephalopathy improves      # Acute hypoxemic respiratory failure, unclear etiology, infectious vs rule out PE  # Sepsis with leukocytosis (3/4 criterion) secondary to UTI vs unknown source  # High anion gap metabolic acidosis with compensatory respiratory alkalosis likely 2/2 lactic acidosis likely 2/2 sepsis vs bowel necrosis  UA: wbc +, rbc +, LE + (received Rocephin for 4 days until 10/13, previous culture polymicrobial)  CXR: Small bilateral pleural effusions and adjacent bibasilar opacities  CT A/p with no abscess, no free air  Elevated Procalcitonin and CRP  Lactic acidosis resolved  Continue IV Vancomycin, IV Meropenem D2  Continue IV fluids  Blood culture x 2: NGTD, Ucx pending  Trend WBC  CTA Chest to r/o PE as patient is at high risk of VTE 2/2 diffuse metastatic cancer      # Elevated troponin likely 2/2 demand ischemia in the setting of sepsis  Troponin downtrended, patient denies chest pain  EKG sinus tachycardia with no ischemic changes       #Small bowel obstruction  --s/p exploratory laparotomy with lysis of adhesions and reduction of internal hernia done on 10/9  ---Surgical findings include; Diffuse metastatic cancer of unknown primary, Closed loop obstruction of proximal small bowel  Gastric mass with evidence of near  erosion into small bowel  ---Concern for malignancy related, surgery feels metastatic gastric versus recurrent breast  --- Underwent EGD showed friability and  sloughing mucosa in the lesser curvature. with biopsies on 10/17/23, pending pathology  --- CT A/P with contrast:  Postoperative changes from recent exploratory laparotomy   ---CHAVA management per surgery  --- recommend NGT tube and may need TPN  --- NGT pulled out, patient refused  --- Dietary orders per surgery      #Mural thickening of the stomach  #Mesenteric stranding and lymphadenopathy  #History of colon and breast cancer  -CT showed most pronounced in the distal body and antrum, corresponding to area of increased activity on PET/CT which could reflect a neoplastic process; mesenteric stranding and lymphadenopathy concerning for metastatic disease with the largest lymph node seen in the stomach measuring 2.9 x 2.4 cm  - Status post biopsy at time of small bowel obstruction surgery.   --Biopsy results--metastatic gastric carcinoma  -- Pending CEA, Will likely need port in the future  -- EGD showed friability and  sloughing mucosa in the lesser curvature. with biopsies on 10/17/23, pending pathology  - [place] oncology consult appreciated - seen Bal[facility] in past.  Follow-up with oncology as outpatient      Acute blood loss anemia  --- stable overall but hemoglobin significantly down since admission.    --- Continue to monitor; going down small amounts each day.  --- Weight up about 10 pounds since admission if scale correct.  DC p.o. Lasix since it could worsen dehydration-  --- Drop in Hb from 10.1 to 8.6, which is baseline 10/17. Prior Hemoglobin 10.1, likely from hemoconcentration, as all cell lines have dropped  --- Monitor Hb    #Acute kidney injury  Resolved     #Hypokalemia  resolved    #Left hydroureteronephrosis  -CT showed no obstructing calculi, there is inflammatory stranding surrounding the left ureter distally which could reflect neoplastic  "invasion and/or inflammatory scaring  -Urology consulted from ED. no intervention planned.    Patient is considered high risk for infection s/p ureteral stent.    Urology may consider ureteral stents if patient worsens clinically or plans to undergo chemotherapy.  -- Gomez discontinued 10/10       #COPD/asthma  -Quit smoking 2010   --No exacerbation  -DuoNebs as needed  -Continue home Incruse Ellipta     #Essential hypertension blood pressure now on the low side   --continue to hold Lasix, hydrochlorothiazide, norvasc     #Chronic thrombocytosis  - Monitor PC     #JAMMIE  - was on CPAP     #Chronic thoracic back pain  -10/11 resumed Effexor, Cymbalta, gabapentin - held due to encephalopathy     Dysthymia--resume BuSpar and Cymbalta and gabapentin and Topamax and effexor and trazodone - held due to encephalopathy     Hyperlipidemia--resume statin          Diet: NPO for Medical/Clinical Reasons Except for: Meds    DVT Prophylaxis: Pneumatic Compression Devices  Gomez Catheter: Not present  Lines: None     Cardiac Monitoring: None  Code Status: Full Code      Clinically Significant Risk Factors         # Hypernatremia: Highest Na = 147 mmol/L in last 2 days, will monitor as appropriate      # Hypoalbuminemia: Lowest albumin = 2.7 g/dL at 10/18/2023  5:13 AM, will monitor as appropriate     # Hypertension: Noted on problem list        # Obesity: Estimated body mass index is 31.81 kg/m  as calculated from the following:    Height as of this encounter: 1.499 m (4' 11\").    Weight as of this encounter: 71.4 kg (157 lb 8 oz).   # Severe Malnutrition: based on nutrition assessment    # COPD: noted on problem list        Disposition Plan      Expected Discharge Date: 10/23/2023    Discharge Delays: IV Medication - consider oral or Home Infusion  Voiding/Eating Trial needed  Destination: home with help/services  Discharge Comments: egd today            Mary Ann Garcia MD  Hospitalist Service  [facility]  Securely message with Lionel (more info)  Text page via [facility] Paging/Directory   ______________________________________________________________________    Interval History   Patient is new to me today.  She is alert and oriented.  She did seem reluctant to answer my questions and seems somewhat suspicious of me.  She seems to be in a good mood and is conversational.  She has a palliative care meeting with family today at 2.  She has been having heartburn this morning.  She is on famotidine IV twice daily currently.  She states she does not normally get heartburn.  She has been having tachycardia since the 15th consistently.  Will get cardiac monitoring started.  She is having hyponatremia.  Discussed with nursing, discussed with social work.    Physical Exam   Vital Signs: Temp: 98.5  F (36.9  C) Temp src: Oral BP: 116/56 Pulse: 107   Resp: 16 SpO2: 95 % O2 Device: None (Room air) Oxygen Delivery: 3 LPM  Weight: 157 lbs 8 oz    General Appearance: Awake, alert, in no acute distress  Respiratory: CTAB, no wheeze  Cardiovascular: Tachycardia  GI: soft, nontender, non distended, normal bowel sounds  Skin: no jaundice, no rash      Medical Decision Making       50 MINUTES SPENT BY ME on the date of service doing chart review, history, exam, documentation & further activities per the note.      Data     I have personally reviewed the following data over the past 24 hrs:    10.6  \   8.3 (L)   / 665 (H)     147 (H) 114 (H) 13.2 /  84   3.4 19 (L) 0.66 \     Ferritin:  N/A % Retic:  N/A LDH:  N/A       Imaging results reviewed over the past 24 hrs:   Recent Results (from the past 24 hour(s))   CT Head w/o Contrast    Narrative    EXAM: CT HEAD W/O CONTRAST  LOCATION: [facility]  DATE: 10/19/2023    INDICATION: Altered mental status. Confusion.  COMPARISON: 12/23/2021  TECHNIQUE: Routine CT Head without IV contrast. Multiplanar reformats. Dose reduction techniques were  used.    FINDINGS:  INTRACRANIAL CONTENTS: No intracranial hemorrhage, extraaxial collection, or mass effect.  No CT evidence of acute infarct. Minimal presumed chronic small vessel ischemic changes. Mild generalized volume loss. No hydrocephalus. Enlarged, partially empty   sella turcica.    VISUALIZED ORBITS/SINUSES/MASTOIDS: No intraorbital abnormality. No paranasal sinus mucosal disease. No middle ear or mastoid effusion.    BONES/SOFT TISSUES: No acute abnormality.      Impression    IMPRESSION:  1.  No CT evidence for acute intracranial process.  2.  Brain atrophy and presumed chronic microvascular ischemic changes as above.  3.  Enlarged, partially empty sella turcica. This finding is nonspecific, but can be seen in setting of idiopathic intracranial hypertension.   CT Chest Pulmonary Embolism w Contrast    Narrative    EXAM: CT CHEST PULMONARY EMBOLISM W CONTRAST  LOCATION: [facility]  DATE: 10/19/2023    INDICATION: Tachycardia and hypoxemia. Metastatic gastric adenocarcinoma recently diagnosed.  COMPARISON: CT AP 10/18/2023 and older studies, PET CT 09/28/2023  TECHNIQUE: CT chest pulmonary angiogram during arterial phase injection of IV contrast. Multiplanar reformats and MIP reconstructions were performed. Dose reduction techniques were used.   CONTRAST: 75 mL of [id number]    FINDINGS:  ANGIOGRAM CHEST: Excellent opacification of pulmonary arteries and branches. No evidence of pulmonary emboli. Aorta and branches are unremarkable. Aberrant origin of the right subclavian artery.      LUNGS AND PLEURA: Bilateral layering small to moderate pleural effusions, right greater than left again noted. Subpleural atelectasis in both lower lobes. Atelectatic lung enhances normally.     In addition, there are dependent groundglass opacities in the right upper lobe posteriorly with peribronchiolar opacities adjacent to the fissure laterally (image 130), inferiorly in the right middle lobe  smaller area in the left upper lobe laterally and   a few tubular opacities in the superior segment of the left lower lobe (images 123 - 137). Retained secretions in the main, right lower lobe bronchus. Underlying emphysema.    MEDIASTINUM/AXILLAE: Esophagus is slightly distended with an air-fluid level in the midportion. No adenopathy.    CORONARY ARTERY CALCIFICATION: None.    UPPER ABDOMEN: Ill-defined distal gastric tumor again noted. Incomplete visualization of the upper abdominal drains.    MUSCULOSKELETAL: No suspicious lesions. Marked dependent subcutaneous edema.      Impression    IMPRESSION:  1.  There are a few findings of concern for possible aspiration. Mid esophagus is distended with air and fluid, there are secretions in the right lower lobe and areas of pneumonitis predominantly involving the right upper  and right middle lobes.  2.  There are again small to moderate bilateral pleural effusions, right greater than left with adjacent compressive atelectasis. Atelectatic lung enhances normally.  3.  Underlying emphysema.  4.  No evidence of pulmonary emboli.  5.  Gastric tumor again noted with partially visible postsurgical changes in the abdomen.  6.  Aberrant origin of the right subclavian artery.

## 2023-10-21 NOTE — PROGRESS NOTES
[facility] Progress Note - Hospitalist Service    Date of Admission:  10/8/2023    Assessment & Plan   Peri Irizarry is a 58 year old female admitted on 10/8/2023. She came to the emergency department for evaluation of abdominal pain, nausea and vomiting; found to have small bowel obstruction requiring surgical management.    Toxic vs metabolic Encephalopathy likely 2/2 sepsis vs drug intoxication, unclear etiology  GCS 12, patient is lethargic, awakens to verbal commands and speaking short sentences, following commands  CT Head pending, Lovenox held  Hold opioids  Cotinue to hold Buspar, Gabapentin, Topamax  Discontinued venlafaxine due to interactions with trazodone and cymbalta  Restart Cymbalta and trazodone    Acute hypoxemic respiratory failure secondary to aspiration pneumonia/pneumonitis  Sepsis with leukocytosis (3/4 criterion) secondary to UTI vs unknown source  High anion gap metabolic acidosis with compensatory respiratory alkalosis likely 2/2 lactic acidosis likely 2/2 sepsis vs bowel necrosis  UA: wbc +, rbc +, LE + (received Rocephin for 4 days until 10/13, previous culture polymicrobial)  CXR: Small bilateral pleural effusions and adjacent bibasilar opacities  CT A/p with no abscess, no free air  Elevated Procalcitonin and CRP  Lactic acidosis resolved  Discontinue IV Vancomycin, IV Meropenem, Complete antibiotic course with cefpodoxime and azithromycin  discontinue IV fluids  Blood culture x 2: NGTD, Ucx mixed  CTA Chest: Negative for PE, signs of aspiration pneumonia pneumonitis    Elevated troponin likely 2/2 demand ischemia in the setting of sepsis  Troponin downtrended, patient denies chest pain  EKG sinus tachycardia with no ischemic changes     Small bowel obstruction  -s/p exploratory laparotomy with lysis of adhesions and reduction of internal hernia done on 10/9  -Surgical findings include; Diffuse metastatic cancer of unknown primary, Closed loop  obstruction of proximal small bowel  Gastric mass with evidence of near erosion into small bowel  -Concern for malignancy related, surgery feels metastatic gastric versus recurrent breast  - Underwent EGD showed friability and  sloughing mucosa in the lesser curvature. with biopsies on 10/17/23, pending pathology  - CT A/P with contrast:  Postoperative changes from recent exploratory laparotomy   -CHAVA management per surgery  - recommend NGT tube and may need TPN  - NGT pulled out, patient refused  - Dietary orders per surgery    Mural thickening of the stomach  Mesenteric stranding and lymphadenopathy  History of colon and breast cancer  -CT showed most pronounced in the distal body and antrum, corresponding to area of increased activity on PET/CT which could reflect a neoplastic process; mesenteric stranding and lymphadenopathy concerning for metastatic disease with the largest lymph node seen in the stomach measuring 2.9 x 2.4 cm  - Status post biopsy at time of small bowel obstruction surgery.   -Biopsy results--metastatic gastric carcinoma  - Pending CEA, Will likely need port in the future  - EGD showed friability and  sloughing mucosa in the lesser curvature. with biopsies on 10/17/23, pending pathology  - [place] [facility] consult appreciated - seen Baltazar in past.  Follow-up with oncology as outpatient    Acute blood loss anemia  - stable overall but hemoglobin significantly down since admission.    - Continue to monitor; going down small amounts each day.  - Weight up about 10 pounds since admission if scale correct.  DC p.o. Lasix since it could worsen dehydration-  - Drop in Hb from 10.1 to 8.6, which is baseline 10/17. Prior Hemoglobin 10.1, likely from hemoconcentration, as all cell lines have dropped  - Monitor Hb    Acute kidney injury  Resolved     Hypokalemia  resolved    Left hydroureteronephrosis  -CT showed no obstructing calculi, there is inflammatory stranding surrounding the left ureter  "distally which could reflect neoplastic invasion and/or inflammatory scaring  -Urology consulted from ED. no intervention planned.    Patient is considered high risk for infection s/p ureteral stent.    Urology may consider ureteral stents if patient worsens clinically or plans to undergo chemotherapy.  - Gomez discontinued 10/10    COPD/asthma  -Quit smoking 2010   -DuoNebs as needed  -Continue home Incruse Ellipta     Essential hypertension blood pressure now on the low side   -continue to hold Lasix, hydrochlorothiazide, norvasc     Chronic thrombocytosis  - Monitor PC     JAMMIE  - was on CPAP     Hyperlipidemia--resume statin          Diet: Snacks/Supplements Adult: Gelatein Plus; With Meals  Full Liquid Diet  Snacks/Supplements Adult: Ensure Enlive; With Meals    DVT Prophylaxis: Pneumatic Compression Devices  Gomez Catheter: Not present  Lines: None     Cardiac Monitoring: ACTIVE order. Indication: Tachyarrhythmias, acute (48 hours)  Code Status: Full Code      Clinically Significant Risk Factors        # Hypokalemia: Lowest K = 3.2 mmol/L in last 2 days, will replace as needed  # Hypernatremia: Highest Na = 147 mmol/L in last 2 days, will monitor as appropriate      # Hypoalbuminemia: Lowest albumin = 2.7 g/dL at 10/18/2023  5:13 AM, will monitor as appropriate     # Hypertension: Noted on problem list        # Obesity: Estimated body mass index is 31.81 kg/m  as calculated from the following:    Height as of this encounter: 1.499 m (4' 11\").    Weight as of this encounter: 71.4 kg (157 lb 8 oz).   # Severe Malnutrition: based on nutrition assessment    # COPD: noted on problem list        Disposition Plan      Expected Discharge Date: 10/23/2023    Discharge Delays: IV Medication - consider oral or Home Infusion  Voiding/Eating Trial needed  PT New Consult needed  Destination: home with help/services  Discharge Comments: palitive care following            Mary Ann Garcia MD  Hospitalist Service  [facility] " [facility]  Securely message with [employer] (more info)  Text page via [employer] Paging/Directory   ______________________________________________________________________    Interval History   Ms. Irizarry is doing well today.  Will be restarting some of her psych medications.  Have completely discontinued venlafaxine as it interferes with several of her other medications and has similar reactions to Cymbalta.  May not need to continue buspirone as well.  Has not been complaining of pain despite not getting gabapentin.  This will continue to be held.  Has not had any headaches so have not restarted Topamax.    Physical Exam   Vital Signs: Temp: 98  F (36.7  C) Temp src: Oral BP: (!) 174/88 Pulse: 84   Resp: 18 SpO2: 96 % O2 Device: None (Room air)    Weight: 157 lbs 8 oz    General Appearance: Awake, alert, in no acute distress  Respiratory: CTAB, no wheeze  Cardiovascular: RRR, no murmur noted  GI: soft, nontender, non distended, normal bowel sounds  Skin: no jaundice, no rash    Medical Decision Making       45 MINUTES SPENT BY ME on the date of service doing chart review, history, exam, documentation & further activities per the note.      Data     I have personally reviewed the following data over the past 24 hrs:    N/A  \   N/A   / 759 (H)     N/A N/A N/A /  N/A   3.1 (L) N/A 0.62 \       Imaging results reviewed over the past 24 hrs:   No results found for this or any previous visit (from the past 24 hour(s)).

## 2023-10-21 NOTE — PROGRESS NOTES
Progress Note     Primary Oncologist/Hematologist:  Mani Malcolm MD      INTERVAL HISTORY:   Pt seen at bedside. Doing well, advancing diet as tolerated.   She appears to be nearing discharge.         Assessment and Plan:    1. Small bowel obstruction  -On 9/11/23 CT CAP showed asymmetric nodularity along the posterior gastric wall superimposed on significant diffuse gastric and distal esophageal wall thickening, appearance concerning for neoplasm. Enlarged lymph nodes in the gastrohepatic ligament, highly suspicious for metastatic adenopathy. Asymmetric thickened appearance of the pancreatic tail without definable mass or obvious ductal dilatation.   -10/9/23 s/p diagnostic laparoscopy converted to laparotomy, hernia repair and lysis of adhesions. The Mesenteric implant, mesenteric nodule, and omentum shows metastatic poorly differentiated carcinoma Peritoneal Fluid is positive for malignant cells; cell morphology and immunochemical profile are not specific but are most suggestive of metastatic gastric carcinoma.  - Will need systemic chemotherapy +/- immunotherapy +/- HER2 directed therapy, to start outpatient.   - Has outpatient apt with Dr. Malcolm 10/26/23 to discuss systemic options.     3. History of colorectal cancer: S/p sigmoid colon resection back in 2012. Colonoscopy in December 2022 did not show any significant abnormality. Some polyps were found, and they were removed with cold biopsy forceps. The pathology was benign.      4. 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 To date, there has been no evidence for a local recurrence or more distant metastatic disease.  Bilateral screening mammogram on 1/9/23 showed no evidence of malignancy. We will continue active surveillance.     Plan  Continue to advance diet as tolerated.  OOB to chair.   Discharge when  inpatient needs are met  Has follow-up with Dr. Malcolm to discuss systemic therapy options on 10/26/23.      Alhaji Lott MD  [facility]  [phone]        Interval History:                   Review of Systems:     The 5 point Review of Systems is negative other than noted in the HPI             Medications:   Scheduled Medications   amLODIPine  2.5 mg Oral Daily    [Held by provider] busPIRone  5 mg Oral TID    DULoxetine  30 mg Oral BID    [Held by provider] enoxaparin ANTICOAGULANT  40 mg Subcutaneous Q24H    famotidine  20 mg Intravenous Q12H    [Held by provider] ferrous sulfate  325 mg Oral Q48H    fluticasone-vilanterol  1 puff Inhalation Daily    furosemide  40 mg Oral Daily    [Held by provider] gabapentin  300 mg Oral BID    [Held by provider] gabapentin  600 mg Oral At Bedtime    hydrochlorothiazide  25 mg Oral Daily    meropenem  1 g Intravenous Q8H    montelukast  10 mg Oral At Bedtime    multivitamin w/minerals  1 tablet Oral Daily    polyethylene glycol  17 g Oral Daily    potassium chloride  40 mEq Oral or Feeding Tube Once    rosuvastatin  10 mg Oral Daily    senna-docusate  1 tablet Oral BID    sodium chloride (PF)  3 mL Intracatheter Q8H    [Held by provider] topiramate  100 mg Oral At Bedtime    [Held by provider] topiramate  50 mg Oral QAM    traZODone  100 mg Oral At Bedtime    umeclidinium  1 puff Inhalation Daily    vancomycin  1,000 mg Intravenous Q12H     PRN Medications  acetaminophen, acetaminophen, albuterol, bisacodyl, cyclobenzaprine, hydrALAZINE, hydrOXYzine, ipratropium - albuterol 0.5 mg/2.5 mg/3 mL, lidocaine 4%, lidocaine (buffered or not buffered), magnesium hydroxide, meclizine, [Held by provider] morphine **OR** [Held by provider] morphine, naloxone **OR** naloxone **OR** naloxone **OR** naloxone, ondansetron **OR** ondansetron, [Held by provider] oxyCODONE, phenol MT, prochlorperazine **OR** [DISCONTINUED] prochlorperazine, prochlorperazine, [DISCONTINUED]  "prochlorperazine **OR** prochlorperazine **OR** prochlorperazine, sodium chloride (PF), SUMAtriptan               Physical Exam:   Vitals were reviewed  Blood pressure (!) 174/88, pulse 84, temperature 98  F (36.7  C), temperature source Oral, resp. rate 18, height 1.499 m (4' 11\"), weight 71.4 kg (157 lb 8 oz), SpO2 96%, not currently breastfeeding.  Wt Readings from Last 4 Encounters:   10/20/23 71.4 kg (157 lb 8 oz)   02/21/23 77.1 kg (170 lb)   12/07/22 77.1 kg (170 lb)   01/03/22 90.3 kg (199 lb)       I/O last 3 completed shifts:  In: 220 [P.O.:220]  Out: 130 [Drains:130]    Constitutional: Awake, alert, cooperative, no apparent distress   Lungs: Noisy breathing. No wheezing bilaterally.   Cardiovascular: Tachycardia, No murmurs.   Abdomen: TTP throughout.   Skin: No rashes, no cyanosis, no edema   Other:               Data:   All laboratory data and imaging studies reviewed.    CMP  Recent Labs   Lab 10/21/23  1051 10/20/23  1510 10/20/23  0623 10/19/23  1126 10/19/23  0831 10/19/23  0535 10/18/23  2118 10/18/23  1853 10/18/23  1318 10/18/23  1304 10/18/23  0513   NA  --   --  147*  --   --  146*  --  144 140  --  140   POTASSIUM 3.2* 3.6 3.4  --   --  4.0  --  4.1 4.1  --  3.9  3.9   CHLORIDE  --   --  114*  --   --  114*  --  112* 109*  --  107   CO2  --   --  19*  --   --  16*  --  15* 15*  --  11*   ANIONGAP  --   --  14  --   --  16*  --  17* 16*  --  22*   GLC  --   --  84 100* 112* 104*   < > 123* 140*   < > 141*   BUN  --   --  13.2  --   --  15.1  --  13.8 12.6  --  10.3   CR 0.62  --  0.66  --   --  0.89  --  0.83 0.77  --  0.67  0.63   GFRESTIMATED >90  --  >90  --   --  74  --  81 88  --  >90  >90   CHERY  --   --  8.0*  --   --  8.2*  --  8.5* 8.4*  --  8.6   PROTTOTAL  --   --   --   --   --   --   --   --   --   --  6.0*   ALBUMIN  --   --   --   --   --   --   --   --   --   --  2.7*   BILITOTAL  --   --   --   --   --   --   --   --   --   --  0.4   ALKPHOS  --   --   --   --   --   --   --  "  --   --   --  232*   AST  --   --   --   --   --   --   --   --   --   --  25   ALT  --   --   --   --   --   --   --   --   --   --  21    < > = values in this interval not displayed.     CBC  Recent Labs   Lab 10/21/23  1051 10/20/23  0623 10/19/23  0535 10/18/23  1318 10/18/23  0513   WBC  --  10.6 15.2* 18.8* 20.0*   RBC  --  2.93* 3.03* 3.55* 3.43*   HGB  --  8.3* 8.6* 10.1* 10.0*   HCT  --  26.6* 27.4* 31.4* 31.5*   MCV  --  91 90 89 92   MCH  --  28.3 28.4 28.5 29.2   MCHC  --  31.2* 31.4* 32.2 31.7   RDW  --  17.4* 17.4* 16.8* 17.2*   * 665* 767* 988* 935*     INRNo lab results found in last 7 days.  Data   Results for orders placed or performed during the hospital encounter of 10/08/23 (from the past 24 hour(s))   Vancomycin level   Result Value Ref Range    Vancomycin 10.4   ug/mL   Potassium   Result Value Ref Range    Potassium 3.6 3.4 - 5.3 mmol/L   Platelet count   Result Value Ref Range    Platelet Count 759 (H) 150 - 450 10e3/uL   Creatinine   Result Value Ref Range    Creatinine 0.62 0.51 - 0.95 mg/dL    GFR Estimate >90 >60 mL/min/1.73m2   Potassium   Result Value Ref Range    Potassium 3.2 (L) 3.4 - 5.3 mmol/L       Narrative & Impression   EXAM: XR CHEST PORT 1 VIEW  LOCATION: [facility]  DATE: 10/18/2023     INDICATION: new onset sepsis, unclear source  COMPARISON: CT abdomen/pelvis earlier the same day.                                                                      IMPRESSION: Heart size and pulmonary vasculature are normal. Small bilateral pleural effusions and adjacent bibasilar opacities. No pneumothorax. Partially visualized tubing overlying the left upper quadrant abdomen.           EXAM: CT ABDOMEN PELVIS W CONTRAST  LOCATION: [facility]  DATE: 10/18/2023     INDICATION: 58F a w SBO secondary to metastatic gastric adenocarcinoma, now POD#9 reduction of internal hernia of small bowel. Diffuse carcinomatosis noted with patchy  reviewing poorly differentiated adenocarcinoma.  COMPARISON: CT AP 10/09/2023  TECHNIQUE: CT scan of the abdomen and pelvis was performed following injection of IV contrast. Multiplanar reformats were obtained. Dose reduction techniques were used.  CONTRAST: 70ml isovue 370     FINDINGS:   LOWER CHEST: There are new, small layering bilateral pleural effusions with compressive atelectasis, right greater than left. Atelectatic lung enhances normally.. Less than 1 cm right pericardiophrenic node again noted. There is a trace pericardial   effusion.     HEPATOBILIARY: No suspicious liver lesions.     PANCREAS: Normal.     SPLEEN: Normal.     ADRENAL GLANDS: Normal.     KIDNEYS/BLADDER: Normal.     BOWEL: Postoperative changes are seen with 2 abdominal drains in the upper abdomen.  Distal descending colonic staple line again noted. There is a group of matted and distended small bowel in the left midabdomen loops in the left midabdomen measuring up to 5.7 cm. Point of transition is in this region with multiple collapsed distal small   bowel loops. No pneumatosis or abnormal wall enhancement. Marked stranding of the small bowel mesentery again noted with multiple less than 1 cm mesenteric nodes and new, minimal fluid within the mesentery. No abscess.     Distal gastric wall thickening along the greater curve and bulky gastrohepatic adenopathy again noted. Marked Most of the colon is collapsed with liquid fecal contents in the rectosigmoid.     LYMPH NODES: No additional retroperitoneal adenopathy.     VASCULATURE: Mild arterial calcifications. No aneurysm. Vessels are patent. No IVC or pelvic vein thrombus.     PELVIC ORGANS: Prior hysterectomy.     MUSCULOSKELETAL: New dependent flank edema. No subcutaneous postoperative fluid collections in the midabdomen. No suspicious lesions.                                                                      IMPRESSION:   1.  Postoperative changes from recent exploratory laparotomy,  and reduction of internal, small bowel hernia.  2.  There are focally matted loops of small bowel in the left midabdomen distended up to 5.7 cm with abrupt transition in this area indicative of a developing small bowel obstruction. Most of the small bowel loops which are distal to this are completely   collapsed.  3.  The duodenum and stomach are not distended.  4.  No abdominal abscess.  5.  Two, large upper abdominal drains in place.  6.  New, small layering bilateral pleural effusions with notable compressive atelectasis especially in the right lower lobe..  7.  The distal gastric mass, with adjacent bulky gastrohepatic lymphadenopathy and extensive stranding of the root of the small bowel mesentery again noted. Recent EGD with biopsy of a gastric lesion. Pathology pending.  8.  Other noncritical findings as noted above.     Order-Level Documents:    There are no order-level documents.  Lab and Collection    CT Abdomen Pelvis w Contrast (Order: [id number]) - 10/18/2023  Result History    CT Abdomen Pelvis w Contrast (Order #[id number]) on 10/18/2023 - Order Result History Report  Result Information    Status: Final result (Exam End: 10/18/2023 10:30 AM) Provider Status: Open     Reading Providers     Reading Role Read Date   Yessenia Isbell MD Radiology 10/18/2023     Signed by    Signed Date/Time  Phone Pager   YESSENIA ISBELL 10/18/2023 11:04 [phone]

## 2023-10-21 NOTE — PLAN OF CARE
Problem: Plan of Care - These are the overarching goals to be used throughout the patient stay.    Goal: Plan of Care Review  Description: The Plan of Care Review/Shift note should be completed every shift.  The Outcome Evaluation is a brief statement about your assessment that the patient is improving, declining, or no change.  This information will be displayed automatically on your shift note.  Outcome: Progressing     Problem: Intestinal Obstruction  Goal: Optimal Bowel Function  Outcome: Progressing  Intervention: Promote Bowel Function  Recent Flowsheet Documentation  Taken 10/20/2023 2355 by Irasema Toribio RN  Body Position: position changed independently  Taken 10/20/2023 2000 by Irasema Toribio RN  Body Position: position changed independently  Intervention: Promote Bowel Function  Recent Flowsheet Documentation  Taken 10/20/2023 2355 by Irasema Toribio RN  Body Position: position changed independently  Taken 10/20/2023 2000 by Irasema Toribio RN  Body Position: position changed independently     Problem: Intestinal Obstruction  Goal: Optimal Bowel Function  Intervention: Promote Bowel Function  Recent Flowsheet Documentation  Taken 10/20/2023 2355 by Irasema Toribio RN  Body Position: position changed independently  Taken 10/20/2023 2000 by Irasema Toribio RN  Body Position: position changed independently     Problem: Intestinal Obstruction  Goal: Optimal Bowel Function  Intervention: Promote Bowel Function  Recent Flowsheet Documentation  Taken 10/20/2023 2355 by Irasema Toribio RN  Body Position: position changed independently  Taken 10/20/2023 2000 by Irasema Toribio RN  Body Position: position changed independently     Problem: Pain Chronic (Persistent)  Goal: Optimal Pain Control and Function  Outcome: Progressing  Intervention: Manage Persistent Pain  Recent Flowsheet Documentation  Taken 10/20/2023 2355 by Irasema Toribio RN  Medication Review/Management: medications  reviewed  Taken 10/20/2023 2000 by Irasema Toribio, RN  Medication Review/Management: medications reviewed     Problem: Pain Chronic (Persistent)  Goal: Optimal Pain Control and Function  Intervention: Manage Persistent Pain  Recent Flowsheet Documentation  Taken 10/20/2023 8425 by Irasema Toribio, RN  Medication Review/Management: medications reviewed  Taken 10/20/2023 2000 by Irasema Toribio, RN  Medication Review/Management: medications reviewed   Goal Outcome Evaluation:       Patient denies any pain, bowl sounds hypoactive, had a med BM and passing gas. Rhythm=NSR, 02 sat's 95% on room air. Fadi drains bilateral in place. Patient is A&Ox4, intermittent confusion.

## 2023-10-21 NOTE — PLAN OF CARE
"  Problem: Plan of Care - These are the overarching goals to be used throughout the patient stay.    Goal: Plan of Care Review  Description: The Plan of Care Review/Shift note should be completed every shift.  The Outcome Evaluation is a brief statement about your assessment that the patient is improving, declining, or no change.  This information will be displayed automatically on your shift note.  Outcome: Progressing  Goal: Patient-Specific Goal (Individualized)  Description: You can add care plan individualizations to a care plan. Examples of Individualization might be:  \"Parent requests to be called daily at 9am for status\", \"I have a hard time hearing out of my right ear\", or \"Do not touch me to wake me up as it startles me\".  Outcome: Progressing  Goal: Absence of Hospital-Acquired Illness or Injury  Outcome: Progressing  Intervention: Identify and Manage Fall Risk  Recent Flowsheet Documentation  Taken 10/21/2023 0900 by Ashly Jolley RN  Safety Promotion/Fall Prevention:   activity supervised   toileting scheduled  Goal: Optimal Comfort and Wellbeing  Outcome: Progressing  Goal: Readiness for Transition of Care  Outcome: Progressing     Problem: Intestinal Obstruction  Goal: Optimal Bowel Function  Outcome: Progressing  Goal: Fluid and Electrolyte Balance  Outcome: Progressing  Goal: Absence of Infection Signs and Symptoms  Outcome: Progressing  Goal: Optimize Nutrition Status  Outcome: Progressing  Goal: Optimal Pain Control and Function  Outcome: Progressing   Goal Outcome Evaluation:                      Pt alert and oriented, forgetful at times. Had 1 BM this shift and one over night. Tolerating clear liquids well. Moved up to full liquids. Will wait and see how she tolerates it once she orders, was not interested in ordering at this time. No pain, nausea or other symptoms. Abdominal stables in place. C/d/I. X2 CHAVA drains in place, stripped and to bulb suction, serous fluid drainage, 20 ml out each. Tele " NSR, occasional slightly tachy. K 3.2. protocol ran and given x1 dose k packet. Redraw at 1715.

## 2023-10-21 NOTE — PLAN OF CARE
"  Problem: Plan of Care - These are the overarching goals to be used throughout the patient stay.    Goal: Plan of Care Review  Description: The Plan of Care Review/Shift note should be completed every shift.  The Outcome Evaluation is a brief statement about your assessment that the patient is improving, declining, or no change.  This information will be displayed automatically on your shift note.  10/21/2023 1852 by Ashly Jolley RN  Outcome: Progressing  10/21/2023 1339 by Ashly Jolley RN  Outcome: Progressing  Goal: Patient-Specific Goal (Individualized)  Description: You can add care plan individualizations to a care plan. Examples of Individualization might be:  \"Parent requests to be called daily at 9am for status\", \"I have a hard time hearing out of my right ear\", or \"Do not touch me to wake me up as it startles me\".  10/21/2023 1852 by Ashly Jolley RN  Outcome: Progressing  10/21/2023 1339 by Ashly Jolley RN  Outcome: Progressing  Goal: Absence of Hospital-Acquired Illness or Injury  10/21/2023 1852 by Ashly Jolley RN  Outcome: Progressing  10/21/2023 1339 by Ashly Jolley RN  Outcome: Progressing  Intervention: Identify and Manage Fall Risk  Recent Flowsheet Documentation  Taken 10/21/2023 1534 by Ashly Jolley RN  Safety Promotion/Fall Prevention:   activity supervised   toileting scheduled  Taken 10/21/2023 0900 by Ashly Jolley RN  Safety Promotion/Fall Prevention:   activity supervised   toileting scheduled  Goal: Optimal Comfort and Wellbeing  10/21/2023 1852 by Ashly Jolley RN  Outcome: Progressing  10/21/2023 1339 by Ashly Jolley RN  Outcome: Progressing  Goal: Readiness for Transition of Care  10/21/2023 1852 by Ashly Jolley RN  Outcome: Progressing  10/21/2023 1339 by Ashly Jolley RN  Outcome: Progressing   Goal Outcome Evaluation:                    Pt alert and oriented, forgetful. Tolerating full liquid diet well. No pain or nausea. BS active, x2 Bms earlier in day. CHAVA drains in place, dressings " changed, serous output. IV antibiotics given per order. K recheck came back at 3.1. Pt wants to try the pills instead of packets. Will get one dose and recheck 4 hours later.   Tele NSR at 1500. Tele tech contacted RN to report pt had been in A Fib for about 5 min and had now flipped back to NSR/Sinus tach. Dr. Garcia notified. Continue to monitor.

## 2023-10-21 NOTE — PROGRESS NOTES
CLINICAL NUTRITION SERVICES NOTE    Recommendation to MD: monitor and replace k, mg, phos as needed, now that diet is advancing.    Diet: Advanced today by Dr. Wright to Full liquids, was clear liquids.  Intake: Pt reports she like the Gelatein plus pineapple sent for dinner last night.  She especially likes the lemon ice.  Writer brought one in for pt and notified nursing.   Pt is aware she should sit up for meals.  Pt reports surgeon told her not to over do it with oral intake.  Will continue Gelatein plus with dinner and discontinue Ensure Clear and instead send Ensure Enlive vanilla with lunch.  Will continue to monitor intake/diet advancement vs need for nutrition support.

## 2023-10-21 NOTE — PROGRESS NOTES
Patient seen on a.m. rounds.  Patient states that she is doing better.  She is more optimistic about being discharged.  Would like to try more food intake.  Passing flatus having bowel movements.        Vitals:    10/20/23 2355 10/21/23 0348 10/21/23 0711 10/21/23 1138   BP: 139/78 (!) 141/81 (!) 161/78 (!) 174/88   BP Location: Left arm Left arm Left arm Left arm   Patient Position: Semi-Horn's Sitting     Cuff Size: Adult Regular Adult Regular     Pulse: 94 100 103 84   Resp: 17 18 18 18   Temp: 98.3  F (36.8  C) 97.4  F (36.3  C) 97.7  F (36.5  C) 98  F (36.7  C)   TempSrc: Oral Oral Oral Oral   SpO2: 95% 95% 96% 96%   Weight:       Height:           PHYSICAL EXAM:  GEN: No acute distress, comfortable  ABD: Soft, midline incision intact with staples, CHAVA drain with serous output  EXT:No cyanosis, edema or obvious abnormalities        Recent Labs   Lab 10/21/23  1051 10/20/23  0623   WBC  --  10.6   HGB  --  8.3*   HCT  --  26.6*   * 665*       Recent Labs   Lab 10/20/23  0623 10/18/23  1318 10/18/23  0513   *   < > 140   CO2 19*   < > 11*   BUN 13.2   < > 10.3   ALBUMIN  --   --  2.7*   ALKPHOS  --   --  232*   ALT  --   --  21   AST  --   --  25    < > = values in this interval not displayed.         A/P:  Peri Irizarry is s/p laparotomy with reduction of internal hernia    -Full liquid diet.  Advance as tolerated to regular  -Continue the abdominal drains for now.  -Continue medical management per primary team.  -Surgery team following with you    Evin Wright,   General Surgeon  [facility]  Surgery [facility] - [address]  [place] [address]?  Office: [phone]  Employed by - [facility]  Pager: [phone]

## 2023-10-21 NOTE — PHARMACY-VANCOMYCIN DOSING SERVICE
Pharmacy Vancomycin Note  Date of Service 2023  Patient's  1965   58 year old, female    Indication: Sepsis  Day of Therapy: 4  Current vancomycin regimen:  750 mg IV q12h  Current vancomycin monitoring method: AUC  Current vancomycin therapeutic monitoring goal: 400-600 mg*h/L    InsightRX Prediction of Current Vancomycin Regimen  Loading dose: N/A  Regimen: 750 mg IV every 12 hours.  Start time: 16:16 on 10/21/2023  Exposure target: AUC24 (range)400-600 mg/L.hr   AUC24,ss: 333 mg/L.hr  Probability of AUC24 > 400: 18 %  Ctrough,ss: 9.7 mg/L  Probability of Ctrough,ss > 20: 1 %  Probability of nephrotoxicity (Lodise BI ): 6 %    Current estimated CrCl = Estimated Creatinine Clearance: 103.4 mL/min (based on SCr of 0.66 mg/dL).    Creatinine for last 3 days  10/18/2023:  1:18 PM Creatinine 0.77 mg/dL;  6:53 PM Creatinine 0.83 mg/dL  10/19/2023:  5:35 AM Creatinine 0.89 mg/dL  10/20/2023:  6:23 AM Creatinine 0.66 mg/dL    Recent Vancomycin Levels (past 3 days)  10/20/2023:  3:10 PM Vancomycin 10.4 ug/mL    Vancomycin IV Administrations (past 72 hours)                     vancomycin (VANCOCIN) 750 mg in sodium chloride 0.9 % 250 mL intermittent infusion (mg) 750 mg New Bag 10/21/23 0416     750 mg New Bag 10/20/23 1712     750 mg New Bag  0529     750 mg New Bag 10/19/23 1702     750 mg New Bag  0329    vancomycin (VANCOCIN) 1,250 mg in sodium chloride 0.9 % 250 mL intermittent infusion (mg) 1,250 mg New Bag 10/18/23 1712                    Nephrotoxins and other renal medications (From now, onward)      Start     Dose/Rate Route Frequency Ordered Stop    10/19/23 0400  vancomycin (VANCOCIN) 750 mg in sodium chloride 0.9 % 250 mL intermittent infusion        See Hyperspace for full Linked Orders Report.    750 mg  over 60 Minutes Intravenous EVERY 12 HOURS 10/18/23 1556      10/09/23 0800  furosemide (LASIX) tablet 40 mg        Note to Pharmacy: PTA Sig:Take 40 mg by mouth      40 mg Oral DAILY  10/09/23 0630                 Contrast Orders - past 72 hours (72h ago, onward)      Start     Dose/Rate Route Frequency Stop    10/19/23 2100  iopamidol (ISOVUE-370) solution 75 mL         75 mL Intravenous ONCE 10/19/23 2052    10/18/23 1030  iopamidol (ISOVUE-370) solution 70 mL         70 mL Intravenous ONCE 10/18/23 1029            Interpretation of levels and current regimen:  Vancomycin level is reflective of AUC less than 400    Has serum creatinine changed greater than 50% in last 72 hours: No    Urine output:  good urine output    Renal Function: Stable    InsightRX Prediction of Planned New Vancomycin Regimen  Loading dose: N/A  Regimen: 1000 mg IV every 12 hours.  Start time: 16:16 on 10/21/2023  Exposure target: AUC24 (range)400-600 mg/L.hr   AUC24,ss: 443 mg/L.hr  Probability of AUC24 > 400: 70 %  Ctrough,ss: 12.8 mg/L  Probability of Ctrough,ss > 20: 5 %  Probability of nephrotoxicity (Lodise BI 2009): 8 %    Plan:  Increase Dose to 1000 mg IV q12h.  Level was obtained yesterday afternoon, however it appears it was never acted upon. Increasing dose based on subtherapeutic AUC.  Vancomycin monitoring method: AUC  Vancomycin therapeutic monitoring goal: 400-600 mg*h/L  Pharmacy will check vancomycin levels as appropriate in 1-3 Days.  Serum creatinine levels will be ordered daily for the first week of therapy and at least twice weekly for subsequent weeks.    SAMPSON HOWELL [facility]

## 2023-10-22 NOTE — PROGRESS NOTES
Physical Therapy        10/22/23 2602   Appointment Info   Signing Clinician's Name / Credentials (PT) Ora Christy DPT   Living Environment   People in Home alone   Current Living Arrangements apartment   Home Accessibility no concerns   Living Environment Comments sons come check on her, PCA 7 days per week who helps with bathing, dressing, IADLs   Self-Care   Equipment Currently Used at Home wheelchair, power;cane, straight;walker, standard   Fall history within last six months no   Activity/Exercise/Self-Care Comment pt uses 4WW   General Information   Onset of Illness/Injury or Date of Surgery 10/08/23   Referring Physician Mary Ann Garcia MD   Patient/Family Therapy Goals Statement (PT) none stated   Pertinent History of Current Problem (include personal factors and/or comorbidities that impact the POC) 58 year old female admitted on 10/8/2023. She came to the emergency department for evaluation of abdominal pain, nausea and vomiting; found to have small bowel obstruction requiring surgical management.   Existing Precautions/Restrictions abdominal   Cognition   Cognitive Status Comments oriented x 4, pleasant and cooperative   Pain Assessment   Patient Currently in Pain No   Integumentary/Edema   Integumentary/Edema Comments abdominal incisions/dressings C/D/I   Posture    Posture Not impaired   Range of Motion (ROM)   Range of Motion ROM is WFL   Strength (Manual Muscle Testing)   Strength (Manual Muscle Testing) Deficits observed during functional mobility   Bed Mobility   Comment, (Bed Mobility) pt attempted supine to sit with sit-up technique, which she admits is painful. SBA   Transfers   Comment, (Transfers) CGA sit>stand with 4WW   Gait/Stairs (Locomotion)   Comment, (Gait/Stairs) 8' with 4WW, CGA. Flexed at trunk, no LOB   Balance   Balance no deficits were identified   Balance Comments pt appears to be near baseline   Sensory Examination   Sensory Perception patient reports no sensory changes    Coordination   Coordination no deficits were identified   Muscle Tone   Muscle Tone no deficits were identified   Clinical Impression   Criteria for Skilled Therapeutic Intervention Yes, treatment indicated   PT Diagnosis (PT) difficulty walking   Influenced by the following impairments abdominal pain, deconditioning   Functional limitations due to impairments limited household mobility   Clinical Presentation (PT Evaluation Complexity) stable   Clinical Presentation Rationale presents as medically diagnosed   Clinical Decision Making (Complexity) low complexity   Planned Therapy Interventions (PT) balance training;bed mobility training;gait training;home exercise program;neuromuscular re-education;patient/family education;strengthening;transfer training   Risk & Benefits of therapy have been explained evaluation/treatment results reviewed;participants voiced agreement with care plan;participants included;patient   PT Total Evaluation Time   PT Eval, Low Complexity Minutes (11073) 8   Physical Therapy Goals   PT Frequency Daily   PT Predicted Duration/Target Date for Goal Attainment 10/30/23   PT Goals Bed Mobility;Transfers;Gait   PT: Bed Mobility Independent;Supine to/from sit;Rolling;Within precautions   PT: Transfers Modified independent;Sit to/from stand;Assistive device;Within precautions   PT: Gait Modified independent;Greater than 200 feet;Assistive device;Within precautions   Interventions   Interventions Quick Adds Gait Training;Therapeutic Activity   Therapeutic Activity   Therapeutic Activities: dynamic activities to improve functional performance Minutes (55937) 9   Treatment Detail/Skilled Intervention pt ed on abdominal precautions, as well as log roll technique, rationale, and benefits. Performed supine<>sit with Germán and step-by step cues for technique. sit<>stand on bed and 4ww, cues for use of brakes, safety   Gait Training   Gait Training Minutes (57730) 12   Symptoms Noted During/After  Treatment (Gait Training) fatigue   Treatment Detail/Skilled Intervention amb with cues to start walk fully upright and looking forward. Good step length and consistency. fatigued after 100', but was able to walk back to room after a rest break.   Distance in Feet 100'x2 with rest on 4WW between   [place] Level (Gait Training) stand-by assist   Physical Assistance Level (Gait Training) 1 person assist;supervision   PT Discharge Planning   PT Plan GT with her 4WW (in room),   PT Discharge Recommendation (DC Rec) home with assist;home with home care physical therapy   PT Rationale for DC Rec pt has daily assistance and is able to ambulate household distances with 4WW, though she is below baseline and would brenefit from home PT to continue progress   PT Brief overview of current status amb 100'x2 with 4WW, SBA   Total Session Time   Timed Code Treatment Minutes 21   Total Session Time (sum of timed and untimed services) 29       Pt was educated on the role of physical therapy in their care, and indicated understanding.      Ora Christy, DPT 10/22/2023

## 2023-10-22 NOTE — PROGRESS NOTES
Doing well, minimal pain, tolerating diet, having Bms         Vitals:    10/21/23 1500 10/21/23 1959 10/21/23 2347 10/22/23 0751   BP: (!) 142/76 134/69 139/74 (!) 146/76   BP Location: Left arm Left arm Left arm Left arm   Patient Position:       Cuff Size:       Pulse: 95 97 98 100   Resp: 19 18 16 16   Temp: 98.5  F (36.9  C) 98.3  F (36.8  C) 98.6  F (37  C) 97.8  F (36.6  C)   TempSrc: Oral Oral Oral Oral   SpO2: 98% 97% 98% 98%   Weight:       Height:           PHYSICAL EXAM:  GEN: No acute distress, comfortable  ABD: Soft, midline incision intact with staples, CHAVA drains with serous output  EXT:No cyanosis, edema or obvious abnormalities        Recent Labs   Lab 10/22/23  0639   WBC 11.5*   HGB 8.6*   HCT 26.9*   *       Recent Labs   Lab 10/22/23  0639 10/18/23  1318 10/18/23  0513   *   < > 140   CO2 25   < > 11*   BUN 7.1*   < > 10.3   ALBUMIN  --   --  2.7*   ALKPHOS  --   --  232*   ALT  --   --  21   AST  --   --  25    < > = values in this interval not displayed.         A/P:  Peri Irizarry is s/p laparotomy with reduction of internal hernia    -ADAT  -Continue the abdominal drains for now.  -Continue medical management per primary team.  -Surgery team following with you, likely ready for discharge from surgical standpoint in the next 24 hours if continues to tolerate diet advancement    Getachew Nogueira PA-C  [facility]  Surgery Clinic - [address]  [address]  [place] [address]?  Office: [phone]

## 2023-10-22 NOTE — PLAN OF CARE
Problem: Plan of Care - These are the overarching goals to be used throughout the patient stay.    Goal: Absence of Hospital-Acquired Illness or Injury  Intervention: Identify and Manage Fall Risk  Recent Flowsheet Documentation  Taken 10/22/2023 0013 by Elian Menon RN  Safety Promotion/Fall Prevention:   activity supervised   assistive device/personal items within reach   mobility aid in reach   nonskid shoes/slippers when out of bed   lighting adjusted  Intervention: Prevent Skin Injury  Recent Flowsheet Documentation  Taken 10/21/2023 2152 by Elian Menon RN  Body Position: position changed independently     Problem: Intestinal Obstruction  Goal: Optimal Bowel Function  Intervention: Promote Bowel Function  Recent Flowsheet Documentation  Taken 10/21/2023 2152 by Elian Menon RN  Body Position: position changed independently  Head of Bed (HOB) Positioning: HOB at 30 degrees  Intervention: Promote Bowel Function  Recent Flowsheet Documentation  Taken 10/21/2023 2152 by Elian Menon RN  Body Position: position changed independently  Head of Bed (HOB) Positioning: HOB at 30 degrees   Goal Outcome Evaluation:       Patient is alert and able to communicate needs. Tele NSR, denies pain. 2 CHAVA drain intact and clean. Slept most of the night.

## 2023-10-22 NOTE — PLAN OF CARE
"  Problem: Plan of Care - These are the overarching goals to be used throughout the patient stay.    Goal: Plan of Care Review  Description: The Plan of Care Review/Shift note should be completed every shift.  The Outcome Evaluation is a brief statement about your assessment that the patient is improving, declining, or no change.  This information will be displayed automatically on your shift note.  Outcome: Progressing  Goal: Patient-Specific Goal (Individualized)  Description: You can add care plan individualizations to a care plan. Examples of Individualization might be:  \"Parent requests to be called daily at 9am for status\", \"I have a hard time hearing out of my right ear\", or \"Do not touch me to wake me up as it startles me\".  Outcome: Progressing  Goal: Absence of Hospital-Acquired Illness or Injury  Outcome: Progressing  Intervention: Identify and Manage Fall Risk  Recent Flowsheet Documentation  Taken 10/22/2023 1000 by Ashly Jolley, RN  Safety Promotion/Fall Prevention:   activity supervised   toileting scheduled  Goal: Optimal Comfort and Wellbeing  Outcome: Progressing  Goal: Readiness for Transition of Care  Outcome: Progressing     Problem: Intestinal Obstruction  Goal: Optimal Bowel Function  Outcome: Progressing  Goal: Fluid and Electrolyte Balance  Outcome: Not Progressing  Goal: Absence of Infection Signs and Symptoms  Outcome: Progressing  Goal: Optimize Nutrition Status  Outcome: Progressing  Goal: Optimal Pain Control and Function  Outcome: Progressing     Problem: Pain Chronic (Persistent)  Goal: Optimal Pain Control and Function  Outcome: Progressing     Problem: Pain Acute  Goal: Optimal Pain Control and Function  Outcome: Progressing  Intervention: Prevent or Manage Pain  Recent Flowsheet Documentation  Taken 10/22/2023 1000 by Ashly Jolley, RN  Sensory Stimulation Regulation: quiet environment promoted     Problem: Surgery Nonspecified  Goal: Absence of Bleeding  Outcome: Progressing  Goal: " Effective Bowel Elimination  Outcome: Progressing  Goal: Fluid and Electrolyte Balance  Outcome: Progressing  Goal: Blood Glucose Level Within Targeted Range  Outcome: Progressing  Goal: Absence of Infection Signs and Symptoms  Outcome: Progressing  Goal: Anesthesia/Sedation Recovery  Outcome: Progressing  Intervention: Optimize Anesthesia Recovery  Recent Flowsheet Documentation  Taken 10/22/2023 1000 by Ashly Jolley RN  Safety Promotion/Fall Prevention:   activity supervised   toileting scheduled  Reorientation Measures:   clock in view   reorientation provided  Goal: Optimal Pain Control and Function  Outcome: Progressing  Goal: Nausea and Vomiting Relief  Outcome: Not Progressing  Intervention: Prevent or Manage Nausea and Vomiting  Recent Flowsheet Documentation  Taken 10/22/2023 1000 by Ashly Jolley RN  Nausea/Vomiting Interventions:   antiemetic   aromatherapy utilized  Goal: Effective Urinary Elimination  Outcome: Progressing  Intervention: Monitor and Manage Urinary Retention  Recent Flowsheet Documentation  Taken 10/22/2023 1000 by Ashly Jolley RN  Urinary Elimination Promotion: toileting offered     Problem: Violence Risk or Actual  Goal: Anger and Impulse Control  Outcome: Progressing  Intervention: Minimize Safety Risk  Recent Flowsheet Documentation  Taken 10/22/2023 1000 by Ashly Jolley RN  Sensory Stimulation Regulation: quiet environment promoted     Problem: Malnutrition  Goal: Improved Nutritional Intake  Outcome: Progressing     Problem: Nausea and Vomiting  Goal: Nausea and Vomiting Relief  Outcome: Not Progressing  Intervention: Prevent and Manage Nausea and Vomiting  Recent Flowsheet Documentation  Taken 10/22/2023 1000 by Ashly Jolley RN  Nausea/Vomiting Interventions:   antiemetic   aromatherapy utilized     Problem: Electrolyte Imbalance  Goal: Electrolyte Balance  Outcome: Not Progressing     Pt alert and oriented this morning. Up to bathroom with staff to void and wash up. Denied pain. Pt  "became nauseous. Unable to take any AM PO meds. K 3.1, changed to IV bumps, running now. MG 1.5, x1 IV bump, completed. Rechecks this evening. Unable to place second IV, attempted by SWAT RN and with ultrasound. Given zofran and compazine IV for nausea. Minimal relief. Tele NSR, occasional sinus tachy. CHAVA drains in place, minimal output, pink tinged. Dr. Garcia aware of K and Mg levels and Nausea. Updated [facility] surgery PA that Pt was nausous and unable to take anything PO at this time. Inquired if Pt should return to NPO status. Per PA, \"she can be whatever she wants at this point. She could be regular diet if she wants.\" Discussed diet status with Dr. Garcia and will monitor at this time and see if nausea resolves.   "

## 2023-10-22 NOTE — PLAN OF CARE
Problem: Nausea and Vomiting  Goal: Nausea and Vomiting Relief  10/22/2023 1829 by Ashly Jolley RN  Outcome: Progressing  10/22/2023 1351 by Ashly Jolley RN  Outcome: Not Progressing  Intervention: Prevent and Manage Nausea and Vomiting  Recent Flowsheet Documentation  Taken 10/22/2023 1601 by Ashly Jolley RN  Nausea/Vomiting Interventions:   antiemetic   aromatherapy utilized  Taken 10/22/2023 1000 by Ashly Jolley RN  Nausea/Vomiting Interventions:   antiemetic   aromatherapy utilized     Problem: Electrolyte Imbalance  Goal: Electrolyte Balance  10/22/2023 1829 by Ashly Jolley RN  Outcome: Not Progressing  10/22/2023 1351 by Ashly Jolley RN  Outcome: Not Progressing   Pt reports slight nausea. Given zofran. Pt stats she is comfortable at this time. Has taken a few ice chips. Pt aware she is now on clear liquid diet. BS active, no increased pain. CHAVA drains in place, pink tinge output. Walked in [place] to nurses desk with PT.   Waiting for Mg and K recheck results so protocols can be ran again.

## 2023-10-22 NOTE — PROGRESS NOTES
[facility] Progress Note - Hospitalist Service    Date of Admission:  10/8/2023    Assessment & Plan   Peri Irizarry is a 58 year old female admitted on 10/8/2023. She came to the emergency department for evaluation of abdominal pain, nausea and vomiting; found to have small bowel obstruction requiring surgical management.    Toxic vs metabolic Encephalopathy likely 2/2 sepsis vs drug intoxication, unclear etiology- improved  On admission GCS 12, patient is lethargic, awakens to verbal commands and speaking short sentences, following commands  CT Head10/19: No acute intracranial process, brain atrophy with chronic microvascular changes, partially empty sella nonspecific  Discontinued Buspar, Gabapentin, Topamax  Discontinued venlafaxine due to interactions with trazodone and cymbalta  Restart Cymbalta and trazodone    Acute hypoxemic respiratory failure secondary to aspiration pneumonia/pneumonitis  Sepsis with leukocytosis (3/4 criterion) secondary to UTI vs unknown source  High anion gap metabolic acidosis with compensatory respiratory alkalosis likely 2/2 lactic acidosis likely 2/2 sepsis vs bowel necrosis  UA: wbc +, rbc +, LE + (received Rocephin for 4 days until 10/13, previous culture polymicrobial)  CXR: Small bilateral pleural effusions and adjacent bibasilar opacities  CT A/p with no abscess, no free air  Elevated Procalcitonin and CRP  Lactic acidosis resolved  Discontinue IV Vancomycin, IV Meropenem, Complete antibiotic course with cefpodoxime and azithromycin  discontinue IV fluids  Blood culture x 2: NGTD, Ucx mixed  CTA Chest: Negative for PE, signs of aspiration pneumonia pneumonitis    Elevated troponin likely 2/2 demand ischemia in the setting of sepsis  Troponin downtrended, patient denies chest pain  EKG sinus tachycardia with no ischemic changes     Small bowel obstruction  -s/p exploratory laparotomy with lysis of adhesions and reduction of internal  hernia done on 10/9  -Surgical findings include; Diffuse metastatic cancer of unknown primary, Closed loop obstruction of proximal small bowel  Gastric mass with evidence of near erosion into small bowel  -Concern for malignancy related, surgery feels metastatic gastric versus recurrent breast  - Underwent EGD showed friability and  sloughing mucosa in the lesser curvature. with biopsies on 10/17/23, pending pathology  - CT A/P with contrast:  Postoperative changes from recent exploratory laparotomy   -CHAVA management per surgery  - recommend NGT tube and may need TPN  - NGT pulled out, patient refused  - Dietary orders per surgery    Mural thickening of the stomach  Mesenteric stranding and lymphadenopathy  History of colon and breast cancer  -CT showed most pronounced in the distal body and antrum, corresponding to area of increased activity on PET/CT which could reflect a neoplastic process; mesenteric stranding and lymphadenopathy concerning for metastatic disease with the largest lymph node seen in the stomach measuring 2.9 x 2.4 cm  - Status post biopsy at time of small bowel obstruction surgery.   -Biopsy results--metastatic gastric carcinoma  - Pending CEA, Will likely need port in the future  - EGD showed friability and  sloughing mucosa in the lesser curvature. with biopsies on 10/17/23, pending pathology  - [place] onc[facility] consult appreciated - seen Baltazar in past.  Follow-up with oncology as outpatient    Acute blood loss anemia  - stable overall but hemoglobin significantly down since admission.    - Continue to monitor; going down small amounts each day.  - Weight up about 10 pounds since admission if scale correct.  DC p.o. Lasix since it could worsen dehydration-  - Drop in Hb from 10.1 to 8.6, which is baseline 10/17. Prior Hemoglobin 10.1, likely from hemoconcentration, as all cell lines have dropped  - Monitor Hb    Acute kidney injury  Resolved     Hypokalemia  resolved    Left  "hydroureteronephrosis  -CT showed no obstructing calculi, there is inflammatory stranding surrounding the left ureter distally which could reflect neoplastic invasion and/or inflammatory scaring  -Urology consulted from ED. no intervention planned.    Patient is considered high risk for infection s/p ureteral stent.    Urology may consider ureteral stents if patient worsens clinically or plans to undergo chemotherapy.  - Gomez discontinued 10/10    COPD/asthma  -Quit smoking 2010   -DuoNebs as needed  -Continue home Incruse Ellipta     Essential hypertension blood pressure now on the low side   -continue to hold Lasix, hydrochlorothiazide, norvasc     Chronic thrombocytosis  - Monitor PC     JAMMIE  - was on CPAP     Hyperlipidemia--resume statin          Diet: Snacks/Supplements Adult: Gelatein Plus; With Meals  Full Liquid Diet  Snacks/Supplements Adult: Ensure Enlive; With Meals    DVT Prophylaxis: Pneumatic Compression Devices  Gomez Catheter: Not present  Lines: None     Cardiac Monitoring: ACTIVE order. Indication: Tachyarrhythmias, acute (48 hours)  Code Status: Full Code      Clinically Significant Risk Factors        # Hypokalemia: Lowest K = 3.1 mmol/L in last 2 days, will replace as needed  # Hypernatremia: Highest Na = 146 mmol/L in last 2 days, will monitor as appropriate    # Hypomagnesemia: Lowest Mg = 1.5 mg/dL in last 2 days, will replace as needed   # Hypoalbuminemia: Lowest albumin = 2.7 g/dL at 10/18/2023  5:13 AM, will monitor as appropriate     # Hypertension: Noted on problem list        # Obesity: Estimated body mass index is 31.81 kg/m  as calculated from the following:    Height as of this encounter: 1.499 m (4' 11\").    Weight as of this encounter: 71.4 kg (157 lb 8 oz).   # Severe Malnutrition: based on nutrition assessment    # COPD: noted on problem list        Disposition Plan      Expected Discharge Date: 10/23/2023    Discharge Delays: IV Medication - consider oral or Home " Infusion  Voiding/Eating Trial needed  PT New Consult needed  Destination: home with help/services  Discharge Comments: palitive care following, TPN vs tube feeding or oral intake            Mary Ann Garcia MD  Hospitalist Service  [facility]  Securely message with [employer] (more info)  Text page via [employer] Paging/Directory   ______________________________________________________________________    Interval History   Ms. Irizarry was really sleepy today.  She is not having complaints but was difficult to talk to given she was coming in and out of sleep.  She was having a lot of nausea this morning and not able to tolerate food.    Physical Exam   Vital Signs: Temp: 97.8  F (36.6  C) Temp src: Oral BP: (!) 146/76 Pulse: 100   Resp: 16 SpO2: 98 % O2 Device: None (Room air)    Weight: 157 lbs 8 oz    General Appearance: Awake, sleepy, in no acute distress  Respiratory: CTAB, no wheeze  Cardiovascular: RRR  GI: soft, nontender, non distended, normal bowel sounds  Skin: no jaundice, no rash      Medical Decision Making       40 MINUTES SPENT BY ME on the date of service doing chart review, history, exam, documentation & further activities per the note.      Data     I have personally reviewed the following data over the past 24 hrs:    11.5 (H)  \   8.6 (L)   / 729 (H)     146 (H) 111 (H) 7.1 (L) /  96   3.1 (L) 25 0.76 \       Imaging results reviewed over the past 24 hrs:   No results found for this or any previous visit (from the past 24 hour(s)).

## 2023-10-23 NOTE — PROGRESS NOTES
"At 2030 pm pt put light on and announced she would like something to eat.  One of her sons was in the room with her.  She wanted yellow \"icy\" and I did bring her  saltine crackers and chicken broth.  Due to previous nausea she did not want to take pills all at once but take them a little at a time with the bits of saltines and the \"icy\".    She did take 2 pills for me right away and I stopped back later and she had taken rest of pills.  She ate one whole icy and half of a saltine and said she was going ot try eat the other icy yet.   Denies nausea at this time.   "

## 2023-10-23 NOTE — PROGRESS NOTES
SPIRITUAL HEALTH SERVICES ([facility]) Progress Note  [facility].  [address]     Saw pt Peri Irizarry per LOS and post-surgery followup.     Patient/Family Understanding of Illness and Goals of Care - Ms. Whitt has a good understanding of her illness. She has been treated for breast cancer and colon cancer before and is preparing herself for the upcoming meeting with her oncologist where she will work out with him the Goals of her care.      Ms Whitt was in much better spirits than she was prior to her surgery. She explained in depth why she was so sad and angry prior to surgery.     Distress and Loss - Ms Whitt was very disappointed that she is now diagnosed with her third bout of cancer, stomach cancer. She is still grappling with why this is happening to her. But she is more accepting of the fact that she has stomach cancer than she was during the last visit. She still puzzles with why she got it when she has been rigorous in following the doctor's orders regarding food and food consumption. However, she has many relatives who have already  of cancer so she suspects a familial connection.      Strengths, Coping, and Resources - She is well supported by her son, daughter-in-law and her ex-. She has been reading the Bible and her devotionals. And she trusts her oncologist to help her through this next bout of cancer.      Meaning, Beliefs, and Spirituality - Ms Whitt is a Latter day and her Latter day anselmo helps her through difficult times.      Plan of Care - [facility] will follow as able during this hospitalization.       Vernell Snider, Ph.D.,[facility]  , Spiritual Health Services      [facility] available  for emergency requests/referrals, either by having the on-call  paged or by entering an ASAP/STAT consult in Epic (this will also page the on-call ).

## 2023-10-23 NOTE — PROGRESS NOTES
ASSESSMENT:  1. Malignant neoplasm of stomach, unspecified location (H)    2. Mild dehydration    3. Hypokalemia    4. Upper abdominal pain    5. Generalized abdominal mass    6. SBO (small bowel obstruction) (H)    7. Acute UTI      Peri Irizarry is a 58 year old female who is s/p exploratory laparotomy with reduction of internal hernia,Gastric mass with diffuse metastatic cancer  on 10/9/23. Biopsy Mesenteric implant,shows metastatic poorly differentiated carcinoma suggestive of metastatic gastric carcinoma.     Doing well with no pain. Eating and having bms. Ready for discharge from a surgical standpoint with removal of staples and both CHAVA drains at bedside by myself.       PLAN:  No further follow up needed with us.   Will place our discharge recommendations in place.  Will sign off at this time  Please call if any questions or concerns.     SUBJECTIVE:   She is feeling well. No pain. No nausea.       Patient Vitals for the past 24 hrs:   BP Temp Temp src Pulse Resp SpO2   10/23/23 0738 128/73 98.6  F (37  C) Oral 83 18 96 %   10/23/23 0435 120/65 98  F (36.7  C) Oral 98 18 98 %   10/23/23 0026 127/68 98.2  F (36.8  C) Oral 99 18 97 %   10/22/23 1937 125/59 98.2  F (36.8  C) Oral 98 18 97 %   10/22/23 1542 119/60 98.2  F (36.8  C) Oral 98 16 93 %   10/22/23 1200 119/68 98  F (36.7  C) Oral 84 16 98 %         PHYSICAL EXAM:  GEN: No acute distress, comfortable    ABD:soft. Non tender. Wound clean and dry. Removed staples  Drains: serous. Removed both. Covered.    Output by Drain (mL) 10/21/23 0700 - 10/21/23 1459 10/21/23 1500 - 10/21/23 2259 10/21/23 2300 - 10/22/23 0659 10/22/23 0700 - 10/22/23 1459 10/22/23 1500 - 10/22/23 2259 10/22/23 2300 - 10/23/23 0659 10/23/23 0700 - 10/23/23 1045   Closed/Suction Drain 1 Right;Anterior RLQ Bulb 19 [place] 60 15 20  40 10    Closed/Suction Drain 2 Left;Anterior LLQ Bulb 19 [place] 60 20 30  35 15       EXT:No cyanosis, edema or obvious abnormalities    10/22 0700 -  10/23 0659  In: -   Out: 100 [Drains:100]    No results displayed because visit has over 200 results.   Recent Results (from the past 24 hour(s))   Magnesium    Collection Time: 10/22/23  6:37 PM   Result Value Ref Range    Magnesium 2.4 (H) 1.7 - 2.3 mg/dL   Potassium    Collection Time: 10/22/23  6:37 PM   Result Value Ref Range    Potassium 3.7 3.4 - 5.3 mmol/L   Basic metabolic panel    Collection Time: 10/23/23  5:03 AM   Result Value Ref Range    Sodium 145 135 - 145 mmol/L    Potassium 3.2 (L) 3.4 - 5.3 mmol/L    Chloride 109 (H) 98 - 107 mmol/L    Carbon Dioxide (CO2) 25 22 - 29 mmol/L    Anion Gap 11 7 - 15 mmol/L    Urea Nitrogen 7.9 (L) 8.0 - 23.0 mg/dL    Creatinine 0.79 0.51 - 0.95 mg/dL    GFR Estimate 86 >60 mL/min/1.73m2    Calcium 9.1 8.6 - 10.0 mg/dL    Glucose 86 70 - 99 mg/dL   CBC with platelets    Collection Time: 10/23/23  5:03 AM   Result Value Ref Range    WBC Count 13.4 (H) 4.0 - 11.0 10e3/uL    RBC Count 3.10 (L) 3.80 - 5.20 10e6/uL    Hemoglobin 8.8 (L) 11.7 - 15.7 g/dL    Hematocrit 27.3 (L) 35.0 - 47.0 %    MCV 88 78 - 100 fL    MCH 28.4 26.5 - 33.0 pg    MCHC 32.2 31.5 - 36.5 g/dL    RDW 16.7 (H) 10.0 - 15.0 %    Platelet Count 743 (H) 150 - 450 10e3/uL   Phosphorus    Collection Time: 10/23/23  5:03 AM   Result Value Ref Range    Phosphorus 4.3 2.5 - 4.5 mg/dL   Magnesium    Collection Time: 10/23/23  5:03 AM   Result Value Ref Range    Magnesium 2.0 1.7 - 2.3 mg/dL               PAMELA Ivan  [facility] [place] [facility]  [address] [place], [place] [id number]  Phone- [phone]  Fax- [phone]

## 2023-10-23 NOTE — PLAN OF CARE
Problem: Intestinal Obstruction  Goal: Optimal Bowel Function  Outcome: Progressing  Goal: Optimal Pain Control and Function  Outcome: Progressing     Problem: Pain Acute  Goal: Optimal Pain Control and Function  Outcome: Progressing     Problem: Surgery Nonspecified  Goal: Effective Bowel Elimination  Outcome: Progressing  Goal: Nausea and Vomiting Relief  Outcome: Progressing  Goal: Effective Urinary Elimination  Outcome: Progressing     Problem: Violence Risk or Actual  Goal: Anger and Impulse Control  Outcome: Progressing   Goal Outcome Evaluation:    Pt pleasant and cooperative. Called appropriately to request staff assist with bathroom needs. No nausea or emesis. Denied pain. Urinary incontinence x 1; did void large amount on toilet. Had small formed brown stool. CHAVA drains x 2; left and right abdomen. Left drain output 15 ml serous fluid; right drain output 10 ml serous fluid. Tele monitoring has been NSR.

## 2023-10-23 NOTE — PROGRESS NOTES
Progress Note     Primary Oncologist/Hematologist:  Mani Malcolm MD            Assessment and Plan:    1. Small bowel obstruction  -On 9/11/23 CT CAP showed asymmetric nodularity along the posterior gastric wall superimposed on significant diffuse gastric and distal esophageal wall thickening, appearance concerning for neoplasm. Enlarged lymph nodes in the gastrohepatic ligament, highly suspicious for metastatic adenopathy. Asymmetric thickened appearance of the pancreatic tail without definable mass or obvious ductal dilatation.   -10/9/23 s/p diagnostic laparoscopy converted to laparotomy, hernia repair and lysis of adhesions. The Mesenteric implant, mesenteric nodule, and omentum shows metastatic poorly differentiated carcinoma Peritoneal Fluid is positive for malignant cells; cell morphology and immunochemical profile are not specific but are most suggestive of metastatic gastric carcinoma.  - Will need systemic chemotherapy +/- immunotherapy +/- HER2 directed therapy, to start outpatient.   - Has outpatient apt with Dr. Malcolm 10/26/23 to discuss systemic options.     3. History of colorectal cancer: S/p sigmoid colon resection back in 2012. Colonoscopy in December 2022 did not show any significant abnormality. Some polyps were found, and they were removed with cold biopsy forceps. The pathology was benign.      4. 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 To date, there has been no evidence for a local recurrence or more distant metastatic disease.  Bilateral screening mammogram on 1/9/23 showed no evidence of malignancy. We will continue active surveillance.     Plan  OK to discharge from oncology standpoint   Has follow-up with Dr. Malcolm to discuss systemic therapy options on 10/26/23.      Carmen Guzman CNP  [facility]  [phone]        Interval  "History:     Seen today at bedside. Reports feeling well at this time. Denies pain. Staples and drains were removed without issues.               Review of Systems:     The 5 point Review of Systems is negative other than noted in the HPI             Medications:   Scheduled Medications   amLODIPine  5 mg Oral Daily    DULoxetine  30 mg Oral BID    famotidine  20 mg Oral BID    fluticasone-vilanterol  1 puff Inhalation Daily    hydrochlorothiazide  25 mg Oral Daily    montelukast  10 mg Oral At Bedtime    multivitamin w/minerals  1 tablet Oral Daily    polyethylene glycol  17 g Oral Daily    rosuvastatin  10 mg Oral Daily    senna-docusate  1 tablet Oral BID    sodium chloride (PF)  3 mL Intracatheter Q8H    traZODone  100 mg Oral At Bedtime    umeclidinium  1 puff Inhalation Daily     PRN Medications  acetaminophen, acetaminophen, albuterol, bisacodyl, cyclobenzaprine, hydrALAZINE, hydrOXYzine, ipratropium - albuterol 0.5 mg/2.5 mg/3 mL, lidocaine 4%, lidocaine (buffered or not buffered), magnesium hydroxide, meclizine, naloxone **OR** naloxone **OR** naloxone **OR** naloxone, ondansetron **OR** ondansetron, phenol MT, prochlorperazine **OR** [DISCONTINUED] prochlorperazine, prochlorperazine, [DISCONTINUED] prochlorperazine **OR** prochlorperazine **OR** prochlorperazine, sodium chloride (PF), SUMAtriptan               Physical Exam:   Vitals were reviewed  Blood pressure 128/65, pulse 91, temperature 98.1  F (36.7  C), temperature source Oral, resp. rate 18, height 1.499 m (4' 11\"), weight 71.4 kg (157 lb 8 oz), SpO2 96%, not currently breastfeeding.  Wt Readings from Last 4 Encounters:   10/20/23 71.4 kg (157 lb 8 oz)   02/21/23 77.1 kg (170 lb)   12/07/22 77.1 kg (170 lb)   01/03/22 90.3 kg (199 lb)       I/O last 3 completed shifts:  In: 120 [P.O.:120]  Out: 100 [Drains:100]    Constitutional: Awake, alert, cooperative, no apparent distress   Lungs: Noisy breathing. No wheezing bilaterally.   Cardiovascular: " Tachycardia, No murmurs.   Abdomen: TTP throughout.   Skin: No rashes, no cyanosis, no edema   Other:               Data:   All laboratory data and imaging studies reviewed.    CMP  Recent Labs   Lab 10/23/23  1214 10/23/23  0503 10/22/23  1837 10/22/23  0639 10/21/23  1728 10/21/23  1051 10/20/23  1510 10/20/23  0623 10/19/23  1126 10/19/23  0831 10/19/23  0535 10/18/23  1304 10/18/23  0513   NA  --  145  --  146*  --   --   --  147*  --   --  146*   < > 140   POTASSIUM 3.5 3.2* 3.7 3.1*   < > 3.2*   < > 3.4  --   --  4.0   < > 3.9  3.9   CHLORIDE  --  109*  --  111*  --   --   --  114*  --   --  114*   < > 107   CO2  --  25  --  25  --   --   --  19*  --   --  16*   < > 11*   ANIONGAP  --  11  --  10  --   --   --  14  --   --  16*   < > 22*   GLC  --  86  --  96  --   --   --  84 100*   < > 104*   < > 141*   BUN  --  7.9*  --  7.1*  --   --   --  13.2  --   --  15.1   < > 10.3   CR  --  0.79  --  0.76  --  0.62  --  0.66  --   --  0.89   < > 0.67  0.63   GFRESTIMATED  --  86  --  90  --  >90  --  >90  --   --  74   < > >90  >90   CHERY  --  9.1  --  8.6  --   --   --  8.0*  --   --  8.2*   < > 8.6   MAG  --  2.0 2.4* 1.5*  --   --   --   --   --   --   --   --   --    PHOS  --  4.3  --  3.8  --   --   --   --   --   --   --   --   --    PROTTOTAL  --   --   --   --   --   --   --   --   --   --   --   --  6.0*   ALBUMIN  --   --   --   --   --   --   --   --   --   --   --   --  2.7*   BILITOTAL  --   --   --   --   --   --   --   --   --   --   --   --  0.4   ALKPHOS  --   --   --   --   --   --   --   --   --   --   --   --  232*   AST  --   --   --   --   --   --   --   --   --   --   --   --  25   ALT  --   --   --   --   --   --   --   --   --   --   --   --  21    < > = values in this interval not displayed.     CBC  Recent Labs   Lab 10/23/23  0503 10/22/23  0639 10/21/23  1051 10/20/23  0623 10/19/23  0535   WBC 13.4* 11.5*  --  10.6 15.2*   RBC 3.10* 3.08*  --  2.93* 3.03*   HGB 8.8* 8.6*  --  8.3* 8.6*    HCT 27.3* 26.9*  --  26.6* 27.4*   MCV 88 87  --  91 90   MCH 28.4 27.9  --  28.3 28.4   MCHC 32.2 32.0  --  31.2* 31.4*   RDW 16.7* 16.7*  --  17.4* 17.4*   * 729* 759* 665* 767*     INRNo lab results found in last 7 days.  Data   Results for orders placed or performed during the hospital encounter of 10/08/23 (from the past 24 hour(s))   Magnesium   Result Value Ref Range    Magnesium 2.4 (H) 1.7 - 2.3 mg/dL   Potassium   Result Value Ref Range    Potassium 3.7 3.4 - 5.3 mmol/L   Basic metabolic panel   Result Value Ref Range    Sodium 145 135 - 145 mmol/L    Potassium 3.2 (L) 3.4 - 5.3 mmol/L    Chloride 109 (H) 98 - 107 mmol/L    Carbon Dioxide (CO2) 25 22 - 29 mmol/L    Anion Gap 11 7 - 15 mmol/L    Urea Nitrogen 7.9 (L) 8.0 - 23.0 mg/dL    Creatinine 0.79 0.51 - 0.95 mg/dL    GFR Estimate 86 >60 mL/min/1.73m2    Calcium 9.1 8.6 - 10.0 mg/dL    Glucose 86 70 - 99 mg/dL   CBC with platelets   Result Value Ref Range    WBC Count 13.4 (H) 4.0 - 11.0 10e3/uL    RBC Count 3.10 (L) 3.80 - 5.20 10e6/uL    Hemoglobin 8.8 (L) 11.7 - 15.7 g/dL    Hematocrit 27.3 (L) 35.0 - 47.0 %    MCV 88 78 - 100 fL    MCH 28.4 26.5 - 33.0 pg    MCHC 32.2 31.5 - 36.5 g/dL    RDW 16.7 (H) 10.0 - 15.0 %    Platelet Count 743 (H) 150 - 450 10e3/uL   Phosphorus   Result Value Ref Range    Phosphorus 4.3 2.5 - 4.5 mg/dL   Magnesium   Result Value Ref Range    Magnesium 2.0 1.7 - 2.3 mg/dL   Potassium   Result Value Ref Range    Potassium 3.5 3.4 - 5.3 mmol/L       Narrative & Impression   EXAM: XR CHEST PORT 1 VIEW  LOCATION: [facility]  DATE: 10/18/2023     INDICATION: new onset sepsis, unclear source  COMPARISON: CT abdomen/pelvis earlier the same day.                                                                      IMPRESSION: Heart size and pulmonary vasculature are normal. Small bilateral pleural effusions and adjacent bibasilar opacities. No pneumothorax. Partially visualized tubing overlying the  left upper quadrant abdomen.           EXAM: CT ABDOMEN PELVIS W CONTRAST  LOCATION: [facility]  DATE: 10/18/2023     INDICATION: 58F a w SBO secondary to metastatic gastric adenocarcinoma, now POD#9 reduction of internal hernia of small bowel. Diffuse carcinomatosis noted with patchy reviewing poorly differentiated adenocarcinoma.  COMPARISON: CT AP 10/09/2023  TECHNIQUE: CT scan of the abdomen and pelvis was performed following injection of IV contrast. Multiplanar reformats were obtained. Dose reduction techniques were used.  CONTRAST: 70ml isovue 370     FINDINGS:   LOWER CHEST: There are new, small layering bilateral pleural effusions with compressive atelectasis, right greater than left. Atelectatic lung enhances normally.. Less than 1 cm right pericardiophrenic node again noted. There is a trace pericardial   effusion.     HEPATOBILIARY: No suspicious liver lesions.     PANCREAS: Normal.     SPLEEN: Normal.     ADRENAL GLANDS: Normal.     KIDNEYS/BLADDER: Normal.     BOWEL: Postoperative changes are seen with 2 abdominal drains in the upper abdomen.  Distal descending colonic staple line again noted. There is a group of matted and distended small bowel in the left midabdomen loops in the left midabdomen measuring up to 5.7 cm. Point of transition is in this region with multiple collapsed distal small   bowel loops. No pneumatosis or abnormal wall enhancement. Marked stranding of the small bowel mesentery again noted with multiple less than 1 cm mesenteric nodes and new, minimal fluid within the mesentery. No abscess.     Distal gastric wall thickening along the greater curve and bulky gastrohepatic adenopathy again noted. Marked Most of the colon is collapsed with liquid fecal contents in the rectosigmoid.     LYMPH NODES: No additional retroperitoneal adenopathy.     VASCULATURE: Mild arterial calcifications. No aneurysm. Vessels are patent. No IVC or pelvic vein thrombus.     PELVIC  ORGANS: Prior hysterectomy.     MUSCULOSKELETAL: New dependent flank edema. No subcutaneous postoperative fluid collections in the midabdomen. No suspicious lesions.                                                                      IMPRESSION:   1.  Postoperative changes from recent exploratory laparotomy, and reduction of internal, small bowel hernia.  2.  There are focally matted loops of small bowel in the left midabdomen distended up to 5.7 cm with abrupt transition in this area indicative of a developing small bowel obstruction. Most of the small bowel loops which are distal to this are completely   collapsed.  3.  The duodenum and stomach are not distended.  4.  No abdominal abscess.  5.  Two, large upper abdominal drains in place.  6.  New, small layering bilateral pleural effusions with notable compressive atelectasis especially in the right lower lobe..  7.  The distal gastric mass, with adjacent bulky gastrohepatic lymphadenopathy and extensive stranding of the root of the small bowel mesentery again noted. Recent EGD with biopsy of a gastric lesion. Pathology pending.  8.  Other noncritical findings as noted above.     Order-Level Documents:    There are no order-level documents.  Lab and Collection    CT Abdomen Pelvis w Contrast (Order: [id number]) - 10/18/2023  Result History    CT Abdomen Pelvis w Contrast (Order #[id number]) on 10/18/2023 - Order Result History Report  Result Information    Status: Final result (Exam End: 10/18/2023 10:30 AM) Provider Status: Open     Reading Providers     Reading Role Read Date   Yessenia Isbell MD Radiology 10/18/2023     Signed by    Signed Date/Time  Phone Pager   YESSENIA ISBELL 10/18/2023 11:04 [phone]

## 2023-10-23 NOTE — DISCHARGE INSTRUCTIONS
From your Surgeon.        Follow up- You do not need to follow up at this time. If you have any concerns please call us.      Contact  -[facility]                   [address] [id number]                   Phone- [phone]                   Fax- [phone]                       Diet: Regular diet. Patients can have difficulty with constipation following surgery,due in part to the administration of narcotic medications.  If you are suffering with constipation, you should avoid foods such as hard cheeses or red meat.  Things to help avoid constipation are eating foods high in fiber,drink plenty of fluids, and be active as much as you can. If needed you can take over the counter medications for constipation such as laxatives or bulking psyllium products.     Activity: You should continue to be active at home, including ambulating frequently.  If possible try to limit the amount of time spent in bed.    Restrictions: You should not lift greater than 15 pounds for 6 weeks, and will want to avoid strenuous physical activity for 6 weeks.  You should limit your physical activity if it causes you discomfort; however, this should resolve within 6 weeks.   Walking does not count as strenuous physical activity.You are safe to walk up and down stairs.  Following 6 weeks you may resume all normal physical activity.    Wound / drain care:  You only need to keep your wound clean and dry. Do not scrub your wound.   Your two sites where the drains were need to be covered for 2 days. You can take off the bandages to shower.     Bathing: You may shower after 24 hours from surgery.  It is ok to get your incisions wet, but avoid rubbing them.  Avoid soaking in bath tubs, or swimming in lakes, pools, or streams for 4 weeks following surgery.

## 2023-10-23 NOTE — PROGRESS NOTES
[facility]    Medicine Progress Note - Hospitalist Service    Date of Admission:  10/8/2023    Assessment & Plan   Peri Irizarry is a 58 year old female admitted on 10/8/2023. She came to the emergency department for evaluation of abdominal pain, nausea and vomiting; found to have small bowel obstruction requiring surgical management.    Encephalopathy likely 2/2 sepsis possible component of polypharmacy- resolved  On admission GCS 12, patient is lethargic, awakens to verbal commands and speaking short sentences, following commands  CT Head10/19: No acute intracranial process, brain atrophy with chronic microvascular changes, partially empty sella nonspecific  Discontinued Buspar, Gabapentin, Topamax  Discontinued venlafaxine due to interactions with trazodone and cymbalta  Restart Cymbalta and trazodone    Acute hypoxemic respiratory failure secondary to aspiration pneumonia/pneumonitis  Sepsis with leukocytosis (3/4 criterion) secondary to UTI vs unknown source  High anion gap metabolic acidosis with compensatory respiratory alkalosis likely 2/2 lactic acidosis likely 2/2 sepsis  UA: wbc +, rbc +, LE + (received Rocephin for 4 days until 10/13, previous culture polymicrobial)  CXR: Small bilateral pleural effusions and adjacent bibasilar opacities  CT A/p with no abscess, no free air  Elevated Procalcitonin and CRP  Lactic acidosis resolved  Discontinue IV Vancomycin, IV Meropenem, Completed antibiotic course with cefpodoxime and azithromycin  discontinue IV fluids  Blood culture x 2: NGTD, Ucx mixed  CTA Chest: Negative for PE, signs of aspiration pneumonia pneumonitis    Elevated troponin likely 2/2 demand ischemia in the setting of sepsis  Troponin downtrended, patient denies chest pain  EKG sinus tachycardia with no ischemic changes     Small bowel obstruction  -s/p exploratory laparotomy with lysis of adhesions and reduction of internal hernia done on 10/9  -Surgical findings  include; Diffuse metastatic cancer of unknown primary, Closed loop obstruction of proximal small bowel  Gastric mass with evidence of near erosion into small bowel  -Concern for malignancy related, surgery feels metastatic gastric versus recurrent breast  - Underwent EGD showed friability and  sloughing mucosa in the lesser curvature. with biopsies on 10/17/23, pending pathology  - CT A/P with contrast:  Postoperative changes from recent exploratory laparotomy   -CHAVA management per surgery  - recommend NGT tube and may need TPN  - NGT pulled out, patient refused  - Dietary orders per surgery  -tolerating diet 10/23    Mural thickening of the stomach  Mesenteric stranding and lymphadenopathy  History of colon and breast cancer  -CT showed most pronounced in the distal body and antrum, corresponding to area of increased activity on PET/CT which could reflect a neoplastic process; mesenteric stranding and lymphadenopathy concerning for metastatic disease with the largest lymph node seen in the stomach measuring 2.9 x 2.4 cm  - Status post biopsy at time of small bowel obstruction surgery.   -Biopsy results--metastatic gastric carcinoma  - Pending CEA, Will likely need port in the future  - EGD showed friability and  sloughing mucosa in the lesser curvature. with biopsies on 10/17/23, pending pathology  - [facility] consult appreciated: Follow-up with oncology as outpatient with Dr. Malcolm on 10/26/2023 to discuss treatment options    Acute blood loss anemia  - stable overall but hemoglobin significantly down since admission.    - Drop in Hb from 10.1 to 8.6, which is baseline 10/17. Prior Hemoglobin 10.1, likely from hemoconcentration, as all cell lines have dropped  - Monitor Hb    Acute kidney injury  Resolved     Hypokalemia  resolved    Left hydroureteronephrosis  -CT showed no obstructing calculi, there is inflammatory stranding surrounding the left ureter distally which could reflect neoplastic invasion  "and/or inflammatory scaring  -Urology consulted from ED. no intervention planned.    Patient is considered high risk for infection s/p ureteral stent.    Urology may consider ureteral stents if patient worsens clinically or plans to undergo chemotherapy.  - Gomez discontinued 10/10    Hypomagnesemia, hypophosphatemia  -Likely due to poor oral intake  -Will monitor and replace with RN replacement protocol    COPD/asthma  -Quit smoking 2010   -DuoNebs as needed  -Continue home Incruse Ellipta     Essential hypertension blood pressure now on the low side   -continue to hold Lasix, hydrochlorothiazide, norvasc     Chronic thrombocytosis  - Monitor PC     JAMMIE  - was on CPAP     Hyperlipidemia--resume statin        Diet: Snacks/Supplements Adult: Gelatein Plus; With Meals  Snacks/Supplements Adult: Ensure Enlive; With Meals  Advance Diet as Tolerated: Clear Liquid Diet    DVT Prophylaxis: Pneumatic Compression Devices  Gomez Catheter: Not present  Lines: None     Cardiac Monitoring: ACTIVE order. Indication: Tachyarrhythmias, acute (48 hours)  Code Status: No CPR- Do NOT Intubate      Clinically Significant Risk Factors        # Hypokalemia: Lowest K = 3.1 mmol/L in last 2 days, will replace as needed  # Hypernatremia: Highest Na = 146 mmol/L in last 2 days, will monitor as appropriate    # Hypomagnesemia: Lowest Mg = 1.5 mg/dL in last 2 days, will replace as needed   # Hypoalbuminemia: Lowest albumin = 2.7 g/dL at 10/18/2023  5:13 AM, will monitor as appropriate     # Hypertension: Noted on problem list        # Obesity: Estimated body mass index is 31.81 kg/m  as calculated from the following:    Height as of this encounter: 1.499 m (4' 11\").    Weight as of this encounter: 71.4 kg (157 lb 8 oz).   # Severe Malnutrition: based on nutrition assessment    # COPD: noted on problem list        Disposition Plan      Expected Discharge Date: 10/24/2023    Discharge Delays: IV Medication - consider oral or Home " Infusion  Voiding/Eating Trial needed  Destination: home  Discharge Comments: palitive care following, TPN vs tube feeding or oral intake            Mary Ann Garcia MD  Hospitalist Service  [facility]  Securely message with Lionel (more info)  Text page via Sticky Paging/Directory   ______________________________________________________________________    Interval History   Mrs. Irizarry is doing well this morning. She is alert and hungry! She said her appetitie came back yesterday afternoon and she has been feeling better. We discussed how her symptoms will wax and wane ue to the gatsric cancer.     Physical Exam   Vital Signs: Temp: 98.1  F (36.7  C) Temp src: Oral BP: 128/65 Pulse: 91   Resp: 18 SpO2: 96 % O2 Device: None (Room air)    Weight: 157 lbs 8 oz    General Appearance: Awake, alert, in no acute distress  Respiratory: CTAB, no wheeze  Cardiovascular: RRR  GI: soft, nontender, non distended, normal bowel sounds  Skin: no jaundice, no rash      Medical Decision Making       45 MINUTES SPENT BY ME on the date of service doing chart review, history, exam, documentation & further activities per the note.      Data     I have personally reviewed the following data over the past 24 hrs:    13.4 (H)  \   8.8 (L)   / 743 (H)     145 109 (H) 7.9 (L) /  86   3.5 25 0.79 \       Imaging results reviewed over the past 24 hrs:   No results found for this or any previous visit (from the past 24 hour(s)).

## 2023-10-23 NOTE — PROGRESS NOTES
10/23/23 0740   Appointment Info   Signing Clinician's Name / Credentials (OT) Serene Anna OTR/L OTD   Living Environment   People in Home alone   Current Living Arrangements apartment   Home Accessibility no concerns   Living Environment Comments Tub/shower with chair with grab bars, toilet with grab bars - has raised toilet seat   Self-Care   Equipment Currently Used at Home wheelchair, power;cane, straight;walker, rolling  (4WW)   Fall history within last six months no   Activity/Exercise/Self-Care Comment Gets assist from PCA with dressing, bathing and IADLs - son comes by daily to assist as needed, uses 4WW in apartment and power scooter in community   General Information   Onset of Illness/Injury or Date of Surgery 10/08/23   Referring Physician Mary Ann Garcia MD   Patient/Family Therapy Goal Statement (OT) To return home   Additional Occupational Profile Info/Pertinent History of Current Problem Peri Irizarry is a 58 year old female admitted on 10/8/2023. She came to the emergency department for evaluation of abdominal pain, nausea and vomiting; found to have small bowel obstruction requiring surgical management.   Existing Precautions/Restrictions other (see comments);abdominal  (CHAVA drains)   Cognitive Status Examination   Orientation Status orientation to person, place and time   Affect/Mental Status (Cognitive) WFL  (agreeable/cooperative)   Follows Commands WFL   Visual Perception   Visual Impairment/Limitations WFL   Posture   Posture forward head position   Range of Motion Comprehensive   General Range of Motion bilateral upper extremity ROM WFL   Strength Comprehensive (MMT)   General Manual Muscle Testing (MMT) Assessment no strength deficits identified   Bed Mobility   Bed Mobility supine-sit   Supine-Sit [place] (Bed Mobility) contact guard   Transfers   Transfers sit-stand transfer;toilet transfer   Sit-Stand Transfer   Sit-Stand [place] (Transfers) contact guard   Assistive  Device (Sit-Stand Transfers) walker, 4-wheeled   Toilet Transfer   [place] Level (Toilet Transfer) contact guard   Assistive Device (Toilet Transfer) grab bars/safety frame   Activities of Daily Living   BADL Assessment/Intervention toileting;grooming   Grooming Assessment/Training   [place] Level (Grooming) contact guard assist   Toileting   [place] Level (Toileting) contact guard assist   Clinical Impression   Criteria for Skilled Therapeutic Interventions Met (OT) Yes, treatment indicated   OT Diagnosis Decreased ind with ADLs and safety   Influenced by the following impairments SBO   OT Problem List-Impairments impacting ADL activity tolerance impaired;post-surgical precautions;pain;mobility   Assessment of Occupational Performance 1-3 Performance Deficits   Identified Performance Deficits toileting, activity tolerance, g/h   Planned Therapy Interventions (OT) ADL retraining;strengthening;progressive activity/exercise   Clinical Decision Making Complexity (OT) problem focused assessment/low complexity   Risk & Benefits of therapy have been explained evaluation/treatment results reviewed;care plan/treatment goals reviewed;risks/benefits reviewed;current/potential barriers reviewed;patient   OT Total Evaluation Time   OT Eval, Low Complexity Minutes (95999) 10   OT Goals   Therapy Frequency (OT) Daily   OT Predicted Duration/Target Date for Goal Attainment 10/27/23   OT Goals Hygiene/Grooming;Toilet Transfer/Toileting;Aerobic Activity   OT: Hygiene/Grooming modified independent;while standing   OT: Toilet Transfer/Toileting Modified independent;toilet transfer;cleaning and garment management   OT: Perform aerobic activity with stable cardiovascular response intermittent activity;10 minutes   Interventions   Interventions Quick Adds Self-Care/Home Management   Self-Care/Home Management   Self-Care/Home Mgmt/ADL, Compensatory, Meal Prep Minutes (07713) 10   Symptoms Noted During/After Treatment (Meal  Preparation/Planning Training) fatigue   Treatment Detail/Skilled Intervention Evaluation completed, treatment initiated. Pt requesting abdominal binder when out of bed, mod A for donning/set up. Fxl mob in room SBA w/ 4WW. Attempted to complete g/h standing at sink, declined d/t fatigue following 2 minutes. Returned to EOB, declined sitting in chair d/t comfort. Educated on abdominal precautions related to ADLs, pt demos understanding with reinforcement. Returned to supine CGA with cueing for log roll technique. Left with call light in reach and bed alarm on   OT Discharge Planning   OT Plan Activity tolerance, toileting, stand g/h, UE exer   OT Discharge Recommendation (DC Rec) home with assist;home with home care occupational therapy   OT Rationale for DC Rec Pt mobilizing well, has good assist from son and PCA 7 days/wk - may benefit from home OT to enhance ind with ADLs and safety   OT Brief overview of current status SBA short fxl mob in room, CGA transfers   Total Session Time   Timed Code Treatment Minutes 10   Total Session Time (sum of timed and untimed services) 20

## 2023-10-23 NOTE — PROGRESS NOTES
Care Management Follow Up    Length of Stay (days): 14    Expected Discharge Date: 10/23/2023     Concerns to be Addressed:     Discharge planning  Patient plan of care discussed at interdisciplinary rounds: Yes    Anticipated Discharge Disposition:  home with home care     Anticipated Discharge Services:  Home Rn and home PT  Anticipated Discharge DME:  walker    Patient/family educated on Medicare website which has current facility and service quality ratings:    Education Provided on the Discharge Plan:  yes  Patient/Family in Agreement with the Plan:  yes    Referrals Placed by CM/SW:    Private pay costs discussed: Not applicable    Additional Information:  Chart reviewed. SW met with patient. Anticipate patient will discharge home with home care and she is agreeable to this.  Patient was previously open to home care with [facility], notification sent to [facility] today.   Family involved and supportive, anticipate family will transport.  Patient will discharge with her 4 wheeled walker.    DIDI Castillo

## 2023-10-23 NOTE — PROGRESS NOTES
[facility]  Palliative Care Daily Progress Note       Recommendations & Counseling     GOALS OF CARE:   Life-prolonging without limits   Recommend further discussion about overall goals, once prognostic information and potential treatment options are available from oncology.      ADVANCE CARE PLANNING:  No health care directive on file. Patient provided with  advance directive document today and plans to complete prior to discharge  There is no POLST form on file, recommend to complete prior to DC.  Code status: Full Code-discussed briefly today.  Recommend further discussion once prognostic information is more clear     MEDICAL MANAGEMENT:   We are not actively managing symptoms at this time.  Agree with hospitalist reviewing psychiatric medications and slowly adding back.      PSYCHOSOCIAL/SPIRITUAL SUPPORT:  Watches TV  Spiritual but not Denominational  2 sons,   Was a homemaker     Palliative Care will continue to follow. Thank you for the consult and allowing us to aid in the care of Peri Irizarry.     These recommendations have been discussed with Dr. Garcia      Assessments          Peri Irizarry is a 58 year old female with a past medical history of breast cancer, colon cancer, COPD, JAMMIE on CPAP, chronic headache, chronic back pain and HTN who presented on 10/6/23 with abdominal pain.  Patient admitted with sepsis, SBO, encephalopathy, s/p exploratory lap, KOKI and reduction of hernia on 10/9.  Pathology results show metastatic gastric cancer.  Patient having increased abdominal pain and surgery recommend NG tube placement and TPN today.  NG tube pulled out per patient.  She is hypotensive and tachycardic with elevated WBC-concern for sepsis. CT of head, CT abdomen pending.      Today, the patient was seen for:  Goals of care    Prognosis, Goals, or Advance Care Planning was addressed today with: Yes.  Mood, coping, and/or meaning in the context of serious illness were  "addressed today: Yes.              Interval History:     Course reviewed. No acute complaints. She just had her drains removed and is glad that she will not have to worry about them at home. She is hoping to discharge home today or tomorrow.     We discussed code status, CPR, intubation, and \"life support\". She stated that she talked with her kids and they have a HCD but it has to be notarized . She shared that she would not want to \"hooked up to machines\". She told her children that \"they wouldn't be treating me anymore, they would be treating a machine.\" She shared how hard it was to have that conversation but happy that she was able to and to know that her children are aware of her wishes.              Review of Systems:     Besides above, an additional 4 point system ROS was reviewed and is unremarkable          Medications:     I have reviewed this patient's medication profile and medications during this hospitalization.           Physical Exam:   Vital Signs: Blood pressure 128/73, pulse 83, temperature 98.6  F (37  C), temperature source Oral, resp. rate 18, height 1.499 m (4' 11\"), weight 71.4 kg (157 lb 8 oz), SpO2 96%, not currently breastfeeding.   Physical Exam  Vitals and nursing note reviewed.   Constitutional:       General: She is not in acute distress.  HENT:      Head: Normocephalic.      Mouth/Throat:      Mouth: Mucous membranes are moist.      Pharynx: Oropharynx is clear.   Eyes:      Extraocular Movements: Extraocular movements intact.      Conjunctiva/sclera: Conjunctivae normal.      Pupils: Pupils are equal, round, and reactive to light.   Cardiovascular:      Rate and Rhythm: Normal rate and regular rhythm.      Pulses: Normal pulses.   Pulmonary:      Effort: Pulmonary effort is normal. No respiratory distress.      Breath sounds: No wheezing.   Abdominal:      General: Abdomen is flat. There is no distension.      Tenderness: There is no abdominal tenderness.   Musculoskeletal:         " General: Normal range of motion.   Skin:     General: Skin is warm and dry.      Capillary Refill: Capillary refill takes less than 2 seconds.   Neurological:      General: No focal deficit present.      Mental Status: She is alert. Mental status is at baseline.   Psychiatric:         Mood and Affect: Mood normal.         Thought Content: Thought content normal.                    Data Reviewed:           Recent Labs   Lab 10/23/23  0503 10/22/23  1837 10/22/23  0639 10/21/23  1728 10/21/23  1051 10/20/23  1510 10/20/23  0623 10/18/23  1304 10/18/23  0513   WBC 13.4*  --  11.5*  --   --   --  10.6   < > 20.0*   HGB 8.8*  --  8.6*  --   --   --  8.3*   < > 10.0*   MCV 88  --  87  --   --   --  91   < > 92   *  --  729*  --  759*  --  665*   < > 935*     --  146*  --   --   --  147*   < > 140   POTASSIUM 3.2* 3.7 3.1*   < > 3.2*   < > 3.4   < > 3.9  3.9   CHLORIDE 109*  --  111*  --   --   --  114*   < > 107   CO2 25  --  25  --   --   --  19*   < > 11*   BUN 7.9*  --  7.1*  --   --   --  13.2   < > 10.3   CR 0.79  --  0.76  --  0.62  --  0.66   < > 0.67  0.63   ANIONGAP 11  --  10  --   --   --  14   < > 22*   CHERY 9.1  --  8.6  --   --   --  8.0*   < > 8.6   GLC 86  --  96  --   --   --  84   < > 141*   ALBUMIN  --   --   --   --   --   --   --   --  2.7*   PROTTOTAL  --   --   --   --   --   --   --   --  6.0*   BILITOTAL  --   --   --   --   --   --   --   --  0.4   ALKPHOS  --   --   --   --   --   --   --   --  232*   ALT  --   --   --   --   --   --   --   --  21   AST  --   --   --   --   --   --   --   --  25    < > = values in this interval not displayed.      Lab Results   Component Value Date    WBC 13.4 (H) 10/23/2023    HGB 8.8 (L) 10/23/2023    HCT 27.3 (L) 10/23/2023     (H) 10/23/2023     10/23/2023    POTASSIUM 3.2 (L) 10/23/2023    CHLORIDE 109 (H) 10/23/2023    CO2 25 10/23/2023    BUN 7.9 (L) 10/23/2023    CR 0.79 10/23/2023    GLC 86 10/23/2023    AST 25 10/18/2023     ALT 21 10/18/2023    ALKPHOS 232 (H) 10/18/2023    BILITOTAL 0.4 10/18/2023    KAIDEN 27 10/18/2023    INR 1.07 10/09/2023      Lab Results   Component Value Date    ALBUMIN 2.7 10/18/2023    ALBUMIN 3.9 04/13/2022                        ====================================================  TT: I have personally spent a total of 50 minutes on the day of the encounter on the unit in review of medical record, consultation with the medical providers and assessment of patient today, with time spent in counseling, coordination of care, and discussion with patient and family re: diagnostic results, prognosis, risks and benefits of management options, and development of plan of care as noted above.      ====================================================    Leandro Balderas NP  [facility], Palliative Care  Securely message with the [employer] Web Console (learn more here) or  Text page via [email or web address] Paging/Directory

## 2023-10-23 NOTE — PLAN OF CARE
Problem: Plan of Care - These are the overarching goals to be used throughout the patient stay.    Goal: Plan of Care Review  Description: The Plan of Care Review/Shift note should be completed every shift.  The Outcome Evaluation is a brief statement about your assessment that the patient is improving, declining, or no change.  This information will be displayed automatically on your shift note.  Outcome: Progressing     Problem: Plan of Care - These are the overarching goals to be used throughout the patient stay.    Goal: Absence of Hospital-Acquired Illness or Injury  Intervention: Identify and Manage Fall Risk  Recent Flowsheet Documentation  Taken 10/23/2023 1148 by Helio Joyner, RN  Safety Promotion/Fall Prevention:   activity supervised   assistive device/personal items within reach     Problem: Pain Chronic (Persistent)  Goal: Optimal Pain Control and Function  Intervention: Manage Persistent Pain  Recent Flowsheet Documentation  Taken 10/23/2023 1148 by Helio Joyner, RN  Medication Review/Management: medications reviewed   Goal Outcome Evaluation:       No verbal or nonverbal expressions of pain, both CHAVA drains and staples removed, uses call light appropriately.Telemetry NSR

## 2023-10-24 PROBLEM — R10.10 UPPER ABDOMINAL PAIN: Status: RESOLVED | Noted: 2023-01-01 | Resolved: 2023-01-01

## 2023-10-24 PROBLEM — E86.0 MILD DEHYDRATION: Status: RESOLVED | Noted: 2023-01-01 | Resolved: 2023-01-01

## 2023-10-24 PROBLEM — N39.0 ACUTE UTI: Status: RESOLVED | Noted: 2023-01-01 | Resolved: 2023-01-01

## 2023-10-24 PROBLEM — K56.609 SBO (SMALL BOWEL OBSTRUCTION) (H): Status: RESOLVED | Noted: 2023-01-01 | Resolved: 2023-01-01

## 2023-10-24 PROBLEM — E87.6 HYPOKALEMIA: Status: RESOLVED | Noted: 2023-01-01 | Resolved: 2023-01-01

## 2023-10-24 NOTE — PLAN OF CARE
Physical Therapy Discharge Summary    Reason for therapy discharge:    Discharged to home with home therapy.    Progress towards therapy goal(s). See goals on Care Plan in [facility] electronic health record for goal details.  Goals not met.  Barriers to achieving goals:   Pt was working on the goals yet. .    Therapy recommendation(s):    Continued therapy is recommended.  Rationale/Recommendations:  To improve mobility and strength. .

## 2023-10-24 NOTE — PLAN OF CARE
Problem: Plan of Care - These are the overarching goals to be used throughout the patient stay.    Goal: Absence of Hospital-Acquired Illness or Injury  Intervention: Identify and Manage Fall Risk  Recent Flowsheet Documentation  Taken 10/24/2023 1300 by Helio Joyner RN  Safety Promotion/Fall Prevention:   activity supervised   assistive device/personal items within reach     Problem: Plan of Care - These are the overarching goals to be used throughout the patient stay.    Goal: Readiness for Transition of Care  Outcome: Met   Goal Outcome Evaluation:       No verbal or nonverbal expressions of pain. Patient will discharge home today, family will transport.

## 2023-10-24 NOTE — PLAN OF CARE
Occupational Therapy Discharge Summary    Reason for therapy discharge:    Discharged to home.    Progress towards therapy goal(s). See goals on Care Plan in [facility] electronic health record for goal details.  Goals met    Therapy recommendation(s):    No further therapy is recommended.

## 2023-10-24 NOTE — PLAN OF CARE
Problem: Plan of Care - These are the overarching goals to be used throughout the patient stay.    Goal: Plan of Care Review  Description: The Plan of Care Review/Shift note should be completed every shift.  The Outcome Evaluation is a brief statement about your assessment that the patient is improving, declining, or no change.  This information will be displayed automatically on your shift note.  Outcome: Progressing     Problem: Plan of Care - These are the overarching goals to be used throughout the patient stay.    Goal: Absence of Hospital-Acquired Illness or Injury  Intervention: Identify and Manage Fall Risk  Recent Flowsheet Documentation  Taken 10/24/2023 1300 by Helio Joyner, RN  Safety Promotion/Fall Prevention:   activity supervised   assistive device/personal items within reach     Problem: Nausea and Vomiting  Goal: Nausea and Vomiting Relief  Intervention: Prevent and Manage Nausea and Vomiting  Recent Flowsheet Documentation  Taken 10/24/2023 1300 by Helio Joyner, RN  Oral Care: mouth wash rinse   Goal Outcome Evaluation:       No verbal or nonverbal expressions of pain, assist of 1 ambulation to bathroom, no nausea or vomiting, full liquid diet.

## 2023-10-24 NOTE — PROGRESS NOTES
Care Management Discharge Note    Discharge Date: 10/24/2023       Discharge Disposition: Home, Home Care    Discharge Services:  Home RN PT OT    Discharge DME:  walker, personal 4WW    Discharge Transportation: family or friend will provide    Private pay costs discussed: Not applicable    Does the patient's insurance plan have a 3 day qualifying hospital stay waiver?  No    Education Provided on the Discharge Plan:    Persons Notified of Discharge Plans: patient  Patient/Family in Agreement with the Plan:      Handoff Referral Completed: Yes    Additional Information:  Chart reviewed. Patient has discharge orders complete, will discharge home with [facility].  Family will transport.    DIDI Castillo

## 2023-10-24 NOTE — DISCHARGE SUMMARY
"[facility]  Hospitalist Discharge Summary      Date of Admission:  10/8/2023  Date of Discharge:  10/24/2023  Discharging Provider: Mary Ann Garcia MD  Discharge Service: Hospitalist Service    Discharge Diagnoses   Invasive adenocarcinoma of the stomach  Encephalopathy, resolved  Acute hypoxemic respiratory failure secondary to aspiration pneumonia pneumonitis, resolved  Sepsis with leukocytosis, resolved  Small bowel obstruction, resolved  History of colon and breast cancer  Anemia of chronic disease  Acute kidney injury, resolved  Left hydronephrosis, resolved  COPD and asthma  Essential hypertension  Chronic thrombocytosis  JAMMIE  Hyperlipidemia    Clinically Significant Risk Factors     # Overweight: Estimated body mass index is 29.81 kg/m  as calculated from the following:    Height as of this encounter: 1.499 m (4' 11\").    Weight as of this encounter: 67 kg (147 lb 9.6 oz).  # Severe Malnutrition: based on nutrition assessment      Follow-ups Needed After Discharge   Follow-up Appointments     Follow-up and recommended labs and tests       Follow up with primary care provider, Brain Camacho, within 7   days to evaluate medication change, for hospital follow- up, and regarding   new diagnosis.  The following labs/tests are recommended: potassium,   magnesium, CBC.            Unresulted Labs Ordered in the Past 30 Days of this Admission       No orders found from 9/8/2023 to 10/9/2023.        These results will be followed up by Mary Ann Garcia    Discharge Disposition   Discharged to home  Condition at discharge: Stable    Hospital Course   Peri Irizarry is a 58 year old female admitted on 10/8/2023. She came to the emergency department for evaluation of abdominal pain, nausea and vomiting.  She had a CT abdomen and pelvis done on 10/9/2023 which showed mural thickening of the stomach, dilation of the jejunum with a transition point in the left upper quadrant reflecting " an intermediate grade proximal small bowel obstruction, mesenteric stranding and lymphadenopathy concerning for metastatic disease, and left-sided hydronephrosis without obstructing calculi possibly related to neoplastic invasion or inflammatory scarring.  Surgery was consulted and she underwent an exploratory laparotomy with lysis of adhesions and reduction of internal hernia on 10/9.  During surgery he was found to have diffuse metastatic cancer of unknown primary with closed-loop obstruction of the proximal small bowel and a gastric mass with evidence of near erosion into the small bowel.  Pathology from biopsies taken during surgery showed metastatic poorly differentiated adenocarcinoma.  She underwent an EGD on 10/17/2023 which showed sloughing of the mucosa in the lesser curvature where biopsies were taken again showing invasive adenocarcinoma of the stomach.  After surgery and EGD she had difficulty increasing and tolerating a diet due to nausea and vomiting.  Her diet was increased very slowly with some days of backtracking.  By the day of discharge she was tolerating a full liquid diet well including Ensure.  Hematology oncology was consulted and she will follow-up with them with Dr. Malcolm on 10/26/2023 did discuss treatment options.    Mural thickening of the stomach  Mesenteric stranding and lymphadenopathy  History of colon and breast cancer  -CT showed most pronounced in the distal body and antrum, corresponding to area of increased activity on PET/CT which could reflect a neoplastic process; mesenteric stranding and lymphadenopathy concerning for metastatic disease with the largest lymph node seen in the stomach measuring 2.9 x 2.4 cm  - Status post biopsy at time of small bowel obstruction surgery.   -Biopsy results--metastatic gastric carcinoma  - Pending CEA, Will likely need port in the future  - EGD showed friability and  sloughing mucosa in the lesser curvature. with biopsies on 10/17/23,  pending pathology  - [place] oncology consult appreciated: Follow-up with oncology as outpatient with Dr. Malcolm on 10/26/2023 to discuss treatment options    Encephalopathy likely 2/2 sepsis possible component of polypharmacy- resolved  -On admission GCS 12, patient is lethargic, awakens to verbal commands and speaking short sentences, following commands  -CT Head10/19: No acute intracranial process, brain atrophy with chronic microvascular changes, partially empty sella nonspecific  -Discontinued Buspar, Gabapentin, Topamax, and venlafaxine    Acute hypoxemic respiratory failure secondary to aspiration pneumonia/pneumonitis- reoslved  Sepsis with leukocytosis-resolved  High anion gap metabolic acidosis with compensatory respiratory alkalosis likely 2/2 lactic acidosis 2/2 sepsis  -Elevated Procalcitonin and CRP  -Lactic acid 2.2 > 1.2  -Blood culture x 2: No growth  -UTI ruled out: UA 10/9: wbc +, rbc +, LE + (received Rocephin for 4 days until 10/13, previous culture polymicrobial)  -CXR: Small bilateral pleural effusions and adjacent bibasilar opacities  -CTA Chest 10/19: signs of aspiration pneumonia pneumonitis in the right upper and middle lobe  -Initially treated with IV Vancomycin, IV Meropenem, then switched to complete antibiotic course with cefpodoxime and azithromycin    Elevated troponin likely 2/2 demand ischemia in the setting of sepsis  -Troponin downtrended, patient denies chest pain  -EKG sinus tachycardia with no ischemic changes    Anemia of chronic disease  -Baseline hemoglobin fluctuates from 9-11, had slowly decreasing her hemoglobin fluctuated from the eights to tens while inpatient with a low of 7.9  -Would recommend repeat CBC outpatient with PCP    Acute kidney injury-Resolved  -Likely related to dehydration     Hypokalemia  -Has chronic hypokalemia and is on supplementation outpatient which we will continue  -Recommend PCP checking potassium levels    Left hydroureteronephrosis  -CT  showed no obstructing calculi, there is inflammatory stranding surrounding the left ureter distally which could reflect neoplastic invasion and/or inflammatory scaring  -Urology consulted from ED. no intervention planned. Patient is considered high risk for infection s/p ureteral stent.  - Gomez discontinued 10/10, able to urinate on her own after this    Hypomagnesemia, hypophosphatemia  -Likely due to poor oral intake, replaced with RN replacement protocol    COPD/asthma  -Quit smoking 2010   -We will continue on home inhalers outpatient     Essential hypertension  -Lasix discontinued   -Will continue on hydrochlorothiazide and norvasc outpatient     Chronic thrombocytosis-Stable     JAMMIE- CPAP     Hyperlipidemia-continue statin    Consultations This Hospital Stay   SURGERY GENERAL IP CONSULT  HEMATOLOGY & ONCOLOGY IP CONSULT  UROLOGY IP CONSULT  INTERVENTIONAL RADIOLOGY ADULT/PEDS IP CONSULT  CARE MANAGEMENT / SOCIAL WORK IP CONSULT  HEMATOLOGY & ONCOLOGY IP CONSULT  HOSPITALIST IP CONSULT  PHARMACY TO DOSE VANCO  PALLIATIVE CARE ADULT IP CONSULT  PHYSICAL THERAPY ADULT IP CONSULT  OCCUPATIONAL THERAPY ADULT IP CONSULT    Code Status   No CPR- Do NOT Intubate    Time Spent on this Encounter   I, Mary Ann Garcia MD, personally saw the patient today and spent greater than 30 minutes discharging this patient.       Mary Ann Garcia MD  [place] [id number]  Phone: [phone]  Fax: [phone]  ______________________________________________________________________    Physical Exam   Vital Signs: Temp: 99.1  F (37.3  C) Temp src: Oral BP: 135/72 Pulse: 86   Resp: 20 SpO2: 97 % O2 Device: None (Room air)    Weight: 147 lbs 9.57 oz  General Appearance: Awake, alert, in no acute distress  Respiratory: CTAB, no wheeze  Cardiovascular: RRR, no murmur noted  GI: soft, nontender, non distended, normal bowel sounds  Skin: no jaundice, no rash          Primary Care Physician   Brain Camacho    Discharge Orders      Home Care Referral      Reason for your hospital stay    Acute hypoxemic respiratory failure secondary to aspiration pneumonia due to nausea and vomiting in the setting of small bowel obstruction with gastric cancer     Follow-up and recommended labs and tests     Follow up with primary care provider, rBain Camacho, within 7 days to evaluate medication change, for hospital follow- up, and regarding new diagnosis.  The following labs/tests are recommended: potassium, magnesium, CBC.     Activity    Your activity upon discharge: activity as tolerated     Diet    Follow this diet upon discharge: Snacks/Supplements Adult: Gelatein Plus; With Meals      Snacks/Supplements Adult: Ensure Enlive; With Meals      Advance diet as tolerated       Significant Results and Procedures   Most Recent 3 CBC's:  Recent Labs   Lab Test 10/24/23  0459 10/23/23  0503 10/22/23  0639   WBC 12.3* 13.4* 11.5*   HGB 7.9* 8.8* 8.6*   MCV 89 88 87   * 743* 729*     Most Recent 3 BMP's:  Recent Labs   Lab Test 10/24/23  1237 10/24/23  0459 10/23/23  1214 10/23/23  0503 10/22/23  1837 10/22/23  0639   NA  --  145  --  145  --  146*   POTASSIUM 3.3* 3.2* 3.5 3.2*   < > 3.1*   CHLORIDE  --  107  --  109*  --  111*   CO2  --  23  --  25  --  25   BUN  --  7.0*  --  7.9*  --  7.1*   CR  --  0.79  --  0.79  --  0.76   ANIONGAP  --  15  --  11  --  10   CHERY  --  8.5*  --  9.1  --  8.6   GLC  --  66*  --  86  --  96    < > = values in this interval not displayed.     Most Recent 2 LFT's:  Recent Labs   Lab Test 10/18/23  0513 10/08/23  2315   AST 25 23   ALT 21 20   ALKPHOS 232* 118*   BILITOTAL 0.4 0.6     Most Recent 3 Hemoglobins:  Recent Labs   Lab Test 10/24/23  0459 10/23/23  0503 10/22/23  0639   HGB 7.9* 8.8* 8.6*     Most Recent 3 Troponin's:No lab results found.  7-Day Micro Results       Collected Updated Procedure Result Status      10/19/2023 8528  10/21/2023 1057 Urine Culture [[id number]]   Urine, NOS    Final result Component Value   Culture No Growth               10/18/2023 1126 10/23/2023 1404 Blood Culture Wrist, Right [[id number]]   Blood from Wrist, Right    Final result Component Value   Culture No Growth               10/18/2023 1118 10/23/2023 1404 Blood Culture Arm, Right [[id number]]   Blood from Arm, Right    Final result Component Value   Culture No Growth                   ,   Results for orders placed or performed during the hospital encounter of 10/08/23   CT Abdomen Pelvis w Contrast    Narrative    EXAM: CT ABDOMEN PELVIS W CONTRAST  LOCATION: [facility]  DATE: 10/9/2023    INDICATION: Eval for worsening abdominal pain now with nausea vomiting and poor intake for 3 days  COMPARISON: PET/CT dated 09/28/2023  TECHNIQUE: CT scan of the abdomen and pelvis was performed following injection of IV contrast. Multiplanar reformats were obtained. Dose reduction techniques were used.  CONTRAST: isovue 370 90ml    FINDINGS:   LOWER CHEST: Normal.    HEPATOBILIARY: No significant mass or bile duct dilatation. No calcified gallstones. Gallbladder is not distended, there is a layering sludge in the gallbladder. A small amount of pericholecystic fluid is identified.     PANCREAS: No significant mass, duct dilatation, or inflammatory change.    SPLEEN: Normal size.    ADRENAL GLANDS: No significant nodules.    KIDNEYS/BLADDER: There is mild left-sided hydroureteronephrosis. Retroperitoneal inflammatory changes are seen adjacent to the distal left ureter (image 140, series 3). No obstructing renal stone is identified. The right kidney and ureter are normal in   size and caliber. The bladder is decompressed.    BOWEL: There is mural thickening of the stomach most pronounced in the distal body/antrum (image 31, series 3, corresponding with increased activity in this area on prior PET/CT.  There is mild dilatation of the proximal  jejunum measuring up to 3.8 cm in   diameter with a transition point in the left upper quadrant (image 76, series 3). Remainder the small bowel is normal in size and caliber. The appendix is normal. Postsurgical changes from prior transverse colectomy are again noted the colon is normal   in size and caliber.    LYMPH NODES: Mesenteric stranding at the root of the mesentery with multiple subcentimeter lymph nodes are seen throughout the abdomen and retroperitoneum. It is more larger distended lymph nodes seen in the root of mesentery the largest of which   measures 2.9 x 2.4 cm, adjacent to the proximal stomach (image 142, series 3)     VASCULATURE: No abdominal aortic aneurysm.    PELVIC ORGANS: No pelvic masses.    MUSCULOSKELETAL: Unremarkable.      Impression    IMPRESSION:   1.  Mural thickening of the stomach most pronounced in the distal body and antrum (image 31, series 3 which corresponds the area of increased activity on the prior PET/CT, which could reflect a neoplastic process, correlation with patient's oncology   notes   2.   dilatation of the proximal jejunum with an apparent transition point seen in the left upper quadrant, (image 76, series 3) may reflect a low to intermediate grade proximal small bowel obstruction, versus focal ileus. Correlation with patient's   clinical symptomatology is recommended.  3.  Mesenteric stranding and lymphadenopathy, concerning for metastatic disease, with the largest lymph node seen in the stomach measuring 2.9 x 2.4 cm (image 142, series 3)  4.  interval development of a left-sided hydroureteronephrosis, no obstructing calculi is identified there is inflammatory stranding surrounding the left ureter distally which could reflect neoplastic invasion and/or inflammatory scarring.    XR Abdomen Port 1 View    Narrative    EXAM: XR ABDOMEN PORT 1 VIEW  LOCATION: [facility]  DATE: 10/9/2023    INDICATION: check ngt location  COMPARISON: None.       Impression    IMPRESSION: NG tube seen with the tip and side-port in the stomach. Contrast is seen within the left and less so right renal collecting system with a bladder. The bowel gas pattern is nonobstructive.   US Renal Limited    Narrative    EXAM: US RENAL LIMITED  LOCATION: [facility]  DATE: 10/12/2023    INDICATION: Left hydronephrosis noted on CT 10/09/2023. Creatinine normalized. Eval for residual hydro.  COMPARISON: CT abdomen pelvis 10/09/2023, PET/CT 09/28/2023 reviewed.  TECHNIQUE: Routine Bilateral Renal and Bladder Ultrasound.    FINDINGS:    LEFT KIDNEY: Measures 10.9 x 5.6 x 5.8 cm. Mild hydronephrosis. This persists postvoid.    BLADDER: Normal. No mass evident by ultrasound.      Impression    IMPRESSION:  Persistent mild left hydronephrosis.   XR Abdomen 2 Views    Narrative    EXAM: XR ABDOMEN 2 VIEWS  LOCATION: [facility]  DATE: 10/16/2023    INDICATION: Recent closed loop small bowel obstruction status post laparotomy 10/09/2023. Additionally has gastric mass and peritoneal carcinomatosis.  COMPARISON: CT abdomen pelvis 10/09/2023      Impression    IMPRESSION: Two surgical drains project over the left abdomen. Prominent gas-filled loops of small bowel and colon throughout the abdomen are noted, consistent with postoperative ileus. No pneumatosis or large volume pneumoperitoneum. Midline surgical   staples. Lung bases are clear.   CT Abdomen Pelvis w Contrast    Narrative    EXAM: CT ABDOMEN PELVIS W CONTRAST  LOCATION: [facility]  DATE: 10/18/2023    INDICATION: 58F a w SBO secondary to metastatic gastric adenocarcinoma, now POD#9 reduction of internal hernia of small bowel. Diffuse carcinomatosis noted with patchy reviewing poorly differentiated adenocarcinoma.  COMPARISON: CT AP 10/09/2023  TECHNIQUE: CT scan of the abdomen and pelvis was performed following injection of IV contrast. Multiplanar reformats were  obtained. Dose reduction techniques were used.  CONTRAST: 70ml isovue 370    FINDINGS:   LOWER CHEST: There are new, small layering bilateral pleural effusions with compressive atelectasis, right greater than left. Atelectatic lung enhances normally.. Less than 1 cm right pericardiophrenic node again noted. There is a trace pericardial   effusion.    HEPATOBILIARY: No suspicious liver lesions.    PANCREAS: Normal.    SPLEEN: Normal.    ADRENAL GLANDS: Normal.    KIDNEYS/BLADDER: Normal.    BOWEL: Postoperative changes are seen with 2 abdominal drains in the upper abdomen.  Distal descending colonic staple line again noted. There is a group of matted and distended small bowel in the left midabdomen loops in the left midabdomen measuring up to 5.7 cm. Point of transition is in this region with multiple collapsed distal small   bowel loops. No pneumatosis or abnormal wall enhancement. Marked stranding of the small bowel mesentery again noted with multiple less than 1 cm mesenteric nodes and new, minimal fluid within the mesentery. No abscess.    Distal gastric wall thickening along the greater curve and bulky gastrohepatic adenopathy again noted. Marked Most of the colon is collapsed with liquid fecal contents in the rectosigmoid.    LYMPH NODES: No additional retroperitoneal adenopathy.    VASCULATURE: Mild arterial calcifications. No aneurysm. Vessels are patent. No IVC or pelvic vein thrombus.    PELVIC ORGANS: Prior hysterectomy.    MUSCULOSKELETAL: New dependent flank edema. No subcutaneous postoperative fluid collections in the midabdomen. No suspicious lesions.      Impression    IMPRESSION:   1.  Postoperative changes from recent exploratory laparotomy, and reduction of internal, small bowel hernia.  2.  There are focally matted loops of small bowel in the left midabdomen distended up to 5.7 cm with abrupt transition in this area indicative of a developing small bowel obstruction. Most of the small bowel loops  which are distal to this are completely   collapsed.  3.  The duodenum and stomach are not distended.  4.  No abdominal abscess.  5.  Two, large upper abdominal drains in place.  6.  New, small layering bilateral pleural effusions with notable compressive atelectasis especially in the right lower lobe..  7.  The distal gastric mass, with adjacent bulky gastrohepatic lymphadenopathy and extensive stranding of the root of the small bowel mesentery again noted. Recent EGD with biopsy of a gastric lesion. Pathology pending.  8.  Other noncritical findings as noted above.   XR Chest Port 1 View    Narrative    EXAM: XR CHEST PORT 1 VIEW  LOCATION: [facility]  DATE: 10/18/2023    INDICATION: new onset sepsis, unclear source  COMPARISON: CT abdomen/pelvis earlier the same day.      Impression    IMPRESSION: Heart size and pulmonary vasculature are normal. Small bilateral pleural effusions and adjacent bibasilar opacities. No pneumothorax. Partially visualized tubing overlying the left upper quadrant abdomen.   CT Head w/o Contrast    Narrative    EXAM: CT HEAD W/O CONTRAST  LOCATION: [facility]  DATE: 10/19/2023    INDICATION: Altered mental status. Confusion.  COMPARISON: 12/23/2021  TECHNIQUE: Routine CT Head without IV contrast. Multiplanar reformats. Dose reduction techniques were used.    FINDINGS:  INTRACRANIAL CONTENTS: No intracranial hemorrhage, extraaxial collection, or mass effect.  No CT evidence of acute infarct. Minimal presumed chronic small vessel ischemic changes. Mild generalized volume loss. No hydrocephalus. Enlarged, partially empty   sella turcica.    VISUALIZED ORBITS/SINUSES/MASTOIDS: No intraorbital abnormality. No paranasal sinus mucosal disease. No middle ear or mastoid effusion.    BONES/SOFT TISSUES: No acute abnormality.      Impression    IMPRESSION:  1.  No CT evidence for acute intracranial process.  2.  Brain atrophy and presumed chronic  microvascular ischemic changes as above.  3.  Enlarged, partially empty sella turcica. This finding is nonspecific, but can be seen in setting of idiopathic intracranial hypertension.   CT Chest Pulmonary Embolism w Contrast    Narrative    EXAM: CT CHEST PULMONARY EMBOLISM W CONTRAST  LOCATION: [facility]  DATE: 10/19/2023    INDICATION: Tachycardia and hypoxemia. Metastatic gastric adenocarcinoma recently diagnosed.  COMPARISON: CT AP 10/18/2023 and older studies, PET CT 09/28/2023  TECHNIQUE: CT chest pulmonary angiogram during arterial phase injection of IV contrast. Multiplanar reformats and MIP reconstructions were performed. Dose reduction techniques were used.   CONTRAST: 75 mL of Isovue-370    FINDINGS:  ANGIOGRAM CHEST: Excellent opacification of pulmonary arteries and branches. No evidence of pulmonary emboli. Aorta and branches are unremarkable. Aberrant origin of the right subclavian artery.      LUNGS AND PLEURA: Bilateral layering small to moderate pleural effusions, right greater than left again noted. Subpleural atelectasis in both lower lobes. Atelectatic lung enhances normally.     In addition, there are dependent groundglass opacities in the right upper lobe posteriorly with peribronchiolar opacities adjacent to the fissure laterally (image 130), inferiorly in the right middle lobe smaller area in the left upper lobe laterally and   a few tubular opacities in the superior segment of the left lower lobe (images 123 - 137). Retained secretions in the main, right lower lobe bronchus. Underlying emphysema.    MEDIASTINUM/AXILLAE: Esophagus is slightly distended with an air-fluid level in the midportion. No adenopathy.    CORONARY ARTERY CALCIFICATION: None.    UPPER ABDOMEN: Ill-defined distal gastric tumor again noted. Incomplete visualization of the upper abdominal drains.    MUSCULOSKELETAL: No suspicious lesions. Marked dependent subcutaneous edema.      Impression     IMPRESSION:  1.  There are a few findings of concern for possible aspiration. Mid esophagus is distended with air and fluid, there are secretions in the right lower lobe and areas of pneumonitis predominantly involving the right upper  and right middle lobes.  2.  There are again small to moderate bilateral pleural effusions, right greater than left with adjacent compressive atelectasis. Atelectatic lung enhances normally.  3.  Underlying emphysema.  4.  No evidence of pulmonary emboli.  5.  Gastric tumor again noted with partially visible postsurgical changes in the abdomen.  6.  Aberrant origin of the right subclavian artery.         Discharge Medications   Current Discharge Medication List        START taking these medications    Details   famotidine (PEPCID) 20 MG tablet Take 1 tablet (20 mg) by mouth 2 times daily for 30 days  Qty: 60 tablet, Refills: 0    Associated Diagnoses: Malignant neoplasm of stomach, unspecified location (H)      multivitamin w/minerals (THERA-VIT-M) tablet Take 1 tablet by mouth daily for 30 days  Qty: 30 tablet, Refills: 0    Associated Diagnoses: Malignant neoplasm of stomach, unspecified location (H)      prochlorperazine (COMPAZINE) 10 MG tablet Take 1 tablet (10 mg) by mouth every 6 hours as needed for vomiting  Qty: 60 tablet, Refills: 0    Associated Diagnoses: Malignant neoplasm of stomach, unspecified location (H)           CONTINUE these medications which have CHANGED    Details   amLODIPine (NORVASC) 5 MG tablet Take 1 tablet (5 mg) by mouth daily for 30 days  Qty: 30 tablet, Refills: 0    Associated Diagnoses: Primary hypertension      potassium chloride ER (KLOR-CON M) 10 MEQ CR tablet Take 2 tablets (20 mEq) by mouth daily for 30 days  Qty: 60 tablet, Refills: 0    Associated Diagnoses: Hypokalemia           CONTINUE these medications which have NOT CHANGED    Details   ADVAIR DISKUS 500-50 MCG/DOSE inhaler Inhale 1 puff into the lungs 2 times daily      albuterol (PROAIR  HFA/PROVENTIL HFA/VENTOLIN HFA) 108 (90 Base) MCG/ACT inhaler Inhale 1-2 puffs into the lungs every 4 hours as needed for wheezing or shortness of breath    Comments: Pharmacy may dispense brand covered by insurance (Proair, or proventil or ventolin or generic albuterol inhaler)      albuterol (PROVENTIL) (2.5 MG/3ML) 0.083% neb solution NEBULIZE 1 VIAL AS DIRECTED EVERY 4-6 HOURS AS NEEDED VIA NEBULIZER      alendronate (FOSAMAX) 35 MG tablet Take 35 mg by mouth every 7 days Tuesdays      calcium carbonate (OS-HCERY) 600 MG tablet Take 600 mg by mouth 2 times daily (with meals)      conjugated estrogens (PREMARIN) 0.625 MG/GM vaginal cream Place 1 g vaginally every 72 hours as needed      cyclobenzaprine (FLEXERIL) 5 MG tablet Take 5 mg by mouth 3 times daily as needed for muscle spasms prn      DULoxetine (CYMBALTA) 30 MG capsule Take 30 mg by mouth 2 times daily      ferrous sulfate (FEROSUL) 325 (65 Fe) MG tablet Take 325 mg by mouth every 48 hours      GNP NATURAL FIBER 48.57 % POWD TAKE 2G ONCE DAILY AS DIRECTED **184 DAYS SUPPLY** 30      hydrochlorothiazide (HYDRODIURIL) 25 MG tablet Take 25 mg by mouth daily      hydrOXYzine (VISTARIL) 25 MG capsule Take 25 mg by mouth 3 times daily as needed      ibuprofen (ADVIL/MOTRIN) 800 MG tablet Take 800 mg by mouth 2 times daily as needed for moderate pain      meclizine (ANTIVERT) 25 MG tablet Take 1 tablet (25 mg) by mouth 3 times daily as needed for dizziness  Qty: 20 tablet, Refills: 0      montelukast (SINGULAIR) 10 MG tablet Take 10 mg by mouth at bedtime      rosuvastatin (CRESTOR) 10 MG tablet Take 10 mg by mouth daily      senna-docusate (SENOKOT-S/PERICOLACE) 8.6-50 MG tablet Take 2 tablets by mouth daily as needed for constipation      SUMAtriptan (IMITREX) 50 MG tablet Take 1 tablet (50 mg) by mouth at onset of headache for migraine  Qty: 9 tablet, Refills: 11    Associated Diagnoses: Migraine with aura and without status migrainosus, not intractable       tiotropium (SPIRIVA) 18 MCG inhaled capsule Inhale 18 mcg into the lungs daily      traZODone (DESYREL) 50 MG tablet Take 100 mg by mouth at bedtime      vitamin D3 (CHOLECALCIFEROL) 50 mcg (2000 units) tablet Take 50 mcg by mouth daily           STOP taking these medications       busPIRone (BUSPAR) 5 MG tablet Comments:   Reason for Stopping:         furosemide (LASIX) 40 MG tablet Comments:   Reason for Stopping:         gabapentin (NEURONTIN) 300 MG capsule Comments:   Reason for Stopping:         gabapentin (NEURONTIN) 300 MG capsule Comments:   Reason for Stopping:         HYDROcodone-acetaminophen (NORCO) 7.5-325 MG per tablet Comments:   Reason for Stopping:         topiramate (TOPAMAX) 50 MG tablet Comments:   Reason for Stopping:         topiramate (TOPAMAX) 50 MG tablet Comments:   Reason for Stopping:         venlafaxine (EFFEXOR XR) 37.5 MG 24 hr capsule Comments:   Reason for Stopping:             Allergies   Allergies   Allergen Reactions    Hydrocodone-Acetaminophen Unknown     Pt takes this med 3 times daily, Brand Name Hydrocodone without the sparkles pt can use  -Monitor hydrocodone/apap supply (ok to take as long as no blue sparkles like brand lortab)        Lisinopril     Penicillin G      10/21/2023 tolerated meropenem and cefdinir at [facility]

## 2023-10-24 NOTE — PROGRESS NOTES
[facility]  Palliative Care Daily Progress Note      Chart reviewed and discussed with Dr. Garcia.  Patient approaching discharge, goals of care are established, and we are not actively managing symptoms at this time.  Palliative care will sign off.  Please reach out if further assistance is needed from our team.   Thank you,    Irasema Sheth, CNS  [facility], Palliative Care  Securely message with the [employer] Web Console (learn more here) or  Text page via [employer] Paging/Directory      Griseofulvin Counseling:  I discussed with the patient the risks of griseofulvin including but not limited to photosensitivity, cytopenia, liver damage, nausea/vomiting and severe allergy.  The patient understands that this medication is best absorbed when taken with a fatty meal (e.g., ice cream or french fries).

## 2023-10-24 NOTE — PLAN OF CARE
Patient is A&Ox4. She has been up walking to bathroom with assist of 1 and walker. Patient has had several bowel movements this shift. She has been eating small amt of clear liquid diet.  Problem: Plan of Care - These are the overarching goals to be used throughout the patient stay.    Goal: Absence of Hospital-Acquired Illness or Injury  Outcome: Progressing  Goal: Optimal Comfort and Wellbeing  Outcome: Progressing  Intervention: Monitor Pain and Promote Comfort  Recent Flowsheet Documentation  Taken 10/23/2023 1700 by Estephania Martin RN  Pain Management Interventions: declines     Problem: Intestinal Obstruction  Goal: Optimal Bowel Function  Outcome: Progressing  Goal: Optimal Pain Control and Function  Outcome: Progressing  Intervention: Prevent or Manage Pain  Recent Flowsheet Documentation  Taken 10/23/2023 1700 by Estephania Martin RN  Pain Management Interventions: declines  Intervention: Prevent or Manage Pain  Recent Flowsheet Documentation  Taken 10/23/2023 1700 by Estephania Martin RN  Pain Management Interventions: declines     Problem: Pain Acute  Goal: Optimal Pain Control and Function  Outcome: Progressing  Intervention: Develop Pain Management Plan  Recent Flowsheet Documentation  Taken 10/23/2023 1700 by Estephania Martin RN  Pain Management Interventions: declines     Problem: Pain Chronic (Persistent)  Goal: Optimal Pain Control and Function  Intervention: Develop Pain Management Plan  Recent Flowsheet Documentation  Taken 10/23/2023 1700 by Estephania Martin RN  Pain Management Interventions: sammy     Problem: Surgery Nonspecified  Goal: Optimal Pain Control and Function  Intervention: Prevent or Manage Pain  Recent Flowsheet Documentation  Taken 10/23/2023 1700 by Estephania Martin RN  Pain Management Interventions: declines   Goal Outcome Evaluation:

## 2023-10-24 NOTE — PLAN OF CARE
Problem: Plan of Care - These are the overarching goals to be used throughout the patient stay.    Goal: Optimal Comfort and Wellbeing  Outcome: Progressing     Problem: Intestinal Obstruction  Goal: Optimal Bowel Function  Outcome: Progressing     Problem: Pain Chronic (Persistent)  Goal: Optimal Pain Control and Function  Intervention: Manage Persistent Pain  Recent Flowsheet Documentation  Taken 10/24/2023 0100 by Asya Alfaro RN  Medication Review/Management: medications reviewed     Problem: Malnutrition  Goal: Improved Nutritional Intake  Outcome: Not Progressing     Problem: Nausea and Vomiting  Goal: Nausea and Vomiting Relief  Outcome: Progressing   Goal Outcome Evaluation:    No issues overnight. Eager to discharge home. Denied pain; declined pain medication when offered. No nausea or emesis. Passing gas with several bowel movements on previous shift. Poor appetite; remains on clear liquid diet.

## 2023-12-12 NOTE — PROGRESS NOTES
"  Interventional Radiology - Pre-Procedure Note:  [facility]  12/13/2023     Procedure Requested: port placement  Requested by: Mani Malcolm MD    History and Physical Reviewed: H&P documented within 30 days (by Mani Rahman MD on 12/4/23).  I have personally reviewed the patient's medical history and have updated the medical record as necessary.    Past Medical History:   Diagnosis Date    Anemia     Anxiety     Arthritis     Asthma     Breast cancer (H) 01/01/2010    left     Cancer (H)     Colon cancer (H) 01/01/2011    COPD (chronic obstructive pulmonary disease) (H)     Depression     Difficulty walking     Dyspnea on exertion     History of migraine headaches     Hx of radiation therapy 01/01/2010    Hypertension     Migraines     Motion sickness     Orthopnea     Other chronic pain     Shortness of breath     Sleep apnea     CPAP    Stroke (H) 01/01/2010    \"I HAD A SLIGHT STROKE BACK IN 2010\"    Walking troubles       Past Surgical History:   Procedure Laterality Date    BIOPSY BREAST Left 2010    BREAST SURGERY      COLON SURGERY  2011    20% OF COLON REMOVED-COLON CANCER    COLONOSCOPY N/A 6/24/2016    Procedure: COLONOSCOPY WITH MAC SEDATION;  Surgeon: Kumar Horowitz MD;  Location: [facility] [place];  Service:     COLONOSCOPY N/A 7/31/2019    Procedure: COLONOSCOPY;  Surgeon: Chintan Peter MD;  Location: [facility];  Service: General    COLONOSCOPY N/A 12/7/2022    Procedure: COLONOSCOPY WITH POLYPECTOMY;  Surgeon: Evin Wright DO;  Location: [facility] [place] [facility]    ESOPHAGOSCOPY, GASTROSCOPY, DUODENOSCOPY (EGD), COMBINED N/A 10/17/2023    Procedure: ESOPHAGOGASTRODUODENOSCOPY WITH BIOPSIES;  Surgeon: Evin Wright DO;  Location: [facility] [place] OR    HYSTERECTOMY  2013    LAPAROSCOPY DIAGNOSTIC (GYN) N/A 10/9/2023    Procedure: DIAGNOSTIC LAPAROSCOPY, LYSIS OF ADHESIONS;  Surgeon: Matthew Ramey DO;  Location: [facility] [place] OR    LAPAROTOMY " EXPLORATORY N/A 10/9/2023    Procedure: OPEN LYSIS OF ADHESIONS; REDUCTION INTERNAL HERNIA, PARTIAL OMENTECTOMY;  Surgeon: Matthew Ramey DO;  Location: [facility] [place] [facility]    LUMPECTOMY BREAST Left 2010    OOPHORECTOMY Bilateral 2013        Brief HPI: Peri Irizarry is a 58 year old female with 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 She is S/p ex lap, Freddie on 10/9/23. She is followed by Dr Mani Malcolm, [facility]. She presents today for port placement.     IMAGING:  EXAM: CT CHEST PULMONARY EMBOLISM W CONTRAST  LOCATION: [facility]  DATE: 10/19/2023     INDICATION: Tachycardia and hypoxemia. Metastatic gastric adenocarcinoma recently diagnosed.  COMPARISON: CT AP 10/18/2023 and older studies, PET CT 09/28/2023  TECHNIQUE: CT chest pulmonary angiogram during arterial phase injection of IV contrast. Multiplanar reformats and MIP reconstructions were performed. Dose reduction techniques were used.   CONTRAST: 75 mL of Isovue-370     FINDINGS:  ANGIOGRAM CHEST: Excellent opacification of pulmonary arteries and branches. No evidence of pulmonary emboli. Aorta and branches are unremarkable. Aberrant origin of the right subclavian artery.       LUNGS AND PLEURA: Bilateral layering small to moderate pleural effusions, right greater than left again noted. Subpleural atelectasis in both lower lobes. Atelectatic lung enhances normally.      In addition, there are dependent groundglass opacities in the right upper lobe posteriorly with peribronchiolar opacities adjacent to the fissure laterally (image 130), inferiorly in the right middle lobe smaller area in the left upper lobe laterally and   a few tubular opacities in the superior segment of the left lower lobe (images 123 - 137). Retained secretions in the main, right lower lobe bronchus. Underlying emphysema.     MEDIASTINUM/AXILLAE: Esophagus  is slightly distended with an air-fluid level in the midportion. No adenopathy.     CORONARY ARTERY CALCIFICATION: None.     UPPER ABDOMEN: Ill-defined distal gastric tumor again noted. Incomplete visualization of the upper abdominal drains.     MUSCULOSKELETAL: No suspicious lesions. Marked dependent subcutaneous edema.                                                                      IMPRESSION:  1.  There are a few findings of concern for possible aspiration. Mid esophagus is distended with air and fluid, there are secretions in the right lower lobe and areas of pneumonitis predominantly involving the right upper  and right middle lobes.  2.  There are again small to moderate bilateral pleural effusions, right greater than left with adjacent compressive atelectasis. Atelectatic lung enhances normally.  3.  Underlying emphysema.  4.  No evidence of pulmonary emboli.  5.  Gastric tumor again noted with partially visible postsurgical changes in the abdomen.  6.  Aberrant origin of the right subclavian artery.       NPO: since [place]  ANTICOAGULANTS: none  ANTIBIOTICS: Ancef 2gm IV pre procedure    ALLERGIES  Allergies   Allergen Reactions    Hydrocodone-Acetaminophen Unknown     Pt takes this med 3 times daily, Brand Name Hydrocodone without the sparkles pt can use  -Monitor hydrocodone/apap supply (ok to take as long as no blue sparkles like brand lortab)        Lisinopril     Penicillin G      10/21/2023 tolerated meropenem and cefdinir at [facility]         LABS:  INR   Date Value Ref Range Status   10/09/2023 1.07 0.85 - 1.15 Final      Hemoglobin   Date Value Ref Range Status   11/16/2023 9.1 (L) 11.7 - 15.7 g/dL Final     Platelet Count   Date Value Ref Range Status   11/16/2023 552 (H) 150 - 450 10e3/uL Final     Creatinine   Date Value Ref Range Status   11/16/2023 0.63 0.51 - 0.95 mg/dL Final     Potassium   Date Value Ref Range Status   11/16/2023 2.6 (LL) 3.4 - 5.3 mmol/L Final   04/13/2022 3.5 3.5 - 5.0  mmol/L Final         EXAM:  /80   Pulse 89   Temp 98.1  F (36.7  C) (Oral)   Resp 16   LMP  (LMP Unknown)   SpO2 100%   General:  Stable.  In no acute distress.    Neuro:  A&O x 3. Moves all extremities equally.  Resp:  Lungs clear to auscultation bilaterally.  Cardio:  S1S2 and reg, without murmur, clicks or rubs  Abdomen:  Soft, non-distended, non-tender.  Skin:  Without excoriations, ecchymosis, erythema, lesions or open sores on upper chest.  MSK:  No gross motor weakness.  Sensation intact.     Pre-Sedation Assessment:  Mallampati Airway Classification:  II - Faucial pillars and soft palate may be seen, but uvula is masked by the base of the tongue  Previous reaction to anesthesia/sedation:  No  Sedation plan based on assessment: Moderate (conscious) sedation  ASA Classification: Class 3 - SEVERE SYSTEMIC DISEASE, DEFINITE FUNCTIONAL LIMITATIONS.   Code Status: Full Code intra procedure, per discussion with patient.         ASSESSMENT/PLAN:   Plan for port placement, laterality per proceduralist discretion with sedation    Procedural education reviewed with patient/family in detail including, but not limited to risks, benefits and alternatives with understanding verbalized by patient/family.    Total time spent on the date of the encounter: 45 minutes.      EUFEMIA CARRION CNP  Interventional Radiology

## 2023-12-13 NOTE — SEDATION DOCUMENTATION
Patient Name: Peri Irizarry  Medical Record Number: [id number]  Today's Date: 12/13/2023    Procedure: port placement  Proceduralist: Dr. Pryor    Procedure Start: 1414  Procedure end: 1436  Sedation medications administered: 1.5 mg midazolam and 100 mcg fentanyl   Sedation time: 22 minutes      Other Notes: Pt arrived to IR room 1 from IR holding. Consent reviewed. Pt denies any questions or concerns regarding procedure. Pt positioned supine and monitored per protocol. Pt tolerated procedure without any noted complications. VSS. Pt transferred back to IR holding.

## 2023-12-13 NOTE — PRE-PROCEDURE
GENERAL PRE-PROCEDURE:   Procedure:  Port placement with sedation  Date/Time:  12/13/2023 1:37 PM    Written consent obtained?: Yes    Risks and benefits: Risks, benefits and alternatives were discussed    Consent given by:  Patient  Patient states understanding of procedure being performed: Yes    Patient's understanding of procedure matches consent: Yes    Procedure consent matches procedure scheduled: Yes    Expected level of sedation:  Moderate  Appropriately NPO:  Yes  ASA Class:  3  Mallampati  :  Grade 2- soft palate, base of uvula, tonsillar pillars, and portion of posterior pharyngeal wall visible  Lungs:  Lungs clear with good breath sounds bilaterally  Heart:  Normal heart sounds and rate  History & Physical reviewed:  History and physical reviewed and no updates needed  Statement of review:  I have reviewed the lab findings, diagnostic data, medications, and the plan for sedation

## 2023-12-13 NOTE — PROCEDURES
Interventional Radiology Post-Procedure Note     Patient name:  Peri Irizarry  MRN:  [id number]   Date:  12/13/2023     Procedure(s): Right chest port placement    Attending:  Roya    Sedation:  Moderate sedation    Pre/Post Procedure Diagnosis: Peritoneal carcinoma    Drains/Lines:  Power port    Specimen(s):  None    Estimated Blood Loss:  Minimal    Complications:  None     Findings:  Uneventful procedure, please see dictation in PACS or under the Imaging tab in EPIC for detailed procedure note.    Plan:  Port ready for immediate use. Patient may discharge in 1 hour post-procedure if criteria met.      Ta Pryor MD  Vascular & Interventional Radiology  [facility]  12/13/2023  2:40 PM

## 2023-12-13 NOTE — PROGRESS NOTES
Pt. Denies pain at time of discharge.  Port insertion site is clean, dry, without swelling or pain per pt.  Pt. And son verbalize understanding of pt. Education.  Pt. To front entrance via wheelchair accompanied by her son.

## 2023-12-13 NOTE — DISCHARGE INSTRUCTIONS
Port Placement Procedure Discharge Instructions:  You had a port placed. A port is a small medical device that is placed under the skin and is connected to a vein with a catheter (thin, flexible tube). Ports can be used to administer IV medications (including chemotherapy), fluids or blood products or for blood lab draws. Please follow the below instructions after your procedure:    Care Instructions:  - If you received sedation for your procedure, do not drive or operate heavy machinery for the rest of the day.  - You may shower beginning tomorrow (post procedure day #1). Do not scrub site until well healed; pat dry gently with a towel.  - You likely have skin adhesive over your port site. Skin adhesive works like a bandage to keep the site covered and protected. Do not use antibiotic ointment or creams/lotions over adhesive as it can break it down. The skin adhesive will peel off on its own (typically in 5-14 days).  - Avoid submerging the port site under water (ex: tub baths, Jacuzzis, lakes, hot tubs and pools) for 10 days or until your site is well healed.  - You may have some discomfort, minimal swelling, redness and/or bruising at your port site/procedure site. You may take over the counter pain medication for discomfort (follow the package directions) or apply an ice pack wrapped in a towel over the site (rotating 20 minutes with ice pack on and 20 minutes with ice pack off) for comfort as needed. It can take several days for these to resolve.  - Avoid heavy lifting (greater than 10 pounds) and strenuous activities for 2 days following your procedure.   - If you experience significant bleeding at site, apply pressure with hands above the clavicle bone, sit upright and seek immediate medical assistance.  - Ports need to be flushed approximately every 4-6 weeks, if not being used more frequently. Follow up with the provider who ordered your port placement for further instructions for this.    Seek medical  evaluation or contact [facility] RN Line at [phone] if you experience the following:  - Uncontrolled bleeding from port site  - Fever (greater than 101 F (38.3C))  - Purulent (yellow/green/foul smelling) drainage from port insertion site  - Increasing pain at port site  - Increasing redness at port site

## (undated) DEVICE — SU MONOCRYL+ 4-0 18IN PS2 UND MCP496G

## (undated) DEVICE — DRSG PRIMAPORE 02X3"

## (undated) DEVICE — ESU LIGASURE MARYLAND LAPAROSCOPIC SLR/DVDR 5MMX37CM LF1937

## (undated) DEVICE — TUBING SMOKE EVAC PNEUMOCLEAR HIGH FLOW 0620050250

## (undated) DEVICE — ENDO POUCH UNIV RETRIEVAL SYSTEM INZII 10MM CD001

## (undated) DEVICE — BLADE KNIFE SURG 11 371111

## (undated) DEVICE — SUCTION MANIFOLD NEPTUNE 2 SYS 1 PORT 702-025-000

## (undated) DEVICE — SOL WATER IRRIG 1000ML BOTTLE 2F7114

## (undated) DEVICE — SU SILK 3-0 SH CR 8X18" C013D

## (undated) DEVICE — SPONGE LAP 18X18" X8435

## (undated) DEVICE — CATH TRAY FOLEY SURESTEP 16FR DRAIN BAG STATOCK A899916

## (undated) DEVICE — SUCTION STRYKERFLOW II 250-070-500

## (undated) DEVICE — STPL SKIN 35W 6.9MM  PXW35

## (undated) DEVICE — DRAIN RESERVOIR 100ML JP 0070740

## (undated) DEVICE — GLOVE BIOGEL PI ORTHOPRO SZ 7.5 47675

## (undated) DEVICE — DRSG PRIMAPORE 04X11 3/4"

## (undated) DEVICE — PREP CHLORAPREP 26ML TINTED HI-LITE ORANGE 930815

## (undated) DEVICE — DRSG DRAIN 4X4" 7086

## (undated) DEVICE — CUSTOM PACK LAP CHOLE SBA5BLCHEA

## (undated) DEVICE — TUBING SUCTION MEDI-VAC 1/4"X20' N620A - HE

## (undated) DEVICE — NEEDLE HYPO MAGELLAN SAFETY 22GA 1 1/2IN 8881850215

## (undated) DEVICE — Device

## (undated) DEVICE — SUTURE SILK 2-0 TIES 30IN SA85H

## (undated) DEVICE — CUSTOM PACK COLON CLOSING SBA5BCCHEA

## (undated) DEVICE — DRAIN BLAKE 19FR SIL 2231

## (undated) DEVICE — ENDO TROCAR SLEEVE KII Z-THREADED 05X100MM CTS02

## (undated) DEVICE — ESU PENCIL SMOKE EVAC W/ROCKER SWITCH 0703-047-000

## (undated) DEVICE — TUBE PENROSE 1/4 X 12 STRL 30414-025

## (undated) DEVICE — ENDO TROCAR FIRST ENTRY KII FIOS Z-THRD 11X100MM CTF33

## (undated) DEVICE — SNARE OVAL SMALL DISP 100600

## (undated) DEVICE — DRAPE OR LAPAROTOMY KC 89228*

## (undated) DEVICE — SOL NACL 0.9% IRRIG 1000ML BOTTLE 2F7124

## (undated) DEVICE — CUSTOM PACK GEN MAJOR SBA5BGMHEA

## (undated) DEVICE — ENDO TROCAR FIRST ENTRY KII FIOS Z-THRD 05X100MM CTF03

## (undated) DEVICE — PLATE GROUNDING ADULT W/CORD 9165L

## (undated) DEVICE — NDL INSUFFLATION 13GA 120MM C2201

## (undated) DEVICE — SU SILK 2-0 FS-1 18" 685G

## (undated) DEVICE — SUTURE PDS 0 60IN CTX+ LOOPED PDP990G

## (undated) DEVICE — RETRIEVAL ESCP 230CM 2.5MM RTHNT 360D 5X3CM BX00711197

## (undated) RX ORDER — ONDANSETRON 2 MG/ML
INJECTION INTRAMUSCULAR; INTRAVENOUS
Status: DISPENSED
Start: 2023-01-01

## (undated) RX ORDER — BUPIVACAINE HYDROCHLORIDE 2.5 MG/ML
INJECTION, SOLUTION EPIDURAL; INFILTRATION; INTRACAUDAL
Status: DISPENSED
Start: 2023-01-01

## (undated) RX ORDER — LIDOCAINE HYDROCHLORIDE 10 MG/ML
INJECTION, SOLUTION EPIDURAL; INFILTRATION; INTRACAUDAL; PERINEURAL
Status: DISPENSED
Start: 2023-01-01

## (undated) RX ORDER — FENTANYL CITRATE 50 UG/ML
INJECTION, SOLUTION INTRAMUSCULAR; INTRAVENOUS
Status: DISPENSED
Start: 2023-01-01

## (undated) RX ORDER — CEFAZOLIN SODIUM/WATER 2 G/20 ML
SYRINGE (ML) INTRAVENOUS
Status: DISPENSED
Start: 2023-01-01

## (undated) RX ORDER — PROPOFOL 10 MG/ML
INJECTION, EMULSION INTRAVENOUS
Status: DISPENSED
Start: 2023-01-01

## (undated) RX ORDER — DEXAMETHASONE SODIUM PHOSPHATE 10 MG/ML
INJECTION, SOLUTION INTRAMUSCULAR; INTRAVENOUS
Status: DISPENSED
Start: 2023-01-01